# Patient Record
Sex: MALE | Race: WHITE | NOT HISPANIC OR LATINO | Employment: OTHER | URBAN - METROPOLITAN AREA
[De-identification: names, ages, dates, MRNs, and addresses within clinical notes are randomized per-mention and may not be internally consistent; named-entity substitution may affect disease eponyms.]

---

## 2020-11-09 ENCOUNTER — TRANSCRIBE ORDERS (OUTPATIENT)
Dept: ADMINISTRATIVE | Facility: HOSPITAL | Age: 70
End: 2020-11-09

## 2020-11-09 DIAGNOSIS — Z13.6 SCREENING FOR ISCHEMIC HEART DISEASE: Primary | ICD-10-CM

## 2020-12-15 ENCOUNTER — TELEPHONE (OUTPATIENT)
Dept: NEPHROLOGY | Facility: CLINIC | Age: 70
End: 2020-12-15

## 2021-01-11 ENCOUNTER — TRANSCRIBE ORDERS (OUTPATIENT)
Dept: ADMINISTRATIVE | Facility: HOSPITAL | Age: 71
End: 2021-01-11

## 2021-02-19 ENCOUNTER — HOSPITAL ENCOUNTER (INPATIENT)
Facility: HOSPITAL | Age: 71
LOS: 16 days | Discharge: NURSING, REHAB OR LONG-TERM CARE | DRG: 240 | End: 2021-03-07
Attending: EMERGENCY MEDICINE | Admitting: INTERNAL MEDICINE
Payer: COMMERCIAL

## 2021-02-19 ENCOUNTER — APPOINTMENT (EMERGENCY)
Dept: RADIOLOGY | Facility: HOSPITAL | Age: 71
DRG: 240 | End: 2021-02-19
Payer: COMMERCIAL

## 2021-02-19 ENCOUNTER — APPOINTMENT (INPATIENT)
Dept: ULTRASOUND IMAGING | Facility: HOSPITAL | Age: 71
DRG: 240 | End: 2021-02-19
Payer: COMMERCIAL

## 2021-02-19 DIAGNOSIS — R53.1 GENERALIZED WEAKNESS: ICD-10-CM

## 2021-02-19 DIAGNOSIS — E11.51 TYPE 2 DIABETES MELLITUS WITH DIABETIC PERIPHERAL ANGIOPATHY WITHOUT GANGRENE, WITH LONG-TERM CURRENT USE OF INSULIN (HCC): ICD-10-CM

## 2021-02-19 DIAGNOSIS — I73.9 PERIPHERAL ARTERIAL DISEASE (HCC): ICD-10-CM

## 2021-02-19 DIAGNOSIS — Z79.4 TYPE 2 DIABETES MELLITUS WITH DIABETIC PERIPHERAL ANGIOPATHY WITHOUT GANGRENE, WITH LONG-TERM CURRENT USE OF INSULIN (HCC): ICD-10-CM

## 2021-02-19 DIAGNOSIS — M86.9 OSTEOMYELITIS (HCC): ICD-10-CM

## 2021-02-19 DIAGNOSIS — N18.30 STAGE 3 CHRONIC KIDNEY DISEASE (HCC): ICD-10-CM

## 2021-02-19 DIAGNOSIS — I96 GANGRENE OF TOE OF RIGHT FOOT (HCC): Primary | ICD-10-CM

## 2021-02-19 DIAGNOSIS — K59.00 CONSTIPATION: ICD-10-CM

## 2021-02-19 DIAGNOSIS — K21.9 GERD (GASTROESOPHAGEAL REFLUX DISEASE): ICD-10-CM

## 2021-02-19 DIAGNOSIS — H04.123 DRY EYES: ICD-10-CM

## 2021-02-19 DIAGNOSIS — M86.9 OSTEOMYELITIS OF RIGHT FOOT, UNSPECIFIED TYPE (HCC): ICD-10-CM

## 2021-02-19 DIAGNOSIS — Z01.89 ENCOUNTER FOR COMPETENCY EVALUATION: ICD-10-CM

## 2021-02-19 PROBLEM — E03.9 HYPOTHYROIDISM: Status: ACTIVE | Noted: 2021-02-19

## 2021-02-19 PROBLEM — I10 HYPERTENSION: Status: ACTIVE | Noted: 2021-02-19

## 2021-02-19 PROBLEM — E78.5 HYPERLIPIDEMIA: Status: ACTIVE | Noted: 2021-02-19

## 2021-02-19 PROBLEM — IMO0002 TYPE 2 DM MILD NONPROLIFERATIVE RETINOPATHY, MACULAR EDEMA, UNCONTROL: Status: RESOLVED | Noted: 2021-02-19 | Resolved: 2021-02-19

## 2021-02-19 PROBLEM — IMO0002 TYPE 2 DM MILD NONPROLIFERATIVE RETINOPATHY, MACULAR EDEMA, UNCONTROL: Status: ACTIVE | Noted: 2021-02-19

## 2021-02-19 PROBLEM — R26.2 AMBULATORY DYSFUNCTION: Status: ACTIVE | Noted: 2021-02-19

## 2021-02-19 PROBLEM — F32.A DEPRESSION: Status: ACTIVE | Noted: 2021-02-19

## 2021-02-19 PROBLEM — E11.9 TYPE 2 DIABETES MELLITUS, WITH LONG-TERM CURRENT USE OF INSULIN (HCC): Status: ACTIVE | Noted: 2021-02-19

## 2021-02-19 LAB
ALBUMIN SERPL BCP-MCNC: 2.9 G/DL (ref 3.5–5)
ALP SERPL-CCNC: 119 U/L (ref 46–116)
ALT SERPL W P-5'-P-CCNC: 18 U/L (ref 12–78)
ANION GAP SERPL CALCULATED.3IONS-SCNC: 13 MMOL/L (ref 4–13)
APTT PPP: 33 SECONDS (ref 23–37)
AST SERPL W P-5'-P-CCNC: 10 U/L (ref 5–45)
BACTERIA UR QL AUTO: NORMAL /HPF
BASE EX.OXY STD BLDV CALC-SCNC: 28 % (ref 60–80)
BASE EXCESS BLDV CALC-SCNC: -4.9 MMOL/L
BASOPHILS # BLD AUTO: 0.11 THOUSANDS/ΜL (ref 0–0.1)
BASOPHILS NFR BLD AUTO: 1 % (ref 0–1)
BETA-HYDROXYBUTYRATE: 1.4 MMOL/L
BILIRUB SERPL-MCNC: 0.34 MG/DL (ref 0.2–1)
BILIRUB UR QL STRIP: NEGATIVE
BUN SERPL-MCNC: 30 MG/DL (ref 5–25)
CALCIUM ALBUM COR SERPL-MCNC: 10 MG/DL (ref 8.3–10.1)
CALCIUM SERPL-MCNC: 9.1 MG/DL (ref 8.3–10.1)
CHLORIDE SERPL-SCNC: 95 MMOL/L (ref 100–108)
CLARITY UR: CLEAR
CO2 SERPL-SCNC: 22 MMOL/L (ref 21–32)
COLOR UR: YELLOW
CREAT SERPL-MCNC: 1.93 MG/DL (ref 0.6–1.3)
EOSINOPHIL # BLD AUTO: 0.04 THOUSAND/ΜL (ref 0–0.61)
EOSINOPHIL NFR BLD AUTO: 0 % (ref 0–6)
ERYTHROCYTE [DISTWIDTH] IN BLOOD BY AUTOMATED COUNT: 12.2 % (ref 11.6–15.1)
GFR SERPL CREATININE-BSD FRML MDRD: 35 ML/MIN/1.73SQ M
GLUCOSE SERPL-MCNC: 408 MG/DL (ref 65–140)
GLUCOSE SERPL-MCNC: 415 MG/DL (ref 65–140)
GLUCOSE UR STRIP-MCNC: ABNORMAL MG/DL
HCO3 BLDV-SCNC: 21.2 MMOL/L (ref 24–30)
HCT VFR BLD AUTO: 35.7 % (ref 36.5–49.3)
HGB BLD-MCNC: 11.9 G/DL (ref 12–17)
HGB UR QL STRIP.AUTO: ABNORMAL
IMM GRANULOCYTES # BLD AUTO: 0.16 THOUSAND/UL (ref 0–0.2)
IMM GRANULOCYTES NFR BLD AUTO: 1 % (ref 0–2)
INR PPP: 1.12 (ref 0.84–1.19)
KETONES UR STRIP-MCNC: ABNORMAL MG/DL
LACTATE SERPL-SCNC: 1.4 MMOL/L (ref 0.5–2)
LEUKOCYTE ESTERASE UR QL STRIP: ABNORMAL
LYMPHOCYTES # BLD AUTO: 1.56 THOUSANDS/ΜL (ref 0.6–4.47)
LYMPHOCYTES NFR BLD AUTO: 6 % (ref 14–44)
MCH RBC QN AUTO: 29.3 PG (ref 26.8–34.3)
MCHC RBC AUTO-ENTMCNC: 33.3 G/DL (ref 31.4–37.4)
MCV RBC AUTO: 88 FL (ref 82–98)
MONOCYTES # BLD AUTO: 2.13 THOUSAND/ΜL (ref 0.17–1.22)
MONOCYTES NFR BLD AUTO: 9 % (ref 4–12)
NEUTROPHILS # BLD AUTO: 20.24 THOUSANDS/ΜL (ref 1.85–7.62)
NEUTS SEG NFR BLD AUTO: 83 % (ref 43–75)
NITRITE UR QL STRIP: NEGATIVE
NON-SQ EPI CELLS URNS QL MICRO: NORMAL /HPF
NRBC BLD AUTO-RTO: 0 /100 WBCS
O2 CT BLDV-SCNC: 4.8 ML/DL
PCO2 BLDV: 43.2 MM HG (ref 42–50)
PH BLDV: 7.31 [PH] (ref 7.3–7.4)
PH UR STRIP.AUTO: 5.5 [PH]
PLATELET # BLD AUTO: 365 THOUSANDS/UL (ref 149–390)
PMV BLD AUTO: 10.9 FL (ref 8.9–12.7)
PO2 BLDV: 18.1 MM HG (ref 35–45)
POTASSIUM SERPL-SCNC: 5.1 MMOL/L (ref 3.5–5.3)
PROCALCITONIN SERPL-MCNC: 0.1 NG/ML
PROT SERPL-MCNC: 7.8 G/DL (ref 6.4–8.2)
PROT UR STRIP-MCNC: ABNORMAL MG/DL
PROTHROMBIN TIME: 14.5 SECONDS (ref 11.6–14.5)
RBC # BLD AUTO: 4.06 MILLION/UL (ref 3.88–5.62)
RBC #/AREA URNS AUTO: NORMAL /HPF
SODIUM SERPL-SCNC: 130 MMOL/L (ref 136–145)
SP GR UR STRIP.AUTO: 1.02 (ref 1–1.03)
TROPONIN I SERPL-MCNC: <0.02 NG/ML
UROBILINOGEN UR QL STRIP.AUTO: 0.2 E.U./DL
WBC # BLD AUTO: 24.24 THOUSAND/UL (ref 4.31–10.16)
WBC #/AREA URNS AUTO: NORMAL /HPF

## 2021-02-19 PROCEDURE — 85610 PROTHROMBIN TIME: CPT | Performed by: PHYSICIAN ASSISTANT

## 2021-02-19 PROCEDURE — 93923 UPR/LXTR ART STDY 3+ LVLS: CPT

## 2021-02-19 PROCEDURE — 85025 COMPLETE CBC W/AUTO DIFF WBC: CPT | Performed by: PHYSICIAN ASSISTANT

## 2021-02-19 PROCEDURE — 99285 EMERGENCY DEPT VISIT HI MDM: CPT

## 2021-02-19 PROCEDURE — 82948 REAGENT STRIP/BLOOD GLUCOSE: CPT

## 2021-02-19 PROCEDURE — 82010 KETONE BODYS QUAN: CPT | Performed by: PHYSICIAN ASSISTANT

## 2021-02-19 PROCEDURE — 82805 BLOOD GASES W/O2 SATURATION: CPT | Performed by: PHYSICIAN ASSISTANT

## 2021-02-19 PROCEDURE — 84145 PROCALCITONIN (PCT): CPT | Performed by: PHYSICIAN ASSISTANT

## 2021-02-19 PROCEDURE — 93925 LOWER EXTREMITY STUDY: CPT

## 2021-02-19 PROCEDURE — 81001 URINALYSIS AUTO W/SCOPE: CPT | Performed by: PHYSICIAN ASSISTANT

## 2021-02-19 PROCEDURE — 71045 X-RAY EXAM CHEST 1 VIEW: CPT

## 2021-02-19 PROCEDURE — 99223 1ST HOSP IP/OBS HIGH 75: CPT | Performed by: INTERNAL MEDICINE

## 2021-02-19 PROCEDURE — 87040 BLOOD CULTURE FOR BACTERIA: CPT | Performed by: PHYSICIAN ASSISTANT

## 2021-02-19 PROCEDURE — 73660 X-RAY EXAM OF TOE(S): CPT

## 2021-02-19 PROCEDURE — 96365 THER/PROPH/DIAG IV INF INIT: CPT

## 2021-02-19 PROCEDURE — 85730 THROMBOPLASTIN TIME PARTIAL: CPT | Performed by: PHYSICIAN ASSISTANT

## 2021-02-19 PROCEDURE — 99285 EMERGENCY DEPT VISIT HI MDM: CPT | Performed by: PHYSICIAN ASSISTANT

## 2021-02-19 PROCEDURE — 80053 COMPREHEN METABOLIC PANEL: CPT | Performed by: PHYSICIAN ASSISTANT

## 2021-02-19 PROCEDURE — 83605 ASSAY OF LACTIC ACID: CPT | Performed by: PHYSICIAN ASSISTANT

## 2021-02-19 PROCEDURE — 93005 ELECTROCARDIOGRAM TRACING: CPT

## 2021-02-19 PROCEDURE — 36415 COLL VENOUS BLD VENIPUNCTURE: CPT | Performed by: PHYSICIAN ASSISTANT

## 2021-02-19 PROCEDURE — 84484 ASSAY OF TROPONIN QUANT: CPT | Performed by: PHYSICIAN ASSISTANT

## 2021-02-19 RX ORDER — ATORVASTATIN CALCIUM 40 MG/1
40 TABLET, FILM COATED ORAL DAILY
COMMUNITY

## 2021-02-19 RX ORDER — ATENOLOL 25 MG/1
25 TABLET ORAL DAILY
COMMUNITY

## 2021-02-19 RX ORDER — AMLODIPINE BESYLATE 10 MG/1
10 TABLET ORAL DAILY
Status: DISCONTINUED | OUTPATIENT
Start: 2021-02-20 | End: 2021-02-22

## 2021-02-19 RX ORDER — SODIUM CHLORIDE 9 MG/ML
100 INJECTION, SOLUTION INTRAVENOUS CONTINUOUS
Status: DISCONTINUED | OUTPATIENT
Start: 2021-02-19 | End: 2021-02-21

## 2021-02-19 RX ORDER — TRAZODONE HYDROCHLORIDE 50 MG/1
50 TABLET ORAL
Status: DISCONTINUED | OUTPATIENT
Start: 2021-02-19 | End: 2021-03-07 | Stop reason: HOSPADM

## 2021-02-19 RX ORDER — CEFAZOLIN SODIUM 2 G/50ML
2000 SOLUTION INTRAVENOUS EVERY 8 HOURS
Status: DISCONTINUED | OUTPATIENT
Start: 2021-02-19 | End: 2021-02-24

## 2021-02-19 RX ORDER — LEVOTHYROXINE SODIUM 175 UG/1
175 TABLET ORAL DAILY
COMMUNITY

## 2021-02-19 RX ORDER — OXYCODONE HYDROCHLORIDE 5 MG/1
5 TABLET ORAL EVERY 4 HOURS PRN
Status: DISCONTINUED | OUTPATIENT
Start: 2021-02-19 | End: 2021-03-07 | Stop reason: HOSPADM

## 2021-02-19 RX ORDER — OXYCODONE HYDROCHLORIDE 5 MG/1
2.5 TABLET ORAL EVERY 4 HOURS PRN
Status: DISCONTINUED | OUTPATIENT
Start: 2021-02-19 | End: 2021-03-07 | Stop reason: HOSPADM

## 2021-02-19 RX ORDER — ATENOLOL 25 MG/1
25 TABLET ORAL DAILY
Status: DISCONTINUED | OUTPATIENT
Start: 2021-02-20 | End: 2021-03-07 | Stop reason: HOSPADM

## 2021-02-19 RX ORDER — AMLODIPINE BESYLATE 10 MG/1
10 TABLET ORAL DAILY
COMMUNITY

## 2021-02-19 RX ORDER — LISINOPRIL 20 MG/1
20 TABLET ORAL DAILY
COMMUNITY

## 2021-02-19 RX ORDER — POLYETHYLENE GLYCOL 3350 17 G/17G
17 POWDER, FOR SOLUTION ORAL DAILY PRN
Status: DISCONTINUED | OUTPATIENT
Start: 2021-02-19 | End: 2021-03-07 | Stop reason: HOSPADM

## 2021-02-19 RX ORDER — INSULIN GLARGINE 100 [IU]/ML
30 INJECTION, SOLUTION SUBCUTANEOUS
Status: DISCONTINUED | OUTPATIENT
Start: 2021-02-19 | End: 2021-02-20

## 2021-02-19 RX ORDER — ACETAMINOPHEN 325 MG/1
975 TABLET ORAL EVERY 8 HOURS SCHEDULED
Status: DISCONTINUED | OUTPATIENT
Start: 2021-02-19 | End: 2021-03-07 | Stop reason: HOSPADM

## 2021-02-19 RX ORDER — CITALOPRAM 20 MG/1
20 TABLET ORAL DAILY
COMMUNITY

## 2021-02-19 RX ORDER — LEVOTHYROXINE SODIUM 175 UG/1
175 TABLET ORAL
Status: DISCONTINUED | OUTPATIENT
Start: 2021-02-20 | End: 2021-03-07 | Stop reason: HOSPADM

## 2021-02-19 RX ORDER — GABAPENTIN 300 MG/1
300 CAPSULE ORAL DAILY
COMMUNITY
End: 2021-03-07 | Stop reason: HOSPADM

## 2021-02-19 RX ORDER — ATORVASTATIN CALCIUM 40 MG/1
40 TABLET, FILM COATED ORAL
Status: DISCONTINUED | OUTPATIENT
Start: 2021-02-20 | End: 2021-03-07 | Stop reason: HOSPADM

## 2021-02-19 RX ORDER — HYDROXYZINE 50 MG/1
50 TABLET, FILM COATED ORAL DAILY
COMMUNITY

## 2021-02-19 RX ORDER — HYDROMORPHONE HCL/PF 1 MG/ML
0.2 SYRINGE (ML) INJECTION EVERY 4 HOURS PRN
Status: DISCONTINUED | OUTPATIENT
Start: 2021-02-19 | End: 2021-03-05

## 2021-02-19 RX ORDER — CITALOPRAM 20 MG/1
20 TABLET ORAL DAILY
Status: DISCONTINUED | OUTPATIENT
Start: 2021-02-20 | End: 2021-03-07 | Stop reason: HOSPADM

## 2021-02-19 RX ORDER — PREGABALIN 50 MG/1
50 CAPSULE ORAL DAILY
COMMUNITY

## 2021-02-19 RX ORDER — TRAZODONE HYDROCHLORIDE 50 MG/1
50 TABLET ORAL
COMMUNITY

## 2021-02-19 RX ADMIN — CEFAZOLIN SODIUM 2000 MG: 2 SOLUTION INTRAVENOUS at 20:38

## 2021-02-19 RX ADMIN — METRONIDAZOLE 500 MG: 500 INJECTION, SOLUTION INTRAVENOUS at 22:09

## 2021-02-19 RX ADMIN — SODIUM CHLORIDE 100 ML/HR: 0.9 INJECTION, SOLUTION INTRAVENOUS at 20:38

## 2021-02-19 RX ADMIN — INSULIN GLARGINE 30 UNITS: 100 INJECTION, SOLUTION SUBCUTANEOUS at 22:09

## 2021-02-19 RX ADMIN — OXYCODONE HYDROCHLORIDE 5 MG: 5 TABLET ORAL at 20:36

## 2021-02-19 RX ADMIN — VANCOMYCIN HYDROCHLORIDE 1750 MG: 1 INJECTION, POWDER, LYOPHILIZED, FOR SOLUTION INTRAVENOUS at 16:40

## 2021-02-19 RX ADMIN — TRAZODONE HYDROCHLORIDE 50 MG: 50 TABLET ORAL at 22:08

## 2021-02-19 RX ADMIN — ACETAMINOPHEN 975 MG: 325 TABLET, FILM COATED ORAL at 22:08

## 2021-02-19 RX ADMIN — CEFTRIAXONE 2000 MG: 2 INJECTION, POWDER, FOR SOLUTION INTRAMUSCULAR; INTRAVENOUS at 15:56

## 2021-02-19 NOTE — ED ATTENDING ATTESTATION
2/19/2021  Ck Villa DO, saw and evaluated the patient  I have discussed the patient with the resident/non-physician practitioner and agree with the resident's/non-physician practitioner's findings, Plan of Care, and MDM as documented in the resident's/non-physician practitioner's note, except where noted  All available labs and Radiology studies were reviewed  I was present for key portions of any procedure(s) performed by the resident/non-physician practitioner and I was immediately available to provide assistance  At this point I agree with the current assessment done in the Emergency Department  I have conducted an independent evaluation of this patient a history and physical is as follows:      Patient appears older than stated age  Right second toe is blackened and edematous with purulent drainage at the base of the phalanx  There is also an area of open skin  ED Course     3:22 PM  Patient still does not have vital signs documented after being here for an hour and 15 minutes  Asked the nurse again to please obtain vital signs  Critical Care Time  ECG 12 Lead Documentation Only    Date/Time: 2/19/2021 3:48 PM  Performed by: Jayla Dickerson DO  Authorized by: Ravi Villa DO     Indications / Diagnosis:  Generalized weakness  ECG reviewed by me, the ED Provider: yes    Patient location:  ED  Comments:      Normal sinus rhythm at 78 beats per minute  Normal axis, normal intervals, no ST T wave abnormalities suggestive of ischemia  QTC is normal   No old available for comparison  A/P  1) Right 2nd toe gangrene with osteomyelitis - start antibiotics  Admit to [facility]  Podiatry consulted

## 2021-02-19 NOTE — Clinical Note
Case was discussed with SUNITA and the patient's admission status was agreed to be Admission Status: inpatient status to the service of Dr Franko Maret

## 2021-02-19 NOTE — SEPSIS NOTE
Sepsis Note   Avtar Starks 78 y o  male MRN: [id number]  Unit/Bed#: ED 06 Encounter: [id number]      qSOFA     Row Name 02/19/21 1541                Altered mental status GCS < 15  --        Respiratory Rate > / =59  0        Systolic BP < / =304  0        Q Sofa Score  0            Initial Sepsis Screening     Row Name 02/19/21 1642                Is the patient's history suggestive of a new or worsening infection? (!) Yes (Proceed)  -SD        Suspected source of infection  soft tissue  -SD        Are two or more of the following signs & symptoms of infection both present and new to the patient? No  -SD        Indicate SIRS criteria  Leukocytosis (WBC > 18918 IJL)  -SD        If the answer is yes to both questions, suspicion of sepsis is present  --        If severe sepsis is present AND tissue hypoperfusion perists in the hour after fluid resuscitation or lactate > 4, the patient meets criteria for SEPTIC SHOCK  --        Are any of the following organ dysfunction criteria present within 6 hours of suspected infection and SIRS criteria that are NOT considered to be chronic conditions?   --        Organ dysfunction  --        Date of presentation of severe sepsis  --        Time of presentation of severe sepsis  --        Tissue hypoperfusion persists in the hour after crystalloid fluid administration, evidenced, by either:  --        Was hypotension present within one hour of the conclusion of crystalloid fluid administration?  --        Date of presentation of septic shock  --        Time of presentation of septic shock  --          User Key  (r) = Recorded By, (t) = Taken By, (c) = Cosigned By    [address] Name Provider Type    ANNA Lopez PA-C Physician Assistant

## 2021-02-20 ENCOUNTER — ANESTHESIA EVENT (INPATIENT)
Dept: PERIOP | Facility: HOSPITAL | Age: 71
DRG: 240 | End: 2021-02-20
Payer: COMMERCIAL

## 2021-02-20 PROBLEM — D64.9 ANEMIA: Status: ACTIVE | Noted: 2021-02-20

## 2021-02-20 PROBLEM — E87.1 HYPONATREMIA: Status: ACTIVE | Noted: 2021-02-20

## 2021-02-20 LAB
ALBUMIN SERPL BCP-MCNC: 2.4 G/DL (ref 3.5–5)
ALP SERPL-CCNC: 105 U/L (ref 46–116)
ALT SERPL W P-5'-P-CCNC: 13 U/L (ref 12–78)
ANION GAP SERPL CALCULATED.3IONS-SCNC: 13 MMOL/L (ref 4–13)
AST SERPL W P-5'-P-CCNC: 9 U/L (ref 5–45)
ATRIAL RATE: 81 BPM
BASOPHILS # BLD AUTO: 0.1 THOUSANDS/ΜL (ref 0–0.1)
BASOPHILS NFR BLD AUTO: 0 % (ref 0–1)
BILIRUB SERPL-MCNC: 0.28 MG/DL (ref 0.2–1)
BUN SERPL-MCNC: 31 MG/DL (ref 5–25)
CALCIUM ALBUM COR SERPL-MCNC: 9.8 MG/DL (ref 8.3–10.1)
CALCIUM SERPL-MCNC: 8.5 MG/DL (ref 8.3–10.1)
CHLORIDE SERPL-SCNC: 99 MMOL/L (ref 100–108)
CO2 SERPL-SCNC: 18 MMOL/L (ref 21–32)
CREAT SERPL-MCNC: 1.7 MG/DL (ref 0.6–1.3)
EOSINOPHIL # BLD AUTO: 0.05 THOUSAND/ΜL (ref 0–0.61)
EOSINOPHIL NFR BLD AUTO: 0 % (ref 0–6)
ERYTHROCYTE [DISTWIDTH] IN BLOOD BY AUTOMATED COUNT: 12.4 % (ref 11.6–15.1)
GFR SERPL CREATININE-BSD FRML MDRD: 41 ML/MIN/1.73SQ M
GLUCOSE SERPL-MCNC: 207 MG/DL (ref 65–140)
GLUCOSE SERPL-MCNC: 229 MG/DL (ref 65–140)
GLUCOSE SERPL-MCNC: 238 MG/DL (ref 65–140)
GLUCOSE SERPL-MCNC: 255 MG/DL (ref 65–140)
GLUCOSE SERPL-MCNC: 390 MG/DL (ref 65–140)
GLUCOSE SERPL-MCNC: 395 MG/DL (ref 65–140)
HCT VFR BLD AUTO: 33.7 % (ref 36.5–49.3)
HGB BLD-MCNC: 11.4 G/DL (ref 12–17)
IMM GRANULOCYTES # BLD AUTO: 0.16 THOUSAND/UL (ref 0–0.2)
IMM GRANULOCYTES NFR BLD AUTO: 1 % (ref 0–2)
LYMPHOCYTES # BLD AUTO: 1.58 THOUSANDS/ΜL (ref 0.6–4.47)
LYMPHOCYTES NFR BLD AUTO: 7 % (ref 14–44)
MCH RBC QN AUTO: 29.8 PG (ref 26.8–34.3)
MCHC RBC AUTO-ENTMCNC: 33.8 G/DL (ref 31.4–37.4)
MCV RBC AUTO: 88 FL (ref 82–98)
MONOCYTES # BLD AUTO: 1.76 THOUSAND/ΜL (ref 0.17–1.22)
MONOCYTES NFR BLD AUTO: 8 % (ref 4–12)
NEUTROPHILS # BLD AUTO: 19.93 THOUSANDS/ΜL (ref 1.85–7.62)
NEUTS SEG NFR BLD AUTO: 84 % (ref 43–75)
NRBC BLD AUTO-RTO: 0 /100 WBCS
P AXIS: 60 DEGREES
PLATELET # BLD AUTO: 330 THOUSANDS/UL (ref 149–390)
PMV BLD AUTO: 10.7 FL (ref 8.9–12.7)
POTASSIUM SERPL-SCNC: 4.5 MMOL/L (ref 3.5–5.3)
PR INTERVAL: 146 MS
PROCALCITONIN SERPL-MCNC: 0.13 NG/ML
PROT SERPL-MCNC: 6.8 G/DL (ref 6.4–8.2)
QRS AXIS: -2 DEGREES
QRSD INTERVAL: 92 MS
QT INTERVAL: 360 MS
QTC INTERVAL: 410 MS
RBC # BLD AUTO: 3.83 MILLION/UL (ref 3.88–5.62)
SODIUM SERPL-SCNC: 130 MMOL/L (ref 136–145)
T WAVE AXIS: 48 DEGREES
VENTRICULAR RATE: 78 BPM
WBC # BLD AUTO: 23.58 THOUSAND/UL (ref 4.31–10.16)

## 2021-02-20 PROCEDURE — 84145 PROCALCITONIN (PCT): CPT | Performed by: PHYSICIAN ASSISTANT

## 2021-02-20 PROCEDURE — 82948 REAGENT STRIP/BLOOD GLUCOSE: CPT

## 2021-02-20 PROCEDURE — 93010 ELECTROCARDIOGRAM REPORT: CPT | Performed by: INTERNAL MEDICINE

## 2021-02-20 PROCEDURE — 99223 1ST HOSP IP/OBS HIGH 75: CPT | Performed by: INTERNAL MEDICINE

## 2021-02-20 PROCEDURE — 99232 SBSQ HOSP IP/OBS MODERATE 35: CPT | Performed by: SURGERY

## 2021-02-20 PROCEDURE — 93925 LOWER EXTREMITY STUDY: CPT | Performed by: SURGERY

## 2021-02-20 PROCEDURE — 99222 1ST HOSP IP/OBS MODERATE 55: CPT | Performed by: PODIATRIST

## 2021-02-20 PROCEDURE — 85025 COMPLETE CBC W/AUTO DIFF WBC: CPT | Performed by: INTERNAL MEDICINE

## 2021-02-20 PROCEDURE — 80053 COMPREHEN METABOLIC PANEL: CPT | Performed by: INTERNAL MEDICINE

## 2021-02-20 PROCEDURE — 93922 UPR/L XTREMITY ART 2 LEVELS: CPT | Performed by: SURGERY

## 2021-02-20 PROCEDURE — 99232 SBSQ HOSP IP/OBS MODERATE 35: CPT | Performed by: INTERNAL MEDICINE

## 2021-02-20 RX ORDER — INSULIN GLARGINE 100 [IU]/ML
30 INJECTION, SOLUTION SUBCUTANEOUS
Status: DISCONTINUED | OUTPATIENT
Start: 2021-02-20 | End: 2021-02-22

## 2021-02-20 RX ORDER — INSULIN GLARGINE 100 [IU]/ML
42 INJECTION, SOLUTION SUBCUTANEOUS
Status: DISCONTINUED | OUTPATIENT
Start: 2021-02-20 | End: 2021-02-20

## 2021-02-20 RX ADMIN — ENOXAPARIN SODIUM 40 MG: 40 INJECTION SUBCUTANEOUS at 08:54

## 2021-02-20 RX ADMIN — METRONIDAZOLE 500 MG: 500 INJECTION, SOLUTION INTRAVENOUS at 12:37

## 2021-02-20 RX ADMIN — METRONIDAZOLE 500 MG: 500 INJECTION, SOLUTION INTRAVENOUS at 05:47

## 2021-02-20 RX ADMIN — INSULIN LISPRO 4 UNITS: 100 INJECTION, SOLUTION INTRAVENOUS; SUBCUTANEOUS at 01:01

## 2021-02-20 RX ADMIN — ATENOLOL 25 MG: 25 TABLET ORAL at 08:55

## 2021-02-20 RX ADMIN — ATORVASTATIN CALCIUM 40 MG: 40 TABLET, FILM COATED ORAL at 16:53

## 2021-02-20 RX ADMIN — INSULIN GLARGINE 30 UNITS: 100 INJECTION, SOLUTION SUBCUTANEOUS at 21:31

## 2021-02-20 RX ADMIN — SODIUM CHLORIDE 100 ML/HR: 0.9 INJECTION, SOLUTION INTRAVENOUS at 08:53

## 2021-02-20 RX ADMIN — INSULIN LISPRO 2 UNITS: 100 INJECTION, SOLUTION INTRAVENOUS; SUBCUTANEOUS at 21:31

## 2021-02-20 RX ADMIN — TIOTROPIUM BROMIDE 18 MCG: 18 CAPSULE ORAL; RESPIRATORY (INHALATION) at 08:55

## 2021-02-20 RX ADMIN — CITALOPRAM HYDROBROMIDE 20 MG: 20 TABLET ORAL at 08:54

## 2021-02-20 RX ADMIN — CEFAZOLIN SODIUM 2000 MG: 2 SOLUTION INTRAVENOUS at 05:02

## 2021-02-20 RX ADMIN — ACETAMINOPHEN 975 MG: 325 TABLET, FILM COATED ORAL at 05:47

## 2021-02-20 RX ADMIN — CEFAZOLIN SODIUM 2000 MG: 2 SOLUTION INTRAVENOUS at 11:41

## 2021-02-20 RX ADMIN — METRONIDAZOLE 500 MG: 500 INJECTION, SOLUTION INTRAVENOUS at 20:49

## 2021-02-20 RX ADMIN — LEVOTHYROXINE SODIUM 175 MCG: 175 TABLET ORAL at 05:47

## 2021-02-20 RX ADMIN — INSULIN LISPRO 3 UNITS: 100 INJECTION, SOLUTION INTRAVENOUS; SUBCUTANEOUS at 11:41

## 2021-02-20 RX ADMIN — CEFAZOLIN SODIUM 2000 MG: 2 SOLUTION INTRAVENOUS at 19:01

## 2021-02-20 RX ADMIN — INSULIN LISPRO 5 UNITS: 100 INJECTION, SOLUTION INTRAVENOUS; SUBCUTANEOUS at 07:22

## 2021-02-20 RX ADMIN — INSULIN LISPRO 10 UNITS: 100 INJECTION, SOLUTION INTRAVENOUS; SUBCUTANEOUS at 07:24

## 2021-02-20 RX ADMIN — AMLODIPINE BESYLATE 10 MG: 10 TABLET ORAL at 08:54

## 2021-02-20 RX ADMIN — SODIUM CHLORIDE 100 ML/HR: 0.9 INJECTION, SOLUTION INTRAVENOUS at 20:49

## 2021-02-20 RX ADMIN — INSULIN LISPRO 2 UNITS: 100 INJECTION, SOLUTION INTRAVENOUS; SUBCUTANEOUS at 16:53

## 2021-02-20 RX ADMIN — OXYCODONE HYDROCHLORIDE 5 MG: 5 TABLET ORAL at 07:24

## 2021-02-20 RX ADMIN — OXYCODONE HYDROCHLORIDE 5 MG: 5 TABLET ORAL at 12:00

## 2021-02-20 RX ADMIN — ACETAMINOPHEN 975 MG: 325 TABLET, FILM COATED ORAL at 14:34

## 2021-02-20 RX ADMIN — ACETAMINOPHEN 975 MG: 325 TABLET, FILM COATED ORAL at 21:31

## 2021-02-20 RX ADMIN — TRAZODONE HYDROCHLORIDE 50 MG: 50 TABLET ORAL at 21:31

## 2021-02-20 NOTE — ASSESSMENT & PLAN NOTE
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

## 2021-02-20 NOTE — ASSESSMENT & PLAN NOTE
· Necrosis of the right 2nd digit (distal phalanx), proximal phalanx erythema toes, warmth, tender  Inflammatory signs present of the plantar surface as well  · X-ray of the right foot showed no evidence of osteomyelitis, 2nd PIP joint dislocation  · Patient does not meet sepsis/SIRS criteria  Afebrile, stable vital signs  · WBC 24 2  · Lactic acid within normal limits    Plan:  · Ancef 2 g IV, Flagyl 500 mg IV  · Geriatric pain management  · Wound care  · Podiatry and ID consults  · Appreciate Vascular surgery consult   · Lower extremity arterial Doppler:  Follow-up results    · Monitor vital signs  · Monitor WBC

## 2021-02-20 NOTE — ASSESSMENT & PLAN NOTE
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

## 2021-02-20 NOTE — UTILIZATION REVIEW
Initial Clinical Review    Admission: Date/Time/Statement:   Admission Orders (From admission, onward)     Ordered        02/19/21 1736  Inpatient Admission  Once                   Orders Placed This Encounter   Procedures    Inpatient Admission     Standing Status:   Standing     Number of Occurrences:   1     Order Specific Question:   Level of Care     Answer:   Med Surg [16]     Order Specific Question:   Estimated length of stay     Answer:   More than 2 Midnights     Order Specific Question:   Certification     Answer:   I certify that inpatient services are medically necessary for this patient for a duration of greater than two midnights  See H&P and MD Progress Notes for additional information about the patient's course of treatment  ED Arrival Information     Expected Arrival Acuity Means of Arrival Escorted By Service Admission Type    - 2/19/2021 14:09 Urgent Ambulance [facility] EMS Hospitalist Urgent    Arrival Complaint    general weakness        Chief Complaint   Patient presents with    Weakness - Generalized     PT comes to ED with c/o increasing weakness "for many weeks, today I just cant get around anymore" Per EMS "brother told us that his big toe on right foot is black" (PT is diabetic BS for EMS was 406)     Assessment/Plan:   79 yo male,  To ER via EMS From home,   Admitted IP status, medsurg level of care for treatment of  Rt 2nd toe  Osteomyelitis  In setting of  hyperglycemia, leukocytosis, elevated acetone and elevated crt  H/o DMT2 & CKD 3  (unknown baseline)   Reports injured 2nd toe Rt foot few wks prior  (fall)    Progressively turning black since  For last 3 days has felt ill - did not take his insulin or other meds, today unable to get out of bed d/t weakness  Pseudohyponatremia in the setting of hyperglycemia     EXAM:  Dry mucous membranes, Rt 2nd toe  W/obvious deformity, gangrenous, foul smelling  with surrounding erythema on plantar & dorsal surface of the foot   Dorsalis pedis on right not palpable  Will initiate  IVABs,  Provide pain management & wound care,  Consult podiatry/ID/vascular  Manage BS w/basal and SSI per qid accucks  Trend VS, CBC,  Renal indices - HOLD lisinopril, avoid hypotension  2/20  Vascular surgery consult:  Obtain b/l  Lower extremity arterial Doppler study; Toe/wound care per podiatry   2/20   INTERNAL MED:  crt improved w/IVF, cont  Increase basal insulin  PT/OT after possible toe amputation     2/20  Podiatry:  Acute osteomyelitis right 2nd toe with gangrene and exposed bone,  Cellulitis right foot, suspicious for abscess, PAD      limb threatening right foot infection -  will need open 2nd ray amputation / I&D to control the source of the infection before possible vascular intervention  NPO after midnight    Plan OR tomorrow      ED Triage Vitals   Temperature Pulse Respirations Blood Pressure SpO2   02/19/21 1541 02/19/21 1541 02/19/21 1541 02/19/21 1541 02/19/21 1541   98 °F (36 7 °C) 78 14 147/65 98 %      Temp Source Heart Rate Source Patient Position - Orthostatic VS BP Location FiO2 (%)   02/19/21 1926 -- 02/19/21 1926 02/19/21 1541 --   Oral  Lying Right arm       Pain Score       02/19/21 1926       Worst Possible Pain          Wt Readings from Last 1 Encounters:   02/19/21 95 3 kg (210 lb)     Additional Vital Signs:     02/19/21 1926  97 7 °F (36 5 °C)  83  14  144/82  94 %  None (Room air)  Lying     02/20/21 0700  97 6 °F (36 4 °C)  82  18  178/73Abnormal   91 %  None (Room air)  Lying   02/20/21 0300  98 1 °F (36 7 °C)  77  16  171/69Abnormal   95 %  None (Room air)         Pertinent Labs/Diagnostic Test Results:   2/19  ekg - nsr   2/19  CXR -  nad  2/19  Xray  2nd toe of the right foot showed no evidence of osteomyelitis, 2nd PIP joint dislocation      Results from last 7 days   Lab Units 02/20/21  0438 02/19/21  1547   WBC Thousand/uL 23 58* 24 24*   HEMOGLOBIN g/dL 11 4* 11 9*   HEMATOCRIT % 33 7* 35 7*   PLATELETS Thousands/uL 330 365   NEUTROS ABS Thousands/µL 19 93* 20 24*         Results from last 7 days   Lab Units 02/20/21  0438 02/19/21  1547   SODIUM mmol/L 130* 130*   POTASSIUM mmol/L 4 5 5 1   CHLORIDE mmol/L 99* 95*   CO2 mmol/L 18* 22   ANION GAP mmol/L 13 13   BUN mg/dL 31* 30*   CREATININE mg/dL 1 70* 1 93*   EGFR ml/min/1 73sq m 41 35   CALCIUM mg/dL 8 5 9 1     Results from last 7 days   Lab Units 02/20/21  0438 02/19/21  1547   AST U/L 9 10   ALT U/L 13 18   ALK PHOS U/L 105 119*   TOTAL PROTEIN g/dL 6 8 7 8   ALBUMIN g/dL 2 4* 2 9*   TOTAL BILIRUBIN mg/dL 0 28 0 34     Results from last 7 days   Lab Units 02/20/21  1126 02/20/21  0717 02/19/21  2110   POC GLUCOSE mg/dl 229* 395* 408*     Results from last 7 days   Lab Units 02/20/21  0438 02/19/21  1547   GLUCOSE RANDOM mg/dL 390* 415*     BETA-HYDROXYBUTYRATE   Date Value Ref Range Status   02/19/2021 1 4 (H) <0 6 mmol/L Final          Results from last 7 days   Lab Units 02/19/21  1547   PH DHAVAL  7 309   PCO2 DHAVAL mm Hg 43 2   PO2 DHAVAL mm Hg 18 1*   HCO3 DHAVAL mmol/L 21 2*   BASE EXC DHAVAL mmol/L -4 9   O2 CONTENT DHAVAL ml/dL 4 8   O2 HGB, VENOUS % 28 0*     Results from last 7 days   Lab Units 02/19/21  1547   TROPONIN I ng/mL <0 02         Results from last 7 days   Lab Units 02/19/21  1547   PROTIME seconds 14 5   INR  1 12   PTT seconds 33         Results from last 7 days   Lab Units 02/19/21  1547   PROCALCITONIN ng/ml 0 10     Results from last 7 days   Lab Units 02/19/21  1547   LACTIC ACID mmol/L 1 4     Results from last 7 days   Lab Units 02/19/21  1911   CLARITY UA  Clear   COLOR UA  Yellow   SPEC GRAV UA  1 025   PH UA  5 5   GLUCOSE UA mg/dl >=1000 (1%)*   KETONES UA mg/dl Trace*   BLOOD UA  Small*   PROTEIN UA mg/dl 30 (1+)*   NITRITE UA  Negative   BILIRUBIN UA  Negative   UROBILINOGEN UA E U /dl 0 2   LEUKOCYTES UA  Elevated glucose may cause decreased leukocyte values   See urine microscopic for [facility] result/*   WBC UA /hpf 0-1   RBC UA /hpf 1-2 BACTERIA UA /hpf Occasional   EPITHELIAL CELLS WET PREP /hpf Occasional     Results from last 7 days   Lab Units 02/19/21  1555 02/19/21  1540   BLOOD CULTURE  Received in Microbiology Lab  Culture in Progress  Received in Microbiology Lab  Culture in Progress  ED Treatment:   Medication Administration from 02/19/2021 1409 to 02/19/2021 1919       Date/Time Order Dose Route Action     02/19/2021 1556 cefTRIAXone (ROCEPHIN) 2,000 mg in dextrose 5 % 50 mL IVPB 2,000 mg Intravenous New Bag     02/19/2021 1640 vancomycin (VANCOCIN) 1,750 mg in sodium chloride 0 9 % 500 mL IVPB 1,750 mg Intravenous New Bag        Admitting Diagnosis: Osteomyelitis (HCC) [M86 9]  General weakness [R53 1]  Generalized weakness [R53 1]  Gangrene of toe of right foot ([facility] [address] ) [place]  Age/Sex: 78 y o  male     Admission Orders:  Scds;  Send stool for cdiff r/o;  Vs q 4 hr;  I/O q shift; Wound care daily  ;  accucks qid w/SSI:  Lo carb diet;     On Sunday - npo after 2359;    IP CONSULT TO PODIATRY  IP CONSULT TO VASCULAR SURGERY  IP CONSULT TO INFECTIOUS DISEASES    Scheduled Medications:  acetaminophen, 975 mg, Oral, Q8H [facility]  amLODIPine, 10 mg, Oral, Daily  atenolol, 25 mg, Oral, Daily  atorvastatin, 40 mg, Oral, Daily With Dinner  cefazolin, 2,000 mg, Intravenous, Q8H  citalopram, 20 mg, Oral, Daily  enoxaparin, 40 mg, Subcutaneous, Daily  insulin glargine, 42 Units, Subcutaneous, HS  insulin lispro, 1-5 Units, Subcutaneous, HS  insulin lispro, 1-6 Units, Subcutaneous, TID AC  insulin lispro, 14 Units, Subcutaneous, TID With Meals  levothyroxine, 175 mcg, Oral, Early Morning  metroNIDAZOLE, 500 mg, Intravenous, Q8H  tiotropium, 18 mcg, Inhalation, Daily  traZODone, 50 mg, Oral, HS      Continuous IV Infusions:  sodium chloride, 100 mL/hr, Intravenous, Continuous      PRN Meds:  HYDROmorphone, 0 2 mg, Intravenous, Q4H PRN  oxyCODONE, 2 5 mg, Oral, Q4H PRN  oxyCODONE, 5 mg, Oral, Q4H PRN  polyethylene glycol, 17 g, Oral, Daily PRN      Network Utilization Review Department  ATTENTION: Please call with any questions or concerns to [phone] and carefully listen to the prompts so that you are directed to the right person  All voicemails are confidential   Ok [place] all requests for admission clinical reviews, approved or denied determinations and any other requests to dedicated fax number below belonging to the campus where the patient is receiving treatment   List of dedicated fax numbers for the Facilities:  [address] DENIALS (Administrative/Medical Necessity) [phone]   [address] (Maternity/NICU/Pediatrics) [phone]   [address] [place] [address] Dr Ashwini Mayen 0584 (  Rosana Muir "Dorinda" 103) [address] [place] [address] Libby Alejandro Vigil [address] [place] [address] [phone]

## 2021-02-20 NOTE — ASSESSMENT & PLAN NOTE
Lab Results   Component Value Date    EGFR 35 02/19/2021    CREATININE 1 93 (H) 02/19/2021   · Per notes, patient has chronic kidney disease stage III  · Unclear baseline, labs not available  · Will monitor creatinine after IV fluids  As well will hold lisinopril for now  · Monitor BMP a m

## 2021-02-20 NOTE — PROGRESS NOTES
Progress Note - Doni Home 1950, 78 y o  male MRN: [id number]    Unit/Bed#: S -01 Encounter: [id number]    Primary Care Provider: Ulises Johnson   Date and time admitted to hospital: 2/19/2021  2:09 PM        * Osteomyelitis [facility])  Assessment & Plan  · Necrosis of the right 2nd digit (distal phalanx), proximal phalanx erythema toes, warmth, tender  Inflammatory signs present of the plantar surface as well  · X-ray of the right foot showed no evidence of osteomyelitis, 2nd PIP joint dislocation  · Patient does not meet sepsis/SIRS criteria  Afebrile, stable vital signs  · WBC 24 2  · Lactic acid within normal limits    Plan:  · Ancef 2 g IV, Flagyl 500 mg IV  · Geriatric pain management  · Wound care  · Podiatry and ID consults  · Appreciate Vascular surgery consult   · Lower extremity arterial Doppler:  Follow-up results  · Monitor vital signs  · Monitor WBC      Type 2 diabetes mellitus, with long-term current use of insulin ([facility] )  Assessment & Plan  No results found for: HGBA1C    Recent Labs     02/19/21  2110 02/20/21  0717   POCGLU 408* 395*       Blood Sugar Average: Last 72 hrs:  (P) 401 5   · Patient admitted with blood sugars of 415 mg, beta hydroxy butyrate increased, normal pH, normal bicarb and anion gap on BMP  · History of type 2 diabetes in the past 25 years, follow-up with endocrinologist as outpatient  On 30 NPH a m , 60 units NPH at night, Novolin R 30 units a m  As home medication  · Last hemoglobin A1c 8 0  · Patient admitted poor oral intake, and decreased appetite lately  Not took insulin as was ordered  Plan:  · On basal bolus insulin  Will increase the doses today  · Sliding scale insulin  · A1c level, if not done the last 90 days  · Accu-Chek  · Hypoglycemia protocol  · Diabetic diet  · Adjust treatment accordingly  Anemia  Assessment & Plan  · Mild  · No active bleeding  · Likely hemodilution from IV fluids  · Monitor    · Further workup and management if this worsens significantly  Hyponatremia  Assessment & Plan  · Pseudohyponatremia in the setting of hyperglycemia   · Monitor  · Manage patient's hyperglycemia  Ambulatory dysfunction  Assessment & Plan  · Per patient, 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23, 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  Lately patient noticed left hip pain, as well mentioned knee pain  · About 2 months ago patient had a fall  · Fall precaution  · Bed alarm  · PT/OT after possible toe amputation     Stage 3 chronic kidney disease  Assessment & Plan  Lab Results   Component Value Date    EGFR 41 02/20/2021    EGFR 35 02/19/2021    CREATININE 1 70 (H) 02/20/2021    CREATININE 1 93 (H) 02/19/2021   · Per notes, patient has chronic kidney disease stage III  · Unclear baseline, labs not available  · Serum creatinine improving with IV fluids  Thus will continue IV fluids for now  · Continue to hold off on the lisinopril for now  · Monitor BMP a m  · Avoid nephrotoxins  · Avoid hypotension  Hypothyroidism  Assessment & Plan  · Last TSH was within normal limits per chart review  · Continue levothyroxine 175 mg    Hyperlipidemia  Assessment & Plan  · Continue atorvastatin 40 mg    Depression  Assessment & Plan  · Continue citalopram 20 mg    Hypertension  Assessment & Plan  · Blood pressure acceptable  · On lisinopril 20 mg as home medication, atenolol 25 mg, amlodipine 10 mg   · Will hold lisinopril for now because of kidney function  · Monitor vital signs      VTE Pharmacologic Prophylaxis:   Pharmacologic: Enoxaparin (Lovenox)  Mechanical VTE Prophylaxis in Place: Yes    Patient Centered Rounds: I have performed bedside rounds with nursing staff today  Discussions with Specialists or Other Care Team Provider:     Education and Discussions with Family / Patient:  I discussed our findings, management and plans to the patient and patient's brother  Answered questions and concerns    They are okay with the management and plans     Time Spent for Care: 30 minutes  More than 50% of total time spent on counseling and coordination of care as described above  Current Length of Stay: 1 day(s)    Current Patient Status: Inpatient   Certification Statement: The patient will continue to require additional inpatient hospital stay due to Above findings and plans  Discharge Plan:  None yet  Code Status: Level 1 - Full Code      Subjective:   Patient is doing fine  Patient has only complaint is occasional pains on his 2nd digit of the right foot  Otherwise no other pains  No other complaints  No shortness of breath  No nausea, vomiting or diarrhea  Objective:     Vitals:   Temp (24hrs), Av 9 °F (36 6 °C), Min:97 6 °F (36 4 °C), Max:98 1 °F (36 7 °C)    Temp:  [97 6 °F (36 4 °C)-98 1 °F (36 7 °C)] 98 1 °F (36 7 °C)  HR:  [77-86] 77  Resp:  [14-18] 18  BP: (144-178)/(63-82) 147/63  SpO2:  [91 %-98 %] 91 %  Body mass index is 30 13 kg/m²  Input and Output Summary (last 24 hours): Intake/Output Summary (Last 24 hours) at 2021 1208  Last data filed at 2021 0853  Gross per 24 hour   Intake 1275 ml   Output 300 ml   Net 975 ml       Physical Exam:     Physical Exam  Vitals signs and nursing note reviewed  Constitutional:       General: He is not in acute distress  Appearance: He is not toxic-appearing or diaphoretic  Cardiovascular:      Rate and Rhythm: Normal rate and regular rhythm  Heart sounds: Normal heart sounds  Pulmonary:      Effort: Pulmonary effort is normal  No respiratory distress  Breath sounds: Normal breath sounds  Abdominal:      General: Bowel sounds are normal  There is no distension  Palpations: Abdomen is soft  Tenderness: There is no abdominal tenderness  Musculoskeletal:         General: Tenderness present  Right lower leg: No edema  Left lower leg: No edema  Skin:     General: Skin is warm  Coloration: Skin is not pale        Findings: Lesion present  No erythema or rash  Comments: Necrosis/gangrene 2nd toe right foot with tenderness  Neurological:      General: No focal deficit present  Mental Status: He is alert  Psychiatric:         Mood and Affect: Mood normal          Behavior: Behavior normal          Thought Content: Thought content normal               Additional Data:     Labs:    Results from last 7 days   Lab Units 02/20/21  0438   WBC Thousand/uL 23 58*   HEMOGLOBIN g/dL 11 4*   HEMATOCRIT % 33 7*   PLATELETS Thousands/uL 330   NEUTROS PCT % 84*   LYMPHS PCT % 7*   MONOS PCT % 8   EOS PCT % 0     Results from last 7 days   Lab Units 02/20/21  0438   POTASSIUM mmol/L 4 5   CHLORIDE mmol/L 99*   CO2 mmol/L 18*   BUN mg/dL 31*   CREATININE mg/dL 1 70*   CALCIUM mg/dL 8 5   ALK PHOS U/L 105   ALT U/L 13   AST U/L 9     Results from last 7 days   Lab Units 02/19/21  1547   INR  1 12       * I Have Reviewed All Lab Data Listed Above  * Additional Pertinent Lab Tests Reviewed: Sandy [address] Admission Reviewed    Imaging:    Imaging Reports Reviewed Today Include:  Diagnostic imaging studies that were done on this admission  Imaging Personally Reviewed by Myself Includes:  None  Recent Cultures (last 7 days):     Results from last 7 days   Lab Units 02/19/21  1555 02/19/21  1540   BLOOD CULTURE  Received in Microbiology Lab  Culture in Progress  Received in Microbiology Lab  Culture in Progress         Last 24 Hours Medication List:   Current Facility-Administered Medications   Medication Dose Route Frequency Provider Last Rate    acetaminophen  975 mg Oral Q8H [address] Dean Olivo MD      amLODIPine  10 mg Oral Daily Dean Olivo MD      atenolol  25 mg Oral Daily Dean Olivo MD      atorvastatin  40 mg Oral Daily With Lavonna Dancer, MD      cefazolin  2,000 mg Intravenous Q8H Dean Olivo MD 2,000 mg (02/20/21 1141)    citalopram  20 mg Oral Daily Delbert Maben MD Aden      enoxaparin  40 mg Subcutaneous Daily Miguel Esparza MD      HYDROmorphone  0 2 mg Intravenous Q4H PRN Miguel Esparza MD      insulin glargine  42 Units Subcutaneous HS Bibi Sinclair MD      insulin lispro  1-5 Units Subcutaneous HS Miguel Esparza MD      insulin lispro  1-6 Units Subcutaneous TID AC Miguel Esparza MD      insulin lispro  14 Units Subcutaneous TID With Meals Bibi Sinclair MD      levothyroxine  175 mcg Oral Early Morning Miguel Esparza MD      metroNIDAZOLE  500 mg Intravenous Q8H Miguel Esparza  mg (02/20/21 6053)    oxyCODONE  2 5 mg Oral Q4H PRN Miguel Esparza MD      oxyCODONE  5 mg Oral Q4H PRN Miguel Esparza MD      polyethylene glycol  17 g Oral Daily PRN Miguel Esparza MD      sodium chloride  100 mL/hr Intravenous Continuous Miguel Esparza  mL/hr (02/20/21 5185)    tiotropium  18 mcg Inhalation Daily Miguel Esparza MD      traZODone  50 mg Oral HS Miguel Esparza MD          Today, Patient Was Seen By: Kiara Corado MD    ** Please Note: Dragon 360 Dictation voice to text software may have been used in the creation of this document   **

## 2021-02-20 NOTE — ED PROVIDER NOTES
History  Chief Complaint   Patient presents with    Weakness - Generalized     PT comes to ED with c/o increasing weakness "for many weeks, today I just cant get around anymore" Per EMS "brother told us that his big toe on right foot is black" (PT is diabetic BS for EMS was 14)     This is a 14-year-old male with past medical history significant for type 2 diabetes mellitus presenting to the emergency department for weakness for weeks and a black big toe  Patient notes this weakness has been occurring for the past few weeks and occurred after he had strenuous activity  The patient notes it toe pain approximately 3 weeks ago  It was located on his right 2nd toe and has been worsening since  Patient notes discoloration of the left 2nd toe  Per EMS, blood sugar was 406 prior to evaluation  The patient notes chronic dyspnea  The patient denies fever, chills, chest pain, cough, palpitations, nausea, vomiting, diarrhea, constipation, dysuria, and lower extremity swelling  The patient denies peripheral neuropathy  The patient is an insulin dependent diabetic and supposedly has not been so compliant with his insulin over the course of the years  The patient denies other complaints at this time  History provided by:  Patient   used: No    Other  Location:  Right Second Toe  Quality:  Blackness; Infection  Severity:  Severe  Onset quality:  Gradual  Duration:  3 weeks  Timing:  Constant  Progression:  Worsening  Chronicity:  New  Worsened by: Movement; Walking  Associated symptoms: fatigue and shortness of breath    Associated symptoms: no abdominal pain, no chest pain, no cough, no diarrhea, no fever, no headaches, no loss of consciousness, no myalgias, no nausea, no sore throat, no vomiting and no wheezing    Risk factors:  T2DM      Prior to Admission Medications   Prescriptions Last Dose Informant Patient Reported? Taking?    amLODIPine (NORVASC) 10 mg tablet 2/19/2021 at Unknown time  Yes Yes   Sig: Take 10 mg by mouth daily   atenolol (TENORMIN) 25 mg tablet 2/19/2021 at Unknown time  Yes Yes   Sig: Take 25 mg by mouth daily   atorvastatin (LIPITOR) 40 mg tablet 2/18/2021 at Unknown time  Yes Yes   Sig: Take 40 mg by mouth daily   citalopram (CeleXA) 20 mg tablet 2/19/2021 at Unknown time  Yes Yes   Sig: Take 20 mg by mouth daily   gabapentin (NEURONTIN) 300 mg capsule Not Taking at Unknown time  Yes No   Sig: Take 300 mg by mouth daily   hydrOXYzine HCL (ATARAX) 50 mg tablet Not Taking at Unknown time  Yes No   Sig: Take 50 mg by mouth daily   levothyroxine 175 mcg tablet 2/19/2021 at Unknown time  Yes Yes   Sig: Take 175 mcg by mouth daily   lisinopril (ZESTRIL) 20 mg tablet 2/19/2021 at Unknown time Family Member Yes Yes   Sig: Take 20 mg by mouth daily   pregabalin (LYRICA) 50 mg capsule Not Taking at Unknown time  Yes No   Sig: Take 50 mg by mouth daily   tiotropium (SPIRIVA) 18 mcg inhalation capsule Not Taking at Unknown time  Yes No   Sig: Place 18 mcg into inhaler and inhale daily   traZODone (DESYREL) 50 mg tablet 2/18/2021 at Unknown time  Yes Yes   Sig: Take 50 mg by mouth daily at bedtime      Facility-Administered Medications: None       No past medical history on file  No past surgical history on file  No family history on file  I have reviewed and agree with the history as documented  No existing history information found  No existing history information found  Social History     Tobacco Use    Smoking status: Not on file   Substance Use Topics    Alcohol use: Not on file    Drug use: Not on file       Review of Systems   Constitutional: Positive for fatigue  Negative for chills, diaphoresis and fever  HENT: Negative for sore throat  Eyes: Negative for visual disturbance  Respiratory: Positive for shortness of breath  Negative for cough, chest tightness and wheezing  Cardiovascular: Negative for chest pain, palpitations and leg swelling  Gastrointestinal: Negative for abdominal pain, constipation, diarrhea, nausea and vomiting  Genitourinary: Negative for dysuria  Musculoskeletal: Positive for arthralgias  Negative for myalgias  Skin: Positive for color change and wound  Negative for pallor  Neurological: Negative for dizziness, loss of consciousness, syncope, weakness, light-headedness, numbness and headaches  Psychiatric/Behavioral: Negative for confusion  All other systems reviewed and are negative  Physical Exam  Physical Exam  Vitals signs and nursing note reviewed  Constitutional:       General: He is not in acute distress  Appearance: Normal appearance  He is obese  He is not ill-appearing, toxic-appearing or diaphoretic  HENT:      Head: Normocephalic and atraumatic  Nose: Nose normal       Mouth/Throat:      Mouth: Mucous membranes are moist    Eyes:      General: No scleral icterus  Right eye: No discharge  Left eye: No discharge  Extraocular Movements: Extraocular movements intact  Conjunctiva/sclera: Conjunctivae normal    Cardiovascular:      Rate and Rhythm: Normal rate and regular rhythm  Pulses: Normal pulses  Heart sounds: Normal heart sounds  No murmur  No friction rub  No gallop  Comments: 2+ DP pulses bilaterally  Pulmonary:      Effort: Pulmonary effort is normal  No respiratory distress  Breath sounds: Normal breath sounds  No stridor  No wheezing, rhonchi or rales  Abdominal:      General: Abdomen is flat  There is no distension  Palpations: Abdomen is soft  Tenderness: There is no abdominal tenderness  There is no right CVA tenderness, left CVA tenderness, guarding or rebound  Comments: No TTP in any of the four abdominal quadrants  Negative rebound  Negative Rovsing  Negative McBurney Point Tenderness  Negative Tidwell  Non-peritoneal   Musculoskeletal: Normal range of motion  Right lower leg: No edema        Left lower leg: No edema    Skin:     General: Skin is warm and dry  Capillary Refill: Capillary refill takes less than 2 seconds  Coloration: Skin is not jaundiced or pale  Findings: No lesion  Comments: Right second toe is black with a significant amount of eschar; there is sloughing and purulence of toe proximally at base of toe near metatarsal  There is not ROM of the right second toe; no active signs of bleeding  Right second toe is foul-smelling with obvious deformity   Neurological:      General: No focal deficit present  Mental Status: He is alert and oriented to person, place, and time  Mental status is at baseline     Psychiatric:         Mood and Affect: Mood normal          Behavior: Behavior normal          Vital Signs  ED Triage Vitals   Temperature Pulse Respirations Blood Pressure SpO2   02/19/21 1541 02/19/21 1541 02/19/21 1541 02/19/21 1541 02/19/21 1541   98 °F (36 7 °C) 78 14 147/65 98 %      Temp Source Heart Rate Source Patient Position - Orthostatic VS BP Location FiO2 (%)   02/19/21 1926 -- 02/19/21 1926 02/19/21 1541 --   Oral  Lying Right arm       Pain Score       02/19/21 1926       Worst Possible Pain           Vitals:    02/19/21 1541 02/19/21 1926   BP: 147/65 144/82   Pulse: 78 83   Patient Position - Orthostatic VS:  Lying         Visual Acuity      ED Medications  Medications   sodium chloride 0 9 % infusion (has no administration in time range)   enoxaparin (LOVENOX) subcutaneous injection 40 mg (has no administration in time range)   insulin lispro (HumaLOG) 100 units/mL subcutaneous injection 10 Units (has no administration in time range)   insulin glargine (LANTUS) subcutaneous injection 30 Units 0 3 mL (has no administration in time range)   insulin lispro (HumaLOG) 100 units/mL subcutaneous injection 1-6 Units (has no administration in time range)   insulin lispro (HumaLOG) 100 units/mL subcutaneous injection 1-5 Units (has no administration in time range)   acetaminophen (TYLENOL) tablet 975 mg (has no administration in time range)   oxyCODONE (ROXICODONE) IR tablet 2 5 mg (has no administration in time range)   oxyCODONE (ROXICODONE) IR tablet 5 mg (has no administration in time range)   HYDROmorphone (DILAUDID) injection 0 2 mg (has no administration in time range)   polyethylene glycol (MIRALAX) packet 17 g (has no administration in time range)   amLODIPine (NORVASC) tablet 10 mg (has no administration in time range)   atenolol (TENORMIN) tablet 25 mg (has no administration in time range)   citalopram (CeleXA) tablet 20 mg (has no administration in time range)   levothyroxine tablet 175 mcg (has no administration in time range)   traZODone (DESYREL) tablet 50 mg (has no administration in time range)   atorvastatin (LIPITOR) tablet 40 mg (has no administration in time range)   tiotropium (SPIRIVA) capsule for inhaler 18 mcg (has no administration in time range)   ceFAZolin (ANCEF) IVPB (premix in dextrose) 2,000 mg 50 mL (has no administration in time range)   metroNIDAZOLE (FLAGYL) IVPB (premix) 500 mg 100 mL (has no administration in time range)   cefTRIAXone (ROCEPHIN) 2,000 mg in dextrose 5 % 50 mL IVPB (0 mg Intravenous Stopped 2/19/21 1640)   vancomycin (VANCOCIN) 1,750 mg in sodium chloride 0 9 % 500 mL IVPB (1,750 mg Intravenous New Bag 2/19/21 1640)       Diagnostic Studies  Results Reviewed     Procedure Component Value Units Date/Time    UA w Reflex to Microscopic w Reflex to Culture [[id number]]  (Abnormal) Collected: 02/19/21 1911    Lab Status: Final result Specimen: Urine, Clean Catch Updated: 02/19/21 1933     Color, UA Yellow     Clarity, UA Clear     Specific Gravity, UA 1 025     pH, UA 5 5     Leukocytes, UA Elevated glucose may cause decreased leukocyte values   See urine microscopic for [facility] result/     Nitrite, UA Negative     Protein, UA 30 (1+) mg/dl      Glucose, UA >=1000 (1%) mg/dl      Ketones, UA Trace mg/dl      Urobilinogen, UA 0 2 E U /dl Bilirubin, UA Negative     Blood, UA Small    Urine Microscopic [[id number]] Collected: 02/19/21 1911    Lab Status:  In process Specimen: Urine, Clean Catch Updated: 02/19/21 1933    Beta Hydroxybutyrate [[id number]]  (Abnormal) Collected: 02/19/21 1724    Lab Status: Final result Specimen: Blood from Arm, Right Updated: 02/19/21 1743     BETA-HYDROXYBUTYRATE 1 4 mmol/L     Protime-INR [[id number]]  (Normal) Collected: 02/19/21 1547    Lab Status: Final result Specimen: Blood from Arm, Right Updated: 02/19/21 1709     Protime 14 5 seconds      INR 1 12    APTT [[id number]]  (Normal) Collected: 02/19/21 1547    Lab Status: Final result Specimen: Blood from Arm, Right Updated: 02/19/21 1709     PTT 33 seconds     Comprehensive metabolic panel [[id number]]  (Abnormal) Collected: 02/19/21 1547    Lab Status: Final result Specimen: Blood from Arm, Right Updated: 02/19/21 1623     Sodium 130 mmol/L      Potassium 5 1 mmol/L      Chloride 95 mmol/L      CO2 22 mmol/L      ANION GAP 13 mmol/L      BUN 30 mg/dL      Creatinine 1 93 mg/dL      Glucose 415 mg/dL      Calcium 9 1 mg/dL      Corrected Calcium 10 0 mg/dL      AST 10 U/L      ALT 18 U/L      Alkaline Phosphatase 119 U/L      Total Protein 7 8 g/dL      Albumin 2 9 g/dL      Total Bilirubin 0 34 mg/dL      eGFR 35 ml/min/1 73sq m     Narrative:      [place] guidelines for Chronic Kidney Disease (CKD):     Stage 1 with normal or high GFR (GFR > 90 mL/min/1 73 square meters)    Stage 2 Mild CKD (GFR = 60-89 mL/min/1 73 square meters)    Stage 3A Moderate CKD (GFR = 45-59 mL/min/1 73 square meters)    Stage 3B Moderate CKD (GFR = 30-44 mL/min/1 73 square meters)    Stage 4 Severe CKD (GFR = 15-29 mL/min/1 73 square meters)    Stage 5 End Stage CKD (GFR <15 mL/min/1 73 square meters)  Note: GFR calculation is accurate only with a steady state creatinine    Lactic acid [[id number]]  (Normal) Collected: 02/19/21 1547    Lab Status: Final result Specimen: Blood from Arm, Right Updated: 02/19/21 1615     LACTIC ACID 1 4 mmol/L     Narrative:      Result may be elevated if tourniquet was used during collection  Troponin I [[id number]]  (Normal) Collected: 02/19/21 1547    Lab Status: Final result Specimen: Blood from Arm, Right Updated: 02/19/21 1615     Troponin I <0 02 ng/mL     Blood culture #1 [[id number]] Collected: 02/19/21 1555    Lab Status: In process Specimen: Blood from Arm, Right Updated: 02/19/21 1608    CBC and differential [[id number]]  (Abnormal) Collected: 02/19/21 1547    Lab Status: Final result Specimen: Blood from Arm, Right Updated: 02/19/21 1556     WBC 24 24 Thousand/uL      RBC 4 06 Million/uL      Hemoglobin 11 9 g/dL      Hematocrit 35 7 %      MCV 88 fL      MCH 29 3 pg      MCHC 33 3 g/dL      RDW 12 2 %      MPV 10 9 fL      Platelets 696 Thousands/uL      nRBC 0 /100 WBCs      Neutrophils Relative 83 %      Immat GRANS % 1 %      Lymphocytes Relative 6 %      Monocytes Relative 9 %      Eosinophils Relative 0 %      Basophils Relative 1 %      Neutrophils Absolute 20 24 Thousands/µL      Immature Grans Absolute 0 16 Thousand/uL      Lymphocytes Absolute 1 56 Thousands/µL      Monocytes Absolute 2 13 Thousand/µL      Eosinophils Absolute 0 04 Thousand/µL      Basophils Absolute 0 11 Thousands/µL     Blood gas, Venous [[id number]]  (Abnormal) Collected: 02/19/21 1547    Lab Status: Final result Specimen: Blood from Arm, Right Updated: 02/19/21 1556     pH, Aki 7 309     pCO2, Aki 43 2 mm Hg      pO2, Aki 18 1 mm Hg      HCO3, Aki 21 2 mmol/L      Base Excess, Aki -4 9 mmol/L      O2 Content, Aki 4 8 ml/dL      O2 HGB, VENOUS 28 0 %     Blood culture #2 [[id number]] Collected: 02/19/21 1540    Lab Status: In process Specimen: Blood from Arm, Right Updated: 02/19/21 1553    Procalcitonin with AM Reflex [[id number]] Collected: 02/19/21 1547    Lab Status:  In process Specimen: Blood from Arm, Right Updated: 02/19/21 1553 XR chest portable   ED Interpretation by Ugo Cheatham PA-C (02/19 1511)   No acute cardiopulmonary disease  Final Result by Rochelle Matamoros MD (02/19 1509)      No acute cardiopulmonary disease  Workstation performed: [id number]         XR toe second min 2 views RIGHT   Final Result by Rochelle Matamoros MD (02/19 1516)   No evidence of osteomyelitis      2nd PIP joint dislocation      Multilevel degenerative changes      Workstation performed: [id number]         VAS lower limb arterial duplex, complete bilateral    (Results Pending)              Procedures  ECG 12 Lead Documentation Only    Date/Time: 2/19/2021 7:56 PM  Performed by: Ugo Cheatham PA-C  Authorized by: Ugo Cheatham PA-C     Indications / Diagnosis:  Sepsis Protocol  ECG reviewed by me, the ED Provider: yes    Patient location:  ED  Previous ECG:     Previous ECG:  Unavailable  Interpretation:     Interpretation: normal    Rate:     ECG rate:  78    ECG rate assessment: normal    Rhythm:     Rhythm: sinus rhythm    Ectopy:     Ectopy: none    QRS:     QRS axis:  Normal  Conduction:     Conduction: normal    ST segments:     ST segments:  Normal  T waves:     T waves: normal    Comments:      No acute signs of ischemia  Overall, no explanation on this EKG for the patient's symptoms             ED Course  ED Course as of Feb 19 1939 Fri Feb 19, 2021   1627 WBC(!): 24 24   1627 Creatinine(!): 1 93   1802 BETA-HYDROXYBUTYRATE(!): 1 4   1802 pH, Aki: 7 309                         Initial Sepsis Screening     Row Name 02/19/21 1642                Is the patient's history suggestive of a new or worsening infection? (!) Yes (Proceed)  -SD        Suspected source of infection  soft tissue  -SD        Are two or more of the following signs & symptoms of infection both present and new to the patient?   No  -SD        Indicate SIRS criteria  Leukocytosis (WBC > 59285 IJL)  -SD        If the answer is yes to both questions, suspicion of sepsis is present  --        If severe sepsis is present AND tissue hypoperfusion perists in the hour after fluid resuscitation or lactate > 4, the patient meets criteria for SEPTIC SHOCK  --        Are any of the following organ dysfunction criteria present within 6 hours of suspected infection and SIRS criteria that are NOT considered to be chronic conditions? --        Organ dysfunction  --        Date of presentation of severe sepsis  --        Time of presentation of severe sepsis  --        Tissue hypoperfusion persists in the hour after crystalloid fluid administration, evidenced, by either:  --        Was hypotension present within one hour of the conclusion of crystalloid fluid administration?  --        Date of presentation of septic shock  --        Time of presentation of septic shock  --          User Key  (r) = Recorded By, (t) = Taken By, (c) = Cosigned By    [address] Name Provider Type    ANNA Degroot PA-C Physician Assistant                        MDM  Number of Diagnoses or Management Options  Gangrene of toe of right foot [facility]): new and requires workup  Generalized weakness: new and requires workup  Diagnosis management comments: With the 45-year-old male with past medical history significant for type 2 diabetes mellitus presenting to the emergency department for weeks worth of weakness in addition to a "black toe" for approximately 3 weeks  Per EMS, patient's blood glucose was 406 on arrival   EKG within normal limits  Chest x-ray within normal limits  Lab workup significant for leukocytosis at 24 24, glucose 415, creatinine 1 93, lactic acid 1 4, acetone 1 4, and glucosuria  I had a discussion with podiatry who was recommending a lower arterial study (which I did not order but did notify Dr Shakira Huizar about)  Podiatry recommending seeing the patient tomorrow    X-ray of right 2nd toe did not show any osteomyelitis but did note dislocation of the left 2nd digit  Given the extent of the patient's damage to his left 2nd toe and overall poor glycemic control with leukocytosis, glucosuria, elevated creatinine, elevated acetone, decision was made to admit the patient for inpatient therapy  The patient was accepted for inpatient status to the service of Dr Senait Pleitez  The patient is stable for admission at this time  All questions answered to the patient's satisfaction  The patient verifies understanding and agrees to this plan at this time         Amount and/or Complexity of Data Reviewed  Clinical lab tests: ordered and reviewed  Tests in the radiology section of CPT®: ordered and reviewed  Discuss the patient with other providers: yes  Independent visualization of images, tracings, or specimens: yes        Disposition  Final diagnoses:   Gangrene of toe of right foot ([facility] [address] )   Generalized weakness     Time reflects when diagnosis was documented in both MDM as applicable and the Disposition within this note     Time User Action Codes Description Comment    2/19/2021  5:34 PM Andrew Grimaldo Add [L03 90] Cellulitis     2/19/2021  5:35 PM Kat Deems Add [R53 1] Weakness     2/19/2021  5:35 PM Kishan Giles Add Kala Fabian Gangrene of toe of right foot ([facility] [address] )     2/19/2021  5:35 PM Kat Deems Add [R53 1] Generalized weakness     2/19/2021  5:35 PM Krysta Solum [R53 1] Weakness     2/19/2021  5:35 PM Kishan Giles Modify [I96] Gangrene of toe of right foot ([facility] [address] )     2/19/2021  5:35 PM Kat Deems Remove [L03 90] Cellulitis     2/19/2021  6:42 PM Lenda [place] Add [M86 9] Osteomyelitis ([facility] [address] )     2/19/2021  7:25 PM Lenda [place] Add [I73 9] Peripheral arterial disease [facility])       ED Disposition     ED Disposition Condition Date/Time Comment    Admit Stable Fri Feb 19, 2021  5:36 PM Case was discussed with SUNITA and the patient's admission status was agreed to be Admission Status: inpatient status to the service of Dr Luc Maurer  Follow-up Information    None         Current Discharge Medication List      CONTINUE these medications which have NOT CHANGED    Details   amLODIPine (NORVASC) 10 mg tablet Take 10 mg by mouth daily      atenolol (TENORMIN) 25 mg tablet Take 25 mg by mouth daily      atorvastatin (LIPITOR) 40 mg tablet Take 40 mg by mouth daily      citalopram (CeleXA) 20 mg tablet Take 20 mg by mouth daily      levothyroxine 175 mcg tablet Take 175 mcg by mouth daily      lisinopril (ZESTRIL) 20 mg tablet Take 20 mg by mouth daily      traZODone (DESYREL) 50 mg tablet Take 50 mg by mouth daily at bedtime      gabapentin (NEURONTIN) 300 mg capsule Take 300 mg by mouth daily      hydrOXYzine HCL (ATARAX) 50 mg tablet Take 50 mg by mouth daily      pregabalin (LYRICA) 50 mg capsule Take 50 mg by mouth daily      tiotropium (SPIRIVA) 18 mcg inhalation capsule Place 18 mcg into inhaler and inhale daily           No discharge procedures on file      PDMP Review     None          ED Provider  Electronically Signed by           Carly Henriquez PA-C  02/19/21 2002

## 2021-02-20 NOTE — ASSESSMENT & PLAN NOTE
Lab Results   Component Value Date    EGFR 41 02/20/2021    EGFR 35 02/19/2021    CREATININE 1 70 (H) 02/20/2021    CREATININE 1 93 (H) 02/19/2021   · Per notes, patient has chronic kidney disease stage III  · Unclear baseline, labs not available  · Serum creatinine improving with IV fluids  Thus will continue IV fluids for now  · Continue to hold off on the lisinopril for now  · Monitor BMP a m  · Avoid nephrotoxins  · Avoid hypotension

## 2021-02-20 NOTE — ASSESSMENT & PLAN NOTE
· Mild  · No active bleeding  · Likely hemodilution from IV fluids  · Monitor  · Further workup and management if this worsens significantly

## 2021-02-20 NOTE — ASSESSMENT & PLAN NOTE
· Necrosis of the right 2nd digit (distal phalanx), proximal phalanx erythema toes, warmth, tender  Inflammatory signs present of the plantar surface as well  · X-ray of the right foot showed no evidence of osteomyelitis, 2nd PIP joint dislocation  · Patient does not meet sepsis/SIRS criteria  Afebrile, stable vital signs    · WBC 24 2  · Lactic acid within normal limits    Plan:  · Ancef 2 g IV, Flagyl 500 mg IV  · Geriatric pain management  · Wound care  · Podiatry consult  · Vascular consult   · Lower extremity arterial Doppler  · Monitor vital signs  · Monitor WBC

## 2021-02-20 NOTE — PROGRESS NOTES
Progress Note - Anabell Quiroga 1950, 78 y o  male MRN: [id number]    Unit/Bed#: S -01 Encounter: [id number]    Primary Care Provider: Ulises Elmore   Date and time admitted to hospital: 2/19/2021  2:09 PM        * Osteomyelitis [facility])  Assessment & Plan  · Necrosis of the right 2nd digit (distal phalanx), proximal phalanx erythema toes, warmth, tender  Inflammatory signs present of the plantar surface as well  · X-ray of the right foot showed no evidence of osteomyelitis, 2nd PIP joint dislocation  · Patient does not meet sepsis/SIRS criteria  Afebrile, stable vital signs  · WBC 24 2  · Lactic acid within normal limits    Plan:  · Ancef 2 g IV, Flagyl 500 mg IV  · Geriatric pain management  · Wound care  · Podiatry consult  · Vascular consult   · Lower extremity arterial Doppler  · Monitor vital signs  · Monitor WBC      Type 2 diabetes mellitus, with long-term current use of insulin (HCC)  Assessment & Plan  No results found for: HGBA1C    No results for input(s): POCGLU in the last 72 hours  Blood Sugar Average: Last 72 hrs:     · Patient admitted with blood sugars of 415 mg, beta hydroxy butyrate increased, normal pH, normal bicarb and anion gap on BMP  · History of type 2 diabetes in the past 25 years, follow-up with endocrinologist as outpatient  On 30 NPH a m , 60 units NPH at night, Novolin R 30 units a m  As home medication  · Last hemoglobin A1c 8 0  · Patient admitted poor oral intake, and decreased appetite lately  Not took insulin as was ordered  Plan:  · Lantus 30 units HS, Levemir insulin 10 units with meals  · Sliding scale insulin  · Accu-Chek  · Hypoglycemia protocol  · Diabetic diet      Ambulatory dysfunction  Assessment & Plan  · Per patient, 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23, 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  Lately patient noticed left hip pain, as well mentioned knee pain    · About 2 months ago patient had a fall  · Fall precaution  · Bed alarm  · PT/OT after toe amputation     Stage 3 chronic kidney disease  Assessment & Plan  Lab Results   Component Value Date    EGFR 35 02/19/2021    CREATININE 1 93 (H) 02/19/2021   · Per notes, patient has chronic kidney disease stage III  · Unclear baseline, labs not available  · Will monitor creatinine after IV fluids  As well will hold lisinopril for now  · Monitor BMP a m  Hypothyroidism  Assessment & Plan  · Last TSH was within normal limits per chart review  · Continue levothyroxine 175 mg    Hyperlipidemia  Assessment & Plan  · Continue atorvastatin 40 mg    Depression  Assessment & Plan  · Continue citalopram 20 mg    Hypertension  Assessment & Plan  · Blood pressure acceptable  · On lisinopril 20 mg as home medication, atenolol 25 mg, amlodipine 10 mg   · Will hold lisinopril for now because of kidney function  · Monitor vital signs      VTE Prophylaxis: Enoxaparin (Lovenox)  / sequential compression device   Code Status:  Level 1  POLST: POLST form is not discussed and not completed at this time  Anticipated Length of Stay:  Patient will be admitted on an Inpatient basis with an anticipated length of stay of  greater than 2 midnights  Justification for Hospital Stay:  Osteomyelitis of the right toe, IV antibiotics    Chief Complaint:   Right foot pain    History of Present Illness:    Felicita Domingo is a 78 y o  male with past medical history of hypertension, hyperlipidemia, type 2 diabetes with long use of insulin, chronic kidney disease, smoker(currently smokes about 3 to cigarettes daily) who presents with right foot pain  About 3 weeks ago patient fractured his right 2nd toe, had a bandage on since, but noticed worsening pain, redness for which he came today to emergency department  Physical exam revealed right distal phalanx necrosis of the 2nd toe, erythema, tenderness, warmth around the 2nd toe as well the plantar surface of the right foot  Left foot is very tender to light touch    X-ray of the right foot showed no evidence of osteomyelitis, with 2nd PIP joint dislocation  Patient was admitted for further management  Patient has leukocytosis, is not febrile and has stable vital signs  He was started on vancomycin and ceftriaxone in the emergency department  His blood sugar upon admission was 415, patient reported noncompliance with insulin lately because of poor appetite and poor oral intake  He mentioned that he his cat (21years old) recently  and he could not eat well since  He mentioned that when he is not eating he is not taking insulin as well  Patient denied nausea, vomiting, abdominal pain, confusion  Review of Systems:    Review of Systems   Constitutional: Positive for appetite change and chills  Negative for diaphoresis and fever  HENT: Negative for congestion  Respiratory: Negative for cough, chest tightness, shortness of breath and wheezing  Cardiovascular: Negative for chest pain, palpitations and leg swelling  Gastrointestinal: Negative for abdominal pain, blood in stool, constipation, diarrhea, nausea and vomiting  Endocrine: Negative for polydipsia, polyphagia and polyuria  Genitourinary: Negative for flank pain, frequency and hematuria  Musculoskeletal: Positive for arthralgias and gait problem  Skin: Positive for color change and wound  Neurological: Negative for dizziness, light-headedness and headaches  Psychiatric/Behavioral: Negative for confusion  All other systems reviewed and are negative  Past Medical and Surgical History:     No past medical history on file  No past surgical history on file  Meds/Allergies:    Prior to Admission medications    Medication Sig Start Date End Date Taking?  Authorizing Provider   amLODIPine (NORVASC) 10 mg tablet Take 10 mg by mouth daily   Yes Historical Provider, MD   atenolol (TENORMIN) 25 mg tablet Take 25 mg by mouth daily   Yes Historical Provider, MD   atorvastatin (LIPITOR) 40 mg tablet Take 40 mg by mouth daily   Yes Historical Provider, MD   citalopram (CeleXA) 20 mg tablet Take 20 mg by mouth daily   Yes Historical Provider, MD   levothyroxine 175 mcg tablet Take 175 mcg by mouth daily   Yes Historical Provider, MD   lisinopril (ZESTRIL) 20 mg tablet Take 20 mg by mouth daily   Yes Historical Provider, MD   traZODone (DESYREL) 50 mg tablet Take 50 mg by mouth daily at bedtime   Yes Historical Provider, MD   gabapentin (NEURONTIN) 300 mg capsule Take 300 mg by mouth daily    Historical Provider, MD   hydrOXYzine HCL (ATARAX) 50 mg tablet Take 50 mg by mouth daily    Historical Provider, MD   pregabalin (LYRICA) 50 mg capsule Take 50 mg by mouth daily    Historical Provider, MD   tiotropium (SPIRIVA) 18 mcg inhalation capsule Place 18 mcg into inhaler and inhale daily    Historical Provider, MD     I have reviewed home medications using allscripts  Allergies: Not on File    Social History:     Marital Status:    Occupation:  Retired, worked as a   Patient Pre-hospital Living Situation:  Lives with brother  Patient Pre-hospital Level of Mobility:  Independent  Patient Pre-hospital Diet Restrictions:  No restrictions  Substance Use History:   Social History     Substance and Sexual Activity   Alcohol Use Not on file     Social History     Tobacco Use   Smoking Status Not on file     Social History     Substance and Sexual Activity   Drug Use Not on file       Family History:    No family history on file  Physical Exam:     Vitals:   Blood Pressure: 144/82 (02/19/21 1926)  Pulse: 83 (02/19/21 1926)  Temperature: 97 7 °F (36 5 °C) (02/19/21 1926)  Temp Source: Oral (02/19/21 1926)  Respirations: 14 (02/19/21 1926)  Height: 5' 10" (177 8 cm) (02/19/21 1619)  Weight - Scale: 95 3 kg (210 lb) (02/19/21 1619)  SpO2: 94 % (02/19/21 1926)    Physical Exam  Vitals signs reviewed  Constitutional:       General: He is not in acute distress       Appearance: He is not toxic-appearing or diaphoretic  HENT:      Head: Normocephalic  Eyes:      General: No scleral icterus  Right eye: No discharge  Left eye: No discharge  Extraocular Movements: Extraocular movements intact  Conjunctiva/sclera: Conjunctivae normal       Pupils: Pupils are equal, round, and reactive to light  Neck:      Musculoskeletal: Normal range of motion  Cardiovascular:      Rate and Rhythm: Normal rate and regular rhythm  Heart sounds: Normal heart sounds  No murmur  Pulmonary:      Effort: No respiratory distress  Breath sounds: Normal breath sounds  No wheezing, rhonchi or rales  Abdominal:      General: There is no distension  Palpations: Abdomen is soft  Tenderness: There is no abdominal tenderness  There is no right CVA tenderness, left CVA tenderness, guarding or rebound  Musculoskeletal:      Right lower leg: No edema  Left lower leg: No edema  Skin:     General: Skin is warm  Findings: Erythema and lesion present  Comments: Necrosis he 2nd right toe  Warmth, erythema, tenderness left foot   Neurological:      Mental Status: He is alert and oriented to person, place, and time  Psychiatric:         Mood and Affect: Mood normal          Behavior: Behavior normal            Additional Data:     Lab Results: I have personally reviewed pertinent reports  Results from last 7 days   Lab Units 02/19/21  1547   WBC Thousand/uL 24 24*   HEMOGLOBIN g/dL 11 9*   HEMATOCRIT % 35 7*   PLATELETS Thousands/uL 365   NEUTROS PCT % 83*   LYMPHS PCT % 6*   MONOS PCT % 9   EOS PCT % 0     Results from last 7 days   Lab Units 02/19/21  1547   POTASSIUM mmol/L 5 1   CHLORIDE mmol/L 95*   CO2 mmol/L 22   BUN mg/dL 30*   CREATININE mg/dL 1 93*   CALCIUM mg/dL 9 1   ALK PHOS U/L 119*   ALT U/L 18   AST U/L 10     Results from last 7 days   Lab Units 02/19/21  1547   INR  1 12       Imaging: I have personally reviewed pertinent reports        Xr Chest Portable    Result Date: 2/19/2021  Narrative: CHEST INDICATION:   Sepsis Protocol  COMPARISON:  None EXAM PERFORMED/VIEWS:  XR CHEST PORTABLE Single view FINDINGS: Cardiomediastinal silhouette appears unremarkable  The lungs are clear  No pneumothorax or pleural effusion  Osseous structures appear within normal limits for patient age  Impression: No acute cardiopulmonary disease  Workstation performed: [id number]     Althea March Second Min 2 Views Right    Result Date: 2/19/2021  Narrative: RIGHT TOES INDICATION:   R/O Osteomyelitis  COMPARISON:  None VIEWS:  XR TOE RIGHT SECOND MIN 2 VIEWS Images: 3 FINDINGS: Dislocation at the 2nd PIP joint, likely dorsally  There is no acute fracture Advanced degenerative changes throughout the interphalangeal joints of the foot No lytic or blastic osseous lesion  Soft tissues are unremarkable  Impression: No evidence of osteomyelitis 2nd PIP joint dislocation Multilevel degenerative changes Workstation performed: [id number]       EKG, Pathology, and Other Studies Reviewed on Admission:   · EKG:  Sinus rhythm, T-wave inverted in V5 -V6    Epic / Care Everywhere Records Reviewed: Yes     ** Please Note: This note has been constructed using a voice recognition system   **

## 2021-02-20 NOTE — ASSESSMENT & PLAN NOTE
· Blood pressure acceptable  · On lisinopril 20 mg as home medication, atenolol 25 mg, amlodipine 10 mg   · Will hold lisinopril for now because of kidney function    · Monitor vital signs

## 2021-02-20 NOTE — CONSULTS
Consultation - Vascular Surgery   Tanvi Ford 78 y o  male MRN: [id number]  Unit/Bed#: S -01 Encounter: [id number]      Assessment/Plan      Assessment:  69yo M with DM, CKD, tobacco use, who p/w R 2nd toe tissue loss following injury 3 weeks ago    Plan:  · F/u LEADs -- further recommendations pending results  · [address] per podiatry  · Rest of care per primary    History of Present Illness   Physician Requesting Consult: Guido Parra MD  Reason for Consult / Principal Problem: PAD with R 2nd toe tissue loss    HPI: Tanvi Ford is a 78y o  year old male with PMHx of IDDM, CKD3, HTN, HLD, active smoker, who presents with R 2nd toe tissue loss  He states he sustained a fracture in that toe about 3 weeks ago and it has progressively worsened since then  He currently smokes 3-4 cigarettes per day, but states he smoked 3 PPD for many years  He has never seen a vascular surgeon before and is not on any blood thinning medications regularly  Inpatient consult to Vascular Surgery     Performed by  Jaime Teague MD     Authorized by Luisito Barreto MD              Review of Systems   Skin: Positive for wound  All other systems reviewed and are negative  Historical Information   No past medical history on file  No past surgical history on file  Social History   Social History     Substance and Sexual Activity   Alcohol Use Not on file     Social History     Substance and Sexual Activity   Drug Use Not on file     No existing history information found  No existing history information found    Social History     Tobacco Use   Smoking Status Not on file     Family History: No family history on file }    Meds/Allergies   all current active meds have been reviewed, current meds:   Current Facility-Administered Medications   Medication Dose Route Frequency    acetaminophen (TYLENOL) tablet 975 mg  975 mg Oral Q8H [facility]    [START ON 2/20/2021] amLODIPine (NORVASC) tablet 10 mg  10 mg Oral Daily  [START ON 2/20/2021] atenolol (TENORMIN) tablet 25 mg  25 mg Oral Daily    [START ON 2/20/2021] atorvastatin (LIPITOR) tablet 40 mg  40 mg Oral Daily With Dinner    ceFAZolin (ANCEF) IVPB (premix in dextrose) 2,000 mg 50 mL  2,000 mg Intravenous Q8H    [START ON 2/20/2021] citalopram (CeleXA) tablet 20 mg  20 mg Oral Daily    [START ON 2/20/2021] enoxaparin (LOVENOX) subcutaneous injection 40 mg  40 mg Subcutaneous Daily    HYDROmorphone (DILAUDID) injection 0 2 mg  0 2 mg Intravenous Q4H PRN    insulin glargine (LANTUS) subcutaneous injection 30 Units 0 3 mL  30 Units Subcutaneous HS    insulin lispro (HumaLOG) 100 units/mL subcutaneous injection 1-5 Units  1-5 Units Subcutaneous HS    [START ON 2/20/2021] insulin lispro (HumaLOG) 100 units/mL subcutaneous injection 1-6 Units  1-6 Units Subcutaneous TID AC    [START ON 2/20/2021] insulin lispro (HumaLOG) 100 units/mL subcutaneous injection 10 Units  10 Units Subcutaneous TID With Meals    [START ON 2/20/2021] levothyroxine tablet 175 mcg  175 mcg Oral Early Morning    metroNIDAZOLE (FLAGYL) IVPB (premix) 500 mg 100 mL  500 mg Intravenous Q8H    oxyCODONE (ROXICODONE) IR tablet 2 5 mg  2 5 mg Oral Q4H PRN    oxyCODONE (ROXICODONE) IR tablet 5 mg  5 mg Oral Q4H PRN    polyethylene glycol (MIRALAX) packet 17 g  17 g Oral Daily PRN    sodium chloride 0 9 % infusion  100 mL/hr Intravenous Continuous    [START ON 2/20/2021] tiotropium (SPIRIVA) capsule for inhaler 18 mcg  18 mcg Inhalation Daily    traZODone (DESYREL) tablet 50 mg  50 mg Oral HS    and PTA meds:   Prior to Admission Medications   Prescriptions Last Dose Informant Patient Reported? Taking?    amLODIPine (NORVASC) 10 mg tablet 2/19/2021 at Unknown time  Yes Yes   Sig: Take 10 mg by mouth daily   atenolol (TENORMIN) 25 mg tablet 2/19/2021 at Unknown time  Yes Yes   Sig: Take 25 mg by mouth daily   atorvastatin (LIPITOR) 40 mg tablet 2/18/2021 at Unknown time  Yes Yes   Sig: Take 40 mg by mouth daily   citalopram (CeleXA) 20 mg tablet 2/19/2021 at Unknown time  Yes Yes   Sig: Take 20 mg by mouth daily   gabapentin (NEURONTIN) 300 mg capsule Not Taking at Unknown time  Yes No   Sig: Take 300 mg by mouth daily   hydrOXYzine HCL (ATARAX) 50 mg tablet Not Taking at Unknown time  Yes No   Sig: Take 50 mg by mouth daily   levothyroxine 175 mcg tablet 2/19/2021 at Unknown time  Yes Yes   Sig: Take 175 mcg by mouth daily   lisinopril (ZESTRIL) 20 mg tablet 2/19/2021 at Unknown time Family Member Yes Yes   Sig: Take 20 mg by mouth daily   pregabalin (LYRICA) 50 mg capsule Not Taking at Unknown time  Yes No   Sig: Take 50 mg by mouth daily   tiotropium (SPIRIVA) 18 mcg inhalation capsule Not Taking at Unknown time  Yes No   Sig: Place 18 mcg into inhaler and inhale daily   traZODone (DESYREL) 50 mg tablet 2/18/2021 at Unknown time  Yes Yes   Sig: Take 50 mg by mouth daily at bedtime      Facility-Administered Medications: None     No Known Allergies    Objective   Vitals: Blood pressure 158/70, pulse 86, temperature 98 1 °F (36 7 °C), temperature source Oral, resp  rate 14, height 5' 10" (1 778 m), weight 95 3 kg (210 lb), SpO2 96 %  ,Body mass index is 30 13 kg/m²  Intake/Output Summary (Last 24 hours) at 2/19/2021 2210  Last data filed at 2/19/2021 1640  Gross per 24 hour   Intake 50 ml   Output --   Net 50 ml     Invasive Devices     Peripheral Intravenous Line            Peripheral IV 02/19/21 Left Wrist less than 1 day    Peripheral IV 02/19/21 Right;Ventral (anterior) Forearm less than 1 day    Peripheral IV 02/19/21 Right;Ventral (anterior); Medial Arm less than 1 day                Physical Exam   Gen:  NAD  HENT: MMM  CV:  RRR   weakly palpable DP bilaterally, non-palpable PT bilaterally  Lungs: nl effort on RA  Abd:  soft, NT/ND  Ext:  R 2nd toe dry gangrene with mild erythema proximally   B/L feet warm with M/S intact   Skin:  see above  Neuro: A&Ox3     Lab Results:   I have personally reviewed pertinent reports  , CBC with diff:   Lab Results   Component Value Date    WBC 24 24 (H) 02/19/2021    HGB 11 9 (L) 02/19/2021    HCT 35 7 (L) 02/19/2021    MCV 88 02/19/2021     02/19/2021    MCH 29 3 02/19/2021    MCHC 33 3 02/19/2021    RDW 12 2 02/19/2021    MPV 10 9 02/19/2021    NRBC 0 02/19/2021   , BMP/CMP:   Lab Results   Component Value Date    SODIUM 130 (L) 02/19/2021    K 5 1 02/19/2021    CL 95 (L) 02/19/2021    CO2 22 02/19/2021    BUN 30 (H) 02/19/2021    CREATININE 1 93 (H) 02/19/2021    CALCIUM 9 1 02/19/2021    AST 10 02/19/2021    ALT 18 02/19/2021    ALKPHOS 119 (H) 02/19/2021    EGFR 35 02/19/2021   , Coags:   Lab Results   Component Value Date    PTT 33 02/19/2021    INR 1 12 02/19/2021     Imaging Studies: I have personally reviewed pertinent reports  EKG, Pathology, and Other Studies: I have personally reviewed pertinent reports      VTE Prophylaxis: Enoxaparin (Lovenox)     Code Status: Level 1 - Full Code  Advance Directive and Living Will:      Power of :    POLST:

## 2021-02-20 NOTE — ASSESSMENT & PLAN NOTE
Lab Results   Component Value Date    EGFR 41 02/20/2021    EGFR 35 02/19/2021    CREATININE 1 70 (H) 02/20/2021    CREATININE 1 93 (H) 02/19/2021   · Per notes, patient has chronic kidney disease stage III  · Unclear baseline, labs not available  · Will monitor creatinine after IV fluids  As well will hold lisinopril for now  · Monitor BMP a m

## 2021-02-20 NOTE — H&P
H&P- Doni Home 1950, 78 y o  male MRN: [id number]    Unit/Bed#: S -01 Encounter: [id number]    Primary Care Provider: Ulises Johnson   Date and time admitted to hospital: 2/19/2021  2:09 PM        * Osteomyelitis [facility])  Assessment & Plan  · Necrosis of the right 2nd digit (distal phalanx), proximal phalanx erythema toes, warmth, tender  Inflammatory signs present of the plantar surface as well  · X-ray of the right foot showed no evidence of osteomyelitis, 2nd PIP joint dislocation  · Patient does not meet sepsis/SIRS criteria  Afebrile, stable vital signs  · WBC 24 2  · Lactic acid within normal limits    Plan:  · Ancef 2 g IV, Flagyl 500 mg IV  · Geriatric pain management  · Wound care  · Podiatry consult  · Vascular consult   · Lower extremity arterial Doppler  · Monitor vital signs  · Monitor WBC      Type 2 diabetes mellitus, with long-term current use of insulin ([facility] )  Assessment & Plan  No results found for: HGBA1C    Recent Labs     02/19/21  2110 02/20/21  0717   POCGLU 408* 395*       Blood Sugar Average: Last 72 hrs:  (P) 401 5   · Patient admitted with blood sugars of 415 mg, beta hydroxy butyrate increased, normal pH, normal bicarb and anion gap on BMP  · History of type 2 diabetes in the past 25 years, follow-up with endocrinologist as outpatient  On 30 NPH a m , 60 units NPH at night, Novolin R 30 units a m  As home medication  · Last hemoglobin A1c 8 0  · Patient admitted poor oral intake, and decreased appetite lately  Not took insulin as was ordered  Plan:  · Lantus 30 units HS, Levemir insulin 10 units with meals  · Sliding scale insulin  · Accu-Chek  · Hypoglycemia protocol  · Diabetic diet      Hyponatremia  Assessment & Plan  · Pseudohyponatremia in the setting of hyperglycemia   · Corrected sodium 138  Ambulatory dysfunction  Assessment & Plan  · Per patient, 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23, 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   Lately patient noticed left hip pain, as well mentioned knee pain  · About 2 months ago patient had a fall  · Fall precaution  · Bed alarm  · PT/OT after toe amputation     Stage 3 chronic kidney disease  Assessment & Plan  Lab Results   Component Value Date    EGFR 41 02/20/2021    EGFR 35 02/19/2021    CREATININE 1 70 (H) 02/20/2021    CREATININE 1 93 (H) 02/19/2021   · Per notes, patient has chronic kidney disease stage III  · Unclear baseline, labs not available  · Will monitor creatinine after IV fluids  As well will hold lisinopril for now  · Monitor BMP a m  Hypothyroidism  Assessment & Plan  · Last TSH was within normal limits per chart review  · Continue levothyroxine 175 mg    Hyperlipidemia  Assessment & Plan  · Continue atorvastatin 40 mg    Depression  Assessment & Plan  · Continue citalopram 20 mg    Hypertension  Assessment & Plan  · Blood pressure acceptable  · On lisinopril 20 mg as home medication, atenolol 25 mg, amlodipine 10 mg   · Will hold lisinopril for now because of kidney function  · Monitor vital signs      VTE Prophylaxis: Enoxaparin (Lovenox)  / sequential compression device   Code Status:  Level 1  POLST: POLST form is not discussed and not completed at this time      Anticipated Length of Stay:  Patient will be admitted on an Inpatient basis with an anticipated length of stay of  greater than 2 midnights  Justification for Hospital Stay:  Osteomyelitis of the right toe, IV antibiotics     Chief Complaint:   Right foot pain     History of Present Illness:     Cory Ortega is a 78 y o  male with past medical history of hypertension, hyperlipidemia, type 2 diabetes with long use of insulin, chronic kidney disease, smoker(currently smokes about 3 to cigarettes daily) who presents with right foot pain  About 3 weeks ago patient fractured his right 2nd toe, had a bandage on since, but noticed worsening pain, redness for which he came today to emergency department    Physical exam revealed right distal phalanx necrosis of the 2nd toe, erythema, tenderness, warmth around the 2nd toe as well the plantar surface of the right foot  Left foot is very tender to light touch  X-ray of the right foot showed no evidence of osteomyelitis, with 2nd PIP joint dislocation  Patient was admitted for further management  Patient has leukocytosis, is not febrile and has stable vital signs  He was started on vancomycin and ceftriaxone in the emergency department  His blood sugar upon admission was 415, patient reported noncompliance with insulin lately because of poor appetite and poor oral intake  He mentioned that he his cat (21years old) recently  and he could not eat well since  He mentioned that when he is not eating he is not taking insulin as well  Patient denied nausea, vomiting, abdominal pain, confusion        Review of Systems:     Review of Systems   Constitutional: Positive for appetite change and chills  Negative for diaphoresis and fever  HENT: Negative for congestion  Respiratory: Negative for cough, chest tightness, shortness of breath and wheezing  Cardiovascular: Negative for chest pain, palpitations and leg swelling  Gastrointestinal: Negative for abdominal pain, blood in stool, constipation, diarrhea, nausea and vomiting  Endocrine: Negative for polydipsia, polyphagia and polyuria  Genitourinary: Negative for flank pain, frequency and hematuria  Musculoskeletal: Positive for arthralgias and gait problem  Skin: Positive for color change and wound  Neurological: Negative for dizziness, light-headedness and headaches  Psychiatric/Behavioral: Negative for confusion  All other systems reviewed and are negative         Past Medical and Surgical History:      Medical History   No past medical history on file         Surgical History   No past surgical history on file         Meds/Allergies:             Prior to Admission medications    Medication Sig Start Date End Date Taking? Authorizing Provider   amLODIPine (NORVASC) 10 mg tablet Take 10 mg by mouth daily     Yes Historical Provider, MD   atenolol (TENORMIN) 25 mg tablet Take 25 mg by mouth daily     Yes Historical Provider, MD   atorvastatin (LIPITOR) 40 mg tablet Take 40 mg by mouth daily     Yes Historical Provider, MD   citalopram (CeleXA) 20 mg tablet Take 20 mg by mouth daily     Yes Historical Provider, MD   levothyroxine 175 mcg tablet Take 175 mcg by mouth daily     Yes Historical Provider, MD   lisinopril (ZESTRIL) 20 mg tablet Take 20 mg by mouth daily     Yes Historical Provider, MD   traZODone (DESYREL) 50 mg tablet Take 50 mg by mouth daily at bedtime     Yes Historical Provider, MD   gabapentin (NEURONTIN) 300 mg capsule Take 300 mg by mouth daily       Historical Provider, MD   hydrOXYzine HCL (ATARAX) 50 mg tablet Take 50 mg by mouth daily       Historical Provider, MD   pregabalin (LYRICA) 50 mg capsule Take 50 mg by mouth daily       Historical Provider, MD   tiotropium (SPIRIVA) 18 mcg inhalation capsule Place 18 mcg into inhaler and inhale daily       Historical Provider, MD      I have reviewed home medications using allscripts      Allergies: Not on File     Social History:     Marital Status:     Occupation:  Retired, worked as a   Patient Pre-hospital Living Situation:  Lives with brother  Patient Pre-hospital Level of Mobility:  Independent  Patient Pre-hospital Diet Restrictions:  No restrictions  Substance Use History:   Social History          Substance and Sexual Activity   Alcohol Use Not on file      Social History          Tobacco Use   Smoking Status Not on file      Social History          Substance and Sexual Activity   Drug Use Not on file         Family History:     No family history on file      Physical Exam:      Vitals:   Blood Pressure: 144/82 (02/19/21 1926)  Pulse: 83 (02/19/21 1926)  Temperature: 97 7 °F (36 5 °C) (02/19/21 1926)  Temp Source: Oral (02/19/21 1926)  Respirations: 14 (02/19/21 1926)  Height: 5' 10" (177 8 cm) (02/19/21 1619)  Weight - Scale: 95 3 kg (210 lb) (02/19/21 1619)  SpO2: 94 % (02/19/21 1926)     Physical Exam  Vitals signs reviewed  Constitutional:       General: He is not in acute distress  Appearance: He is not toxic-appearing or diaphoretic  HENT:      Head: Normocephalic  Eyes:      General: No scleral icterus  Right eye: No discharge  Left eye: No discharge  Extraocular Movements: Extraocular movements intact  Conjunctiva/sclera: Conjunctivae normal       Pupils: Pupils are equal, round, and reactive to light  Neck:      Musculoskeletal: Normal range of motion  Cardiovascular:      Rate and Rhythm: Normal rate and regular rhythm  Heart sounds: Normal heart sounds  No murmur  Pulmonary:      Effort: No respiratory distress  Breath sounds: Normal breath sounds  No wheezing, rhonchi or rales  Abdominal:      General: There is no distension  Palpations: Abdomen is soft  Tenderness: There is no abdominal tenderness  There is no right CVA tenderness, left CVA tenderness, guarding or rebound  Musculoskeletal:      Right lower leg: No edema  Left lower leg: No edema  Skin:     General: Skin is warm  Findings: Erythema and lesion present  Comments: Necrosis he 2nd right toe  Warmth, erythema, tenderness left foot   Neurological:      Mental Status: He is alert and oriented to person, place, and time     Psychiatric:         Mood and Affect: Mood normal          Behavior: Behavior normal                Additional Data:      Lab Results: I have personally reviewed pertinent reports             Results from last 7 days   Lab Units 02/19/21  1547   WBC Thousand/uL 24 24*   HEMOGLOBIN g/dL 11 9*   HEMATOCRIT % 35 7*   PLATELETS Thousands/uL 365   NEUTROS PCT % 83*   LYMPHS PCT % 6*   MONOS PCT % 9   EOS PCT % 0           Results from last 7 days   Lab Units 02/19/21  1547 POTASSIUM mmol/L 5 1   CHLORIDE mmol/L 95*   CO2 mmol/L 22   BUN mg/dL 30*   CREATININE mg/dL 1 93*   CALCIUM mg/dL 9 1   ALK PHOS U/L 119*   ALT U/L 18   AST U/L 10           Results from last 7 days   Lab Units 02/19/21  1547   INR   1 12         Imaging: I have personally reviewed pertinent reports        Xr Chest Portable     Result Date: 2/19/2021  Narrative: CHEST INDICATION:   Sepsis Protocol  COMPARISON:  None EXAM PERFORMED/VIEWS:  XR CHEST PORTABLE Single view FINDINGS: Cardiomediastinal silhouette appears unremarkable  The lungs are clear  No pneumothorax or pleural effusion  Osseous structures appear within normal limits for patient age       Impression: No acute cardiopulmonary disease  Workstation performed: [id number]      Xr Toe Second Min 2 Views Right     Result Date: 2/19/2021  Narrative: RIGHT TOES INDICATION:   R/O Osteomyelitis  COMPARISON:  None VIEWS:  XR TOE RIGHT SECOND MIN 2 VIEWS Images: 3 FINDINGS: Dislocation at the 2nd PIP joint, likely dorsally  There is no acute fracture Advanced degenerative changes throughout the interphalangeal joints of the foot No lytic or blastic osseous lesion  Soft tissues are unremarkable       Impression: No evidence of osteomyelitis 2nd PIP joint dislocation Multilevel degenerative changes Workstation performed: [id number]         EKG, Pathology, and Other Studies Reviewed on Admission:   · EKG:  Sinus rhythm, T-wave inverted in V5 -V6     Epic / Care Everywhere Records Reviewed: Yes      ** Please Note: This note has been constructed using a voice recognition system   **

## 2021-02-20 NOTE — ASSESSMENT & PLAN NOTE
No results found for: HGBA1C    No results for input(s): POCGLU in the last 72 hours  Blood Sugar Average: Last 72 hrs:     · Patient admitted with blood sugars of 415 mg, beta hydroxy butyrate increased, normal pH, normal bicarb and anion gap on BMP  · History of type 2 diabetes in the past 25 years, follow-up with endocrinologist as outpatient  On 30 NPH a m , 60 units NPH at night, Novolin R 30 units a m  As home medication  · Last hemoglobin A1c 8 0  · Patient admitted poor oral intake, and decreased appetite lately  Not took insulin as was ordered      Plan:  · Lantus 30 units HS, Levemir insulin 10 units with meals  · Sliding scale insulin  · Accu-Chek  · Hypoglycemia protocol  · Diabetic diet

## 2021-02-20 NOTE — ASSESSMENT & PLAN NOTE
No results found for: HGBA1C    Recent Labs     02/19/21  2110 02/20/21  0717   POCGLU 408* 395*       Blood Sugar Average: Last 72 hrs:  (P) 401 5   · Patient admitted with blood sugars of 415 mg, beta hydroxy butyrate increased, normal pH, normal bicarb and anion gap on BMP  · History of type 2 diabetes in the past 25 years, follow-up with endocrinologist as outpatient  On 30 NPH a m , 60 units NPH at night, Novolin R 30 units a m  As home medication  · Last hemoglobin A1c 8 0  · Patient admitted poor oral intake, and decreased appetite lately  Not took insulin as was ordered  Plan:  · On basal bolus insulin  Will increase the doses today  · Sliding scale insulin  · A1c level, if not done the last 90 days  · Accu-Chek  · Hypoglycemia protocol  · Diabetic diet  · Adjust treatment accordingly

## 2021-02-20 NOTE — CONSULTS
Consult - Podiatry   Avtar Starks 78 y o  male MRN: [id number]  Unit/Bed#: S -01 Encounter: [id number]        ASSESSMENT:  1  Acute osteomyelitis right 2nd toe with gangrene and exposed bone  2  Cellulitis right foot, suspicious for abscess  3  PAD  4  Uncontrolled diabetes with hyperglycemia  PLAN:  1  Pt examined at the bedside and discussed his condition and treatment plan  2  He has limb threatening right foot infection with osteomyelitis and will need open 2nd ray amputation / I&D to control the source of the infection before possible vascular intervention  3  Patient agreeable to the plan  NPO after midnight  Plan OR tomorrow  4  Continue IV antibiotics and waits ID input  5  Discussed with SLIM about surgical plan  6  Pending result of arterial study  7  I also discussed the above with his brother  Imaging: I have personally reviewed pertinent films in PACS  EKG, Pathology, labs, and Other Studies: I have personally reviewed pertinent reports  History of Present Illness     Avtar Starks is a 78 y o  male who consulted for right foot infection  He had problem on right 2nd toe with discoloration for about 3 5 weeks to 1 month  He did not seek any medical attention with his foot problem  Increased pain on right foot in the past week  He also had increased discoloration on right 2nd toe  Finally his brother convinced him to go to ED for evaluation  His brother tried to talk to him to be evaluated multiple times, but he had been refusing  He has not seen his PCP for a long time  His BS is not in good control  No known PAD, but he is a heavy smoker with long standing diabetes  No constitutional signs of infection  Consults    Review of Systems   Constitutional: Negative  HENT: Difficulty hearing   Eyes: Negative  Respiratory: Negative  Cardiovascular: Negative  Gastrointestinal: Negative      Musculoskeletal: See HPI  Skin:n See HPI  Neurological: Negative  Psych: negative  Historical Information   Past medical history  1  Type II Diabetes  2  HTN  3  CRD  4  Hypothyroidism    No past surgical history on file  Social History   Social History     Substance and Sexual Activity   Alcohol Use Not on file     Social History     Substance and Sexual Activity   Drug Use Not on file     Social History     Heavy smoker - smokes 2-3 PPD  Lives with his brother at home           Family History: Diabetes     Meds/Allergies   Medications Prior to Admission   Medication    amLODIPine (NORVASC) 10 mg tablet    atenolol (TENORMIN) 25 mg tablet    atorvastatin (LIPITOR) 40 mg tablet    citalopram (CeleXA) 20 mg tablet    levothyroxine 175 mcg tablet    lisinopril (ZESTRIL) 20 mg tablet    traZODone (DESYREL) 50 mg tablet    gabapentin (NEURONTIN) 300 mg capsule    hydrOXYzine HCL (ATARAX) 50 mg tablet    pregabalin (LYRICA) 50 mg capsule    tiotropium (SPIRIVA) 18 mcg inhalation capsule     No Known Allergies    Objective   First Vitals:   Blood Pressure: 147/65 (02/19/21 1541)  Pulse: 78 (02/19/21 1541)  Temperature: 98 °F (36 7 °C) (02/19/21 1541)  Temp Source: Oral (02/19/21 1926)  Respirations: 14 (02/19/21 1541)  Height: 5' 10" (177 8 cm) (02/19/21 1619)  Weight - Scale: 95 3 kg (210 lb) (02/19/21 1619)  SpO2: 98 % (02/19/21 1541)    Current Vitals:   Blood Pressure: 147/63 (02/20/21 1100)  Pulse: 77 (02/20/21 1100)  Temperature: 98 1 °F (36 7 °C) (02/20/21 1100)  Temp Source: Oral (02/20/21 1100)  Respirations: 18 (02/20/21 1100)  Height: 5' 10" (177 8 cm) (02/19/21 1619)  Weight - Scale: 95 3 kg (210 lb) (02/19/21 1619)  SpO2: 91 % (02/20/21 1100)        /63 (BP Location: Left arm)   Pulse 77   Temp 98 1 °F (36 7 °C) (Oral)   Resp 18   Ht 5' 10" (1 778 m)   Wt 95 3 kg (210 lb)   SpO2 91%   BMI 30 13 kg/m²      PHYSICAL EXAMINATIONS:    General appearance: Alert, cooperative, NAD  HEENT: Normocephalic, atraumatic, without obvious abnormality  HEART: Normal rate and rhythm  Lungs: Non-labored breathing  No acute respiratory distress  Abdomen: No distension  Psychiatric: No confusion  LE Vascular: Right DP - non palpable  Left DP - non palpable  Right PT - non palpable  Left PT - non palpable  Pedal hair - absent  Gangrene right 2nd toe  LE Musculoskeletal: No calf pain  MMT intact  ROM intact  Dislocation right 2nd toe PIPJ with bone exposure  Dermatological: Exposed bone right 2nd toe with gangrene  Mild redness and edema around right 2nd ray  Tenderness presents submet 2 and distal arch  No chen pus  Neurological: Gross sensation intact  CN intact  AAO X 3  No focal neurologic deficit          Lab Results:     Admission on 02/19/2021   Component Date Value    WBC 02/19/2021 24 24*    RBC 02/19/2021 4 06     Hemoglobin 02/19/2021 11 9*    Hematocrit 02/19/2021 35 7*    MCV 02/19/2021 88     MCH 02/19/2021 29 3     MCHC 02/19/2021 33 3     RDW 02/19/2021 12 2     MPV 02/19/2021 10 9     Platelets 84/51/7418 365     nRBC 02/19/2021 0     Neutrophils Relative 02/19/2021 83*    Immat GRANS % 02/19/2021 1     Lymphocytes Relative 02/19/2021 6*    Monocytes Relative 02/19/2021 9     Eosinophils Relative 02/19/2021 0     Basophils Relative 02/19/2021 1     Neutrophils Absolute 02/19/2021 20 24*    Immature Grans Absolute 02/19/2021 0 16     Lymphocytes Absolute 02/19/2021 1 56     Monocytes Absolute 02/19/2021 2 13*    Eosinophils Absolute 02/19/2021 0 04     Basophils Absolute 02/19/2021 0 11*    Sodium 02/19/2021 130*    Potassium 02/19/2021 5 1     Chloride 02/19/2021 95*    CO2 02/19/2021 22     ANION GAP 02/19/2021 13     BUN 02/19/2021 30*    Creatinine 02/19/2021 1 93*    Glucose 02/19/2021 415*    Calcium 02/19/2021 9 1     Corrected Calcium 02/19/2021 10 0     AST 02/19/2021 10     ALT 02/19/2021 18     Alkaline Phosphatase 02/19/2021 119*    Total Protein 02/19/2021 7 8     Albumin 02/19/2021 2 9*    Total Bilirubin 02/19/2021 0 34     eGFR 02/19/2021 35     LACTIC ACID 02/19/2021 1 4     Procalcitonin 02/19/2021 0 10     Protime 02/19/2021 14 5     INR 02/19/2021 1 12     PTT 02/19/2021 33     Blood Culture 02/19/2021 Received in Microbiology Lab  Culture in Progress   Blood Culture 02/19/2021 Received in Microbiology Lab  Culture in Progress   Color, UA 02/19/2021 Yellow     Clarity, UA 02/19/2021 Clear     Specific Gravity, UA 02/19/2021 1 025     pH, UA 02/19/2021 5 5     Leukocytes, UA 02/19/2021 Elevated glucose may cause decreased leukocyte values   See urine microscopic for [facility] result/*    Nitrite, UA 02/19/2021 Negative     Protein, UA 02/19/2021 30 (1+)*    Glucose, UA 02/19/2021 >=1000 (1%)*    Ketones, UA 02/19/2021 Trace*    Urobilinogen, UA 02/19/2021 0 2     Bilirubin, UA 02/19/2021 Negative     Blood, UA 02/19/2021 Small*    pH, Aki 02/19/2021 7 309     pCO2, Aki 02/19/2021 43 2     pO2, Aki 02/19/2021 18 1*    HCO3, Aki 02/19/2021 21 2*    Base Excess, Aki 02/19/2021 -4 9     O2 Content, Aki 02/19/2021 4 8     O2 HGB, VENOUS 02/19/2021 28 0*    Troponin I 02/19/2021 <0 02     BETA-HYDROXYBUTYRATE 02/19/2021 1 4*    RBC, UA 02/19/2021 1-2     WBC, UA 02/19/2021 0-1     Epithelial Cells 02/19/2021 Occasional     Bacteria, UA 02/19/2021 Occasional     POC Glucose 02/19/2021 408*    Sodium 02/20/2021 130*    Potassium 02/20/2021 4 5     Chloride 02/20/2021 99*    CO2 02/20/2021 18*    ANION GAP 02/20/2021 13     BUN 02/20/2021 31*    Creatinine 02/20/2021 1 70*    Glucose 02/20/2021 390*    Calcium 02/20/2021 8 5     Corrected Calcium 02/20/2021 9 8     AST 02/20/2021 9     ALT 02/20/2021 13     Alkaline Phosphatase 02/20/2021 105     Total Protein 02/20/2021 6 8     Albumin 02/20/2021 2 4*    Total Bilirubin 02/20/2021 0 28     eGFR 02/20/2021 41     WBC 02/20/2021 23 58*    RBC 02/20/2021 3 83*    Hemoglobin 02/20/2021 11 4*    Hematocrit 02/20/2021 33 7*    MCV 02/20/2021 88     MCH 02/20/2021 29 8     MCHC 02/20/2021 33 8     RDW 02/20/2021 12 4     MPV 02/20/2021 10 7     Platelets 77/48/0069 330     nRBC 02/20/2021 0     Neutrophils Relative 02/20/2021 84*    Immat GRANS % 02/20/2021 1     Lymphocytes Relative 02/20/2021 7*    Monocytes Relative 02/20/2021 8     Eosinophils Relative 02/20/2021 0     Basophils Relative 02/20/2021 0     Neutrophils Absolute 02/20/2021 19 93*    Immature Grans Absolute 02/20/2021 0 16     Lymphocytes Absolute 02/20/2021 1 58     Monocytes Absolute 02/20/2021 1 76*    Eosinophils Absolute 02/20/2021 0 05     Basophils Absolute 02/20/2021 0 10     POC Glucose 02/20/2021 395*    POC Glucose 02/20/2021 229*             Results from last 7 days   Lab Units 02/19/21  1555 02/19/21  1540   BLOOD CULTURE  Received in Microbiology Lab  Culture in Progress  Received in Microbiology Lab  Culture in Progress

## 2021-02-20 NOTE — ASSESSMENT & PLAN NOTE
No results found for: HGBA1C    Recent Labs     02/19/21  2110 02/20/21  0717   POCGLU 408* 395*       Blood Sugar Average: Last 72 hrs:  (P) 401 5   · Patient admitted with blood sugars of 415 mg, beta hydroxy butyrate increased, normal pH, normal bicarb and anion gap on BMP  · History of type 2 diabetes in the past 25 years, follow-up with endocrinologist as outpatient  On 30 NPH a m , 60 units NPH at night, Novolin R 30 units a m  As home medication  · Last hemoglobin A1c 8 0  · Patient admitted poor oral intake, and decreased appetite lately  Not took insulin as was ordered      Plan:  · Lantus 30 units HS, Levemir insulin 10 units with meals  · Sliding scale insulin  · Accu-Chek  · Hypoglycemia protocol  · Diabetic diet

## 2021-02-20 NOTE — PLAN OF CARE
Problem: Potential for Falls  Goal: Patient will remain free of falls  Description: INTERVENTIONS:  - Assess patient frequently for physical needs  -  Identify cognitive and physical deficits and behaviors that affect risk of falls    -  Loving fall precautions as indicated by assessment   - Educate patient/family on patient safety including physical limitations  - Instruct patient to call for assistance with activity based on assessment  - Modify environment to reduce risk of injury  - Consider OT/PT consult to assist with strengthening/mobility  Outcome: Progressing

## 2021-02-20 NOTE — QUICK NOTE
Since patient is going to be NPO post midnight for surgery tomorrow, I placed patient back on 30 units of Lantus tonight, instead of the 42 units that I ordered earlier today  Patient also not having any diarrhea anymore, thus C diff PCR order was discontinued

## 2021-02-21 ENCOUNTER — ANESTHESIA (INPATIENT)
Dept: PERIOP | Facility: HOSPITAL | Age: 71
DRG: 240 | End: 2021-02-21
Payer: COMMERCIAL

## 2021-02-21 ENCOUNTER — APPOINTMENT (INPATIENT)
Dept: RADIOLOGY | Facility: HOSPITAL | Age: 71
DRG: 240 | End: 2021-02-21
Payer: COMMERCIAL

## 2021-02-21 VITALS — HEART RATE: 66 BPM

## 2021-02-21 PROBLEM — R10.13 DYSPEPSIA: Status: ACTIVE | Noted: 2021-02-21

## 2021-02-21 PROBLEM — I73.9 PERIPHERAL ARTERIAL DISEASE (HCC): Chronic | Status: ACTIVE | Noted: 2021-02-21

## 2021-02-21 LAB
ANION GAP SERPL CALCULATED.3IONS-SCNC: 12 MMOL/L (ref 4–13)
BASOPHILS # BLD AUTO: 0.11 THOUSANDS/ΜL (ref 0–0.1)
BASOPHILS NFR BLD AUTO: 1 % (ref 0–1)
BUN SERPL-MCNC: 21 MG/DL (ref 5–25)
CALCIUM SERPL-MCNC: 8.1 MG/DL (ref 8.3–10.1)
CHLORIDE SERPL-SCNC: 104 MMOL/L (ref 100–108)
CO2 SERPL-SCNC: 19 MMOL/L (ref 21–32)
CREAT SERPL-MCNC: 1.32 MG/DL (ref 0.6–1.3)
EOSINOPHIL # BLD AUTO: 0.18 THOUSAND/ΜL (ref 0–0.61)
EOSINOPHIL NFR BLD AUTO: 1 % (ref 0–6)
ERYTHROCYTE [DISTWIDTH] IN BLOOD BY AUTOMATED COUNT: 12.3 % (ref 11.6–15.1)
EST. AVERAGE GLUCOSE BLD GHB EST-MCNC: 220 MG/DL
GFR SERPL CREATININE-BSD FRML MDRD: 55 ML/MIN/1.73SQ M
GLUCOSE SERPL-MCNC: 135 MG/DL (ref 65–140)
GLUCOSE SERPL-MCNC: 153 MG/DL (ref 65–140)
GLUCOSE SERPL-MCNC: 162 MG/DL (ref 65–140)
GLUCOSE SERPL-MCNC: 166 MG/DL (ref 65–140)
GLUCOSE SERPL-MCNC: 241 MG/DL (ref 65–140)
HBA1C MFR BLD: 9.3 %
HCT VFR BLD AUTO: 34.4 % (ref 36.5–49.3)
HGB BLD-MCNC: 11.4 G/DL (ref 12–17)
IMM GRANULOCYTES # BLD AUTO: 0.14 THOUSAND/UL (ref 0–0.2)
IMM GRANULOCYTES NFR BLD AUTO: 1 % (ref 0–2)
LYMPHOCYTES # BLD AUTO: 1.2 THOUSANDS/ΜL (ref 0.6–4.47)
LYMPHOCYTES NFR BLD AUTO: 6 % (ref 14–44)
MCH RBC QN AUTO: 28.9 PG (ref 26.8–34.3)
MCHC RBC AUTO-ENTMCNC: 33.1 G/DL (ref 31.4–37.4)
MCV RBC AUTO: 87 FL (ref 82–98)
MONOCYTES # BLD AUTO: 1.61 THOUSAND/ΜL (ref 0.17–1.22)
MONOCYTES NFR BLD AUTO: 8 % (ref 4–12)
NEUTROPHILS # BLD AUTO: 16.78 THOUSANDS/ΜL (ref 1.85–7.62)
NEUTS SEG NFR BLD AUTO: 83 % (ref 43–75)
NRBC BLD AUTO-RTO: 0 /100 WBCS
PLATELET # BLD AUTO: 322 THOUSANDS/UL (ref 149–390)
PMV BLD AUTO: 11.1 FL (ref 8.9–12.7)
POTASSIUM SERPL-SCNC: 3.8 MMOL/L (ref 3.5–5.3)
RBC # BLD AUTO: 3.94 MILLION/UL (ref 3.88–5.62)
SODIUM SERPL-SCNC: 135 MMOL/L (ref 136–145)
WBC # BLD AUTO: 20.02 THOUSAND/UL (ref 4.31–10.16)

## 2021-02-21 PROCEDURE — 99232 SBSQ HOSP IP/OBS MODERATE 35: CPT | Performed by: SURGERY

## 2021-02-21 PROCEDURE — 88311 DECALCIFY TISSUE: CPT | Performed by: PATHOLOGY

## 2021-02-21 PROCEDURE — 87075 CULTR BACTERIA EXCEPT BLOOD: CPT | Performed by: PODIATRIST

## 2021-02-21 PROCEDURE — 87077 CULTURE AEROBIC IDENTIFY: CPT | Performed by: PODIATRIST

## 2021-02-21 PROCEDURE — 87205 SMEAR GRAM STAIN: CPT | Performed by: PODIATRIST

## 2021-02-21 PROCEDURE — 85025 COMPLETE CBC W/AUTO DIFF WBC: CPT | Performed by: INTERNAL MEDICINE

## 2021-02-21 PROCEDURE — 88305 TISSUE EXAM BY PATHOLOGIST: CPT | Performed by: PATHOLOGY

## 2021-02-21 PROCEDURE — 82948 REAGENT STRIP/BLOOD GLUCOSE: CPT

## 2021-02-21 PROCEDURE — 87070 CULTURE OTHR SPECIMN AEROBIC: CPT | Performed by: PODIATRIST

## 2021-02-21 PROCEDURE — 99232 SBSQ HOSP IP/OBS MODERATE 35: CPT | Performed by: INTERNAL MEDICINE

## 2021-02-21 PROCEDURE — 87186 SC STD MICRODIL/AGAR DIL: CPT | Performed by: PODIATRIST

## 2021-02-21 PROCEDURE — 83036 HEMOGLOBIN GLYCOSYLATED A1C: CPT | Performed by: INTERNAL MEDICINE

## 2021-02-21 PROCEDURE — 28810 AMPUTATION TOE & METATARSAL: CPT | Performed by: PODIATRIST

## 2021-02-21 PROCEDURE — 99223 1ST HOSP IP/OBS HIGH 75: CPT | Performed by: INTERNAL MEDICINE

## 2021-02-21 PROCEDURE — 73630 X-RAY EXAM OF FOOT: CPT

## 2021-02-21 PROCEDURE — 0Y6R0Z0 DETACHMENT AT RIGHT 2ND TOE, COMPLETE, OPEN APPROACH: ICD-10-PCS | Performed by: PODIATRIST

## 2021-02-21 PROCEDURE — 80048 BASIC METABOLIC PNL TOTAL CA: CPT | Performed by: INTERNAL MEDICINE

## 2021-02-21 PROCEDURE — NC001 PR NO CHARGE: Performed by: PODIATRIST

## 2021-02-21 PROCEDURE — 28002 TREATMENT OF FOOT INFECTION: CPT | Performed by: PODIATRIST

## 2021-02-21 RX ORDER — BUPIVACAINE HYDROCHLORIDE 5 MG/ML
INJECTION, SOLUTION PERINEURAL AS NEEDED
Status: DISCONTINUED | OUTPATIENT
Start: 2021-02-21 | End: 2021-02-21 | Stop reason: HOSPADM

## 2021-02-21 RX ORDER — FAMOTIDINE 20 MG/1
20 TABLET, FILM COATED ORAL 2 TIMES DAILY
Status: DISCONTINUED | OUTPATIENT
Start: 2021-02-21 | End: 2021-03-07 | Stop reason: HOSPADM

## 2021-02-21 RX ORDER — BUPIVACAINE HYDROCHLORIDE 2.5 MG/ML
INJECTION, SOLUTION EPIDURAL; INFILTRATION; INTRACAUDAL AS NEEDED
Status: DISCONTINUED | OUTPATIENT
Start: 2021-02-21 | End: 2021-02-21 | Stop reason: HOSPADM

## 2021-02-21 RX ORDER — ONDANSETRON 2 MG/ML
4 INJECTION INTRAMUSCULAR; INTRAVENOUS ONCE AS NEEDED
Status: DISCONTINUED | OUTPATIENT
Start: 2021-02-21 | End: 2021-02-21 | Stop reason: HOSPADM

## 2021-02-21 RX ORDER — SODIUM CHLORIDE, SODIUM LACTATE, POTASSIUM CHLORIDE, CALCIUM CHLORIDE 600; 310; 30; 20 MG/100ML; MG/100ML; MG/100ML; MG/100ML
125 INJECTION, SOLUTION INTRAVENOUS CONTINUOUS
Status: CANCELLED | OUTPATIENT
Start: 2021-02-21

## 2021-02-21 RX ORDER — HYDROMORPHONE HCL/PF 1 MG/ML
0.2 SYRINGE (ML) INJECTION
Status: DISCONTINUED | OUTPATIENT
Start: 2021-02-21 | End: 2021-02-21 | Stop reason: HOSPADM

## 2021-02-21 RX ORDER — FENTANYL CITRATE 50 UG/ML
INJECTION, SOLUTION INTRAMUSCULAR; INTRAVENOUS AS NEEDED
Status: DISCONTINUED | OUTPATIENT
Start: 2021-02-21 | End: 2021-02-21

## 2021-02-21 RX ORDER — FENTANYL CITRATE/PF 50 MCG/ML
50 SYRINGE (ML) INJECTION
Status: DISCONTINUED | OUTPATIENT
Start: 2021-02-21 | End: 2021-02-21 | Stop reason: HOSPADM

## 2021-02-21 RX ORDER — CALCIUM CARBONATE 200(500)MG
500 TABLET,CHEWABLE ORAL 2 TIMES DAILY PRN
Status: DISCONTINUED | OUTPATIENT
Start: 2021-02-21 | End: 2021-03-07 | Stop reason: HOSPADM

## 2021-02-21 RX ORDER — DIPHENHYDRAMINE HYDROCHLORIDE 50 MG/ML
12.5 INJECTION INTRAMUSCULAR; INTRAVENOUS ONCE AS NEEDED
Status: DISCONTINUED | OUTPATIENT
Start: 2021-02-21 | End: 2021-02-21 | Stop reason: HOSPADM

## 2021-02-21 RX ORDER — CEFAZOLIN SODIUM 1 G/3ML
INJECTION, POWDER, FOR SOLUTION INTRAMUSCULAR; INTRAVENOUS AS NEEDED
Status: DISCONTINUED | OUTPATIENT
Start: 2021-02-21 | End: 2021-02-21

## 2021-02-21 RX ORDER — HYDROMORPHONE HCL/PF 1 MG/ML
0.5 SYRINGE (ML) INJECTION
Status: DISCONTINUED | OUTPATIENT
Start: 2021-02-21 | End: 2021-02-21 | Stop reason: HOSPADM

## 2021-02-21 RX ORDER — METOCLOPRAMIDE HYDROCHLORIDE 5 MG/ML
10 INJECTION INTRAMUSCULAR; INTRAVENOUS ONCE AS NEEDED
Status: DISCONTINUED | OUTPATIENT
Start: 2021-02-21 | End: 2021-02-21 | Stop reason: HOSPADM

## 2021-02-21 RX ORDER — SODIUM CHLORIDE, SODIUM LACTATE, POTASSIUM CHLORIDE, CALCIUM CHLORIDE 600; 310; 30; 20 MG/100ML; MG/100ML; MG/100ML; MG/100ML
INJECTION, SOLUTION INTRAVENOUS CONTINUOUS PRN
Status: DISCONTINUED | OUTPATIENT
Start: 2021-02-21 | End: 2021-02-21

## 2021-02-21 RX ORDER — PROPOFOL 10 MG/ML
INJECTION, EMULSION INTRAVENOUS CONTINUOUS PRN
Status: DISCONTINUED | OUTPATIENT
Start: 2021-02-21 | End: 2021-02-21

## 2021-02-21 RX ORDER — ONDANSETRON 2 MG/ML
INJECTION INTRAMUSCULAR; INTRAVENOUS AS NEEDED
Status: DISCONTINUED | OUTPATIENT
Start: 2021-02-21 | End: 2021-02-21

## 2021-02-21 RX ORDER — LIDOCAINE HYDROCHLORIDE 10 MG/ML
0.5 INJECTION, SOLUTION EPIDURAL; INFILTRATION; INTRACAUDAL; PERINEURAL ONCE AS NEEDED
Status: CANCELLED | OUTPATIENT
Start: 2021-02-21

## 2021-02-21 RX ORDER — PROPOFOL 10 MG/ML
INJECTION, EMULSION INTRAVENOUS AS NEEDED
Status: DISCONTINUED | OUTPATIENT
Start: 2021-02-21 | End: 2021-02-21

## 2021-02-21 RX ORDER — LIDOCAINE HYDROCHLORIDE 10 MG/ML
INJECTION, SOLUTION EPIDURAL; INFILTRATION; INTRACAUDAL; PERINEURAL AS NEEDED
Status: DISCONTINUED | OUTPATIENT
Start: 2021-02-21 | End: 2021-02-21 | Stop reason: HOSPADM

## 2021-02-21 RX ADMIN — FENTANYL CITRATE 25 MCG: 50 INJECTION, SOLUTION INTRAMUSCULAR; INTRAVENOUS at 08:09

## 2021-02-21 RX ADMIN — AMLODIPINE BESYLATE 10 MG: 10 TABLET ORAL at 10:45

## 2021-02-21 RX ADMIN — INSULIN GLARGINE 30 UNITS: 100 INJECTION, SOLUTION SUBCUTANEOUS at 21:54

## 2021-02-21 RX ADMIN — CEFAZOLIN SODIUM 2000 MG: 2 SOLUTION INTRAVENOUS at 20:59

## 2021-02-21 RX ADMIN — ACETAMINOPHEN 975 MG: 325 TABLET, FILM COATED ORAL at 21:51

## 2021-02-21 RX ADMIN — OXYCODONE HYDROCHLORIDE 5 MG: 5 TABLET ORAL at 18:03

## 2021-02-21 RX ADMIN — PROPOFOL 50 MG: 10 INJECTION, EMULSION INTRAVENOUS at 07:58

## 2021-02-21 RX ADMIN — PHENYLEPHRINE HYDROCHLORIDE 100 MCG: 10 INJECTION INTRAVENOUS at 08:25

## 2021-02-21 RX ADMIN — CEFAZOLIN SODIUM 2000 MG: 2 SOLUTION INTRAVENOUS at 03:22

## 2021-02-21 RX ADMIN — PROPOFOL 100 MCG/KG/MIN: 10 INJECTION, EMULSION INTRAVENOUS at 07:58

## 2021-02-21 RX ADMIN — FENTANYL CITRATE 25 MCG: 50 INJECTION, SOLUTION INTRAMUSCULAR; INTRAVENOUS at 07:58

## 2021-02-21 RX ADMIN — ACETAMINOPHEN 975 MG: 325 TABLET, FILM COATED ORAL at 05:11

## 2021-02-21 RX ADMIN — METRONIDAZOLE 500 MG: 500 INJECTION, SOLUTION INTRAVENOUS at 21:52

## 2021-02-21 RX ADMIN — LEVOTHYROXINE SODIUM 175 MCG: 175 TABLET ORAL at 05:11

## 2021-02-21 RX ADMIN — ENOXAPARIN SODIUM 40 MG: 40 INJECTION SUBCUTANEOUS at 10:56

## 2021-02-21 RX ADMIN — INSULIN LISPRO 3 UNITS: 100 INJECTION, SOLUTION INTRAVENOUS; SUBCUTANEOUS at 17:49

## 2021-02-21 RX ADMIN — FENTANYL CITRATE 25 MCG: 50 INJECTION, SOLUTION INTRAMUSCULAR; INTRAVENOUS at 08:15

## 2021-02-21 RX ADMIN — ATENOLOL 25 MG: 25 TABLET ORAL at 10:46

## 2021-02-21 RX ADMIN — HYDROMORPHONE HYDROCHLORIDE 0.2 MG: 1 INJECTION, SOLUTION INTRAMUSCULAR; INTRAVENOUS; SUBCUTANEOUS at 21:53

## 2021-02-21 RX ADMIN — CEFAZOLIN SODIUM 2000 MG: 2 SOLUTION INTRAVENOUS at 11:36

## 2021-02-21 RX ADMIN — METRONIDAZOLE 500 MG: 500 INJECTION, SOLUTION INTRAVENOUS at 10:51

## 2021-02-21 RX ADMIN — FAMOTIDINE 20 MG: 20 TABLET ORAL at 17:49

## 2021-02-21 RX ADMIN — TRAZODONE HYDROCHLORIDE 50 MG: 50 TABLET ORAL at 21:52

## 2021-02-21 RX ADMIN — PROPOFOL 20 MG: 10 INJECTION, EMULSION INTRAVENOUS at 08:15

## 2021-02-21 RX ADMIN — ONDANSETRON 4 MG: 2 INJECTION INTRAMUSCULAR; INTRAVENOUS at 08:02

## 2021-02-21 RX ADMIN — SODIUM CHLORIDE 100 ML/HR: 0.9 INJECTION, SOLUTION INTRAVENOUS at 13:14

## 2021-02-21 RX ADMIN — OXYCODONE HYDROCHLORIDE 5 MG: 5 TABLET ORAL at 10:35

## 2021-02-21 RX ADMIN — OXYCODONE HYDROCHLORIDE 5 MG: 5 TABLET ORAL at 23:21

## 2021-02-21 RX ADMIN — ATORVASTATIN CALCIUM 40 MG: 40 TABLET, FILM COATED ORAL at 16:07

## 2021-02-21 RX ADMIN — INSULIN LISPRO 1 UNITS: 100 INJECTION, SOLUTION INTRAVENOUS; SUBCUTANEOUS at 13:15

## 2021-02-21 RX ADMIN — PHENYLEPHRINE HYDROCHLORIDE 100 MCG: 10 INJECTION INTRAVENOUS at 08:46

## 2021-02-21 RX ADMIN — FAMOTIDINE 20 MG: 20 TABLET ORAL at 11:54

## 2021-02-21 RX ADMIN — SODIUM CHLORIDE, SODIUM LACTATE, POTASSIUM CHLORIDE, AND CALCIUM CHLORIDE: .6; .31; .03; .02 INJECTION, SOLUTION INTRAVENOUS at 07:53

## 2021-02-21 RX ADMIN — CITALOPRAM HYDROBROMIDE 20 MG: 20 TABLET ORAL at 10:46

## 2021-02-21 RX ADMIN — CEFAZOLIN SODIUM 2000 MG: 1 INJECTION, POWDER, FOR SOLUTION INTRAMUSCULAR; INTRAVENOUS at 08:02

## 2021-02-21 RX ADMIN — FENTANYL CITRATE 25 MCG: 50 INJECTION, SOLUTION INTRAMUSCULAR; INTRAVENOUS at 08:03

## 2021-02-21 RX ADMIN — METRONIDAZOLE 500 MG: 500 INJECTION, SOLUTION INTRAVENOUS at 04:03

## 2021-02-21 RX ADMIN — PROPOFOL 50 MG: 10 INJECTION, EMULSION INTRAVENOUS at 08:02

## 2021-02-21 NOTE — ANESTHESIA PREPROCEDURE EVALUATION
Procedure:  INCISION AND DRAINAGE (I&D) EXTREMITY RIGHT FOOT (Right Foot)    Relevant Problems   ANESTHESIA (within normal limits)      CARDIO   (+) Hyperlipidemia   (+) Hypertension      ENDO   (+) Hypothyroidism   (+) Type 2 diabetes mellitus, with long-term current use of insulin (HCC)      GI/HEPATIC (within normal limits)      /RENAL   (+) Stage 3 chronic kidney disease      HEMATOLOGY   (+) Anemia      MUSCULOSKELETAL (within normal limits)      NEURO/PSYCH   (+) Depression      PULMONARY (within normal limits)      Other   (+) Osteomyelitis, right 2nd toe with cellulitis        Physical Exam    Airway    Mallampati score: II  TM Distance: >3 FB  Neck ROM: full     Dental   No notable dental hx     Cardiovascular  Rhythm: regular, Rate: normal, Cardiovascular exam normal    Pulmonary  Pulmonary exam normal Breath sounds clear to auscultation,     Other Findings        Anesthesia Plan  ASA Score- 3     Anesthesia Type- IV sedation with anesthesia with ASA Monitors  Additional Monitors:   Airway Plan:           Plan Factors-    Chart reviewed  Existing labs reviewed  Patient summary reviewed  Patient is a current smoker  Induction- intravenous  Postoperative Plan- Plan for postoperative opioid use  Informed Consent- Anesthetic plan and risks discussed with patient  I personally reviewed this patient with the CRNA  Discussed and agreed on the Anesthesia Plan with the CRNA  Briana Hayes Recent labs personally reviewed:  Lab Results   Component Value Date    WBC 20 02 (H) 02/21/2021    HGB 11 4 (L) 02/21/2021     02/21/2021     Lab Results   Component Value Date    K 3 8 02/21/2021    BUN 21 02/21/2021    CREATININE 1 32 (H) 02/21/2021     Lab Results   Component Value Date    PTT 33 02/19/2021      Lab Results   Component Value Date    INR 1 12 02/19/2021     I, Patel Hewitt MD, have personally seen and evaluated the patient prior to anesthetic care    I have reviewed the pre-anesthetic record, and other medical records if appropriate to the anesthetic care  If a CRNA is involved in the case, I have reviewed the CRNA assessment, if present, and agree  Risks/benefits and alternatives discussed with patient including possible PONV, sore throat, and possibility of rare anesthetic and surgical emergencies

## 2021-02-21 NOTE — ASSESSMENT & PLAN NOTE
· LEADS:  Revealed severe bilateral lower extremity peripheral arterial disease  Low DULCE  · Vascular surgery on board  Planning to do arteriogram on this admission  Recommended Nephrology management to optimize kidney function 1st   · Continue vascular medications

## 2021-02-21 NOTE — CONSULTS
Consultation - Nephrology   Nathalie Lantiguacaryn 78 y o  male MRN: [id number]  Unit/Bed#: S -01 Encounter: [id number]    ASSESSMENT and PLAN:  Acute kidney injury, POA  -Baseline creatinine:  1 6 mg/dl in November 2020 based on records from media section from PCP's office  -Admission creatinine:  1 93 mg/dl  - Work up:   · UA with microscopy:  UA with 1-2 RBC per high-power field with 1+ protein and glucose  · Imaging:  No renal imaging  -Etiology:  Acute kidney injury likely due to use of NSAIDs and foot infection in the setting of autoregulatory failure from use of ACE inhibitor     Patient was taking aspirin 325 mg 2 to 3 times a day to help with pain  [facility] Course:  Renal function improving since admission with IV fluid and holding ACE inhibitor and NSAIDs  -Plan:   · Renal function already improving and creatinine down to 1 3  Will stop IV fluids for now  · Discussed with patient about risk of contrast induced nephropathy and possibly of dialysis and he verbalized understanding and willing to proceed with arteriogram   · To minimize the risk of contrast induced nephropathy, would recommend hydration with IV normal saline 75 mL/hour 1 hour before and is up to 6 hours post IV contrast administration  Continue to monitor renal function closely  · If renal function remains stable at creatinine 1 3-1 6, okay to undergo lower extremity arteriogram from Nephrology side with acceptable risk worsening renal function  · Avoid nephrotoxins and dose all medications per EGFR  · Avoid hypotension                   · Continue to hold ACE inhibitor                             Chronic Kidney Disease Stage 3  -Outpatient Nephrologist:  None  -Baseline Creatinine:  1 6 mg/dL based on labs from November 2020  -Etiology:  Chronic kidney disease likely due to diabetic nephropathy, hypertensive nephrosclerosis and age-related nephron loss  -Avoid Nephrotoxins and Dose all medications per eGFR  -Will need outpatient follow up after discharge  -quantify proteinuria as outpatient    BP/hypertension  -Home Medication:  Lisinopril  -Currently BP stable  -Plan:  Continue to hold ACE inhibitor  Continue amlodipine 10 mg daily and atenolol 25 mg daily    Osteomyelitis, right 2nd digit right foot status post I&D diet foot and open 2nd ray amputation on 02/21 by Podiatry  Plan for lower extremity arteriogram by vascular surgery  Please see above regarding clearance  Continue antibiotic per ID    Hyponatremia:  Likely from volume depletion  Admission sodium 130 improving to 135, continue to monitor    Diabetes mellitus type 2:  Management per primary team     Elevated PSA:  Patient has PSA total of 37 3 on blood work in November 2020  Would repeat PSA and if elevated recommend Urology consult  He currently denies any urinary symptoms but would monitor for urine retention if any worsening renal function    Discussed with Dr Tawanna Frankel  Discussed with vascular surgery        HISTORY OF PRESENT ILLNESS:  Requesting Physician: Josee Pastor MD  Reason for Consult:  Chronic kidney disease/elevated creatinine, prevention of contrast nephropathy    Prosper Villanueva is a 78 y o  male with past history of hypertension, hyperlipidemia, diabetes mellitus type 2 chronic kidney disease, chronic smoking who was admitted to [facility] after presenting with right foot pain redness  Was seen by Podiatry and underwent I&D right foot and open 2nd ray amputation for osteomyelitis on 02/21/2021  Patient was seen by vascular surgery and plan for angiogram for possible revascularization  Being followed by infectious disease and on antibiotic per ID recommendations  Creatinine on elevation was 1 9 on 02/19, improving to 1 32 on 02/21  Nephrology consulted for prevention of contrast induced nephropathy    PAST MEDICAL HISTORY:  History reviewed  No pertinent past medical history     Hypertension  Hypothyroidism  Diabetes mellitus type 2  Chronic kidney disease stage 3  Hyperlipidemia      PAST SURGICAL HISTORY:  History reviewed  No pertinent surgical history  Reviewed    SOCIAL HISTORY:  Social History     Substance and Sexual Activity   Alcohol Use None     Social History     Substance and Sexual Activity   Drug Use Not on file     Social History     Tobacco Use   Smoking Status Not on file       FAMILY HISTORY:  History reviewed  No pertinent family history      ALLERGIES:  No Known Allergies    MEDICATIONS:    Current Facility-Administered Medications:     acetaminophen (TYLENOL) tablet 975 mg, 975 mg, Oral, Q8H [facility], Stephania Puls, DPM, 975 mg at 02/21/21 0511    amLODIPine (NORVASC) tablet 10 mg, 10 mg, Oral, Daily, Stephania Puls, DPM, 10 mg at 02/21/21 1045    atenolol (TENORMIN) tablet 25 mg, 25 mg, Oral, Daily, Stephania Puls, DPM, 25 mg at 02/21/21 1046    atorvastatin (LIPITOR) tablet 40 mg, 40 mg, Oral, Daily With Corinne Davila, DPM, 40 mg at 02/20/21 1653    calcium carbonate (TUMS) chewable tablet 500 mg, 500 mg, Oral, BID PRN, Bibi Sinclair MD    ceFAZolin (ANCEF) IVPB (premix in dextrose) 2,000 mg 50 mL, 2,000 mg, Intravenous, Q8H, Stephania Puls, DPM, Last Rate: 100 mL/hr at 02/21/21 1136, 2,000 mg at 02/21/21 1136    citalopram (CeleXA) tablet 20 mg, 20 mg, Oral, Daily, Stephania Puls, DPM, 20 mg at 02/21/21 1046    enoxaparin (LOVENOX) subcutaneous injection 40 mg, 40 mg, Subcutaneous, Daily, Stephania Puls, DPM, 40 mg at 02/21/21 1056    famotidine (PEPCID) tablet 20 mg, 20 mg, Oral, BID, Bibi Sinclair MD, 20 mg at 02/21/21 1154    HYDROmorphone (DILAUDID) injection 0 2 mg, 0 2 mg, Intravenous, Q4H PRN, Stephania Puls, DPM    insulin glargine (LANTUS) subcutaneous injection 30 Units 0 3 mL, 30 Units, Subcutaneous, HS, Stephania Puls, DPM, 30 Units at 02/20/21 2131    insulin lispro (HumaLOG) 100 units/mL subcutaneous injection 1-5 Units, 1-5 Units, Subcutaneous, HS, Stephania Puls, DPM, 2 Units at 02/20/21 2131    insulin lispro (HumaLOG) 100 units/mL subcutaneous injection 1-6 Units, 1-6 Units, Subcutaneous, TID AC, 1 Units at 02/21/21 1315 **AND** Fingerstick Glucose (POCT), , , TID AC, Familiaon Dannie, DPM    insulin lispro (HumaLOG) 100 units/mL subcutaneous injection 14 Units, 14 Units, Subcutaneous, TID With Meals, Familiaon Dannie, DPM, 14 Units at 02/20/21 1653    levothyroxine tablet 175 mcg, 175 mcg, Oral, Early Morning, Familiaon Dannie, DPM, 175 mcg at 02/21/21 0511    metroNIDAZOLE (FLAGYL) IVPB (premix) 500 mg 100 mL, 500 mg, Intravenous, Q8H, Familiaon Dannie, DPM, Last Rate: 200 mL/hr at 02/21/21 1051, 500 mg at 02/21/21 1051    oxyCODONE (ROXICODONE) IR tablet 2 5 mg, 2 5 mg, Oral, Q4H PRN, Familiaon Dannie, DPM    oxyCODONE (ROXICODONE) IR tablet 5 mg, 5 mg, Oral, Q4H PRN, Familiaon Dannie, DPM, 5 mg at 02/21/21 1035    polyethylene glycol (MIRALAX) packet 17 g, 17 g, Oral, Daily PRN, Familiaon Dannie, DPM    sodium chloride 0 9 % infusion, 100 mL/hr, Intravenous, Continuous, Ace Dannie, DPM, Last Rate: 100 mL/hr at 02/21/21 1314, 100 mL/hr at 02/21/21 1314    tiotropium (SPIRIVA) capsule for inhaler 18 mcg, 18 mcg, Inhalation, Daily, Familiaon Dannie, DPM, 18 mcg at 02/20/21 0855    traZODone (DESYREL) tablet 50 mg, 50 mg, Oral, HS, Familiaon Dannie, DPM, 50 mg at 02/20/21 2131    REVIEW OF SYSTEMS:   Review of Systems   Constitutional: Negative for activity change, appetite change, chills, diaphoresis, fatigue and fever  HENT: Negative for congestion, facial swelling and nosebleeds  Eyes: Negative for pain and visual disturbance  Respiratory: Negative for cough, chest tightness and shortness of breath  Cardiovascular: Negative for chest pain and palpitations  Gastrointestinal: Negative for abdominal distention, abdominal pain, diarrhea, nausea and vomiting  Genitourinary: Negative for difficulty urinating, dysuria, flank pain, frequency and hematuria  Musculoskeletal: Negative for arthralgias, back pain and joint swelling  Skin: Negative for rash  Neurological: Negative for dizziness, seizures, syncope, weakness and headaches  Psychiatric/Behavioral: Negative for agitation and confusion  The patient is not nervous/anxious  All the systems were reviewed and were negative except as documented on the HPI  PHYSICAL EXAM:  Current Weight: Weight - Scale: 95 3 kg (210 lb)  First Weight: Weight - Scale: 95 3 kg (210 lb)  Vitals:    02/21/21 1009 02/21/21 1030 02/21/21 1100 02/21/21 1400   BP: 152/65 148/78 152/88 154/67   BP Location: Left arm Left arm Left arm Left arm   Pulse: 74 73 80 77   Resp: 18 18 16 16   Temp: 98 °F (36 7 °C) 98 3 °F (36 8 °C) 98 4 °F (36 9 °C) 98 7 °F (37 1 °C)   TempSrc: Oral Oral Oral Oral   SpO2: 96% 93% 93% 92%   Weight:       Height:           Intake/Output Summary (Last 24 hours) at 2/21/2021 1402  Last data filed at 2/21/2021 1314  Gross per 24 hour   Intake 2900 ml   Output 1325 ml   Net 1575 ml     Physical Exam  Constitutional:       General: He is not in acute distress  Appearance: He is well-developed  He is not diaphoretic  HENT:      Head: Normocephalic and atraumatic  Mouth/Throat:      Mouth: Mucous membranes are moist    Eyes:      General: No scleral icterus  Conjunctiva/sclera: Conjunctivae normal       Pupils: Pupils are equal, round, and reactive to light  Neck:      Musculoskeletal: Neck supple  Thyroid: No thyromegaly  Cardiovascular:      Rate and Rhythm: Normal rate and regular rhythm  Heart sounds: Normal heart sounds  No murmur  No friction rub  Pulmonary:      Effort: Pulmonary effort is normal  No respiratory distress  Breath sounds: Normal breath sounds  No wheezing or rales  Abdominal:      General: Bowel sounds are normal  There is no distension  Palpations: Abdomen is soft  Tenderness: There is no abdominal tenderness  Musculoskeletal:         General: No deformity  Right lower leg: No edema  Left lower leg: No edema        Comments: Dressing over right foot   Lymphadenopathy:      Cervical: No cervical adenopathy  Skin:     Coloration: Skin is not pale  Nails: There is no clubbing  Neurological:      Mental Status: He is alert and oriented to person, place, and time  He is not disoriented  Psychiatric:         Mood and Affect: Mood normal  Mood is not anxious  Affect is not inappropriate  Behavior: Behavior normal          Thought Content: Thought content normal            Invasive Devices:        Lab Results:   Results from last 7 days   Lab Units 02/21/21  0456 02/20/21  0438 02/19/21  1547   WBC Thousand/uL 20 02* 23 58* 24 24*   HEMOGLOBIN g/dL 11 4* 11 4* 11 9*   HEMATOCRIT % 34 4* 33 7* 35 7*   PLATELETS Thousands/uL 322 330 365   POTASSIUM mmol/L 3 8 4 5 5 1   CHLORIDE mmol/L 104 99* 95*   CO2 mmol/L 19* 18* 22   BUN mg/dL 21 31* 30*   CREATININE mg/dL 1 32* 1 70* 1 93*   CALCIUM mg/dL 8 1* 8 5 9 1   ALK PHOS U/L  --  105 119*   ALT U/L  --  13 18   AST U/L  --  9 10       Other Studies:  X-ray foot, 2nd PIP joint dislocation      Portions of the record may have been created with voice recognition software  Occasional wrong word or "sound a like" substitutions may have occurred due to the inherent limitations of voice recognition software  Read the chart carefully and recognize, using context, where substitutions have occurred  If you have any questions, please contact the dictating provider

## 2021-02-21 NOTE — PROGRESS NOTES
Progress Note - Vascular Surgery   Sandrine Chandra 78 y o  male MRN: [id number]  Unit/Bed#: OR POOL Encounter: [id number]      Subjective:  NAEO, complaining of some pain in right foot, no new complaints    Vitals:  /88 (BP Location: Left arm)   Pulse 75   Temp 98 °F (36 7 °C) (Oral)   Resp 16   Ht 5' 10" (1 778 m)   Wt 95 3 kg (210 lb)   SpO2 92%   BMI 30 13 kg/m²     I/Os:  I/O last 3 completed shifts: In: 1854 [I V :2515; IV Piggyback:210]  Out: 1420 [Urine:1350]  No intake/output data recorded  Lab Results and Cultures:   CBC with diff:   Lab Results   Component Value Date    WBC 20 02 (H) 02/21/2021    HGB 11 4 (L) 02/21/2021    HCT 34 4 (L) 02/21/2021    MCV 87 02/21/2021     02/21/2021    MCH 28 9 02/21/2021    MCHC 33 1 02/21/2021    RDW 12 3 02/21/2021    MPV 11 1 02/21/2021    NRBC 0 02/21/2021   ,   BMP/CMP:  Lab Results   Component Value Date    SODIUM 135 (L) 02/21/2021    K 3 8 02/21/2021     02/21/2021    CO2 19 (L) 02/21/2021    BUN 21 02/21/2021    CREATININE 1 32 (H) 02/21/2021    CALCIUM 8 1 (L) 02/21/2021    AST 9 02/20/2021    ALT 13 02/20/2021    ALKPHOS 105 02/20/2021    EGFR 55 02/21/2021   ,   Lipid Panel: No results found for: CHOL,   Coags:   Lab Results   Component Value Date    PTT 33 02/19/2021    INR 1 12 02/19/2021   ,     Blood Culture:   Lab Results   Component Value Date    BLOODCX No Growth at 24 hrs  02/19/2021   ,   Urinalysis:   Lab Results   Component Value Date    COLORU Yellow 02/19/2021    CLARITYU Clear 02/19/2021    SPECGRAV 1 025 02/19/2021    PHUR 5 5 02/19/2021    LEUKOCYTESUR (A) 02/19/2021     Elevated glucose may cause decreased leukocyte values   See urine microscopic for [facility] result/    NITRITE Negative 02/19/2021    GLUCOSEU >=1000 (1%) (A) 02/19/2021    KETONESU Trace (A) 02/19/2021    BILIRUBINUR Negative 02/19/2021    BLOODU Small (A) 02/19/2021   ,   Urine Culture: No results found for: URINECX,   Wound Culure: No results found for: WOUNDCULT    Medications:  Current Facility-Administered Medications   Medication Dose Route Frequency    [MAR Hold] acetaminophen (TYLENOL) tablet 975 mg  975 mg Oral Q8H [facility]    [MAR Hold] amLODIPine (NORVASC) tablet 10 mg  10 mg Oral Daily    [MAR Hold] atenolol (TENORMIN) tablet 25 mg  25 mg Oral Daily    atorvastatin (LIPITOR) tablet 40 mg  40 mg Oral Daily With Dinner    ceFAZolin (ANCEF) IVPB (premix in dextrose) 2,000 mg 50 mL  2,000 mg Intravenous Q8H    [MAR Hold] citalopram (CeleXA) tablet 20 mg  20 mg Oral Daily    [MAR Hold] enoxaparin (LOVENOX) subcutaneous injection 40 mg  40 mg Subcutaneous Daily    [MAR Hold] HYDROmorphone (DILAUDID) injection 0 2 mg  0 2 mg Intravenous Q4H PRN    insulin glargine (LANTUS) subcutaneous injection 30 Units 0 3 mL  30 Units Subcutaneous HS    [MAR Hold] insulin lispro (HumaLOG) 100 units/mL subcutaneous injection 1-5 Units  1-5 Units Subcutaneous HS    [MAR Hold] insulin lispro (HumaLOG) 100 units/mL subcutaneous injection 1-6 Units  1-6 Units Subcutaneous TID AC    insulin lispro (HumaLOG) 100 units/mL subcutaneous injection 14 Units  14 Units Subcutaneous TID With Meals    [MAR Hold] levothyroxine tablet 175 mcg  175 mcg Oral Early Morning    metroNIDAZOLE (FLAGYL) IVPB (premix) 500 mg 100 mL  500 mg Intravenous Q8H    [MAR Hold] oxyCODONE (ROXICODONE) IR tablet 2 5 mg  2 5 mg Oral Q4H PRN    [MAR Hold] oxyCODONE (ROXICODONE) IR tablet 5 mg  5 mg Oral Q4H PRN    [MAR Hold] polyethylene glycol (MIRALAX) packet 17 g  17 g Oral Daily PRN    sodium chloride 0 9 % infusion  100 mL/hr Intravenous Continuous    tiotropium (SPIRIVA) capsule for inhaler 18 mcg  18 mcg Inhalation Daily    [MAR Hold] traZODone (DESYREL) tablet 50 mg  50 mg Oral HS       Imagin/19: LEADs: R - 0 53/66/19 , L - 0 57/121/58    Physical Exam:  General: AAO x 3, NAD  HEENT: Normocephalic, atraumatic, conjunctiva normal, EOMI, PERRLA  Neck: No JVD, No LAD  Cardio: RRR  Resp: NWB on RA  Abd: Soft, nontender, nondistended, No guarding, no rebound  Neuro: Sensation and motor intact x 4 limbs  Extremities: WWP, Gangrene of right 2nd toe with some spreading erythema of right foot  Skin: Warm and dry    Pulse exam:  R: Non-palpable DP/PT  L: Weakly palpable DP, Non palpable PT    Assessment:  69yo M with DM, CKD, tobacco use, who p/w R 2nd toe tissue loss following injury 3 weeks ago    Plan:  Plan for 2nd toe amputation with podiatry today for source control  LEADs below healing range, would likely benefit from revascularization, plan for angiogram following source control procedure  Given CKD, would recommend nephrology optimization prior to angiogram  Rest of care per primary    Ketty Yeh MD  2/21/2021

## 2021-02-21 NOTE — ASSESSMENT & PLAN NOTE
· Occurred after patient's surgical procedure today  · Complains of stomach discomfort and some nausea  · Prescribed with Tums p r n  And Pepcid  · Antiemetic medication  Kalin Pastrana

## 2021-02-21 NOTE — ASSESSMENT & PLAN NOTE
· Necrosis of the right 2nd digit (distal phalanx), proximal phalanx erythema toes, warmth, tender  Inflammatory signs present of the plantar surface as well  · X-ray of the right foot showed no evidence of osteomyelitis, 2nd PIP joint dislocation  · Patient does not meet sepsis/SIRS criteria  Afebrile, stable vital signs  · WBC 24 2  · Lactic acid within normal limits    Plan:  · Status post incision and drainage right foot and open 2nd ray amputation, 2/21  · Ancef 2 g IV, Flagyl 500 mg IV  · Geriatric pain management  · Wound care  · Podiatry and ID on board  · Appreciate Vascular surgery consult:  · Lower extremity arterial Doppler:  Revealed severe diffuse peripheral arterial disease on both lower extremities  With DULCE in the  50s  Please see plans under peripheral arterial disease  · Monitor vital signs  · Monitor WBC  · PT/OT

## 2021-02-21 NOTE — ASSESSMENT & PLAN NOTE
· Blood pressure acceptable    · On lisinopril 20 mg as home medication, atenolol 25 mg, amlodipine 10 mg   · Will hold lisinopril for now because of kidney function and plans for arteriogram   · Monitor vital signs

## 2021-02-21 NOTE — ASSESSMENT & PLAN NOTE
Lab Results   Component Value Date    EGFR 55 02/21/2021    EGFR 41 02/20/2021    EGFR 35 02/19/2021    CREATININE 1 32 (H) 02/21/2021    CREATININE 1 70 (H) 02/20/2021    CREATININE 1 93 (H) 02/19/2021   · Per notes, patient has chronic kidney disease stage III  · Unclear baseline, labs not available  · Serum creatinine improving with IV fluids  · As per nephrologist, may discontinue IV fluids  · Continue to hold off on the lisinopril for now  · Monitor BMP a m  · Avoid nephrotoxins  · Avoid hypotension    · Per recommendation by the vascular surgery service, we consulted Nephrology, to optimize patient's kidney function, prior to arteriogram

## 2021-02-21 NOTE — ASSESSMENT & PLAN NOTE
· Resolved  · Pseudohyponatremia in the setting of hyperglycemia   · Monitor  · Manage patient's hyperglycemia

## 2021-02-21 NOTE — PLAN OF CARE
Problem: Potential for Falls  Goal: Patient will remain free of falls  Description: INTERVENTIONS:  - Assess patient frequently for physical needs  -  Identify cognitive and physical deficits and behaviors that affect risk of falls    -  [place] fall precautions as indicated by assessment   - Educate patient/family on patient safety including physical limitations  - Instruct patient to call for assistance with activity based on assessment  - Modify environment to reduce risk of injury  - Consider OT/PT consult to assist with strengthening/mobility  Outcome: Progressing     Problem: PAIN - ADULT  Goal: Verbalizes/displays adequate comfort level or baseline comfort level  Description: Interventions:  - Encourage patient to monitor pain and request assistance  - Assess pain using appropriate pain scale  - Administer analgesics based on type and severity of pain and evaluate response  - Implement non-pharmacological measures as appropriate and evaluate response  - Consider cultural and social influences on pain and pain management  - Notify physician/advanced practitioner if interventions unsuccessful or patient reports new pain  Outcome: Progressing     Problem: INFECTION - ADULT  Goal: Absence or prevention of progression during hospitalization  Description: INTERVENTIONS:  - Assess and monitor for signs and symptoms of infection  - Monitor lab/diagnostic results  - Monitor all insertion sites, i e  indwelling lines, tubes, and drains  - Monitor endotracheal if appropriate and nasal secretions for changes in amount and color  - [place] appropriate cooling/warming therapies per order  - Administer medications as ordered  - Instruct and encourage patient and family to use good hand hygiene technique  - Identify and instruct in appropriate isolation precautions for identified infection/condition  Outcome: Progressing     Problem: SAFETY ADULT  Goal: Patient will remain free of falls  Description: INTERVENTIONS:  - Assess patient frequently for physical needs  -  Identify cognitive and physical deficits and behaviors that affect risk of falls  -  [place] fall precautions as indicated by assessment   - Educate patient/family on patient safety including physical limitations  - Instruct patient to call for assistance with activity based on assessment  - Modify environment to reduce risk of injury  - Consider OT/PT consult to assist with strengthening/mobility  Outcome: Progressing     Problem: DISCHARGE PLANNING  Goal: Discharge to home or other facility with appropriate resources  Description: INTERVENTIONS:  - Identify barriers to discharge w/patient and caregiver  - Arrange for needed discharge resources and transportation as appropriate  - Identify discharge learning needs (meds, wound care, etc )  - Arrange for interpretive services to assist at discharge as needed  - Refer to Case Management Department for coordinating discharge planning if the patient needs post-hospital services based on physician/advanced practitioner order or complex needs related to functional status, cognitive ability, or social support system  Outcome: Progressing     Problem: Knowledge Deficit  Goal: Patient/family/caregiver demonstrates understanding of disease process, treatment plan, medications, and discharge instructions  Description: Complete learning assessment and assess knowledge base    Interventions:  - Provide teaching at level of understanding  - Provide teaching via preferred learning methods  Outcome: Progressing

## 2021-02-21 NOTE — QUICK NOTE
Attempted to see patient this morning however he has already been taken to the OR  White count is currently down trending  Blood cultures remain without growth  Patient's other vitals are stable  Creatinine appears to be down trending  Briefly reviewed operative report and it appears that patient underwent amputation but was also found to have a tracking abscess into the midfoot  Cultures have been collected  At this time given current stability will continue the patient on Ancef with Flagyl  Will follow up pending culture data and adjust antibiotics  Will hopefully transition to oral antibiotic if patient makes continued progress and has adequate source control  Additional care as per primary  ID consult service will formally re-evaluate this patient again tomorrow

## 2021-02-21 NOTE — ASSESSMENT & PLAN NOTE
No results found for: HGBA1C    Recent Labs     02/20/21  1828 02/20/21  2127 02/21/21  0918 02/21/21  1129   POCGLU 238* 255* 153* 162*       Blood Sugar Average: Last 72 hrs:  (P) 255 875   · Patient admitted with blood sugars of 415 mg, beta hydroxy butyrate increased, normal pH, normal bicarb and anion gap on BMP  · History of type 2 diabetes in the past 25 years, follow-up with endocrinologist as outpatient  On 30 NPH a m , 60 units NPH at night, Novolin R 30 units a m  As home medication  · Last hemoglobin A1c 8 0  · Patient admitted poor oral intake, and decreased appetite lately  Not took insulin as was ordered  Plan:  · On basal bolus insulin  · Sliding scale insulin  · Presently, blood sugars at acceptable levels  · A1c level, if not done the last 90 days  · Accu-Chek  · Hypoglycemia protocol  · Diabetic diet  · Adjust treatment accordingly

## 2021-02-21 NOTE — H&P (VIEW-ONLY)
Consultation - Nephrology   Cloteal Situ 78 y o  male MRN: [id number]  Unit/Bed#: S -01 Encounter: [id number]    ASSESSMENT and PLAN:  Acute kidney injury, POA  -Baseline creatinine:  1 6 mg/dl in November 2020 based on records from media section from PCP's office  -Admission creatinine:  1 93 mg/dl  - Work up:   · UA with microscopy:  UA with 1-2 RBC per high-power field with 1+ protein and glucose  · Imaging:  No renal imaging  -Etiology:  Acute kidney injury likely due to use of NSAIDs and foot infection in the setting of autoregulatory failure from use of ACE inhibitor     Patient was taking aspirin 325 mg 2 to 3 times a day to help with pain  [facility] Course:  Renal function improving since admission with IV fluid and holding ACE inhibitor and NSAIDs  -Plan:   · Renal function already improving and creatinine down to 1 3  Will stop IV fluids for now  · Discussed with patient about risk of contrast induced nephropathy and possibly of dialysis and he verbalized understanding and willing to proceed with arteriogram   · To minimize the risk of contrast induced nephropathy, would recommend hydration with IV normal saline 75 mL/hour 1 hour before and is up to 6 hours post IV contrast administration  Continue to monitor renal function closely  · If renal function remains stable at creatinine 1 3-1 6, okay to undergo lower extremity arteriogram from Nephrology side with acceptable risk worsening renal function  · Avoid nephrotoxins and dose all medications per EGFR  · Avoid hypotension                   · Continue to hold ACE inhibitor                             Chronic Kidney Disease Stage 3  -Outpatient Nephrologist:  None  -Baseline Creatinine:  1 6 mg/dL based on labs from November 2020  -Etiology:  Chronic kidney disease likely due to diabetic nephropathy, hypertensive nephrosclerosis and age-related nephron loss  -Avoid Nephrotoxins and Dose all medications per eGFR  -Will need outpatient follow up after discharge  -quantify proteinuria as outpatient    BP/hypertension  -Home Medication:  Lisinopril  -Currently BP stable  -Plan:  Continue to hold ACE inhibitor  Continue amlodipine 10 mg daily and atenolol 25 mg daily    Osteomyelitis, right 2nd digit right foot status post I&D diet foot and open 2nd ray amputation on 02/21 by Podiatry  Plan for lower extremity arteriogram by vascular surgery  Please see above regarding clearance  Continue antibiotic per ID    Hyponatremia:  Likely from volume depletion  Admission sodium 130 improving to 135, continue to monitor    Diabetes mellitus type 2:  Management per primary team     Elevated PSA:  Patient has PSA total of 37 3 on blood work in November 2020  Would repeat PSA and if elevated recommend Urology consult  He currently denies any urinary symptoms but would monitor for urine retention if any worsening renal function    Discussed with Dr Elvie Cao  Discussed with vascular surgery        HISTORY OF PRESENT ILLNESS:  Requesting Physician: Cinthya Marmolejo MD  Reason for Consult:  Chronic kidney disease/elevated creatinine, prevention of contrast nephropathy    Marshal Aragon is a 78 y o  male with past history of hypertension, hyperlipidemia, diabetes mellitus type 2 chronic kidney disease, chronic smoking who was admitted to [facility] after presenting with right foot pain redness  Was seen by Podiatry and underwent I&D right foot and open 2nd ray amputation for osteomyelitis on 02/21/2021  Patient was seen by vascular surgery and plan for angiogram for possible revascularization  Being followed by infectious disease and on antibiotic per ID recommendations  Creatinine on elevation was 1 9 on 02/19, improving to 1 32 on 02/21  Nephrology consulted for prevention of contrast induced nephropathy    PAST MEDICAL HISTORY:  History reviewed  No pertinent past medical history     Hypertension  Hypothyroidism  Diabetes mellitus type 2  Chronic kidney disease stage 3  Hyperlipidemia      PAST SURGICAL HISTORY:  History reviewed  No pertinent surgical history  Reviewed    SOCIAL HISTORY:  Social History     Substance and Sexual Activity   Alcohol Use None     Social History     Substance and Sexual Activity   Drug Use Not on file     Social History     Tobacco Use   Smoking Status Not on file       FAMILY HISTORY:  History reviewed  No pertinent family history      ALLERGIES:  No Known Allergies    MEDICATIONS:    Current Facility-Administered Medications:     acetaminophen (TYLENOL) tablet 975 mg, 975 mg, Oral, Q8H [address], Marcos Nashom, DPM, 975 mg at 02/21/21 0511    amLODIPine (NORVASC) tablet 10 mg, 10 mg, Oral, Daily, Wolnaveen Broom, DPM, 10 mg at 02/21/21 1045    atenolol (TENORMIN) tablet 25 mg, 25 mg, Oral, Daily, Marcos Van, DPM, 25 mg at 02/21/21 1046    atorvastatin (LIPITOR) tablet 40 mg, 40 mg, Oral, Daily With Katerina Sandy, DPM, 40 mg at 02/20/21 1653    calcium carbonate (TUMS) chewable tablet 500 mg, 500 mg, Oral, BID PRN, Bibi Sinclair MD    ceFAZolin (ANCEF) IVPB (premix in dextrose) 2,000 mg 50 mL, 2,000 mg, Intravenous, Q8H, Marcos Van DPM, Last Rate: 100 mL/hr at 02/21/21 1136, 2,000 mg at 02/21/21 1136    citalopram (CeleXA) tablet 20 mg, 20 mg, Oral, Daily, Marcos Van DPM, 20 mg at 02/21/21 1046    enoxaparin (LOVENOX) subcutaneous injection 40 mg, 40 mg, Subcutaneous, Daily, Marcos Van DPM, 40 mg at 02/21/21 1056    famotidine (PEPCID) tablet 20 mg, 20 mg, Oral, BID, Bibi Sinclair MD, 20 mg at 02/21/21 1154    HYDROmorphone (DILAUDID) injection 0 2 mg, 0 2 mg, Intravenous, Q4H PRN, Marcos Van DPM    insulin glargine (LANTUS) subcutaneous injection 30 Units 0 3 mL, 30 Units, Subcutaneous, HS, Marcos Van DPM, 30 Units at 02/20/21 2131    insulin lispro (HumaLOG) 100 units/mL subcutaneous injection 1-5 Units, 1-5 Units, Subcutaneous, HS, Marcos Van DPM, 2 Units at 02/20/21 2131    insulin lispro (HumaLOG) 100 units/mL subcutaneous injection 1-6 Units, 1-6 Units, Subcutaneous, TID AC, 1 Units at 02/21/21 1315 **AND** Fingerstick Glucose (POCT), , , TID AC, Ermalinda Martyr, DPM    insulin lispro (HumaLOG) 100 units/mL subcutaneous injection 14 Units, 14 Units, Subcutaneous, TID With Meals, Ermalinda Martyr, DPM, 14 Units at 02/20/21 1653    levothyroxine tablet 175 mcg, 175 mcg, Oral, Early Morning, Ermalinda Martyr, DPM, 175 mcg at 02/21/21 0511    metroNIDAZOLE (FLAGYL) IVPB (premix) 500 mg 100 mL, 500 mg, Intravenous, Q8H, Ermalinda Martyr, DPM, Last Rate: 200 mL/hr at 02/21/21 1051, 500 mg at 02/21/21 1051    oxyCODONE (ROXICODONE) IR tablet 2 5 mg, 2 5 mg, Oral, Q4H PRN, Ermalinda Martyr, DPM    oxyCODONE (ROXICODONE) IR tablet 5 mg, 5 mg, Oral, Q4H PRN, Ermalinda Martyr, DPM, 5 mg at 02/21/21 1035    polyethylene glycol (MIRALAX) packet 17 g, 17 g, Oral, Daily PRN, Ermalinda Martyr, DPM    sodium chloride 0 9 % infusion, 100 mL/hr, Intravenous, Continuous, Ermalinda Martyr, DPM, Last Rate: 100 mL/hr at 02/21/21 1314, 100 mL/hr at 02/21/21 1314    tiotropium (SPIRIVA) capsule for inhaler 18 mcg, 18 mcg, Inhalation, Daily, Ermalinda Martyr, DPM, 18 mcg at 02/20/21 0855    traZODone (DESYREL) tablet 50 mg, 50 mg, Oral, HS, Ermalinda Martyr, DPM, 50 mg at 02/20/21 2131    REVIEW OF SYSTEMS:   Review of Systems   Constitutional: Negative for activity change, appetite change, chills, diaphoresis, fatigue and fever  HENT: Negative for congestion, facial swelling and nosebleeds  Eyes: Negative for pain and visual disturbance  Respiratory: Negative for cough, chest tightness and shortness of breath  Cardiovascular: Negative for chest pain and palpitations  Gastrointestinal: Negative for abdominal distention, abdominal pain, diarrhea, nausea and vomiting  Genitourinary: Negative for difficulty urinating, dysuria, flank pain, frequency and hematuria  Musculoskeletal: Negative for arthralgias, back pain and joint swelling  Skin: Negative for rash  Neurological: Negative for dizziness, seizures, syncope, weakness and headaches  Psychiatric/Behavioral: Negative for agitation and confusion  The patient is not nervous/anxious  All the systems were reviewed and were negative except as documented on the HPI  PHYSICAL EXAM:  Current Weight: Weight - Scale: 95 3 kg (210 lb)  First Weight: Weight - Scale: 95 3 kg (210 lb)  Vitals:    02/21/21 1009 02/21/21 1030 02/21/21 1100 02/21/21 1400   BP: 152/65 148/78 152/88 154/67   BP Location: Left arm Left arm Left arm Left arm   Pulse: 74 73 80 77   Resp: 18 18 16 16   Temp: 98 °F (36 7 °C) 98 3 °F (36 8 °C) 98 4 °F (36 9 °C) 98 7 °F (37 1 °C)   TempSrc: Oral Oral Oral Oral   SpO2: 96% 93% 93% 92%   Weight:       Height:           Intake/Output Summary (Last 24 hours) at 2/21/2021 1402  Last data filed at 2/21/2021 1314  Gross per 24 hour   Intake 2900 ml   Output 1325 ml   Net 1575 ml     Physical Exam  Constitutional:       General: He is not in acute distress  Appearance: He is well-developed  He is not diaphoretic  HENT:      Head: Normocephalic and atraumatic  Mouth/Throat:      Mouth: Mucous membranes are moist    Eyes:      General: No scleral icterus  Conjunctiva/sclera: Conjunctivae normal       Pupils: Pupils are equal, round, and reactive to light  Neck:      Musculoskeletal: Neck supple  Thyroid: No thyromegaly  Cardiovascular:      Rate and Rhythm: Normal rate and regular rhythm  Heart sounds: Normal heart sounds  No murmur  No friction rub  Pulmonary:      Effort: Pulmonary effort is normal  No respiratory distress  Breath sounds: Normal breath sounds  No wheezing or rales  Abdominal:      General: Bowel sounds are normal  There is no distension  Palpations: Abdomen is soft  Tenderness: There is no abdominal tenderness  Musculoskeletal:         General: No deformity  Right lower leg: No edema  Left lower leg: No edema        Comments: Dressing over right foot   Lymphadenopathy:      Cervical: No cervical adenopathy  Skin:     Coloration: Skin is not pale  Nails: There is no clubbing  Neurological:      Mental Status: He is alert and oriented to person, place, and time  He is not disoriented  Psychiatric:         Mood and Affect: Mood normal  Mood is not anxious  Affect is not inappropriate  Behavior: Behavior normal          Thought Content: Thought content normal            Invasive Devices:        Lab Results:   Results from last 7 days   Lab Units 02/21/21  0456 02/20/21  0438 02/19/21  1547   WBC Thousand/uL 20 02* 23 58* 24 24*   HEMOGLOBIN g/dL 11 4* 11 4* 11 9*   HEMATOCRIT % 34 4* 33 7* 35 7*   PLATELETS Thousands/uL 322 330 365   POTASSIUM mmol/L 3 8 4 5 5 1   CHLORIDE mmol/L 104 99* 95*   CO2 mmol/L 19* 18* 22   BUN mg/dL 21 31* 30*   CREATININE mg/dL 1 32* 1 70* 1 93*   CALCIUM mg/dL 8 1* 8 5 9 1   ALK PHOS U/L  --  105 119*   ALT U/L  --  13 18   AST U/L  --  9 10       Other Studies:  X-ray foot, 2nd PIP joint dislocation      Portions of the record may have been created with voice recognition software  Occasional wrong word or "sound a like" substitutions may have occurred due to the inherent limitations of voice recognition software  Read the chart carefully and recognize, using context, where substitutions have occurred  If you have any questions, please contact the dictating provider

## 2021-02-21 NOTE — PROGRESS NOTES
Progress Note - Gege Bush 1950, 78 y o  male MRN: [id number]    Unit/Bed#: S -01 Encounter: [id number]    Primary Care Provider: Ulises Carmona   Date and time admitted to hospital: 2/19/2021  2:09 PM        * Osteomyelitis, right 2nd toe with cellulitis  Assessment & Plan  · Necrosis of the right 2nd digit (distal phalanx), proximal phalanx erythema toes, warmth, tender  Inflammatory signs present of the plantar surface as well  · X-ray of the right foot showed no evidence of osteomyelitis, 2nd PIP joint dislocation  · Patient does not meet sepsis/SIRS criteria  Afebrile, stable vital signs  · WBC 24 2  · Lactic acid within normal limits    Plan:  · Status post incision and drainage right foot and open 2nd ray amputation, 2/21  · Ancef 2 g IV, Flagyl 500 mg IV  · Geriatric pain management  · Wound care  · Podiatry and ID on board  · Appreciate Vascular surgery consult:  · Lower extremity arterial Doppler:  Revealed severe diffuse peripheral arterial disease on both lower extremities  With DULCE in the  50s  Please see plans under peripheral arterial disease  · Monitor vital signs  · Monitor WBC  · PT/OT  Type 2 diabetes mellitus, with long-term current use of insulin [facility])  Assessment & Plan  No results found for: HGBA1C    Recent Labs     02/20/21  1828 02/20/21  2127 02/21/21  0918 02/21/21  1129   POCGLU 238* 255* 153* 162*       Blood Sugar Average: Last 72 hrs:  (P) 255 875   · Patient admitted with blood sugars of 415 mg, beta hydroxy butyrate increased, normal pH, normal bicarb and anion gap on BMP  · History of type 2 diabetes in the past 25 years, follow-up with endocrinologist as outpatient  On 30 NPH a m , 60 units NPH at night, Novolin R 30 units a m  As home medication  · Last hemoglobin A1c 8 0  · Patient admitted poor oral intake, and decreased appetite lately  Not took insulin as was ordered  Plan:  · On basal bolus insulin      · Sliding scale insulin  · Presently, blood sugars at acceptable levels  · A1c level, if not done the last 90 days  · Accu-Chek  · Hypoglycemia protocol  · Diabetic diet  · Adjust treatment accordingly  Dyspepsia  Assessment & Plan  · Occurred after patient's surgical procedure today  · Complains of stomach discomfort and some nausea  · Prescribed with Tums p r n  And Pepcid  · Antiemetic medication       Peripheral arterial disease ([address] )  Assessment & Plan  · LEADS:  Revealed severe bilateral lower extremity peripheral arterial disease  Low DULCE  · Vascular surgery on board  Planning to do arteriogram on this admission  Recommended Nephrology management to optimize kidney function 1st   · Continue vascular medications  Anemia  Assessment & Plan  · Mild  · No active bleeding  · Likely hemodilution from IV fluids  · Monitor  · Further workup and management if this worsens significantly  Hyponatremia  Assessment & Plan  · Resolved  · Pseudohyponatremia in the setting of hyperglycemia   · Monitor  · Manage patient's hyperglycemia  Ambulatory dysfunction  Assessment & Plan  · Per patient, 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23, 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  Lately patient noticed left hip pain, as well mentioned knee pain  · About 2 months ago patient had a fall  · Fall precaution  · Bed alarm  · PT/OT after possible toe amputation     Stage 3 chronic kidney disease  Assessment & Plan  Lab Results   Component Value Date    EGFR 55 02/21/2021    EGFR 41 02/20/2021    EGFR 35 02/19/2021    CREATININE 1 32 (H) 02/21/2021    CREATININE 1 70 (H) 02/20/2021    CREATININE 1 93 (H) 02/19/2021   · Per notes, patient has chronic kidney disease stage III  · Unclear baseline, labs not available  · Serum creatinine improving with IV fluids  · As per nephrologist, may discontinue IV fluids  · Continue to hold off on the lisinopril for now  · Monitor BMP a m  · Avoid nephrotoxins  · Avoid hypotension    · Per recommendation by the vascular surgery service, we consulted Nephrology, to optimize patient's kidney function, prior to arteriogram     Hypothyroidism  Assessment & Plan  · Last TSH was within normal limits per chart review  · Continue levothyroxine 175 mcg    Hyperlipidemia  Assessment & Plan  · Continue atorvastatin 40 mg    Depression  Assessment & Plan  · Continue citalopram 20 mg    Hypertension  Assessment & Plan  · Blood pressure acceptable  · On lisinopril 20 mg as home medication, atenolol 25 mg, amlodipine 10 mg   · Will hold lisinopril for now because of kidney function and plans for arteriogram   · Monitor vital signs      VTE Pharmacologic Prophylaxis:   Pharmacologic: Enoxaparin (Lovenox)  Mechanical VTE Prophylaxis in Place: Yes    Patient Centered Rounds: I have performed bedside rounds with nursing staff today  Discussions with Specialists or Other Care Team Provider:  Podiatrist   Nephrologist     Education and Discussions with Family / Patient:  I discussed our findings, management plans to the patient  Answered questions and concerns  He is okay with the management plans  I offered to call patient's family/brother, but patient declined  Time Spent for Care: 30 minutes  More than 50% of total time spent on counseling and coordination of care as described above  Current Length of Stay: 2 day(s)    Current Patient Status: Inpatient   Certification Statement: The patient will continue to require additional inpatient hospital stay due to Above findings and plans  Discharge Plan:  None yet  Code Status: Level 1 - Full Code      Subjective:   I saw the patient after he had the surgery  Patient did not complain of any foot pains  Patient complained of some stomach/epigastric discomfort and some nausea  No vomiting episodes  No other complaints  No shortness of breath  No other pains      Objective:     Vitals:   Temp (24hrs), Av 2 °F (36 8 °C), Min:97 7 °F (36 5 °C), Max:98 7 °F (37 1 °C)    Temp:  [97 7 °F (36 5 °C)-98 7 °F (37 1 °C)] 98 7 °F (37 1 °C)  HR:  [65-86] 77  Resp:  [16-18] 16  BP: (103-179)/(52-88) 154/67  SpO2:  [92 %-99 %] 92 %  Body mass index is 30 13 kg/m²  Input and Output Summary (last 24 hours): Intake/Output Summary (Last 24 hours) at 2/21/2021 1508  Last data filed at 2/21/2021 1314  Gross per 24 hour   Intake 2900 ml   Output 975 ml   Net 1925 ml       Physical Exam:     Physical Exam  Vitals signs and nursing note reviewed  Constitutional:       General: He is not in acute distress  Appearance: He is not ill-appearing, toxic-appearing or diaphoretic  Cardiovascular:      Rate and Rhythm: Normal rate and regular rhythm  Heart sounds: Normal heart sounds  Pulmonary:      Effort: No respiratory distress  Breath sounds: Normal breath sounds  Abdominal:      General: Bowel sounds are normal  There is no distension  Palpations: Abdomen is soft  Tenderness: There is no abdominal tenderness  Musculoskeletal:      Right lower leg: No edema  Left lower leg: No edema  Skin:     General: Skin is warm  Coloration: Skin is not pale  Findings: No erythema or rash  Comments: Right foot with wound dressing  Neurological:      General: No focal deficit present  Mental Status: He is alert  Psychiatric:         Mood and Affect: Mood normal          Behavior: Behavior normal          Thought Content:  Thought content normal             Additional Data:     Labs:    Results from last 7 days   Lab Units 02/21/21  0456   WBC Thousand/uL 20 02*   HEMOGLOBIN g/dL 11 4*   HEMATOCRIT % 34 4*   PLATELETS Thousands/uL 322   NEUTROS PCT % 83*   LYMPHS PCT % 6*   MONOS PCT % 8   EOS PCT % 1     Results from last 7 days   Lab Units 02/21/21  0456 02/20/21  0438   POTASSIUM mmol/L 3 8 4 5   CHLORIDE mmol/L 104 99*   CO2 mmol/L 19* 18*   BUN mg/dL 21 31*   CREATININE mg/dL 1 32* 1 70*   CALCIUM mg/dL 8 1* 8 5   ALK PHOS U/L  --  105   ALT U/L  --  13   AST U/L  --  9     Results from last 7 days   Lab Units 02/19/21  1547   INR  1 12       * I Have Reviewed All Lab Data Listed Above  * Additional Pertinent Lab Tests Reviewed: Sandy 68 Admission Reviewed    Imaging:    Imaging Reports Reviewed Today Include:  Diagnostic imaging studies that were done on this admission  Imaging Personally Reviewed by Myself Includes:  None  Recent Cultures (last 7 days):     Results from last 7 days   Lab Units 02/19/21  1555 02/19/21  1540   BLOOD CULTURE  No Growth at 24 hrs  No Growth at 24 hrs         Last 24 Hours Medication List:   Current Facility-Administered Medications   Medication Dose Route Frequency Provider Last Rate    acetaminophen  975 mg Oral [facility] Danelle Shields, DPM      amLODIPine  10 mg Oral Daily Danelle Shields, DPM      atenolol  25 mg Oral Daily Danelle Shields, DPM      atorvastatin  40 mg Oral Daily With CARYL GutierrezM      calcium carbonate  500 mg Oral BID PRN Bibi Sinclair MD      cefazolin  2,000 mg Intravenous Q8H Danelle Shields, DPM 2,000 mg (02/21/21 1136)    citalopram  20 mg Oral Daily Danelle Shields, DPM      enoxaparin  40 mg Subcutaneous Daily Danelle Shields, DPM      famotidine  20 mg Oral BID Bibi Sinclair MD      HYDROmorphone  0 2 mg Intravenous Q4H PRN Danelle Shields, DPJEANE      insulin glargine  30 Units Subcutaneous HS Danelle Shields, DPM      insulin lispro  1-5 Units Subcutaneous HS Danelle Shields, DPM      insulin lispro  1-6 Units Subcutaneous TID [facility] Danelle Shields, DPM      insulin lispro  14 Units Subcutaneous TID With Meals Danelle Shields DPJEANE      levothyroxine  175 mcg Oral Early Morning Danelle Shields, DPM      metroNIDAZOLE  500 mg Intravenous Q8H Danelle Shields,  mg (02/21/21 1051)    oxyCODONE  2 5 mg Oral Q4H PRN Danelle Shields, DPM      oxyCODONE  5 mg Oral Q4H PRN Danelle Shields, DPM      polyethylene glycol  17 g Oral Daily PRN Danelle Shields DPM      tiotropium  18 mcg Inhalation Daily Maryam Coamo, DPM      traZODone  50 mg Oral HS Maryam Coamo, DPM          Today, Patient Was Seen By: Asha Cordova MD    ** Please Note: Dragon 360 Dictation voice to text software may have been used in the creation of this document   **

## 2021-02-21 NOTE — ANESTHESIA POSTPROCEDURE EVALUATION
Post-Op Assessment Note    CV Status:  Stable    Pain management: adequate     Mental Status:  Sleepy   Hydration Status:  Euvolemic   PONV Controlled:  Controlled   Airway Patency:  Patent      Post Op Vitals Reviewed: Yes      Staff: Anesthesiologist, CRNA   Comments: RN aware nasal trumpet still in place        No complications documented      /52 (02/21/21 0912)    Temp 97 8 °F (36 6 °C) (02/21/21 0912)    Pulse 72 (02/21/21 0912)   Resp 18 (02/21/21 0912)    SpO2 99 % (02/21/21 0912)

## 2021-02-21 NOTE — CONSULTS
Consultation - Infectious Disease   Gege Bush 78 y o  male MRN: [id number]  Unit/Bed#: S -01 Encounter: [id number]      IMPRESSION & RECOMMENDATIONS:   1  Acute clinical osteomyelitis right 2nd toe with gangrene  Patient reported having a fracture after tripping on a wire approximately 3 weeks ago and now on exam has exposed bone with foul-smelling gangrene of the right 2nd toe  Clinically consistent with osteomyelitis  Patient also has tracking cellulitis  Limited antibiotic and hospital exposure  Blood cultures pending  At this time will continue on Ancef with Flagyl  Continue to trend fever curve/vitals  Repeat CBC/chemistry tomorrow  Follow up pending blood culture data  Ongoing follow-up by Podiatry  Will follow-up OR report/cultures  Additional supportive care as per primary  Additional interventions pending clinical course    2  Right 2nd toe cellulitis and fracture  Patient's exam is consistent with cellulitis likely related to recent fracture and then progressing bone infection  Limited antibiotic exposure  Reports subjective improvement to pain since admission  Continue antibiotics as above  Continue to trend fever curve/WBC  Monitor site clinically  Follow-up operative report from Podiatry  Ongoing follow-up by Podiatry and vascular surgery    3  Leukocytosis  Patient noted with significantly elevated white count on admission which is likely due to the above  Slowly down trending  Continue antibiotics as above  Monitor CBC    4  Chronic kidney disease  Patient appears to have some degree of chronic kidney disease  Creatinine remains stable at this time  Maintain Ancef at high-dose  Will further dose adjust antibiotics as needed  Repeat chemistry tomorrow  Fluid hydration as per primary    5  Type 2 diabetes with neuropathy  Patient has known type 2 diabetes as well as reported neuropathy  Suspect poor control given the above complications    Overall increases risk of infections and poor wound healing  Glucose management as per primary  Continue antibiotics as above  Patient will require chronic follow-up with Podiatry    6  Tobacco use  Unfortunately increases risk of poor wound healing as well as vascular disease  Patient has been evaluated by vascular surgery  Will continue antibiotics otherwise as above  Additional interventions as per Surgical Services  Above plan discussed in detail with the patient at bedside  Above plan discussed in detail with primary service attending  ID consult service will continue to follow  HISTORY OF PRESENT ILLNESS:  Reason for Consult:  Right toe gangrene with osteomyelitis    HPI: Evan Mcgovern is a 78y o  year old male with hypertension, hyperlipidemia, type 2 diabetes, chronic kidney disease who is also a smoker  He presented to the hospital yesterday with right foot pain  Reportedly he fractured his right 2nd toe about 3 weeks ago  He noticed increasing redness and pain prompted him to come to the ER  He was found to have necrosis of the 2nd toe along with erythema and tenderness  He appeared to have changes tracking up the foot  X-ray was without findings of osteomyelitis but there was joint dislocation  Patient was admitted for further management  He was noted to have leukocytosis but other vitals were stable  Patient was started on Ancef with Flagyl  White count was 24  Creatinine was 1 93  Patient has been seen by vascular surgery  Patient was seen by Podiatry and on exam he was noted to have exposed bone  He also had gangrenous changes present  Patient is planned for the OR tomorrow  He is currently afebrile  White blood cell count 23  Blood cultures pending  Imaging reviewed  Patient's other vitals are stable  No significant data in Care everywhere  Patient has limited hospital exposure  No significant culture data in our system  We are consulted at this time for further assistance in management      On evaluation, the patient reports that he was frequently dropping things and tripping over wires and that he fractured his toe about 3 weeks ago  He did not think he saw any bone but the wound itself progressed and was not healing  Patient reports he does take gabapentin but recently was getting more dizzy with it and so was not taking it  He currently denies having any nausea vomiting, chest pain or shortness of breath  He reports that the stabbing pain that was present in his foot has improved since admission  Tolerating antibiotic without issue  He denies having any significant surgical history  REVIEW OF SYSTEMS:  A complete 12 point system-based review of systems is negative other than that noted in the HPI  PAST MEDICAL HISTORY:  No past medical history on file  No past surgical history on file  FAMILY HISTORY:  Non-contributory    SOCIAL HISTORY:  Social History   Social History     Substance and Sexual Activity   Alcohol Use Not on file     Social History     Substance and Sexual Activity   Drug Use Not on file     Social History     Tobacco Use   Smoking Status Not on file       ALLERGIES:  No Known Allergies    MEDICATIONS:  All current active medications have been reviewed      PHYSICAL EXAM:  Temp:  [97 6 °F (36 4 °C)-98 5 °F (36 9 °C)] 98 5 °F (36 9 °C)  HR:  [77-86] 78  Resp:  [14-18] 16  BP: (144-178)/(63-82) 155/65  SpO2:  [91 %-96 %] 92 %  Temp (24hrs), Av °F (36 7 °C), Min:97 6 °F (36 4 °C), Max:98 5 °F (36 9 °C)  Current: Temperature: 98 5 °F (36 9 °C)    Intake/Output Summary (Last 24 hours) at 2021 1907  Last data filed at 2021 1435  Gross per 24 hour   Intake 1225 ml   Output 650 ml   Net 575 ml       General Appearance:  Appearing well, nontoxic, and in no distress   Head:  Normocephalic, without obvious abnormality, atraumatic   Eyes:  Conjunctiva pink and sclera anicteric, both eyes   Nose: Nares normal, mucosa normal, no drainage   Throat: Oropharynx moist without lesions; poor dentition noted  Neck: Supple, symmetrical, no adenopathy, no tenderness/mass/nodules   Back:   Symmetric, no curvature, ROM normal, no CVA tenderness; no spinal or paraspinal muscle tenderness to palpation  Lungs:   Clear to auscultation bilaterally, respirations unlabored on room air   Chest Wall:  No tenderness or deformity   Heart:  RRR; no murmur, rub or gallop appreciated   Abdomen:   Soft, non-tender, non-distended, positive bowel sounds    Extremities: No cyanosis, clubbing or edema in the legs  Skin: Patient's right 2nd toe is completely necrotic and foul smelling  There is some tracking erythema up the foot with warmth proximal to that site  There is exposed bone  Lymph nodes: Cervical, supraclavicular nodes normal   Neurologic: Alert and oriented times 3, extremity strength 5/5 and symmetric       LABS, IMAGING, & OTHER STUDIES:  Lab Results:  I have personally reviewed pertinent labs  Results from last 7 days   Lab Units 02/20/21  0438 02/19/21  1547   WBC Thousand/uL 23 58* 24 24*   HEMOGLOBIN g/dL 11 4* 11 9*   PLATELETS Thousands/uL 330 365     Results from last 7 days   Lab Units 02/20/21  0438 02/19/21  1547   POTASSIUM mmol/L 4 5 5 1   CHLORIDE mmol/L 99* 95*   CO2 mmol/L 18* 22   BUN mg/dL 31* 30*   CREATININE mg/dL 1 70* 1 93*   EGFR ml/min/1 73sq m 41 35   CALCIUM mg/dL 8 5 9 1   AST U/L 9 10   ALT U/L 13 18   ALK PHOS U/L 105 119*     Results from last 7 days   Lab Units 02/19/21  1555 02/19/21  1540   BLOOD CULTURE  Received in Microbiology Lab  Culture in Progress  No Growth at 24 hrs  Imaging Studies:   I have personally reviewed pertinent imaging study reports and images in PACS  Other Studies:   I have personally reviewed pertinent reports

## 2021-02-21 NOTE — PROGRESS NOTES
Patient seen prior to OR  Recommended I&D / open 2nd ray amputation for source control of infection before possible vascular intervention  Explained surgical details and post-op course  Discussed all risks and complications related to the patient's condition and surgery including limb loss  The benefits of surgery were also discussed  The patient understood that the surgery would not guarantee desirable outcome  All questions and concerns were addressed and the consent was signed  Proceed to OR as planned

## 2021-02-21 NOTE — OP NOTE
OPERATIVE REPORT - Podiatry  PATIENT NAME: Prosper Villanueva    :  1950  MRN: [id number]  Pt Location: AN OR ROOM 01    SURGERY DATE: 2021    Surgeon(s) and Role:     * Mj Rome DPM - Primary     * Etta Castillo DPM - Assisting    Pre-op Diagnosis:  Gangrene of toe of right foot ([address] ) [I96]  Osteomyelitis ([facility] [address] ) [M86 9]    Post-Op Diagnosis Codes:     * Gangrene of toe of right foot ([address] ) [I96]     * Osteomyelitis ([facility] [address] ) [M86 9]    Procedure(s) (LRB):  INCISION AND DRAINAGE (I&D) EXTREMITY RIGHT FOOT AND OPEN SECOND RAY AMPUTATION (Right)    Specimen(s):  ID Type Source Tests Collected by Time Destination   1 : SECOND RAY RIGHT FOOT Tissue Foot, Right TISSUE EXAM Mj Rome DPM 2021 0824    A : RIGHT FOOT ABCESS Wound Toe, Right ANAEROBIC CULTURE AND GRAM STAIN, WOUND CULTURE Mj Rome DPM 2021 0820        Estimated Blood Loss:   Minimal    Drains:  Negative Pressure Wound Therapy (V A C ) (Active)   Number of days: 0       Anesthesia Type:   Choice with 20 ml of 1% Lidocaine and 0 5% Bupivacaine in a 1:1 mixture  Hemostasis:  Anatomic dissection,  electrocautery    Materials:  * No implants in log *    1 in iodoform packing    Operative Findings:   2nd digit noted to be ischemic, necrotic with exposed proximal phalanx  Sarwat purulence tracking from the 2nd MTPJ plantarly  to the midfoot into the central space deep to the plantar fascia  Necrotic, nonviable tissue noted within the 1st and 2nd interspace without involvement of the 1st MTPJ  Complications:   None    Procedure and Technique:     Under mild sedation, the patient was brought into the operating room and  Remained on the stretcher in the supine position  IV sedation was achieved by anesthesia team and a universal timeout was performed where all parties are in agreement of correct patient, correct procedure and correct site  A block was performed consisting of 20 ml of 1% Lidocaine and 0 5% Bupivacaine in a 1:1 mixture   The foot was then prepped and draped in the usual aseptic manner  Attention was directed to the Laterally: right 2 digit where an incision was made  Dorsally at the midshaft of the 2nd metatarsal distally to encompass the 2nd toe and plantarly to midshaft of the 2nd metatarsal  Utilizing a sterile 15 blade, this incision was carried deep straight to bone  Upon incision chen purulence was noted at the 2nd MTPJ  Soft tissue structures were then reflected off the  2nd metatarsal head and 2nd proximal phalanx  Utilizing a sterile 15 blade, all capsular structures were severed and the toe was then disarticulated at the level of the  2nd MTPJ and then placed on the back table  At this time is noted the 2nd metatarsal head was soft, nonviable  Utilizing sagittal saw a transverse cut was made into the 2nd metatarsal neck, and the distal aspect the 2nd metatarsal was passed to the back table for specimen  Deep cultures were then taken of the plantar space where purulence was noted  Upon removal of the 2nd metatarsal head there was noted purulence tracking plantarly into the midfoot  The previous incision was then extended proximally  Along the plantar midfoot or chen purulence was expressed  At this time blunt dissection with a hemostat was carried deep to the level of the plantar fascia and utilizing a 15 blade plantar fascia was split in order explore the central space  At this time purulence was noted to be expressed from the central space  The wound bed was then inspected  And all necrotic, nonviable soft tissue was removed utilizing a 15 blade  The wound was then flushed with copious amounts of normal sterile saline  The wound bed was once inspected again  and all nonviable soft tissue was removed once again utilizing a 15 blade  The wound was then flushed again with copious amounts of normal sterile saline  At this time all remaining soft tissue and bone was noted to be healthy, bleeding, viable     The wound was then packed with 1 in iodoform packing and dressed with a dry sterile dressing and Ace wrap  Normal hyperemic response was noted to all digits  The patient tolerated the procedure and anesthesia well without immediate complications and transferred to PACU with vital signs stable  As with many limb salvage procedures, we contemplate the possibility of performing further stages to this procedure  Procedures may include debridements, delayed closure, plastic surgery techniques, or more proximal amputations  This procedure may be considered part of a multi-staged limb salvage treatment plan  Dr Caryl Calle was present during the entire procedure and participated in all key aspects  SIGNATURE: Jill Talavera DPM  DATE: February 21, 2021  TIME: 9:17 AM      Portions of the record may have been created with voice recognition software  Occasional wrong word or "sound a like" substitutions may have occurred due to the inherent limitations of voice recognition software  Read the chart carefully and recognize, using context, where substitutions have occurred

## 2021-02-22 PROBLEM — R09.02 HYPOXEMIA: Status: ACTIVE | Noted: 2021-02-22

## 2021-02-22 LAB
ANION GAP SERPL CALCULATED.3IONS-SCNC: 10 MMOL/L (ref 4–13)
BASOPHILS # BLD AUTO: 0.14 THOUSANDS/ΜL (ref 0–0.1)
BASOPHILS NFR BLD AUTO: 1 % (ref 0–1)
BUN SERPL-MCNC: 15 MG/DL (ref 5–25)
CALCIUM SERPL-MCNC: 7.9 MG/DL (ref 8.3–10.1)
CHLORIDE SERPL-SCNC: 105 MMOL/L (ref 100–108)
CO2 SERPL-SCNC: 21 MMOL/L (ref 21–32)
CREAT SERPL-MCNC: 1.23 MG/DL (ref 0.6–1.3)
EOSINOPHIL # BLD AUTO: 0.26 THOUSAND/ΜL (ref 0–0.61)
EOSINOPHIL NFR BLD AUTO: 1 % (ref 0–6)
ERYTHROCYTE [DISTWIDTH] IN BLOOD BY AUTOMATED COUNT: 12.7 % (ref 11.6–15.1)
GFR SERPL CREATININE-BSD FRML MDRD: 60 ML/MIN/1.73SQ M
GLUCOSE SERPL-MCNC: 165 MG/DL (ref 65–140)
GLUCOSE SERPL-MCNC: 171 MG/DL (ref 65–140)
GLUCOSE SERPL-MCNC: 176 MG/DL (ref 65–140)
GLUCOSE SERPL-MCNC: 89 MG/DL (ref 65–140)
GLUCOSE SERPL-MCNC: 92 MG/DL (ref 65–140)
HCT VFR BLD AUTO: 32.4 % (ref 36.5–49.3)
HGB BLD-MCNC: 10.5 G/DL (ref 12–17)
IMM GRANULOCYTES # BLD AUTO: 0.11 THOUSAND/UL (ref 0–0.2)
IMM GRANULOCYTES NFR BLD AUTO: 1 % (ref 0–2)
LYMPHOCYTES # BLD AUTO: 2.05 THOUSANDS/ΜL (ref 0.6–4.47)
LYMPHOCYTES NFR BLD AUTO: 10 % (ref 14–44)
MCH RBC QN AUTO: 29.3 PG (ref 26.8–34.3)
MCHC RBC AUTO-ENTMCNC: 32.4 G/DL (ref 31.4–37.4)
MCV RBC AUTO: 91 FL (ref 82–98)
MONOCYTES # BLD AUTO: 2.17 THOUSAND/ΜL (ref 0.17–1.22)
MONOCYTES NFR BLD AUTO: 11 % (ref 4–12)
NEUTROPHILS # BLD AUTO: 15.39 THOUSANDS/ΜL (ref 1.85–7.62)
NEUTS SEG NFR BLD AUTO: 76 % (ref 43–75)
NRBC BLD AUTO-RTO: 0 /100 WBCS
PLATELET # BLD AUTO: 315 THOUSANDS/UL (ref 149–390)
PMV BLD AUTO: 10.9 FL (ref 8.9–12.7)
POTASSIUM SERPL-SCNC: 3.8 MMOL/L (ref 3.5–5.3)
PSA SERPL-MCNC: 27.2 NG/ML (ref 0–4)
RBC # BLD AUTO: 3.58 MILLION/UL (ref 3.88–5.62)
SODIUM SERPL-SCNC: 136 MMOL/L (ref 136–145)
WBC # BLD AUTO: 20.12 THOUSAND/UL (ref 4.31–10.16)

## 2021-02-22 PROCEDURE — 82948 REAGENT STRIP/BLOOD GLUCOSE: CPT

## 2021-02-22 PROCEDURE — NC001 PR NO CHARGE: Performed by: RADIOLOGY

## 2021-02-22 PROCEDURE — 99232 SBSQ HOSP IP/OBS MODERATE 35: CPT | Performed by: INTERNAL MEDICINE

## 2021-02-22 PROCEDURE — 84153 ASSAY OF PSA TOTAL: CPT | Performed by: INTERNAL MEDICINE

## 2021-02-22 PROCEDURE — 2W1SX6Z COMPRESSION OF RIGHT FOOT USING PRESSURE DRESSING: ICD-10-PCS | Performed by: PODIATRIST

## 2021-02-22 PROCEDURE — 85025 COMPLETE CBC W/AUTO DIFF WBC: CPT | Performed by: STUDENT IN AN ORGANIZED HEALTH CARE EDUCATION/TRAINING PROGRAM

## 2021-02-22 PROCEDURE — 97606 NEG PRS WND THER DME>50 SQCM: CPT | Performed by: PODIATRIST

## 2021-02-22 PROCEDURE — 99232 SBSQ HOSP IP/OBS MODERATE 35: CPT | Performed by: SURGERY

## 2021-02-22 PROCEDURE — 80048 BASIC METABOLIC PNL TOTAL CA: CPT | Performed by: STUDENT IN AN ORGANIZED HEALTH CARE EDUCATION/TRAINING PROGRAM

## 2021-02-22 RX ORDER — INSULIN GLARGINE 100 [IU]/ML
20 INJECTION, SOLUTION SUBCUTANEOUS
Status: DISCONTINUED | OUTPATIENT
Start: 2021-02-22 | End: 2021-02-24

## 2021-02-22 RX ORDER — SODIUM CHLORIDE 9 MG/ML
75 INJECTION, SOLUTION INTRAVENOUS CONTINUOUS
Status: DISCONTINUED | OUTPATIENT
Start: 2021-02-23 | End: 2021-02-23

## 2021-02-22 RX ORDER — AMLODIPINE BESYLATE 10 MG/1
10 TABLET ORAL DAILY
Status: DISCONTINUED | OUTPATIENT
Start: 2021-02-23 | End: 2021-03-07 | Stop reason: HOSPADM

## 2021-02-22 RX ADMIN — OXYCODONE HYDROCHLORIDE 5 MG: 5 TABLET ORAL at 09:35

## 2021-02-22 RX ADMIN — ATORVASTATIN CALCIUM 40 MG: 40 TABLET, FILM COATED ORAL at 17:59

## 2021-02-22 RX ADMIN — INSULIN GLARGINE 20 UNITS: 100 INJECTION, SOLUTION SUBCUTANEOUS at 21:25

## 2021-02-22 RX ADMIN — OXYCODONE HYDROCHLORIDE 5 MG: 5 TABLET ORAL at 13:28

## 2021-02-22 RX ADMIN — METRONIDAZOLE 500 MG: 500 INJECTION, SOLUTION INTRAVENOUS at 04:10

## 2021-02-22 RX ADMIN — FAMOTIDINE 20 MG: 20 TABLET ORAL at 17:58

## 2021-02-22 RX ADMIN — CEFAZOLIN SODIUM 2000 MG: 2 SOLUTION INTRAVENOUS at 19:36

## 2021-02-22 RX ADMIN — LEVOTHYROXINE SODIUM 175 MCG: 175 TABLET ORAL at 05:16

## 2021-02-22 RX ADMIN — INSULIN LISPRO 1 UNITS: 100 INJECTION, SOLUTION INTRAVENOUS; SUBCUTANEOUS at 13:21

## 2021-02-22 RX ADMIN — CITALOPRAM HYDROBROMIDE 20 MG: 20 TABLET ORAL at 09:29

## 2021-02-22 RX ADMIN — ACETAMINOPHEN 975 MG: 325 TABLET, FILM COATED ORAL at 05:15

## 2021-02-22 RX ADMIN — OXYCODONE HYDROCHLORIDE 5 MG: 5 TABLET ORAL at 19:46

## 2021-02-22 RX ADMIN — CEFAZOLIN SODIUM 2000 MG: 2 SOLUTION INTRAVENOUS at 11:30

## 2021-02-22 RX ADMIN — ATENOLOL 25 MG: 25 TABLET ORAL at 09:29

## 2021-02-22 RX ADMIN — CEFAZOLIN SODIUM 2000 MG: 2 SOLUTION INTRAVENOUS at 03:24

## 2021-02-22 RX ADMIN — TRAZODONE HYDROCHLORIDE 50 MG: 50 TABLET ORAL at 21:25

## 2021-02-22 RX ADMIN — TIOTROPIUM BROMIDE 18 MCG: 18 CAPSULE ORAL; RESPIRATORY (INHALATION) at 09:31

## 2021-02-22 RX ADMIN — METRONIDAZOLE 500 MG: 500 INJECTION, SOLUTION INTRAVENOUS at 20:31

## 2021-02-22 RX ADMIN — HYDROMORPHONE HYDROCHLORIDE 0.2 MG: 1 INJECTION, SOLUTION INTRAMUSCULAR; INTRAVENOUS; SUBCUTANEOUS at 14:26

## 2021-02-22 RX ADMIN — ENOXAPARIN SODIUM 40 MG: 40 INJECTION SUBCUTANEOUS at 09:29

## 2021-02-22 RX ADMIN — FAMOTIDINE 20 MG: 20 TABLET ORAL at 09:29

## 2021-02-22 RX ADMIN — METRONIDAZOLE 500 MG: 500 INJECTION, SOLUTION INTRAVENOUS at 12:20

## 2021-02-22 RX ADMIN — AMLODIPINE BESYLATE 10 MG: 10 TABLET ORAL at 09:29

## 2021-02-22 NOTE — ASSESSMENT & PLAN NOTE
· According to the patient's nurse, patient's oxygen saturation went down to 88% overnight  Thus patient was placed eventually on 3 L of nasal cannula to get good oxygen saturations  · When I saw the patient today, patient denies any shortness of breath  Breath sounds on examination bilaterally were clear  · Possible due to atelectasis  Patient was encouraged to use the incentive spirometer more frequently  · Presently, patient's oxygen has been titrated down to 2 L now  Will continue titrating it down to keep oxygen saturation above 89%  If no improvement, may need imaging study/chest x-ray

## 2021-02-22 NOTE — ASSESSMENT & PLAN NOTE
Lab Results   Component Value Date    HGBA1C 9 3 (H) 02/21/2021       Recent Labs     02/21/21  1624 02/21/21  2108 02/22/21  0736 02/22/21  1101   POCGLU 241* 135 89 176*       Blood Sugar Average: Last 72 hrs:  (P) 224   · Patient admitted with blood sugars of 415 mg, beta hydroxy butyrate increased, normal pH, normal bicarb and anion gap on BMP  · History of type 2 diabetes in the past 25 years, follow-up with endocrinologist as outpatient  On 30 NPH a m , 60 units NPH at night, Novolin R 30 units a m  As home medication  · Last hemoglobin A1c 8 0 prior to this admission  · Patient admitted poor oral intake, and decreased appetite lately  Not took insulin as was ordered  Plan:  · On basal bolus insulin  · Sliding scale insulin  · Presently, blood sugars at acceptable levels  · Accu-Chek  · Hypoglycemia protocol  · Diabetic diet  · Adjust treatment accordingly

## 2021-02-22 NOTE — ASSESSMENT & PLAN NOTE
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

## 2021-02-22 NOTE — PROGRESS NOTES
[address] [facility] NOTE   Doni Home 78 y o  male MRN: [id number]  Unit/Bed#: S -01 Encounter: [id number]  Reason for Consult:  Acute kidney injury on CKD    ASSESSMENT and PLAN:  72-year-old male with a history of hypertension, hyperlipidemia, diabetes mellitus type 2, CKD, chronic smoking who was admitted with right foot pain and erythema on 02/19  He underwent I and D and 2nd ray amputation for osteomyelitis on 02/21  Vascular planning angiogram therefore nephrology consulted on 02/21  1  Acute kidney injury (POA):  · Baseline creatinine 1 6 as of November 2020 per Dr Corinne Pitt consult note  · Creatinine 1 93 on admission  · Renal function improving with IV fluid, holding ACE-inhibitor  No NSAIDs/aspirin  · Creatinine down to 1 23  · Workup:  Urinalysis 1-2 RBCs, 1+ protein, positive for glucose  · Etiology:  Likely due to NSAID use with infection in the setting of autoregulatory failure from ACE-inhibitor  · Plan:  · Patient adequately prepared for contrast which is planned for 2/23  · Continue to hold ACE-inhibitor  · No NSAIDs  · NPO past midnight-will provide gentle hydration for renal protection-it appears that arteriogram planned for 12:30 on 2/23  Will start IV fluids and 0800 hours  2  Chronic kidney disease, stage III:  Patient does not follow with Nephrology on an outpatient basis  Etiology of CKD likely diabetic nephropathy, hypertensive nephrosclerosis and possibly nodular sclerosis due to long-term smoking  3  Blood pressure/hypertension:    · Home medication regime includes lisinopril which is on hold  · Currently on amlodipine 10 mg daily and atenolol 25 mg daily  · Blood pressure slightly above goal   Will tolerate at this time  4  Osteomyelitis:  Status post I&D and open 2nd ray amputation on 02/21  Vascular surgery planning arteriogram 2/23  On antibiotics per ID  5  Hyponatremia:  Resolved  Likely due to volume depletion; improving    6  Diabetes mellitus: Management per primary team  7  Elevated PSA:  Urology follow-up strongly recommended  8  Tobacco use:  Recommend cessation    DISPOSITION:  Check labs in the a m   NPO past midnight  Start IV fluids in the a m  Continue to hold lisinopril    SUBJECTIVE / 24H INTERVAL HISTORY:  Complaining of right foot pain  Recent VAC change aggravating pain issue    OBJECTIVE:  Current Weight: Weight - Scale: 95 3 kg (210 lb)  Vitals:    02/22/21 0325 02/22/21 0700 02/22/21 0912 02/22/21 0940   BP: 144/63 153/70     BP Location: Left arm Left arm     Pulse: 65 63     Resp: 18 16     Temp: 98 9 °F (37 2 °C) 98 4 °F (36 9 °C)     TempSrc: Oral Oral     SpO2: 94% 93% 94% 92%   Weight:       Height:           Intake/Output Summary (Last 24 hours) at 2/22/2021 1344  Last data filed at 2/22/2021 1220  Gross per 24 hour   Intake 480 ml   Output 900 ml   Net -420 ml     General: NAD  Skin: no rash  Eyes: anicteric sclera  ENT: moist mucous membrane  Neck: supple  Chest: CTA b/l, no ronchii, no wheeze, no rubs, no rales  Normal effort  CVS: s1s2, no murmur, no gallop, no rub    Regular  Abdomen: soft, nontender, nl sounds  Extremities: no significant edema LE b/l  : no merrill  Neuro: AAOX3  Psych: normal affect  Medications:    Current Facility-Administered Medications:     acetaminophen (TYLENOL) tablet 975 mg, 975 mg, Oral, Q8H [facility] [place], Vi Records, DPM, 975 mg at 02/22/21 0515    amLODIPine (NORVASC) tablet 10 mg, 10 mg, Oral, Daily, Vi Records, DPM, 10 mg at 02/22/21 6595    atenolol (TENORMIN) tablet 25 mg, 25 mg, Oral, Daily, Vi Records, DPM, 25 mg at 02/22/21 4913    atorvastatin (LIPITOR) tablet 40 mg, 40 mg, Oral, Daily With Hilario Gonzalezin, DPM, 40 mg at 02/21/21 1607    calcium carbonate (TUMS) chewable tablet 500 mg, 500 mg, Oral, BID PRN, Bibi Sinclair MD    ceFAZolin (ANCEF) IVPB (premix in dextrose) 2,000 mg 50 mL, 2,000 mg, Intravenous, Q8H, Vi Pollock DPM, Last Rate: 100 mL/hr at 02/22/21 1130, 2,000 mg at 02/22/21 1130    citalopram (CeleXA) tablet 20 mg, 20 mg, Oral, Daily, Iesha Diver, DPM, 20 mg at 02/22/21 0929    enoxaparin (LOVENOX) subcutaneous injection 40 mg, 40 mg, Subcutaneous, Daily, Iesha Diver, DPM, 40 mg at 02/22/21 0929    famotidine (PEPCID) tablet 20 mg, 20 mg, Oral, BID, Bibi Sinclair MD, 20 mg at 02/22/21 0929    HYDROmorphone (DILAUDID) injection 0 2 mg, 0 2 mg, Intravenous, Q4H PRN, Iesha Diver, DPM, 0 2 mg at 02/21/21 2153    insulin glargine (LANTUS) subcutaneous injection 30 Units 0 3 mL, 30 Units, Subcutaneous, HS, Iesha Diver, DPM, 30 Units at 02/21/21 2154    insulin lispro (HumaLOG) 100 units/mL subcutaneous injection 1-5 Units, 1-5 Units, Subcutaneous, HS, Iesha Diver, DPM, 2 Units at 02/20/21 2131    insulin lispro (HumaLOG) 100 units/mL subcutaneous injection 1-6 Units, 1-6 Units, Subcutaneous, TID AC, 1 Units at 02/22/21 1321 **AND** Fingerstick Glucose (POCT), , , TID AC, Iesha Diver, DPM    insulin lispro (HumaLOG) 100 units/mL subcutaneous injection 14 Units, 14 Units, Subcutaneous, TID With Meals, Iesha Diver, DPM, 14 Units at 02/22/21 1321    levothyroxine tablet 175 mcg, 175 mcg, Oral, Early Morning, Iesha Diver, DPM, 175 mcg at 02/22/21 0516    metroNIDAZOLE (FLAGYL) IVPB (premix) 500 mg 100 mL, 500 mg, Intravenous, Q8H, Iesha Diver, DPM, Last Rate: 200 mL/hr at 02/22/21 1220, 500 mg at 02/22/21 1220    oxyCODONE (ROXICODONE) IR tablet 2 5 mg, 2 5 mg, Oral, Q4H PRN, Iesha Diver, DPM    oxyCODONE (ROXICODONE) IR tablet 5 mg, 5 mg, Oral, Q4H PRN, Iesha Diver, DPM, 5 mg at 02/22/21 1328    polyethylene glycol (MIRALAX) packet 17 g, 17 g, Oral, Daily PRN, Iesha Diver, DPM    tiotropium (SPIRIVA) capsule for inhaler 18 mcg, 18 mcg, Inhalation, Daily, Iesha Diver, DPM, 18 mcg at 02/22/21 0931    traZODone (DESYREL) tablet 50 mg, 50 mg, Oral, HS, Iesha Diver, DPM, 50 mg at 02/21/21 8610    Laboratory Results:  Results from last 7 days   Lab Units 02/22/21  5745 02/21/21  7448 02/20/21  0438 02/19/21  1547   WBC Thousand/uL 20 12* 20 02* 23 58* 24 24*   HEMOGLOBIN g/dL 10 5* 11 4* 11 4* 11 9*   HEMATOCRIT % 32 4* 34 4* 33 7* 35 7*   PLATELETS Thousands/uL 315 322 330 365   POTASSIUM mmol/L 3 8 3 8 4 5 5 1   CHLORIDE mmol/L 105 104 99* 95*   CO2 mmol/L 21 19* 18* 22   BUN mg/dL 15 21 31* 30*   CREATININE mg/dL 1 23 1 32* 1 70* 1 93*   CALCIUM mg/dL 7 9* 8 1* 8 5 9 1

## 2021-02-22 NOTE — ASSESSMENT & PLAN NOTE
· Mild  · No active bleeding  · Likely hemodilution from IV fluids plus acute blood loss anemia from the surgery  · Monitor  · Further workup and management if this worsens significantly

## 2021-02-22 NOTE — ASSESSMENT & PLAN NOTE
· Resolved  · Occurred after patient's surgical procedure today  · Complains of stomach discomfort and some nausea  · Prescribed with Tums p r n  And Pepcid  · Antiemetic medication  Spenser Clifford

## 2021-02-22 NOTE — PROGRESS NOTES
When checking pt's vitals his oxygen saturation was at 88% on room air  Put pt on 2L NC and only came up to 89%  Bumped him up to 3L NC and he was at 91%  Left Pt on 3L for the night and gave him an incentive spirometer and instructed him how to use it

## 2021-02-22 NOTE — PLAN OF CARE
Problem: Potential for Falls  Goal: Patient will remain free of falls  Description: INTERVENTIONS:  - Assess patient frequently for physical needs  -  Identify cognitive and physical deficits and behaviors that affect risk of falls    -  [place] fall precautions as indicated by assessment   - Educate patient/family on patient safety including physical limitations  - Instruct patient to call for assistance with activity based on assessment  - Modify environment to reduce risk of injury  - Consider OT/PT consult to assist with strengthening/mobility  Outcome: Progressing     Problem: PAIN - ADULT  Goal: Verbalizes/displays adequate comfort level or baseline comfort level  Description: Interventions:  - Encourage patient to monitor pain and request assistance  - Assess pain using appropriate pain scale  - Administer analgesics based on type and severity of pain and evaluate response  - Implement non-pharmacological measures as appropriate and evaluate response  - Consider cultural and social influences on pain and pain management  - Notify physician/advanced practitioner if interventions unsuccessful or patient reports new pain  Outcome: Progressing     Problem: INFECTION - ADULT  Goal: Absence or prevention of progression during hospitalization  Description: INTERVENTIONS:  - Assess and monitor for signs and symptoms of infection  - Monitor lab/diagnostic results  - Monitor all insertion sites, i e  indwelling lines, tubes, and drains  - Monitor endotracheal if appropriate and nasal secretions for changes in amount and color  - [place] appropriate cooling/warming therapies per order  - Administer medications as ordered  - Instruct and encourage patient and family to use good hand hygiene technique  - Identify and instruct in appropriate isolation precautions for identified infection/condition  Outcome: Progressing     Problem: SAFETY ADULT  Goal: Patient will remain free of falls  Description: INTERVENTIONS:  - Assess patient frequently for physical needs  -  Identify cognitive and physical deficits and behaviors that affect risk of falls  -  [place] fall precautions as indicated by assessment   - Educate patient/family on patient safety including physical limitations  - Instruct patient to call for assistance with activity based on assessment  - Modify environment to reduce risk of injury  - Consider OT/PT consult to assist with strengthening/mobility  Outcome: Progressing     Problem: DISCHARGE PLANNING  Goal: Discharge to home or other facility with appropriate resources  Description: INTERVENTIONS:  - Identify barriers to discharge w/patient and caregiver  - Arrange for needed discharge resources and transportation as appropriate  - Identify discharge learning needs (meds, wound care, etc )  - Arrange for interpretive services to assist at discharge as needed  - Refer to Case Management Department for coordinating discharge planning if the patient needs post-hospital services based on physician/advanced practitioner order or complex needs related to functional status, cognitive ability, or social support system  Outcome: Progressing     Problem: Knowledge Deficit  Goal: Patient/family/caregiver demonstrates understanding of disease process, treatment plan, medications, and discharge instructions  Description: Complete learning assessment and assess knowledge base    Interventions:  - Provide teaching at level of understanding  - Provide teaching via preferred learning methods  Outcome: Progressing     Problem: Prexisting or High Potential for Compromised Skin Integrity  Goal: Skin integrity is maintained or improved  Description: INTERVENTIONS:  - Identify patients at risk for skin breakdown  - Assess and monitor skin integrity  - Assess and monitor nutrition and hydration status  - Monitor labs   - Assess for incontinence   - Turn and reposition patient  - Assist with mobility/ambulation  - Relieve pressure over bony prominences  - Avoid friction and shearing  - Provide appropriate hygiene as needed including keeping skin clean and dry  - Evaluate need for skin moisturizer/barrier cream  - Collaborate with interdisciplinary team   - Patient/family teaching  - Consider wound care consult   Outcome: Progressing

## 2021-02-22 NOTE — PROGRESS NOTES
Progress Note - Vascular Surgery   Rashard Best 78 y o  male MRN: [id number]  Unit/Bed#: S -01 Encounter: [id number]    Assessment:  67yo M with DM, CKD, tobacco use, who p/w R 2nd toe tissue loss following injury 3 weeks ago  Now POD#1 s/p I&D and R 2nd ray amputation by podiatry  Crt 1 23    Plan:   Plan for IR agram -- timing TBD, hopefully tomorrow pending nephro clearance and IR scheduling   NPO p MN in case of procedure tomorrow   IVF pre- and post-contrast per nephro recs   [facility] per podiatry    Subjective/Objective     Subjective:   No acute events overnight  Objective:    Blood pressure 144/63, pulse 65, temperature 98 9 °F (37 2 °C), temperature source Oral, resp  rate 18, height 5' 10" (1 778 m), weight 95 3 kg (210 lb), SpO2 94 %  ,Body mass index is 30 13 kg/m²  Intake/Output Summary (Last 24 hours) at 2/22/2021 0556  Last data filed at 2/22/2021 0410  Gross per 24 hour   Intake 1480 ml   Output 775 ml   Net 705 ml       Invasive Devices     Peripheral Intravenous Line            Peripheral IV 02/19/21 Right;Ventral (anterior) Forearm 2 days    Peripheral IV 02/20/21 Dorsal (posterior); Left Forearm 1 day                Physical Exam:   Gen:  NAD  CV:  non-palpable DP/PT bilaterally  Lungs: nl effort  Abd:  soft, NT/ND  Ext:  R foot surgical dressing in place  Neuro: A&Ox3     Results from last 7 days   Lab Units 02/22/21  0435 02/21/21  0456 02/20/21  0438   WBC Thousand/uL 20 12* 20 02* 23 58*   HEMOGLOBIN g/dL 10 5* 11 4* 11 4*   HEMATOCRIT % 32 4* 34 4* 33 7*   PLATELETS Thousands/uL 315 322 330     Results from last 7 days   Lab Units 02/22/21  0435 02/21/21  0456 02/20/21  0438   POTASSIUM mmol/L 3 8 3 8 4 5   CHLORIDE mmol/L 105 104 99*   CO2 mmol/L 21 19* 18*   BUN mg/dL 15 21 31*   CREATININE mg/dL 1 23 1 32* 1 70*   CALCIUM mg/dL 7 9* 8 1* 8 5     Results from last 7 days   Lab Units 02/19/21  1547   INR  1 12   PTT seconds 33

## 2021-02-22 NOTE — ASSESSMENT & PLAN NOTE
· LEADS:  Revealed severe bilateral lower extremity peripheral arterial disease  Low DULCE  · Vascular surgery on board  Planning to do arteriogram on this admission  According to vascular surgery, timing to be determined, hopefully tomorrow pending nephro clearance and IR scheduling  Thus NPO post midnight in case of procedure tomorrow  · Continue vascular medications

## 2021-02-22 NOTE — TELEMEDICINE
INTERPROFESSIONAL (PHONE) CONSULTATION - Interventional Radiology  Kavita Dewey 78 y o  male MRN: [id number]  Unit/Bed#: S -01 Encounter: [id number]    IR has been consulted to evaluate the patient, determine the appropriate procedure, and whether or not a procedure can and should be performed regarding the care of Kavita Dewey  We were consulted by Dr Cali President concerning R 2nd toe tissue loss, and to possibly perform an arteriogram if medically appropriate for the patient  IP Consult to IR  Consult performed by: Althea Pa DO  Consult ordered by: Carmelita Estrella MD        02/22/21    Assessment/Recommendation:   Briefly, 78 y M with DM, CKD p/w R 2nd toe tissue loss s/p injury 3 weeks ago  POD1 I&D / 2nd ray amputation  Neph cleared, Cr improving  Plan for RLE arteriogram Tue  NPO MN  Total time spent in review of data, discussion with requesting provider and rendering advice was 10 minutes  Patient or appropriate family member was verbally informed by VS of this consultative service on their behalf to provide more timely access to specialty care in lieu of an in person consultation  Verbal consent was obtained  Thank you for allowing Interventional Radiology to participate in the care of Kavita Dewey  Please don't hesitate to call or TigerText us with any questions       Althea Pa DO

## 2021-02-22 NOTE — PROGRESS NOTES
Progress Note - Infectious Disease   Marshall Sanchez 78 y o  male MRN: [id number]  Unit/Bed#: S -01 Encounter: [id number]      Impression/Plan:  1  Acute clinical osteomyelitis right 2nd toe with gangrene  Patient reported having a fracture after tripping on a wire approximately 3 weeks ago and now on exam has exposed bone with foul-smelling gangrene of the right 2nd toe  Clinically consistent with osteomyelitis  Patient also has tracking cellulitis  Patient underwent right 2nd toe amputation and extensive I &D of plantar abscess found to have a tracked into the midfoot  OR gs with 1+ Gram-positive cocci in pairs, cx pending  Continue on Ancef with Flagyl  Continue to trend fever curve/vitals  Repeat CBC/chemistry tomorrow  Follow up pending blood culture data  Ongoing follow-up by Podiatry  Will follow-up OR cultures  Additional supportive care as per primary  Additional interventions pending clinical course  Plan for angiogram following source control procedure      2  Right 2nd toe cellulitis and fracture  Patient's exam is consistent with cellulitis likely related to recent fracture and then progressing bone infection  Patient underwent right 2nd toe amputation and extensive I &D of plantar abscess found to have a tracked into the midfoot  OR gs with 1+ Gram-positive cocci in pairs, cx pending  Continue antibiotics as above  Continue to trend fever curve/WBC  Monitor site clinically  Ongoing follow-up by Podiatry  Ongoing follow-up vascular surgery     3  Leukocytosis  Patient noted with significantly elevated white count on admission which is likely due to the above  Slowly down trending  Continue antibiotics as above  Monitor CBC     4  Chronic kidney disease  Patient appears to have some degree of chronic kidney disease  Creatinine remains stable at this time    Maintain Ancef at high-dose  Will further dose adjust antibiotics as needed  Repeat chemistry tomorrow  Fluid hydration as per primary     5  Type 2 diabetes with neuropathy  Patient has known type 2 diabetes as well as reported neuropathy  Suspect poor control given the above complications  Overall increases risk of infections and poor wound healing  Glucose management as per primary  Continue antibiotics as above  Patient will require chronic follow-up with Podiatry     6  Tobacco use  Unfortunately increases risk of poor wound healing as well as vascular disease  Patient has been evaluated by vascular surgery  Will continue antibiotics otherwise as above  Additional interventions as per Surgical Services      Above plan discussed in detail with the patient, family at bedside and Dr Chari Puentes  Antibiotics:  Cefazolin/Flagyl  POD #1    Subjective:  Patient s/p vac change just now  Tissue base clean and beefy red on vac change  Patient reports pain in the foot now and is requesting pain medication; RN notified  ROS: Patient has no high fever, chills, sweats overnight; no nausea, vomiting, diarrhea; no cough, shortness of breath  Objective:  Vitals:  Temp:  [97 7 °F (36 5 °C)-99 7 °F (37 6 °C)] 98 4 °F (36 9 °C)  HR:  [63-78] 63  Resp:  [16-18] 16  BP: (144-155)/(63-70) 153/70  SpO2:  [91 %-94 %] 92 %  Temp (24hrs), Av 7 °F (37 1 °C), Min:97 7 °F (36 5 °C), Max:99 7 °F (37 6 °C)  Current: Temperature: 98 4 °F (36 9 °C)    General Appearance:  Awake, alert, cooperative, resting in bed, wincing in discomforting and with restless lower legs  Throat: Oropharynx moist without lesions  Lungs:   Clear to auscultation bilaterally; no wheezes, rhonchi or rales; respirations unlabored   Heart:  RRR; S1-S2 heard, no murmur   Abdomen:   Soft, non-tender, non-distended, positive bowel sounds       Extremities: Right foot wound vac in place with scant serous output in canister, + venodynes, arm IV site nontender           Skin: No rashes      Labs, Imaging, & Other studies:   All pertinent labs and imaging studies were personally reviewed  Results from last 7 days   Lab Units 02/22/21  0435 02/21/21  0456 02/20/21  0438   WBC Thousand/uL 20 12* 20 02* 23 58*   HEMOGLOBIN g/dL 10 5* 11 4* 11 4*   PLATELETS Thousands/uL 315 322 330     Results from last 7 days   Lab Units 02/22/21  0435  02/20/21  0438 02/19/21  1547   POTASSIUM mmol/L 3 8   < > 4 5 5 1   CHLORIDE mmol/L 105   < > 99* 95*   CO2 mmol/L 21   < > 18* 22   BUN mg/dL 15   < > 31* 30*   CREATININE mg/dL 1 23   < > 1 70* 1 93*   EGFR ml/min/1 73sq m 60   < > 41 35   CALCIUM mg/dL 7 9*   < > 8 5 9 1   AST U/L  --   --  9 10   ALT U/L  --   --  13 18   ALK PHOS U/L  --   --  105 119*    < > = values in this interval not displayed  Results from last 7 days   Lab Units 02/20/21  0438 02/19/21  1547   PROCALCITONIN ng/ml 0 13 0 10     Results from last 7 days   Lab Units 02/19/21  1555 02/19/21  1540   BLOOD CULTURE  No Growth at 48 hrs  No Growth at 48 hrs

## 2021-02-22 NOTE — ASSESSMENT & PLAN NOTE
Lab Results   Component Value Date    EGFR 60 02/22/2021    EGFR 55 02/21/2021    EGFR 41 02/20/2021    CREATININE 1 23 02/22/2021    CREATININE 1 32 (H) 02/21/2021    CREATININE 1 70 (H) 02/20/2021   · Per notes, patient has chronic kidney disease stage III  · Unclear baseline, labs not available  · Serum creatinine improved with IV fluids  · As per nephrologist, if serum creatinine remains 1 3 to 1 6, okay to undergo lower extremity angiogram; to start IV fluids again, tomorrow 2/23 at 8:00 a m  · Continue to hold off on the lisinopril for now  · Monitor BMP a m  · Avoid nephrotoxins  · Avoid hypotension  · Repeat PSA was elevated at 27 2, nephrologist recommended urology evaluation either as an inpatient versus outpatient  I think this can be taken care of in the outpatient, unless patient develops post renal obstruction  Outpatient follow-up with urologist (this needs to be set up on discharge, if not seen as an inpatient)

## 2021-02-22 NOTE — UTILIZATION REVIEW
Initial Clinical Review    Admission: Date/Time/Statement:   Admission Orders (From admission, onward)     Ordered        02/19/21 1736  Inpatient Admission  Once                   Orders Placed This Encounter   Procedures    Inpatient Admission     Standing Status:   Standing     Number of Occurrences:   1     Order Specific Question:   Level of Care     Answer:   Med Surg [16]     Order Specific Question:   Estimated length of stay     Answer:   More than 2 Midnights     Order Specific Question:   Certification     Answer:   I certify that inpatient services are medically necessary for this patient for a duration of greater than two midnights  See H&P and MD Progress Notes for additional information about the patient's course of treatment  ED Arrival Information     Expected Arrival Acuity Means of Arrival Escorted By Service Admission Type    - 2/19/2021 14:09 Urgent Ambulance [facility] EMS Hospitalist Urgent    Arrival Complaint    general weakness        Chief Complaint   Patient presents with    Weakness - Generalized     PT comes to ED with c/o increasing weakness "for many weeks, today I just cant get around anymore" Per EMS "brother told us that his big toe on right foot is black" (PT is diabetic BS for EMS was 406)     Assessment/Plan:   77 yo male,  To ER via EMS From home,   Admitted IP status, medsurg level of care for treatment of  Rt 2nd toe  Osteomyelitis  In setting of  hyperglycemia, leukocytosis, elevated acetone and elevated crt  H/o DMT2 & CKD 3  (unknown baseline)   Reports injured 2nd toe Rt foot few wks prior  (fall)    Progressively turning black since  For last 3 days has felt ill - did not take his insulin or other meds, today unable to get out of bed d/t weakness  Pseudohyponatremia in the setting of hyperglycemia     EXAM:  Dry mucous membranes, Rt 2nd toe  W/obvious deformity, gangrenous, foul smelling  with surrounding erythema on plantar & dorsal surface of the foot   Dorsalis pedis on right not palpable  Will initiate  IVABs,  Provide pain management & wound care,  Consult podiatry/ID/vascular  Manage BS w/basal and SSI per qid accucks  Trend VS, CBC,  Renal indices - HOLD lisinopril, avoid hypotension  2/20  Vascular surgery consult:  Obtain b/l  Lower extremity arterial Doppler study; Toe/wound care per podiatry   2/20   INTERNAL MED:  crt improved w/IVF, cont  Increase basal insulin  PT/OT after possible toe amputation     2/20  Podiatry:  Acute osteomyelitis right 2nd toe with gangrene and exposed bone,  Cellulitis right foot, suspicious for abscess, PAD      limb threatening right foot infection -  will need open 2nd ray amputation / I&D to control the source of the infection before possible vascular intervention  NPO after midnight    Plan OR tomorrow      ED Triage Vitals   Temperature Pulse Respirations Blood Pressure SpO2   02/19/21 1541 02/19/21 1541 02/19/21 1541 02/19/21 1541 02/19/21 1541   98 °F (36 7 °C) 78 14 147/65 98 %      Temp Source Heart Rate Source Patient Position - Orthostatic VS BP Location FiO2 (%)   02/19/21 1926 02/22/21 0325 02/19/21 1926 02/19/21 1541 --   Oral Monitor Lying Right arm       Pain Score       02/19/21 1926       Worst Possible Pain          Wt Readings from Last 1 Encounters:   02/19/21 95 3 kg (210 lb)     Additional Vital Signs:     02/19/21 1926  97 7 °F (36 5 °C)  83  14  144/82  94 %  None (Room air)  Lying     02/20/21 0700  97 6 °F (36 4 °C)  82  18  178/73Abnormal   91 %  None (Room air)  Lying   02/20/21 0300  98 1 °F (36 7 °C)  77  16  171/69Abnormal   95 %  None (Room air)         Pertinent Labs/Diagnostic Test Results:   2/19  ekg - nsr   2/19  CXR -  nad  2/19  Xray  2nd toe of the right foot showed no evidence of osteomyelitis, 2nd PIP joint dislocation      Results from last 7 days   Lab Units 02/22/21  0435 02/21/21  0456 02/20/21  0438 02/19/21  1547   WBC Thousand/uL 20 12* 20 02* 23 58* 24 24*   HEMOGLOBIN g/dL 10 5* 11 4* 11 4* 11 9*   HEMATOCRIT % 32 4* 34 4* 33 7* 35 7*   PLATELETS Thousands/uL 315 322 330 365   NEUTROS ABS Thousands/µL 15 39* 16 78* 19 93* 20 24*         Results from last 7 days   Lab Units 02/22/21  0435 02/21/21  0456 02/20/21  0438 02/19/21  1547   SODIUM mmol/L 136 135* 130* 130*   POTASSIUM mmol/L 3 8 3 8 4 5 5 1   CHLORIDE mmol/L 105 104 99* 95*   CO2 mmol/L 21 19* 18* 22   ANION GAP mmol/L 10 12 13 13   BUN mg/dL 15 21 31* 30*   CREATININE mg/dL 1 23 1 32* 1 70* 1 93*   EGFR ml/min/1 73sq m 60 55 41 35   CALCIUM mg/dL 7 9* 8 1* 8 5 9 1     Results from last 7 days   Lab Units 02/20/21  0438 02/19/21  1547   AST U/L 9 10   ALT U/L 13 18   ALK PHOS U/L 105 119*   TOTAL PROTEIN g/dL 6 8 7 8   ALBUMIN g/dL 2 4* 2 9*   TOTAL BILIRUBIN mg/dL 0 28 0 34     Results from last 7 days   Lab Units 02/22/21  1101 02/22/21  0736 02/21/21  2108 02/21/21  1624 02/21/21  1129 02/21/21  0918 02/20/21  2127 02/20/21  1828 02/20/21  1601 02/20/21  1126 02/20/21  0717 02/19/21  2110   POC GLUCOSE mg/dl 176* 89 135 241* 162* 153* 255* 238* 207* 229* 395* 408*     Results from last 7 days   Lab Units 02/22/21  0435 02/21/21  0456 02/20/21  0438 02/19/21  1547   GLUCOSE RANDOM mg/dL 92 166* 390* 415*     BETA-HYDROXYBUTYRATE   Date Value Ref Range Status   02/19/2021 1 4 (H) <0 6 mmol/L Final          Results from last 7 days   Lab Units 02/19/21  1547   PH DHAVAL  7 309   PCO2 DHAVAL mm Hg 43 2   PO2 DHAVAL mm Hg 18 1*   HCO3 DHAVAL mmol/L 21 2*   BASE EXC DHAVAL mmol/L -4 9   O2 CONTENT DHAVAL ml/dL 4 8   O2 HGB, VENOUS % 28 0*     Results from last 7 days   Lab Units 02/19/21  1547   TROPONIN I ng/mL <0 02         Results from last 7 days   Lab Units 02/19/21  1547   PROTIME seconds 14 5   INR  1 12   PTT seconds 33         Results from last 7 days   Lab Units 02/20/21  0438 02/19/21  1547   PROCALCITONIN ng/ml 0 13 0 10     Results from last 7 days   Lab Units 02/19/21  1547   LACTIC ACID mmol/L 1 4     Results from last 7 days Lab Units 02/19/21 1911   CLARITY UA  Clear   COLOR UA  Yellow   SPEC GRAV UA  1 025   PH UA  5 5   GLUCOSE UA mg/dl >=1000 (1%)*   KETONES UA mg/dl Trace*   BLOOD UA  Small*   PROTEIN UA mg/dl 30 (1+)*   NITRITE UA  Negative   BILIRUBIN UA  Negative   UROBILINOGEN UA E U /dl 0 2   LEUKOCYTES UA  Elevated glucose may cause decreased leukocyte values  See urine microscopic for [facility] result/*   WBC UA /hpf 0-1   RBC UA /hpf 1-2   BACTERIA UA /hpf Occasional   EPITHELIAL CELLS WET PREP /hpf Occasional     Results from last 7 days   Lab Units 02/21/21  0820 02/19/21  1555 02/19/21  1540   BLOOD CULTURE   --  No Growth at 48 hrs  No Growth at 48 hrs  GRAM STAIN RESULT  No Polys*  1+ Gram positive cocci in pairs*  --   --    ED Treatment:   Medication Administration from 02/19/2021 1409 to 02/19/2021 1919       Date/Time Order Dose Route Action     02/19/2021 1556 cefTRIAXone (ROCEPHIN) 2,000 mg in dextrose 5 % 50 mL IVPB 2,000 mg Intravenous New Bag     02/19/2021 1640 vancomycin (VANCOCIN) 1,750 mg in sodium chloride 0 9 % 500 mL IVPB 1,750 mg Intravenous New Bag        Admitting Diagnosis: Osteomyelitis (HCC) [M86 9]  General weakness [R53 1]  Generalized weakness [R53 1]  Gangrene of toe of right foot ([facility] [address] ) Maricruz Upton  Age/Sex: 78 y o  male     Admission Orders:  Scds;  Send stool for cdiff r/o;  Vs q 4 hr;  I/O q shift; Wound care daily  ;  accucks qid w/SSI:  Lo carb diet;     On Sunday - npo after 2359;    IP CONSULT TO PODIATRY  IP CONSULT TO VASCULAR SURGERY  IP CONSULT TO INFECTIOUS DISEASES    Scheduled Medications:  acetaminophen, 975 mg, Oral, Q8H [address]  [START ON 2/23/2021] amLODIPine, 10 mg, Oral, Daily  atenolol, 25 mg, Oral, Daily  atorvastatin, 40 mg, Oral, Daily With Dinner  cefazolin, 2,000 mg, Intravenous, Q8H  citalopram, 20 mg, Oral, Daily  enoxaparin, 40 mg, Subcutaneous, Daily  famotidine, 20 mg, Oral, BID  insulin glargine, 30 Units, Subcutaneous, HS  insulin lispro, 1-5 Units, Subcutaneous, HS  insulin lispro, 1-6 Units, Subcutaneous, TID AC  insulin lispro, 14 Units, Subcutaneous, TID With Meals  levothyroxine, 175 mcg, Oral, Early Morning  metroNIDAZOLE, 500 mg, Intravenous, Q8H  tiotropium, 18 mcg, Inhalation, Daily  traZODone, 50 mg, Oral, HS      Continuous IV Infusions:  [START ON 2/23/2021] sodium chloride, 75 mL/hr, Intravenous, Continuous      PRN Meds:  calcium carbonate, 500 mg, Oral, BID PRN  HYDROmorphone, 0 2 mg, Intravenous, Q4H PRN  oxyCODONE, 2 5 mg, Oral, Q4H PRN  oxyCODONE, 5 mg, Oral, Q4H PRN  polyethylene glycol, 17 g, Oral, Daily PRN      Network Utilization Review Department  ATTENTION: Please call with any questions or concerns to [phone] and carefully listen to the prompts so that you are directed to the right person  All voicemails are confidential   Shyla Leon all requests for admission clinical reviews, approved or denied determinations and any other requests to dedicated fax number below belonging to the campus where the patient is receiving treatment   List of dedicated fax numbers for the Facilities:  [address] DENIALS (Administrative/Medical Necessity) [phone]   [address] (Maternity/NICU/Pediatrics) [phone]   [address] Ashwini Mayen 5[address] ([address]  Rosana [place] [facility] "Dorinda" 103) [address] [place] [address] Libby Vigil [address] [place] [address] [phone]

## 2021-02-22 NOTE — CASE MANAGEMENT
LOS: 2 days  Re-admission: No  Risk of re-admission: Green  Bundle: No    CM met with pt at bedside and introduced self/role with dcp  Pt reports he resides with his brother, Faustino Carter, in a multi story home with 2 LINDA  Pt reports he stays in the basement which has 12 steps down  Pt reports his bedroom and full bathroom are in the basement  Pt reports bathroom has walk in shower and shower seat  Pt reports he also has a microwave, griddle, and toaster oven to make food there  Pt reports he is independent with ADLs and ambulation prior to his toe starting to hurt (about a month ago)  Pt reports he was spending more time in bed and using either crutches, RW, or Rolator  Pt also stopped driving due to the pain in his toe  Brother would assist with driving  Pt reports [employer] also provided transport for medical appointments if needed  Pt reports himself and his brother are both retired  Pt reports being set up with [facility] in the past but didn't feel he needed them  Pt denies hx of STR  Pt reports depression - Managed by PCP  No hx of IP Hospitalization  Pt reports hx of drug/alcohol abuse in his 31's - no hx of IP treatment and denies current use  PCP: AROLDO Thompson through   Pharmacy: [facility] mail order or [facility]  Pt reports his brother, Faustino Carter, is his POA  Pt reports Faustino Carter will transport him home at d/c  Pt reports he could stay on the first floor if needed, but his preference would be to scoot on his butt if needed down to the basement  CM reviewed d/c planning process including the following: identifying help at home, patient preference for d/c planning needs, Discharge Lounge, [facility] Meds to Bed program, availability of treatment team to discuss questions or concerns patient and/or family may have regarding understanding medications and recognizing signs and symptoms once discharged  CM also encouraged patient to follow up with all recommended appointments after discharge   Patient advised of importance for patient and family to participate in managing patients medical well being

## 2021-02-22 NOTE — PROCEDURES
Patient has postoperative wound right foot stemming from diabetic foot ulcer  I explained the patient the purpose of a wound VAC oral consent was obtained  The purpose of the wound VAC was explained  All packing was removed from the patient's right foot  A time-out was performed  Using standard protocol 2 black sponges were placed within the wound with an occlusive dressing  Vac setting at 125 Low continuous pressure  Wound measures 17 x 3 x 1  Wound bed is branular with exposed 2nd metatarsal shaft  NO necrosis, pus or malodor

## 2021-02-22 NOTE — PROGRESS NOTES
Progress Note - Gayland Due 1950, 78 y o  male MRN: [id number]    Unit/Bed#: S -01 Encounter: [id number]    Primary Care Provider: Subha Pantoja   Date and time admitted to hospital: 2/19/2021  2:09 PM        * Osteomyelitis, right 2nd toe with cellulitis  Assessment & Plan  · Necrosis of the right 2nd digit (distal phalanx), proximal phalanx erythema toes, warmth, tender  Inflammatory signs present of the plantar surface as well  · X-ray of the right foot showed no evidence of osteomyelitis, 2nd PIP joint dislocation  · Patient does not meet sepsis/SIRS criteria  Afebrile, stable vital signs  · WBC 24 2  · Lactic acid within normal limits    Plan:  · Status post incision and drainage right foot and open 2nd ray amputation, 2/21  · Ancef 2 g IV, Flagyl 500 mg IV  · Geriatric pain management  · Wound care  · Podiatry and ID on board  · Appreciate Vascular surgery consult:  · Lower extremity arterial Doppler:  Revealed severe diffuse peripheral arterial disease on both lower extremities  With DULCE in the  50s  Please see plans under peripheral arterial disease  · Monitor vital signs  · Monitor WBC  · PT/OT  Type 2 diabetes mellitus, with long-term current use of insulin [facility])  Assessment & Plan  Lab Results   Component Value Date    HGBA1C 9 3 (H) 02/21/2021       Recent Labs     02/21/21  1624 02/21/21  2108 02/22/21  0736 02/22/21  1101   POCGLU 241* 135 89 176*       Blood Sugar Average: Last 72 hrs:  (P) 224   · Patient admitted with blood sugars of 415 mg, beta hydroxy butyrate increased, normal pH, normal bicarb and anion gap on BMP  · History of type 2 diabetes in the past 25 years, follow-up with endocrinologist as outpatient  On 30 NPH a m , 60 units NPH at night, Novolin R 30 units a m  As home medication  · Last hemoglobin A1c 8 0 prior to this admission  · Patient admitted poor oral intake, and decreased appetite lately    Not took insulin as was ordered  Plan:  · On basal bolus insulin  · Sliding scale insulin  · Presently, blood sugars at acceptable levels  · Accu-Chek  · Hypoglycemia protocol  · Diabetic diet  · Adjust treatment accordingly  Stage 3 chronic kidney disease  Assessment & Plan  Lab Results   Component Value Date    EGFR 60 02/22/2021    EGFR 55 02/21/2021    EGFR 41 02/20/2021    CREATININE 1 23 02/22/2021    CREATININE 1 32 (H) 02/21/2021    CREATININE 1 70 (H) 02/20/2021   · Per notes, patient has chronic kidney disease stage III  · Unclear baseline, labs not available  · Serum creatinine improved with IV fluids  · As per nephrologist, if serum creatinine remains 1 3 to 1 6, okay to undergo lower extremity angiogram; to start IV fluids again, tomorrow 2/23 at 8:00 a m  · Continue to hold off on the lisinopril for now  · Monitor BMP a m  · Avoid nephrotoxins  · Avoid hypotension  · Repeat PSA was elevated at 27 2, nephrologist recommended urology evaluation either as an inpatient versus outpatient  I think this can be taken care of in the outpatient, unless patient develops post renal obstruction  Outpatient follow-up with urologist (this needs to be set up on discharge, if not seen as an inpatient)  Hypoxemia  Assessment & Plan  · According to the patient's nurse, patient's oxygen saturation went down to 88% overnight  Thus patient was placed eventually on 3 L of nasal cannula to get good oxygen saturations  · When I saw the patient today, patient denies any shortness of breath  Breath sounds on examination bilaterally were clear  · Possible due to atelectasis  Patient was encouraged to use the incentive spirometer more frequently  · Presently, patient's oxygen has been titrated down to 2 L now  Will continue titrating it down to keep oxygen saturation above 89%  If no improvement, may need imaging study/chest x-ray  Dyspepsia  Assessment & Plan  · Resolved    · Occurred after patient's surgical procedure today  · Complains of stomach discomfort and some nausea  · Prescribed with Tums p r n  And Pepcid  · Antiemetic medication       Peripheral arterial disease ([address] )  Assessment & Plan  · LEADS:  Revealed severe bilateral lower extremity peripheral arterial disease  Low DULCE  · Vascular surgery on board  Planning to do arteriogram on this admission  According to vascular surgery, timing to be determined, hopefully tomorrow pending nephro clearance and IR scheduling  Thus NPO post midnight in case of procedure tomorrow  · Continue vascular medications  Anemia  Assessment & Plan  · Mild  · No active bleeding  · Likely hemodilution from IV fluids plus acute blood loss anemia from the surgery  · Monitor  · Further workup and management if this worsens significantly  Hyponatremia  Assessment & Plan  · Resolved  · Pseudohyponatremia in the setting of hyperglycemia   · Monitor  · Manage patient's hyperglycemia  Ambulatory dysfunction  Assessment & Plan  · Per patient, 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23, 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  Lately patient noticed left hip pain, as well mentioned knee pain  · About 2 months ago patient had a fall  · Fall precaution  · Bed alarm  · PT/OT eval/management    Hypothyroidism  Assessment & Plan  · Last TSH was within normal limits per chart review  · Continue levothyroxine 175 mcg    Hyperlipidemia  Assessment & Plan  · Continue atorvastatin 40 mg    Depression  Assessment & Plan  · Continue citalopram 20 mg    Hypertension  Assessment & Plan  · Blood pressure acceptable    · On lisinopril 20 mg as home medication, atenolol 25 mg, amlodipine 10 mg   · Will hold lisinopril for now because of kidney function and plans for arteriogram   · Monitor vital signs      VTE Pharmacologic Prophylaxis:   Pharmacologic: Enoxaparin (Lovenox)  Mechanical VTE Prophylaxis in Place: Yes    Patient Centered Rounds: I have performed bedside rounds with nursing staff today     Discussions with Specialists or Other Care Team Provider:  Case management  Education and Discussions with Family / Patient:  I discussed our findings, management and plans to the patient  Answered questions and concerns  He is okay with the management and plans  I offered to call patient's family/brother, but patient declined    Time Spent for Care: 30 minutes  More than 50% of total time spent on counseling and coordination of care as described above  Current Length of Stay: 3 day(s)    Current Patient Status: Inpatient   Certification Statement: The patient will continue to require additional inpatient hospital stay due to Above findings and plans  Discharge Plan:  None yet today  Code Status: Level 1 - Full Code      Subjective:   Patient is doing fine and feels better  Patient denies any shortness of breath or any pains  Patient denies any cough  No complaints today  Objective:     Vitals:   Temp (24hrs), Av 8 °F (37 1 °C), Min:97 7 °F (36 5 °C), Max:99 7 °F (37 6 °C)    Temp:  [97 7 °F (36 5 °C)-99 7 °F (37 6 °C)] 99 1 °F (37 3 °C)  HR:  [63-78] 68  Resp:  [16-20] 20  BP: (131-155)/(61-70) 131/61  SpO2:  [90 %-94 %] 90 %  Body mass index is 30 13 kg/m²  Input and Output Summary (last 24 hours): Intake/Output Summary (Last 24 hours) at 2021 1554  Last data filed at 2021 1421  Gross per 24 hour   Intake 480 ml   Output 1100 ml   Net -620 ml       Physical Exam:     Physical Exam  Vitals signs and nursing note reviewed  Constitutional:       General: He is not in acute distress  Appearance: He is not ill-appearing, toxic-appearing or diaphoretic  Cardiovascular:      Rate and Rhythm: Normal rate and regular rhythm  Heart sounds: Normal heart sounds  No murmur  No friction rub  No gallop  Pulmonary:      Effort: Pulmonary effort is normal  No respiratory distress  Breath sounds: Normal breath sounds  No stridor   No wheezing, rhonchi or rales    Abdominal:      General: Bowel sounds are normal  There is no distension  Palpations: Abdomen is soft  Tenderness: There is no abdominal tenderness  Musculoskeletal:         General: No tenderness  Right lower leg: No edema  Left lower leg: No edema  Comments: Right foot with wound dressing  Skin:     General: Skin is warm  Neurological:      General: No focal deficit present  Mental Status: He is alert and oriented to person, place, and time  Psychiatric:         Mood and Affect: Mood normal          Behavior: Behavior normal          Thought Content: Thought content normal             Additional Data:     Labs:    Results from last 7 days   Lab Units 02/22/21  0435   WBC Thousand/uL 20 12*   HEMOGLOBIN g/dL 10 5*   HEMATOCRIT % 32 4*   PLATELETS Thousands/uL 315   NEUTROS PCT % 76*   LYMPHS PCT % 10*   MONOS PCT % 11   EOS PCT % 1     Results from last 7 days   Lab Units 02/22/21  0435  02/20/21  0438   POTASSIUM mmol/L 3 8   < > 4 5   CHLORIDE mmol/L 105   < > 99*   CO2 mmol/L 21   < > 18*   BUN mg/dL 15   < > 31*   CREATININE mg/dL 1 23   < > 1 70*   CALCIUM mg/dL 7 9*   < > 8 5   ALK PHOS U/L  --   --  105   ALT U/L  --   --  13   AST U/L  --   --  9    < > = values in this interval not displayed  Results from last 7 days   Lab Units 02/19/21  1547   INR  1 12       * I Have Reviewed All Lab Data Listed Above  * Additional Pertinent Lab Tests Reviewed: [facility]    Imaging:    Imaging Reports Reviewed Today Include:  Diagnostic imaging studies that were done on this admission  Imaging Personally Reviewed by Myself Includes:  None  Recent Cultures (last 7 days):     Results from last 7 days   Lab Units 02/21/21  0820 02/19/21  1555 02/19/21  1540   BLOOD CULTURE   --  No Growth at 48 hrs  No Growth at 48 hrs     GRAM STAIN RESULT  No Polys*  1+ Gram positive cocci in pairs*  --   --        Last 24 Hours Medication List:   Current Facility-Administered Medications   Medication Dose Route Frequency Provider Last Rate    acetaminophen  975 mg Oral [facility] Gladys Calixto      [START ON 2/23/2021] amLODIPine  10 mg Oral Daily Brendan Spurling, MD      atenolol  25 mg Oral Daily [employer], DPM      atorvastatin  40 mg Oral Daily With Myriam Sine, DPJEANE      calcium carbonate  500 mg Oral BID PRN Bibi Sinclair MD      cefazolin  2,000 mg Intravenous Q8H [employer], DPM 2,000 mg (02/22/21 1130)    citalopram  20 mg Oral Daily [employer], DPM      enoxaparin  40 mg Subcutaneous Daily Alease Ganesh, DPM      famotidine  20 mg Oral BID Bibi Sincliar MD      HYDROmorphone  0 2 mg Intravenous Q4H PRN [employer], DPM      insulin glargine  30 Units Subcutaneous HS [employer], DPM      insulin lispro  1-5 Units Subcutaneous HS [employer], DPM      insulin lispro  1-6 Units Subcutaneous TID AC [employer], DPM      insulin lispro  14 Units Subcutaneous TID With Meals [employer], DPM      levothyroxine  175 mcg Oral Early Morning [employer], DPM      metroNIDAZOLE  500 mg Intravenous Q8H [employer],  mg (02/22/21 1220)    oxyCODONE  2 5 mg Oral Q4H PRN [employer], DPM      oxyCODONE  5 mg Oral Q4H PRN [employer], DPM      polyethylene glycol  17 g Oral Daily PRN [employer], DPM      [START ON 2/23/2021] sodium chloride  75 mL/hr Intravenous Continuous Cuauhtemoc Carry, CRNP      tiotropium  18 mcg Inhalation Daily [employer], DPM      traZODone  50 mg Oral HS [employer], DPM          Today, Patient Was Seen By: Hayley Vee MD    ** Please Note: Dragon 360 Dictation voice to text software may have been used in the creation of this document   **

## 2021-02-23 ENCOUNTER — APPOINTMENT (INPATIENT)
Dept: RADIOLOGY | Facility: HOSPITAL | Age: 71
DRG: 240 | End: 2021-02-23
Attending: SURGERY
Payer: COMMERCIAL

## 2021-02-23 LAB
ANION GAP SERPL CALCULATED.3IONS-SCNC: 10 MMOL/L (ref 4–13)
BUN SERPL-MCNC: 19 MG/DL (ref 5–25)
CALCIUM SERPL-MCNC: 7.9 MG/DL (ref 8.3–10.1)
CHLORIDE SERPL-SCNC: 102 MMOL/L (ref 100–108)
CO2 SERPL-SCNC: 19 MMOL/L (ref 21–32)
CREAT SERPL-MCNC: 1.27 MG/DL (ref 0.6–1.3)
ERYTHROCYTE [DISTWIDTH] IN BLOOD BY AUTOMATED COUNT: 12.7 % (ref 11.6–15.1)
GFR SERPL CREATININE-BSD FRML MDRD: 58 ML/MIN/1.73SQ M
GLUCOSE SERPL-MCNC: 177 MG/DL (ref 65–140)
GLUCOSE SERPL-MCNC: 178 MG/DL (ref 65–140)
GLUCOSE SERPL-MCNC: 188 MG/DL (ref 65–140)
GLUCOSE SERPL-MCNC: 193 MG/DL (ref 65–140)
GLUCOSE SERPL-MCNC: 205 MG/DL (ref 65–140)
GLUCOSE SERPL-MCNC: 249 MG/DL (ref 65–140)
HCT VFR BLD AUTO: 32.5 % (ref 36.5–49.3)
HGB BLD-MCNC: 10.3 G/DL (ref 12–17)
KCT BLD-ACNC: 189 SEC (ref 89–137)
MCH RBC QN AUTO: 28.9 PG (ref 26.8–34.3)
MCHC RBC AUTO-ENTMCNC: 31.7 G/DL (ref 31.4–37.4)
MCV RBC AUTO: 91 FL (ref 82–98)
OSMOLALITY UR: 436 MMOL/KG
PLATELET # BLD AUTO: 274 THOUSANDS/UL (ref 149–390)
PMV BLD AUTO: 11.6 FL (ref 8.9–12.7)
POTASSIUM SERPL-SCNC: 4.2 MMOL/L (ref 3.5–5.3)
RBC # BLD AUTO: 3.57 MILLION/UL (ref 3.88–5.62)
SODIUM 24H UR-SCNC: 29 MOL/L
SODIUM SERPL-SCNC: 131 MMOL/L (ref 136–145)
SPECIMEN SOURCE: ABNORMAL
WBC # BLD AUTO: 16.51 THOUSAND/UL (ref 4.31–10.16)

## 2021-02-23 PROCEDURE — 80048 BASIC METABOLIC PNL TOTAL CA: CPT | Performed by: NURSE PRACTITIONER

## 2021-02-23 PROCEDURE — 85347 COAGULATION TIME ACTIVATED: CPT

## 2021-02-23 PROCEDURE — C1887 CATHETER, GUIDING: HCPCS

## 2021-02-23 PROCEDURE — 99232 SBSQ HOSP IP/OBS MODERATE 35: CPT | Performed by: INTERNAL MEDICINE

## 2021-02-23 PROCEDURE — C1725 CATH, TRANSLUMIN NON-LASER: HCPCS

## 2021-02-23 PROCEDURE — 37226 HB FEM/POPL REVASC W/STENT: CPT

## 2021-02-23 PROCEDURE — C1769 GUIDE WIRE: HCPCS

## 2021-02-23 PROCEDURE — 85027 COMPLETE CBC AUTOMATED: CPT | Performed by: INTERNAL MEDICINE

## 2021-02-23 PROCEDURE — 99152 MOD SED SAME PHYS/QHP 5/>YRS: CPT

## 2021-02-23 PROCEDURE — C1874 STENT, COATED/COV W/DEL SYS: HCPCS

## 2021-02-23 PROCEDURE — C1760 CLOSURE DEV, VASC: HCPCS

## 2021-02-23 PROCEDURE — 76937 US GUIDE VASCULAR ACCESS: CPT | Performed by: RADIOLOGY

## 2021-02-23 PROCEDURE — 99153 MOD SED SAME PHYS/QHP EA: CPT

## 2021-02-23 PROCEDURE — 83935 ASSAY OF URINE OSMOLALITY: CPT | Performed by: INTERNAL MEDICINE

## 2021-02-23 PROCEDURE — 99152 MOD SED SAME PHYS/QHP 5/>YRS: CPT | Performed by: RADIOLOGY

## 2021-02-23 PROCEDURE — 047K35Z DILATION OF RIGHT FEMORAL ARTERY WITH TWO DRUG-ELUTING INTRALUMINAL DEVICES, PERCUTANEOUS APPROACH: ICD-10-PCS | Performed by: RADIOLOGY

## 2021-02-23 PROCEDURE — 75710 ARTERY X-RAYS ARM/LEG: CPT

## 2021-02-23 PROCEDURE — 37226 PR REVASCULARIZE FEM/POP ARTERY,ANGIOPLASTY/STENT: CPT | Performed by: RADIOLOGY

## 2021-02-23 PROCEDURE — 84300 ASSAY OF URINE SODIUM: CPT | Performed by: INTERNAL MEDICINE

## 2021-02-23 PROCEDURE — G9198 NO ORDER FOR CEPH NO REASON: HCPCS | Performed by: RADIOLOGY

## 2021-02-23 PROCEDURE — 82948 REAGENT STRIP/BLOOD GLUCOSE: CPT

## 2021-02-23 PROCEDURE — B41DYZZ FLUOROSCOPY OF AORTA AND BILATERAL LOWER EXTREMITY ARTERIES USING OTHER CONTRAST: ICD-10-PCS | Performed by: RADIOLOGY

## 2021-02-23 PROCEDURE — 99232 SBSQ HOSP IP/OBS MODERATE 35: CPT | Performed by: SURGERY

## 2021-02-23 PROCEDURE — C1894 INTRO/SHEATH, NON-LASER: HCPCS

## 2021-02-23 PROCEDURE — 75625 CONTRAST EXAM ABDOMINL AORTA: CPT | Performed by: RADIOLOGY

## 2021-02-23 PROCEDURE — 75625 CONTRAST EXAM ABDOMINL AORTA: CPT

## 2021-02-23 PROCEDURE — 75710 ARTERY X-RAYS ARM/LEG: CPT | Performed by: RADIOLOGY

## 2021-02-23 RX ORDER — SODIUM CHLORIDE 9 MG/ML
75 INJECTION, SOLUTION INTRAVENOUS CONTINUOUS
Status: DISPENSED | OUTPATIENT
Start: 2021-02-23 | End: 2021-02-23

## 2021-02-23 RX ORDER — HEPARIN SODIUM 1000 [USP'U]/ML
INJECTION, SOLUTION INTRAVENOUS; SUBCUTANEOUS CODE/TRAUMA/SEDATION MEDICATION
Status: COMPLETED | OUTPATIENT
Start: 2021-02-23 | End: 2021-02-23

## 2021-02-23 RX ORDER — SODIUM BICARBONATE 650 MG/1
650 TABLET ORAL
Status: DISCONTINUED | OUTPATIENT
Start: 2021-02-23 | End: 2021-03-07 | Stop reason: HOSPADM

## 2021-02-23 RX ORDER — MIDAZOLAM HYDROCHLORIDE 2 MG/2ML
INJECTION, SOLUTION INTRAMUSCULAR; INTRAVENOUS CODE/TRAUMA/SEDATION MEDICATION
Status: COMPLETED | OUTPATIENT
Start: 2021-02-23 | End: 2021-02-23

## 2021-02-23 RX ORDER — CLOPIDOGREL BISULFATE 75 MG/1
75 TABLET ORAL DAILY
Status: DISCONTINUED | OUTPATIENT
Start: 2021-02-24 | End: 2021-03-07 | Stop reason: HOSPADM

## 2021-02-23 RX ORDER — LIDOCAINE WITH 8.4% SOD BICARB 0.9%(10ML)
SYRINGE (ML) INJECTION CODE/TRAUMA/SEDATION MEDICATION
Status: COMPLETED | OUTPATIENT
Start: 2021-02-23 | End: 2021-02-23

## 2021-02-23 RX ORDER — FENTANYL CITRATE 50 UG/ML
INJECTION, SOLUTION INTRAMUSCULAR; INTRAVENOUS CODE/TRAUMA/SEDATION MEDICATION
Status: COMPLETED | OUTPATIENT
Start: 2021-02-23 | End: 2021-02-23

## 2021-02-23 RX ADMIN — ENOXAPARIN SODIUM 40 MG: 40 INJECTION SUBCUTANEOUS at 09:57

## 2021-02-23 RX ADMIN — ATORVASTATIN CALCIUM 40 MG: 40 TABLET, FILM COATED ORAL at 18:26

## 2021-02-23 RX ADMIN — MIDAZOLAM HYDROCHLORIDE 2 MG: 1 INJECTION, SOLUTION INTRAMUSCULAR; INTRAVENOUS at 15:08

## 2021-02-23 RX ADMIN — IODIXANOL 175 ML: 320 INJECTION, SOLUTION INTRAVASCULAR at 17:09

## 2021-02-23 RX ADMIN — MIDAZOLAM HYDROCHLORIDE 1 MG: 1 INJECTION, SOLUTION INTRAMUSCULAR; INTRAVENOUS at 16:09

## 2021-02-23 RX ADMIN — FAMOTIDINE 20 MG: 20 TABLET ORAL at 09:57

## 2021-02-23 RX ADMIN — INSULIN LISPRO 3 UNITS: 100 INJECTION, SOLUTION INTRAVENOUS; SUBCUTANEOUS at 18:32

## 2021-02-23 RX ADMIN — FENTANYL CITRATE 100 MCG: 50 INJECTION, SOLUTION INTRAMUSCULAR; INTRAVENOUS at 15:34

## 2021-02-23 RX ADMIN — CEFAZOLIN SODIUM 2000 MG: 2 SOLUTION INTRAVENOUS at 04:00

## 2021-02-23 RX ADMIN — OXYCODONE HYDROCHLORIDE 5 MG: 5 TABLET ORAL at 10:08

## 2021-02-23 RX ADMIN — Medication 8 ML: at 15:08

## 2021-02-23 RX ADMIN — FENTANYL CITRATE 100 MCG: 50 INJECTION, SOLUTION INTRAMUSCULAR; INTRAVENOUS at 16:38

## 2021-02-23 RX ADMIN — OXYCODONE HYDROCHLORIDE 5 MG: 5 TABLET ORAL at 01:14

## 2021-02-23 RX ADMIN — FAMOTIDINE 20 MG: 20 TABLET ORAL at 18:26

## 2021-02-23 RX ADMIN — TRAZODONE HYDROCHLORIDE 50 MG: 50 TABLET ORAL at 21:11

## 2021-02-23 RX ADMIN — CEFAZOLIN SODIUM 2000 MG: 2 SOLUTION INTRAVENOUS at 11:55

## 2021-02-23 RX ADMIN — SODIUM BICARBONATE 650 MG: 650 TABLET ORAL at 10:03

## 2021-02-23 RX ADMIN — OXYCODONE HYDROCHLORIDE 5 MG: 5 TABLET ORAL at 05:19

## 2021-02-23 RX ADMIN — FENTANYL CITRATE 100 MCG: 50 INJECTION, SOLUTION INTRAMUSCULAR; INTRAVENOUS at 15:09

## 2021-02-23 RX ADMIN — SODIUM BICARBONATE 650 MG: 650 TABLET ORAL at 18:26

## 2021-02-23 RX ADMIN — LEVOTHYROXINE SODIUM 175 MCG: 175 TABLET ORAL at 05:19

## 2021-02-23 RX ADMIN — METRONIDAZOLE 500 MG: 500 INJECTION, SOLUTION INTRAVENOUS at 19:58

## 2021-02-23 RX ADMIN — CITALOPRAM HYDROBROMIDE 20 MG: 20 TABLET ORAL at 09:56

## 2021-02-23 RX ADMIN — MIDAZOLAM HYDROCHLORIDE 1 MG: 1 INJECTION, SOLUTION INTRAMUSCULAR; INTRAVENOUS at 16:18

## 2021-02-23 RX ADMIN — METRONIDAZOLE 500 MG: 500 INJECTION, SOLUTION INTRAVENOUS at 05:12

## 2021-02-23 RX ADMIN — HEPARIN SODIUM 5000 UNITS: 1000 INJECTION INTRAVENOUS; SUBCUTANEOUS at 15:57

## 2021-02-23 RX ADMIN — HEPARIN SODIUM 2000 UNITS: 1000 INJECTION INTRAVENOUS; SUBCUTANEOUS at 16:12

## 2021-02-23 RX ADMIN — TIOTROPIUM BROMIDE 18 MCG: 18 CAPSULE ORAL; RESPIRATORY (INHALATION) at 09:59

## 2021-02-23 RX ADMIN — INSULIN LISPRO 2 UNITS: 100 INJECTION, SOLUTION INTRAVENOUS; SUBCUTANEOUS at 11:57

## 2021-02-23 RX ADMIN — AMLODIPINE BESYLATE 10 MG: 10 TABLET ORAL at 09:57

## 2021-02-23 RX ADMIN — INSULIN GLARGINE 20 UNITS: 100 INJECTION, SOLUTION SUBCUTANEOUS at 21:11

## 2021-02-23 RX ADMIN — INSULIN LISPRO 1 UNITS: 100 INJECTION, SOLUTION INTRAVENOUS; SUBCUTANEOUS at 21:12

## 2021-02-23 RX ADMIN — SODIUM CHLORIDE 75 ML/HR: 0.9 INJECTION, SOLUTION INTRAVENOUS at 09:59

## 2021-02-23 RX ADMIN — ATENOLOL 25 MG: 25 TABLET ORAL at 09:57

## 2021-02-23 RX ADMIN — SODIUM CHLORIDE 75 ML/HR: 0.9 INJECTION, SOLUTION INTRAVENOUS at 07:43

## 2021-02-23 RX ADMIN — METRONIDAZOLE 500 MG: 500 INJECTION, SOLUTION INTRAVENOUS at 11:11

## 2021-02-23 RX ADMIN — CEFAZOLIN SODIUM 2000 MG: 2 SOLUTION INTRAVENOUS at 21:09

## 2021-02-23 NOTE — PROGRESS NOTES
Progress Note - Vascular Surgery   Armida Mosley 78 y o  male MRN: [id number]  Unit/Bed#: S -01 Encounter: [id number]      Subjective:  NAEO, complaining of some pain due to pressure in left heel but right lower extremity pain stable, no other complaints    Vitals:  /63 (BP Location: Left arm)   Pulse 72   Temp 98 2 °F (36 8 °C) (Oral)   Resp 21   Ht 5' 10" (1 778 m)   Wt 95 3 kg (210 lb)   SpO2 90%   BMI 30 13 kg/m²     I/Os:  I/O last 3 completed shifts: In: 2030 [I V :1400; IV Piggyback:630]  Out: 5168 [MMEKB:3107]  I/O this shift:  In: -   Out: 175 [Urine:175]    Lab Results and Cultures:   CBC with diff:   Lab Results   Component Value Date    WBC 16 51 (H) 02/23/2021    HGB 10 3 (L) 02/23/2021    HCT 32 5 (L) 02/23/2021    MCV 91 02/23/2021     02/23/2021    MCH 28 9 02/23/2021    MCHC 31 7 02/23/2021    RDW 12 7 02/23/2021    MPV 11 6 02/23/2021    NRBC 0 02/22/2021   ,   BMP/CMP:  Lab Results   Component Value Date    SODIUM 131 (L) 02/23/2021    K 4 2 02/23/2021     02/23/2021    CO2 19 (L) 02/23/2021    BUN 19 02/23/2021    CREATININE 1 27 02/23/2021    CALCIUM 7 9 (L) 02/23/2021    AST 9 02/20/2021    ALT 13 02/20/2021    ALKPHOS 105 02/20/2021    EGFR 58 02/23/2021   ,   Lipid Panel: No results found for: CHOL,   Coags:   Lab Results   Component Value Date    PTT 33 02/19/2021    INR 1 12 02/19/2021   ,     Blood Culture:   Lab Results   Component Value Date    BLOODCX No Growth at 72 hrs  02/19/2021   ,   Urinalysis:   Lab Results   Component Value Date    COLORU Yellow 02/19/2021    CLARITYU Clear 02/19/2021    SPECGRAV 1 025 02/19/2021    PHUR 5 5 02/19/2021    LEUKOCYTESUR (A) 02/19/2021     Elevated glucose may cause decreased leukocyte values   See urine microscopic for [facility] result/    NITRITE Negative 02/19/2021    GLUCOSEU >=1000 (1%) (A) 02/19/2021    KETONESU Trace (A) 02/19/2021    BILIRUBINUR Negative 02/19/2021    BLOODU Small (A) 02/19/2021   ,   Urine Culture: No results found for: URINECX,   Wound Culure: No results found for: WOUNDCULT    Medications:  Current Facility-Administered Medications   Medication Dose Route Frequency    acetaminophen (TYLENOL) tablet 975 mg  975 mg Oral Q8H [address]    amLODIPine (NORVASC) tablet 10 mg  10 mg Oral Daily    atenolol (TENORMIN) tablet 25 mg  25 mg Oral Daily    atorvastatin (LIPITOR) tablet 40 mg  40 mg Oral Daily With Dinner    calcium carbonate (TUMS) chewable tablet 500 mg  500 mg Oral BID PRN    ceFAZolin (ANCEF) IVPB (premix in dextrose) 2,000 mg 50 mL  2,000 mg Intravenous Q8H    citalopram (CeleXA) tablet 20 mg  20 mg Oral Daily    enoxaparin (LOVENOX) subcutaneous injection 40 mg  40 mg Subcutaneous Daily    famotidine (PEPCID) tablet 20 mg  20 mg Oral BID    HYDROmorphone (DILAUDID) injection 0 2 mg  0 2 mg Intravenous Q4H PRN    insulin glargine (LANTUS) subcutaneous injection 20 Units 0 2 mL  20 Units Subcutaneous HS    insulin lispro (HumaLOG) 100 units/mL subcutaneous injection 1-5 Units  1-5 Units Subcutaneous HS    insulin lispro (HumaLOG) 100 units/mL subcutaneous injection 1-6 Units  1-6 Units Subcutaneous TID AC    insulin lispro (HumaLOG) 100 units/mL subcutaneous injection 7 Units  7 Units Subcutaneous TID With Meals    levothyroxine tablet 175 mcg  175 mcg Oral Early Morning    metroNIDAZOLE (FLAGYL) IVPB (premix) 500 mg 100 mL  500 mg Intravenous Q8H    oxyCODONE (ROXICODONE) IR tablet 2 5 mg  2 5 mg Oral Q4H PRN    oxyCODONE (ROXICODONE) IR tablet 5 mg  5 mg Oral Q4H PRN    polyethylene glycol (MIRALAX) packet 17 g  17 g Oral Daily PRN    sodium chloride 0 9 % infusion  75 mL/hr Intravenous Continuous    tiotropium (SPIRIVA) capsule for inhaler 18 mcg  18 mcg Inhalation Daily    traZODone (DESYREL) tablet 50 mg  50 mg Oral HS       Imaging:  None    Physical Exam:  General: AAO x 3, NAD  HEENT: Normocephalic, atraumatic, conjunctiva normal, EOMI, PERRLA  Neck: No JVD, No LAD  Cardio: RRR  Resp: NWB on RA  Abd: Soft, nontender, nondistended, No guarding, no rebound  Neuro: Sensation and motor intact x 4 limbs  Extremities: WWP  Skin: Warm and dry    Wound/Incision:  Right foot wound packed and wrapped with kerlex    Pulse exam:  R: Wrapped with kerlex  L: Weakly palpable DP, PT non palpable    Assessment:  69yo M with DM, CKD, tobacco use, who p/w R 2nd toe tissue loss following injury 3 weeks ago   Now POD#2 s/p I&D and R 2nd ray amputation by podiatry      Cr Pending (from 1 23)    Plan:  Plan for IR agram today at 12:30pm, will follow up  NPOpM  IVF pre and post agram per nephro  [address] per podiatry  Rest of care per primary    Nancy Hewitt MD  2/23/2021

## 2021-02-23 NOTE — ASSESSMENT & PLAN NOTE
· LEADS:  Revealed severe bilateral lower extremity peripheral arterial disease  Low DULCE    · Awaiting angiogram today

## 2021-02-23 NOTE — PROGRESS NOTES
NEPHROLOGY PROGRESS NOTE   Evan Due 78 y o  male MRN: [id number]  Unit/Bed#: S -01 Encounter: [id number]    ASSESSMENT & PLAN:  Acute kidney injury, POA  -Baseline creatinine:  1 6 mg/dl in November 2020 based on records from media section from PCP's office  -Admission creatinine:  1 93 mg/dl  - Work up:   · UA with microscopy:  UA with 1-2 RBC per high-power field with 1+ protein and glucose  · Imaging:  No renal imaging  -Etiology:  Acute kidney injury likely due to use of NSAIDs and foot infection in the setting of autoregulatory failure from use of ACE inhibitor     Patient was taking aspirin 325 mg 2 to 3 times a day to help with pain  [facility] Course:  Renal function improving since admission with IV fluid and holding ACE inhibitor and NSAIDs  Creatinine stable at 1 27 on 02/23  -Plan:   · Renal function already improving and creatinine down to 1 27    sodium is slightly low at 131 bicarb level slightly low at 19  · Discussed with patient about risk of contrast induced nephropathy and possibly of dialysis and he verbalized understanding and willing to proceed with arteriogram   · To minimize the risk of contrast induced nephropathy, continue IV normal saline 75 mL/hour 1 hour before and is up to 6 hours post IV contrast administration  Patient is receiving IV normal saline currently, okay to stop after 6 hours of the procedure if any shortness of breath,changed to IV normal saline for 12 hours from now  · Continue to monitor renal function closely  · Avoid nephrotoxins and dose all medications per EGFR  · Avoid hypotension                   · Continue to hold ACE inhibitor                              Chronic Kidney Disease Stage 3  -Outpatient Nephrologist:  None  -Baseline Creatinine:  1 6 mg/dL based on labs from November 2020  -Etiology:  Chronic kidney disease likely due to diabetic nephropathy, hypertensive nephrosclerosis and age-related nephron loss and possibly nodular sclerosis due to long-term smoking  -Avoid Nephrotoxins and Dose all medications per eGFR  -Will need outpatient follow up after discharge  -quantify proteinuria as outpatient     BP/hypertension  -Home Medication:  Lisinopril  -Currently BP  elevated today  -Plan:  Continue to hold ACE inhibitor  Continue amlodipine 10 mg daily and atenolol 25 mg daily  If blood pressure persistently elevated, may consider increasing the dose of atenolol  Heart rate is currently acceptable    Hyponatremia:  Likely from volume depletion, also possibility of foot pain causing ADH secretion     Admission sodium 130 improving to 135-136 till February 22nd but again trended down to 131 on 02/23  Ordered workup for hyponatremia  continue to monitor  Recommend free water restriction 1500 mL per day when diet resumed after angiogram today  Metabolic acidosis:  Bicarb level 19  -started on oral sodium bicarbonate 650 mg twice daily  Would also help with the sodium level  Continue to monitor      Osteomyelitis, right 2nd digit right foot status post I&D diet foot and open 2nd ray amputation on 02/21 by Podiatry  Plan for lower extremity arteriogram by vascular surgery today  Continue antibiotic per ID        Diabetes mellitus type 2:  Management per primary team     Anemia:  Hemoglobin stable at 10 3     Elevated PSA:  Patient has PSA total of 37 3 on blood work in November 2020  Repeat PSA 27 2:  Recommend Urology consult either inpatient or outpatient   He currently denies any urinary symptoms but would monitor for urine retention if any worsening renal function    Discussed with Dr Pratt         SUBJECTIVE:  Complaining of pain right leg  No chest pain or shortness of breath    No nausea vomiting    OBJECTIVE:  Current Weight: Weight - Scale: 95 3 kg (210 lb)  Vitals:    02/23/21 0703   BP: 163/71   Pulse: 78   Resp: 20   Temp: 97 9 °F (36 6 °C)   SpO2: (!) 89%       Intake/Output Summary (Last 24 hours) at 2/23/2021 0857  Last data filed at 2/23/2021 0512  Gross per 24 hour   Intake 690 ml   Output 775 ml   Net -85 ml       Physical Exam  General:  Ill looking, awake  Eyes: Conjunctivae pink,  Sclera anicteric  ENT: lips and mucous membranes moist  Neck: supple   Chest: Clear to Auscultation both lungs,  no crackles, ronchus or wheezing  CVS: S1 & S2 present, normal rate, regular rhythm, no murmur    Abdomen: soft, non-tender, non-distended, Bowel sounds normoactive  Extremities: no edema of  legs  Skin: no rash  Neuro: awake, alert, oriented x 3   Psych: Mood and affect appropriate       Medications:    Current Facility-Administered Medications:     acetaminophen (TYLENOL) tablet 975 mg, 975 mg, Oral, Q8H [facility], Sheryle Rinne, DPJEANE, 975 mg at 02/22/21 0515    amLODIPine (NORVASC) tablet 10 mg, 10 mg, Oral, Daily, Raya Lenz MD    atenolol (TENORMIN) tablet 25 mg, 25 mg, Oral, Daily, Sheryle Rinne, DPM, 25 mg at 02/22/21 5531    atorvastatin (LIPITOR) tablet 40 mg, 40 mg, Oral, Daily With Dinner, Sheryle Rinne, DPM, 40 mg at 02/22/21 1759    calcium carbonate (TUMS) chewable tablet 500 mg, 500 mg, Oral, BID PRN, Bibi Sinclair MD    ceFAZolin (ANCEF) IVPB (premix in dextrose) 2,000 mg 50 mL, 2,000 mg, Intravenous, Q8H, Sheryle Rinne, DPM, Last Rate: 100 mL/hr at 02/23/21 0400, 2,000 mg at 02/23/21 0400    citalopram (CeleXA) tablet 20 mg, 20 mg, Oral, Daily, Sheryle Rinne, DPM, 20 mg at 02/22/21 0929    enoxaparin (LOVENOX) subcutaneous injection 40 mg, 40 mg, Subcutaneous, Daily, Sheryle Rinne, DPM, 40 mg at 02/22/21 0929    famotidine (PEPCID) tablet 20 mg, 20 mg, Oral, BID, Bibi Sinclair MD, 20 mg at 02/22/21 1758    HYDROmorphone (DILAUDID) injection 0 2 mg, 0 2 mg, Intravenous, Q4H PRN, Sheryle Rinne, DPM, 0 2 mg at 02/22/21 1426    insulin glargine (LANTUS) subcutaneous injection 20 Units 0 2 mL, 20 Units, Subcutaneous, HS, Bibi Sinclair MD, 20 Units at 02/22/21 2125    insulin lispro (HumaLOG) 100 units/mL subcutaneous injection 1-5 Units, 1-5 Units, Subcutaneous, HS, Dasia Hotter, DPM, 2 Units at 02/20/21 2131    insulin lispro (HumaLOG) 100 units/mL subcutaneous injection 1-6 Units, 1-6 Units, Subcutaneous, TID AC, Stopped at 02/22/21 1759 **AND** Fingerstick Glucose (POCT), , , TID AC, Dasia Hotter, DPM    insulin lispro (HumaLOG) 100 units/mL subcutaneous injection 7 Units, 7 Units, Subcutaneous, TID With Meals, Bibi Sinclair MD    levothyroxine tablet 175 mcg, 175 mcg, Oral, Early Morning, Dasia Hotter, DPM, 175 mcg at 02/23/21 0519    metroNIDAZOLE (FLAGYL) IVPB (premix) 500 mg 100 mL, 500 mg, Intravenous, Q8H, Dasia Hotter, DPM, Last Rate: 200 mL/hr at 02/23/21 0512, 500 mg at 02/23/21 9985    oxyCODONE (ROXICODONE) IR tablet 2 5 mg, 2 5 mg, Oral, Q4H PRN, Dasia Hotter, DPM    oxyCODONE (ROXICODONE) IR tablet 5 mg, 5 mg, Oral, Q4H PRN, Dasia Hotter, DPM, 5 mg at 02/23/21 0519    polyethylene glycol (MIRALAX) packet 17 g, 17 g, Oral, Daily PRN, Dasia Hotter, DPM    sodium chloride 0 9 % infusion, 75 mL/hr, Intravenous, Continuous, Zollie Sames, CRNP, Last Rate: 75 mL/hr at 02/23/21 0743, 75 mL/hr at 02/23/21 0743    tiotropium (SPIRIVA) capsule for inhaler 18 mcg, 18 mcg, Inhalation, Daily, Dasia Hotter, DPM, 18 mcg at 02/22/21 0931    traZODone (DESYREL) tablet 50 mg, 50 mg, Oral, HS, Dasia Hotter, DPM, 50 mg at 02/22/21 2125    Invasive Devices:        Lab Results:   Results from last 7 days   Lab Units 02/23/21  0530 02/22/21  0435 02/21/21  0456 02/20/21  0438 02/19/21  1547   WBC Thousand/uL 16 51* 20 12* 20 02* 23 58* 24 24*   HEMOGLOBIN g/dL 10 3* 10 5* 11 4* 11 4* 11 9*   HEMATOCRIT % 32 5* 32 4* 34 4* 33 7* 35 7*   PLATELETS Thousands/uL 274 315 322 330 365   POTASSIUM mmol/L 4 2 3 8 3 8 4 5 5 1   CHLORIDE mmol/L 102 105 104 99* 95*   CO2 mmol/L 19* 21 19* 18* 22   BUN mg/dL 19 15 21 31* 30*   CREATININE mg/dL 1 27 1 23 1 32* 1 70* 1 93*   CALCIUM mg/dL 7 9* 7 9* 8 1* 8 5 9 1   ALK PHOS U/L  --   --   --  105 119*   ALT U/L  --   --   --  13 18   AST U/L  --   --   --  9 10       Previous work up:         Portions of the record may have been created with voice recognition software  Occasional wrong word or "sound a like" substitutions may have occurred due to the inherent limitations of voice recognition software  Read the chart carefully and recognize, using context, where substitutions have occurred  If you have any questions, please contact the dictating provider

## 2021-02-23 NOTE — PROGRESS NOTES
Progress Note - Cynthia Tyson 1950, 78 y o  male MRN: [id number]    Unit/Bed#: S -01 Encounter: [id number]    Primary Care Provider: Ulises Andrade   Date and time admitted to hospital: 2/19/2021  2:09 PM        * Osteomyelitis, right 2nd toe with cellulitis  Assessment & Plan  · Necrosis of the right 2nd digit (distal phalanx), proximal phalanx erythema toes, warmth, tender  Inflammatory signs present of the plantar surface as well  · X-ray of the right foot showed no evidence of osteomyelitis, 2nd PIP joint dislocation    Plan:  · Status post incision and drainage right foot and open 2nd ray amputation, 2/21  · Ancef 2 g IV, Flagyl 500 mg IV  · Lower extremity arterial Doppler:  Revealed severe diffuse peripheral arterial disease on both lower extremities  With DULCE in the  50s  Please see plans under peripheral arterial disease  · Patient for angiogram today follow-up results      Peripheral arterial disease ([facility] [address] )  Assessment & Plan  · LEADS:  Revealed severe bilateral lower extremity peripheral arterial disease  Low DULCE  · Awaiting angiogram today    Stage 3 chronic kidney disease  Assessment & Plan  Lab Results   Component Value Date    EGFR 58 02/23/2021    EGFR 60 02/22/2021    EGFR 55 02/21/2021    CREATININE 1 27 02/23/2021    CREATININE 1 23 02/22/2021    CREATININE 1 32 (H) 02/21/2021   · Per notes, patient has chronic kidney disease stage III  · Unclear baseline, labs not available  · Serum creatinine improved with IV fluids  · As per nephrologist, if serum creatinine remains 1 3 to 1 6, okay to undergo lower extremity angiogram; to start IV fluids again, tomorrow 2/23 at 8:00 a m  · Continue to hold off on the lisinopril for now  · Clear for angiogram per Nephrology continue IV fluid        Type 2 diabetes mellitus, with long-term current use of insulin [facility])  Assessment & Plan  Lab Results   Component Value Date    HGBA1C 9 3 (H) 02/21/2021       Recent Labs 02/22/21 2039 02/23/21  0607 02/23/21  0705 02/23/21  1127   POCGLU 165* 188* 177* 193*       Blood Sugar Average: Last 72 hrs:  (P) 198 375   · Patient admitted with blood sugars of 415 mg, beta hydroxy butyrate increased, normal pH, normal bicarb and anion gap on BMP  · History of type 2 diabetes in the past 25 years, follow-up with endocrinologist as outpatient  On 30 NPH a m , 60 units NPH at night, Novolin R 30 units a m  As home medication  · Last hemoglobin A1c 8 0 prior to this admission  Plan:  · On basal bolus insulin  · Sliding scale insulin  · Presently, blood sugars at acceptable levels  Hyponatremia  Assessment & Plan  · Nephrology follow-up appreciated  · Follow-up workup    Hypertension  Assessment & Plan  · Blood pressure acceptable  · On lisinopril 20 mg as home medication, atenolol 25 mg, amlodipine 10 mg   · Will hold lisinopril for now because of kidney function and plans for arteriogram   · Monitor vital signs      VTE Pharmacologic Prophylaxis:   Pharmacologic: Enoxaparin (Lovenox)  Mechanical VTE Prophylaxis in Place: No    Patient Centered Rounds: I have performed bedside rounds with nursing staff today  Discussions with Specialists or Other Care Team Provider:  Case discussed with Nephrology    Education and Discussions with Family / Patient:  Offered to call patient's family patient refused    Time Spent for Care: 30 minutes  More than 50% of total time spent on counseling and coordination of care as described above      Current Length of Stay: 4 day(s)    Current Patient Status: Inpatient   Certification Statement: The patient will continue to require additional inpatient hospital stay due to Need for angiogram and possible further intervention    Discharge Plan:  Dependent angiogram and vascular workup    Code Status: Level 1 - Full Code      Subjective:   Patient comfortable no acute distress awaiting angiogram   Currently hungry as he is NPO for angiogram    Objective:     Vitals:   Temp (24hrs), Av 5 °F (36 9 °C), Min:97 9 °F (36 6 °C), Max:99 1 °F (37 3 °C)    Temp:  [97 9 °F (36 6 °C)-99 1 °F (37 3 °C)] 97 9 °F (36 6 °C)  HR:  [68-78] 78  Resp:  [20-21] 20  BP: (131-165)/(61-71) 163/71  SpO2:  [89 %-91 %] 91 %  Body mass index is 30 13 kg/m²  Input and Output Summary (last 24 hours): Intake/Output Summary (Last 24 hours) at 2021 1354  Last data filed at 2021 0936  Gross per 24 hour   Intake 468 75 ml   Output 375 ml   Net 93 75 ml       Physical Exam:     Physical Exam  Constitutional:       General: He is not in acute distress  Appearance: He is not diaphoretic  Cardiovascular:      Rate and Rhythm: Normal rate and regular rhythm  Heart sounds: No murmur  No gallop  Pulmonary:      Effort: Pulmonary effort is normal  No respiratory distress  Breath sounds: Normal breath sounds  No wheezing, rhonchi or rales  Abdominal:      General: There is no distension  Palpations: Abdomen is soft  Tenderness: There is no abdominal tenderness  There is no guarding     Skin:     Coloration: Skin is not jaundiced      )    Additional Data:     Labs:    Results from last 7 days   Lab Units 21  0530 21  0435   WBC Thousand/uL 16 51* 20 12*   HEMOGLOBIN g/dL 10 3* 10 5*   HEMATOCRIT % 32 5* 32 4*   PLATELETS Thousands/uL 274 315   NEUTROS PCT %  --  76*   LYMPHS PCT %  --  10*   MONOS PCT %  --  11   EOS PCT %  --  1     Results from last 7 days   Lab Units 21  0530  21  0438   SODIUM mmol/L 131*   < > 130*   POTASSIUM mmol/L 4 2   < > 4 5   CHLORIDE mmol/L 102   < > 99*   CO2 mmol/L 19*   < > 18*   BUN mg/dL 19   < > 31*   CREATININE mg/dL 1 27   < > 1 70*   ANION GAP mmol/L 10   < > 13   CALCIUM mg/dL 7 9*   < > 8 5   ALBUMIN g/dL  --   --  2 4*   TOTAL BILIRUBIN mg/dL  --   --  0 28   ALK PHOS U/L  --   --  105   ALT U/L  --   --  13   AST U/L  --   --  9   GLUCOSE RANDOM mg/dL 178*   < > 390*    < > = values in this interval not displayed  Results from last 7 days   Lab Units 02/19/21  1547   INR  1 12     Results from last 7 days   Lab Units 02/23/21  1127 02/23/21  0705 02/23/21  0607 02/22/21  2039 02/22/21  1600 02/22/21  1101 02/22/21  0736 02/21/21  2108 02/21/21  1624 02/21/21  1129 02/21/21  0918 02/20/21  2127   POC GLUCOSE mg/dl 193* 177* 188* 165* 171* 176* 89 135 241* 162* 153* 255*     Results from last 7 days   Lab Units 02/21/21  0456   HEMOGLOBIN A1C % 9 3*     Results from last 7 days   Lab Units 02/20/21  0438 02/19/21  1547   LACTIC ACID mmol/L  --  1 4   PROCALCITONIN ng/ml 0 13 0 10           * I Have Reviewed All Lab Data Listed Above  * Additional Pertinent Lab Tests Reviewed: All Labs Within Last 24 Hours Reviewed    Imaging:    Imaging Reports Reviewed Today Include:   Imaging Personally Reviewed by Myself Includes:      Recent Cultures (last 7 days):     Results from last 7 days   Lab Units 02/21/21  0820 02/19/21  1555 02/19/21  1540   BLOOD CULTURE   --  No Growth at 72 hrs  No Growth at 72 hrs  GRAM STAIN RESULT  No Polys*  1+ Gram positive cocci in pairs*  --   --    WOUND CULTURE  Culture results to follow    --   --        Last 24 Hours Medication List:   Current Facility-Administered Medications   Medication Dose Route Frequency Provider Last Rate    acetaminophen  975 mg Oral On license of [facility] Evan Achilles, DPM      amLODIPine  10 mg Oral Daily Stephani Brar MD      atenolol  25 mg Oral Daily Evan Achilles, DPM      atorvastatin  40 mg Oral Daily With Jose Mays DPM      calcium carbonate  500 mg Oral BID PRN Bibi Sinclair MD      cefazolin  2,000 mg Intravenous Q8H Evan Achilles, DPM 2,000 mg (02/23/21 1155)    citalopram  20 mg Oral Daily Evan Achilles, DPM      enoxaparin  40 mg Subcutaneous Daily Evan Achilles, DPM      famotidine  20 mg Oral BID Bibi Sinclair MD      HYDROmorphone  0 2 mg Intravenous Q4H PRN Evan Achilles, DPM      insulin glargine  20 Units Subcutaneous HS Bibi Sinclair MD      insulin lispro  1-5 Units Subcutaneous HS Singleton Kristin, DPM      insulin lispro  1-6 Units Subcutaneous TID AC Singleton Kristin, DPM      insulin lispro  7 Units Subcutaneous TID With Meals Bibi Sicnlair MD      levothyroxine  175 mcg Oral Early Morning Singleton Kristin, DPM      metroNIDAZOLE  500 mg Intravenous Q8H Singleton Kristin,  mg (02/23/21 1111)    oxyCODONE  2 5 mg Oral Q4H PRN Singleton Kristin, DPM      oxyCODONE  5 mg Oral Q4H PRN Singleton Kristin, DPM      polyethylene glycol  17 g Oral Daily PRN Singleton Kristin, DPM      sodium bicarbonate  650 mg Oral BID after meals Doni Jeffries MD      sodium chloride  75 mL/hr Intravenous Continuous Doni Jeffries MD 75 mL/hr (02/23/21 0959)    tiotropium  18 mcg Inhalation Daily Singleton Kristin, DPM      traZODone  50 mg Oral HS Singleton Kristin, DPM          Today, Patient Was Seen By: Caro Ramirez MD    ** Please Note: Dictation voice to text software may have been used in the creation of this document   **

## 2021-02-23 NOTE — ASSESSMENT & PLAN NOTE
Lab Results   Component Value Date    HGBA1C 9 3 (H) 02/21/2021       Recent Labs     02/22/21 2039 02/23/21  0607 02/23/21  0705 02/23/21  1127   POCGLU 165* 188* 177* 193*       Blood Sugar Average: Last 72 hrs:  (P) 198 375   · Patient admitted with blood sugars of 415 mg, beta hydroxy butyrate increased, normal pH, normal bicarb and anion gap on BMP  · History of type 2 diabetes in the past 25 years, follow-up with endocrinologist as outpatient  On 30 NPH a m , 60 units NPH at night, Novolin R 30 units a m  As home medication  · Last hemoglobin A1c 8 0 prior to this admission  Plan:  · On basal bolus insulin  · Sliding scale insulin  · Presently, blood sugars at acceptable levels

## 2021-02-23 NOTE — ASSESSMENT & PLAN NOTE
· Necrosis of the right 2nd digit (distal phalanx), proximal phalanx erythema toes, warmth, tender  Inflammatory signs present of the plantar surface as well  · X-ray of the right foot showed no evidence of osteomyelitis, 2nd PIP joint dislocation    Plan:  · Status post incision and drainage right foot and open 2nd ray amputation, 2/21  · Ancef 2 g IV, Flagyl 500 mg IV  · Lower extremity arterial Doppler:  Revealed severe diffuse peripheral arterial disease on both lower extremities  With DULCE in the  50s  Please see plans under peripheral arterial disease    · Patient for angiogram today follow-up results

## 2021-02-23 NOTE — PROGRESS NOTES
Progress Note - Infectious Disease   iPo Hughes 78 y o  male MRN: [id number]  Unit/Bed#: S -01 Encounter: [id number]      Impression/Plan:  1  Acute clinical osteomyelitis right 2nd toe with gangrene   Patient reported having a fracture after tripping on a wire approximately 3 weeks ago and now on exam has exposed bone with foul-smelling gangrene of the right 2nd toe   Clinically consistent with osteomyelitis   Patient also has tracking cellulitis   Patient underwent right 2nd toe amputation and extensive I &D of plantar abscess found to have a tracked into the midfoot  OR gs with 1+ Gram-positive cocci in pairs, cx pending  Continue on Ancef with Flagyl  Continue to trend fever curve/vitals  Repeat CBC/chemistry tomorrow  Follow up pending blood culture data  Ongoing follow-up by Podiatry  Will follow-up OR cultures  Additional supportive care as per primary  Additional interventions pending clinical course  Plan for IR angiogram today     2  Right 2nd toe cellulitis and fracture   Patient's exam is consistent with cellulitis likely related to recent fracture and then progressing bone infection   Patient underwent right 2nd toe amputation and extensive I &D of plantar abscess found to have a tracked into the midfoot  OR gs with 1+ Gram-positive cocci in pairs, cx pending  Continue antibiotics as above  Continue to trend fever curve/WBC  Monitor site clinically  Ongoing follow-up by Podiatry  Ongoing follow-up vascular surgery     3  Leukocytosis   Patient noted with significantly elevated white count on admission which is likely due to the above   Slowly down trending  Continue antibiotics as above  Monitor CBC     4  Chronic kidney disease   Patient appears to have some degree of chronic kidney disease   Creatinine remains stable at this time    Maintain Ancef at high-dose  Will further dose adjust antibiotics as needed  Repeat chemistry tomorrow  Fluid hydration as per primary     5  Type 2 diabetes with neuropathy   Patient has known type 2 diabetes as well as reported neuropathy   Suspect poor control given the above complications   Overall increases risk of infections and poor wound healing  Glucose management as per primary  Continue antibiotics as above  Patient will require chronic follow-up with Podiatry     6  Tobacco use   Unfortunately increases risk of poor wound healing as well as vascular disease        Above plan discussed in detail with the patient and PCT at bedside      Antibiotics:  Cefazolin/Flagyl  POD #2     Subjective:  Patient s/p sponge bathe in bed  He is scheduled for IR angiogram this afternoon  He reports he wants to be Knocked out" for procedure  Patient reports pain in the foot with movement of RLE    ROS: Patient has no high fever, chills, sweats overnight; no nausea, vomiting, diarrhea; no cough, shortness of breath  Objective:  Vitals:  Temp:  [97 9 °F (36 6 °C)-99 1 °F (37 3 °C)] 97 9 °F (36 6 °C)  HR:  [68-78] 78  Resp:  [20-21] 20  BP: (131-165)/(61-71) 163/71  SpO2:  [89 %-91 %] 91 %  Temp (24hrs), Av 5 °F (36 9 °C), Min:97 9 °F (36 6 °C), Max:99 1 °F (37 3 °C)  Current: Temperature: 97 9 °F (36 6 °C)    General Appearance:  Awake, alert, cooperative, resting in bed, no acute distress, all washed up in bed just now  Throat: Oropharynx moist without lesions  Lungs:   Clear to auscultation bilaterally; no wheezes, rhonchi or rales; respirations unlabored   Heart:  RRR; S1-S2 heard, no murmur   Abdomen:   Soft, non-tender, non-distended, positive bowel sounds       Extremities: Right foot gauze wrap intact with wound VAC in place and scant bloody drainage in tubing, left arm IV site nontender, + venodynes   Skin: No rashes      Labs, Imaging, & Other studies:   All pertinent labs and imaging studies were personally reviewed  Results from last 7 days   Lab Units 21  0530 21  0435 21  0456   WBC Thousand/uL 16 51* 20 12* 20 02* HEMOGLOBIN g/dL 10 3* 10 5* 11 4*   PLATELETS Thousands/uL 274 315 322     Results from last 7 days   Lab Units 02/23/21  0530  02/20/21  0438 02/19/21  1547   POTASSIUM mmol/L 4 2   < > 4 5 5 1   CHLORIDE mmol/L 102   < > 99* 95*   CO2 mmol/L 19*   < > 18* 22   BUN mg/dL 19   < > 31* 30*   CREATININE mg/dL 1 27   < > 1 70* 1 93*   EGFR ml/min/1 73sq m 58   < > 41 35   CALCIUM mg/dL 7 9*   < > 8 5 9 1   AST U/L  --   --  9 10   ALT U/L  --   --  13 18   ALK PHOS U/L  --   --  105 119*    < > = values in this interval not displayed  Results from last 7 days   Lab Units 02/20/21  0438 02/19/21  1547   PROCALCITONIN ng/ml 0 13 0 10     Results from last 7 days   Lab Units 02/21/21  0820 02/19/21  1555 02/19/21  1540   BLOOD CULTURE   --  No Growth at 72 hrs  No Growth at 72 hrs  GRAM STAIN RESULT  No Polys*  1+ Gram positive cocci in pairs*  --   --    WOUND CULTURE  Culture results to follow    --   --

## 2021-02-23 NOTE — PLAN OF CARE
Problem: Potential for Falls  Goal: Patient will remain free of falls  Description: INTERVENTIONS:  - Assess patient frequently for physical needs  -  Identify cognitive and physical deficits and behaviors that affect risk of falls    -  [place] fall precautions as indicated by assessment   - Educate patient/family on patient safety including physical limitations  - Instruct patient to call for assistance with activity based on assessment  - Modify environment to reduce risk of injury  - Consider OT/PT consult to assist with strengthening/mobility  Outcome: Progressing     Problem: PAIN - ADULT  Goal: Verbalizes/displays adequate comfort level or baseline comfort level  Description: Interventions:  - Encourage patient to monitor pain and request assistance  - Assess pain using appropriate pain scale  - Administer analgesics based on type and severity of pain and evaluate response  - Implement non-pharmacological measures as appropriate and evaluate response  - Consider cultural and social influences on pain and pain management  - Notify physician/advanced practitioner if interventions unsuccessful or patient reports new pain  Outcome: Progressing     Problem: INFECTION - ADULT  Goal: Absence or prevention of progression during hospitalization  Description: INTERVENTIONS:  - Assess and monitor for signs and symptoms of infection  - Monitor lab/diagnostic results  - Monitor all insertion sites, i e  indwelling lines, tubes, and drains  - Monitor endotracheal if appropriate and nasal secretions for changes in amount and color  - [place] appropriate cooling/warming therapies per order  - Administer medications as ordered  - Instruct and encourage patient and family to use good hand hygiene technique  - Identify and instruct in appropriate isolation precautions for identified infection/condition  Outcome: Progressing     Problem: SAFETY ADULT  Goal: Patient will remain free of falls  Description: INTERVENTIONS:  - Assess patient frequently for physical needs  -  Identify cognitive and physical deficits and behaviors that affect risk of falls  -  [place] fall precautions as indicated by assessment   - Educate patient/family on patient safety including physical limitations  - Instruct patient to call for assistance with activity based on assessment  - Modify environment to reduce risk of injury  - Consider OT/PT consult to assist with strengthening/mobility  Outcome: Progressing     Problem: DISCHARGE PLANNING  Goal: Discharge to home or other facility with appropriate resources  Description: INTERVENTIONS:  - Identify barriers to discharge w/patient and caregiver  - Arrange for needed discharge resources and transportation as appropriate  - Identify discharge learning needs (meds, wound care, etc )  - Arrange for interpretive services to assist at discharge as needed  - Refer to Case Management Department for coordinating discharge planning if the patient needs post-hospital services based on physician/advanced practitioner order or complex needs related to functional status, cognitive ability, or social support system  Outcome: Progressing     Problem: Knowledge Deficit  Goal: Patient/family/caregiver demonstrates understanding of disease process, treatment plan, medications, and discharge instructions  Description: Complete learning assessment and assess knowledge base    Interventions:  - Provide teaching at level of understanding  - Provide teaching via preferred learning methods  Outcome: Progressing     Problem: Prexisting or High Potential for Compromised Skin Integrity  Goal: Skin integrity is maintained or improved  Description: INTERVENTIONS:  - Identify patients at risk for skin breakdown  - Assess and monitor skin integrity  - Assess and monitor nutrition and hydration status  - Monitor labs   - Assess for incontinence   - Turn and reposition patient  - Assist with mobility/ambulation  - Relieve pressure over bony prominences  - Avoid friction and shearing  - Provide appropriate hygiene as needed including keeping skin clean and dry  - Evaluate need for skin moisturizer/barrier cream  - Collaborate with interdisciplinary team   - Patient/family teaching  - Consider wound care consult   Outcome: Progressing     Problem: Nutrition/Hydration-ADULT  Goal: Nutrient/Hydration intake appropriate for improving, restoring or maintaining nutritional needs  Description: Monitor and assess patient's nutrition/hydration status for malnutrition  Collaborate with interdisciplinary team and initiate plan and interventions as ordered  Monitor patient's weight and dietary intake as ordered or per policy  Utilize nutrition screening tool and intervene as necessary  Determine patient's food preferences and provide high-protein, high-caloric foods as appropriate       INTERVENTIONS:  - Monitor oral intake, urinary output, labs, and treatment plans  - Assess nutrition and hydration status and recommend course of action  - Evaluate amount of meals eaten  - Assist patient with eating if necessary   - Allow adequate time for meals  - Recommend/ encourage appropriate diets, oral nutritional supplements, and vitamin/mineral supplements  - Order, calculate, and assess calorie counts as needed  - Recommend, monitor, and adjust tube feedings and TPN/PPN based on assessed needs  - Assess need for intravenous fluids  - Provide specific nutrition/hydration education as appropriate  - Include patient/family/caregiver in decisions related to nutrition  Outcome: Progressing

## 2021-02-23 NOTE — BRIEF OP NOTE (RAD/CATH)
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

## 2021-02-23 NOTE — SEDATION DOCUMENTATION
Peripheral angiogram with Stenting of right SFA successfully completed  Left groin sheath removed with Perclose and 15 mins of digital pressure to achieve hemostasis  Distal pulses by doppler, patient tolerated fairly well with significant amounts of sedation/analgesics  Hemodynamically stable for transfer back to room via bed  Report and care assumed by primary RN

## 2021-02-23 NOTE — INTERVAL H&P NOTE
Update: (This section must be completed if the H&P was completed greater than 24 hrs to procedure or admission)    H&P reviewed  After examining the patient, I find no changed to the H&P since it had been written  9O with right lower extremity critical limb ischemia, status post amputation for osteomyelitis    Plan to optimize vascular inflow for wound healing    Risks benefits alternatives discussed including risk of distal embolization, access site bleeding    Prior duplex imaging reviewed  We will sedate    Mp2 ASA3    Patient re-evaluated   Accept as history and physical     Ernie Zamarripa MD/February 23, 2021/3:05 PM

## 2021-02-24 ENCOUNTER — APPOINTMENT (INPATIENT)
Dept: VASCULAR ULTRASOUND | Facility: HOSPITAL | Age: 71
DRG: 240 | End: 2021-02-24
Payer: COMMERCIAL

## 2021-02-24 LAB
ANION GAP SERPL CALCULATED.3IONS-SCNC: 11 MMOL/L (ref 4–13)
BACTERIA BLD CULT: NORMAL
BACTERIA BLD CULT: NORMAL
BUN SERPL-MCNC: 21 MG/DL (ref 5–25)
CALCIUM SERPL-MCNC: 7.8 MG/DL (ref 8.3–10.1)
CHLORIDE SERPL-SCNC: 102 MMOL/L (ref 100–108)
CO2 SERPL-SCNC: 22 MMOL/L (ref 21–32)
CORTIS AM PEAK SERPL-MCNC: 9.5 UG/DL (ref 4.2–22.4)
CREAT SERPL-MCNC: 1.19 MG/DL (ref 0.6–1.3)
GFR SERPL CREATININE-BSD FRML MDRD: 62 ML/MIN/1.73SQ M
GLUCOSE SERPL-MCNC: 200 MG/DL (ref 65–140)
GLUCOSE SERPL-MCNC: 237 MG/DL (ref 65–140)
GLUCOSE SERPL-MCNC: 258 MG/DL (ref 65–140)
GLUCOSE SERPL-MCNC: 295 MG/DL (ref 65–140)
GLUCOSE SERPL-MCNC: 312 MG/DL (ref 65–140)
POTASSIUM SERPL-SCNC: 4.9 MMOL/L (ref 3.5–5.3)
SODIUM SERPL-SCNC: 135 MMOL/L (ref 136–145)
TSH SERPL DL<=0.05 MIU/L-ACNC: 0.43 UIU/ML (ref 0.36–3.74)
URATE SERPL-MCNC: 7.2 MG/DL (ref 4.2–8)

## 2021-02-24 PROCEDURE — 97606 NEG PRS WND THER DME>50 SQCM: CPT | Performed by: PODIATRIST

## 2021-02-24 PROCEDURE — 84443 ASSAY THYROID STIM HORMONE: CPT | Performed by: INTERNAL MEDICINE

## 2021-02-24 PROCEDURE — 99232 SBSQ HOSP IP/OBS MODERATE 35: CPT | Performed by: INTERNAL MEDICINE

## 2021-02-24 PROCEDURE — 80048 BASIC METABOLIC PNL TOTAL CA: CPT | Performed by: INTERNAL MEDICINE

## 2021-02-24 PROCEDURE — 82948 REAGENT STRIP/BLOOD GLUCOSE: CPT

## 2021-02-24 PROCEDURE — 93926 LOWER EXTREMITY STUDY: CPT

## 2021-02-24 PROCEDURE — NC001 PR NO CHARGE: Performed by: SURGERY

## 2021-02-24 PROCEDURE — 84550 ASSAY OF BLOOD/URIC ACID: CPT | Performed by: INTERNAL MEDICINE

## 2021-02-24 PROCEDURE — 82533 TOTAL CORTISOL: CPT | Performed by: INTERNAL MEDICINE

## 2021-02-24 RX ORDER — INSULIN GLARGINE 100 [IU]/ML
25 INJECTION, SOLUTION SUBCUTANEOUS
Status: DISCONTINUED | OUTPATIENT
Start: 2021-02-24 | End: 2021-02-25

## 2021-02-24 RX ORDER — METRONIDAZOLE 500 MG/1
500 TABLET ORAL EVERY 8 HOURS SCHEDULED
Status: DISCONTINUED | OUTPATIENT
Start: 2021-02-24 | End: 2021-02-25

## 2021-02-24 RX ADMIN — METRONIDAZOLE 500 MG: 500 TABLET ORAL at 21:49

## 2021-02-24 RX ADMIN — INSULIN GLARGINE 25 UNITS: 100 INJECTION, SOLUTION SUBCUTANEOUS at 21:49

## 2021-02-24 RX ADMIN — FAMOTIDINE 20 MG: 20 TABLET ORAL at 16:22

## 2021-02-24 RX ADMIN — LEVOTHYROXINE SODIUM 175 MCG: 175 TABLET ORAL at 05:37

## 2021-02-24 RX ADMIN — ACETAMINOPHEN 975 MG: 325 TABLET, FILM COATED ORAL at 05:35

## 2021-02-24 RX ADMIN — AMPICILLIN SODIUM 2000 MG: 2 INJECTION, POWDER, FOR SOLUTION INTRAMUSCULAR; INTRAVENOUS at 21:50

## 2021-02-24 RX ADMIN — OXYCODONE HYDROCHLORIDE 5 MG: 5 TABLET ORAL at 05:37

## 2021-02-24 RX ADMIN — INSULIN LISPRO 4 UNITS: 100 INJECTION, SOLUTION INTRAVENOUS; SUBCUTANEOUS at 16:23

## 2021-02-24 RX ADMIN — HYDROMORPHONE HYDROCHLORIDE 0.2 MG: 1 INJECTION, SOLUTION INTRAMUSCULAR; INTRAVENOUS; SUBCUTANEOUS at 07:19

## 2021-02-24 RX ADMIN — CEFAZOLIN SODIUM 2000 MG: 2 SOLUTION INTRAVENOUS at 11:07

## 2021-02-24 RX ADMIN — INSULIN LISPRO 3 UNITS: 100 INJECTION, SOLUTION INTRAVENOUS; SUBCUTANEOUS at 08:52

## 2021-02-24 RX ADMIN — FAMOTIDINE 20 MG: 20 TABLET ORAL at 08:50

## 2021-02-24 RX ADMIN — ACETAMINOPHEN 975 MG: 325 TABLET, FILM COATED ORAL at 21:49

## 2021-02-24 RX ADMIN — METRONIDAZOLE 500 MG: 500 INJECTION, SOLUTION INTRAVENOUS at 11:54

## 2021-02-24 RX ADMIN — TIOTROPIUM BROMIDE 18 MCG: 18 CAPSULE ORAL; RESPIRATORY (INHALATION) at 08:51

## 2021-02-24 RX ADMIN — ATENOLOL 25 MG: 25 TABLET ORAL at 08:50

## 2021-02-24 RX ADMIN — CLOPIDOGREL BISULFATE 75 MG: 75 TABLET ORAL at 08:51

## 2021-02-24 RX ADMIN — TRAZODONE HYDROCHLORIDE 50 MG: 50 TABLET ORAL at 21:49

## 2021-02-24 RX ADMIN — CEFAZOLIN SODIUM 2000 MG: 2 SOLUTION INTRAVENOUS at 04:52

## 2021-02-24 RX ADMIN — ENOXAPARIN SODIUM 40 MG: 40 INJECTION SUBCUTANEOUS at 08:50

## 2021-02-24 RX ADMIN — INSULIN LISPRO 1 UNITS: 100 INJECTION, SOLUTION INTRAVENOUS; SUBCUTANEOUS at 21:50

## 2021-02-24 RX ADMIN — SODIUM BICARBONATE 650 MG: 650 TABLET ORAL at 08:51

## 2021-02-24 RX ADMIN — CITALOPRAM HYDROBROMIDE 20 MG: 20 TABLET ORAL at 08:51

## 2021-02-24 RX ADMIN — OXYCODONE HYDROCHLORIDE 5 MG: 5 TABLET ORAL at 01:08

## 2021-02-24 RX ADMIN — ATORVASTATIN CALCIUM 40 MG: 40 TABLET, FILM COATED ORAL at 16:22

## 2021-02-24 RX ADMIN — AMLODIPINE BESYLATE 10 MG: 10 TABLET ORAL at 08:51

## 2021-02-24 RX ADMIN — METRONIDAZOLE 500 MG: 500 INJECTION, SOLUTION INTRAVENOUS at 05:32

## 2021-02-24 RX ADMIN — OXYCODONE HYDROCHLORIDE 5 MG: 5 TABLET ORAL at 14:44

## 2021-02-24 RX ADMIN — SODIUM BICARBONATE 650 MG: 650 TABLET ORAL at 16:22

## 2021-02-24 RX ADMIN — AMPICILLIN SODIUM 2000 MG: 2 INJECTION, POWDER, FOR SOLUTION INTRAMUSCULAR; INTRAVENOUS at 14:45

## 2021-02-24 RX ADMIN — INSULIN LISPRO 5 UNITS: 100 INJECTION, SOLUTION INTRAVENOUS; SUBCUTANEOUS at 11:14

## 2021-02-24 RX ADMIN — ACETAMINOPHEN 975 MG: 325 TABLET, FILM COATED ORAL at 14:44

## 2021-02-24 NOTE — PLAN OF CARE
Problem: Potential for Falls  Goal: Patient will remain free of falls  Description: INTERVENTIONS:  - Assess patient frequently for physical needs  -  Identify cognitive and physical deficits and behaviors that affect risk of falls    -  [place] fall precautions as indicated by assessment   - Educate patient/family on patient safety including physical limitations  - Instruct patient to call for assistance with activity based on assessment  - Modify environment to reduce risk of injury  - Consider OT/PT consult to assist with strengthening/mobility  Outcome: Progressing     Problem: PAIN - ADULT  Goal: Verbalizes/displays adequate comfort level or baseline comfort level  Description: Interventions:  - Encourage patient to monitor pain and request assistance  - Assess pain using appropriate pain scale  - Administer analgesics based on type and severity of pain and evaluate response  - Implement non-pharmacological measures as appropriate and evaluate response  - Consider cultural and social influences on pain and pain management  - Notify physician/advanced practitioner if interventions unsuccessful or patient reports new pain  Outcome: Progressing     Problem: INFECTION - ADULT  Goal: Absence or prevention of progression during hospitalization  Description: INTERVENTIONS:  - Assess and monitor for signs and symptoms of infection  - Monitor lab/diagnostic results  - Monitor all insertion sites, i e  indwelling lines, tubes, and drains  - Monitor endotracheal if appropriate and nasal secretions for changes in amount and color  - [place] appropriate cooling/warming therapies per order  - Administer medications as ordered  - Instruct and encourage patient and family to use good hand hygiene technique  - Identify and instruct in appropriate isolation precautions for identified infection/condition  Outcome: Progressing     Problem: SAFETY ADULT  Goal: Patient will remain free of falls  Description: INTERVENTIONS:  - Assess patient frequently for physical needs  -  Identify cognitive and physical deficits and behaviors that affect risk of falls  -  [place] fall precautions as indicated by assessment   - Educate patient/family on patient safety including physical limitations  - Instruct patient to call for assistance with activity based on assessment  - Modify environment to reduce risk of injury  - Consider OT/PT consult to assist with strengthening/mobility  Outcome: Progressing     Problem: DISCHARGE PLANNING  Goal: Discharge to home or other facility with appropriate resources  Description: INTERVENTIONS:  - Identify barriers to discharge w/patient and caregiver  - Arrange for needed discharge resources and transportation as appropriate  - Identify discharge learning needs (meds, wound care, etc )  - Arrange for interpretive services to assist at discharge as needed  - Refer to Case Management Department for coordinating discharge planning if the patient needs post-hospital services based on physician/advanced practitioner order or complex needs related to functional status, cognitive ability, or social support system  Outcome: Progressing     Problem: Knowledge Deficit  Goal: Patient/family/caregiver demonstrates understanding of disease process, treatment plan, medications, and discharge instructions  Description: Complete learning assessment and assess knowledge base    Interventions:  - Provide teaching at level of understanding  - Provide teaching via preferred learning methods  Outcome: Progressing     Problem: Prexisting or High Potential for Compromised Skin Integrity  Goal: Skin integrity is maintained or improved  Description: INTERVENTIONS:  - Identify patients at risk for skin breakdown  - Assess and monitor skin integrity  - Assess and monitor nutrition and hydration status  - Monitor labs   - Assess for incontinence   - Turn and reposition patient  - Assist with mobility/ambulation  - Relieve pressure over bony prominences  - Avoid friction and shearing  - Provide appropriate hygiene as needed including keeping skin clean and dry  - Evaluate need for skin moisturizer/barrier cream  - Collaborate with interdisciplinary team   - Patient/family teaching  - Consider wound care consult   Outcome: Progressing     Problem: Nutrition/Hydration-ADULT  Goal: Nutrient/Hydration intake appropriate for improving, restoring or maintaining nutritional needs  Description: Monitor and assess patient's nutrition/hydration status for malnutrition  Collaborate with interdisciplinary team and initiate plan and interventions as ordered  Monitor patient's weight and dietary intake as ordered or per policy  Utilize nutrition screening tool and intervene as necessary  Determine patient's food preferences and provide high-protein, high-caloric foods as appropriate       INTERVENTIONS:  - Monitor oral intake, urinary output, labs, and treatment plans  - Assess nutrition and hydration status and recommend course of action  - Evaluate amount of meals eaten  - Assist patient with eating if necessary   - Allow adequate time for meals  - Recommend/ encourage appropriate diets, oral nutritional supplements, and vitamin/mineral supplements  - Order, calculate, and assess calorie counts as needed  - Recommend, monitor, and adjust tube feedings and TPN/PPN based on assessed needs  - Assess need for intravenous fluids  - Provide specific nutrition/hydration education as appropriate  - Include patient/family/caregiver in decisions related to nutrition  Outcome: Progressing

## 2021-02-24 NOTE — ASSESSMENT & PLAN NOTE
· Necrosis of the right 2nd digit (distal phalanx), proximal phalanx erythema toes, warmth, tender  Inflammatory signs present of the plantar surface as well  · X-ray of the right foot showed no evidence of osteomyelitis, 2nd PIP joint dislocation    Plan:  · Status post incision and drainage right foot and open 2nd ray amputation, 2/21    · PTA of R SFA performed with placement of RACHELLE yesterday  · Wound VAC changed today by Podiatry  · Follow-up operative cultures

## 2021-02-24 NOTE — PROGRESS NOTES
Progress Note - Infectious Disease   Heidi Laughlin 78 y o  male MRN: [id number]  Unit/Bed#: S -01 Encounter: [id number]      Impression/Plan:  1  Acute clinical osteomyelitis right 2nd toe with gangrene   Patient reported having a fracture after tripping on a wire approximately 3 weeks ago and now on exam has exposed bone with foul-smelling gangrene of the right 2nd toe   Clinically consistent with osteomyelitis   Patient also has tracking cellulitis   Patient underwent right 2nd toe amputation and extensive I &D of plantar abscess found to have a tracked into the midfoot   OR gs with 1+ Gram-positive cocci in pairs, cx 2+ Enterococcus faecalis, anaerobic cx pending  VAC changed today, 2nd metatarsal stump is exposed but clean  Patient is status post successful recannulization and stenting the right SFA  Switch Cefazolin to Ampicillin IV  Continue on Flagyl  Continue to trend fever curve/vitals  Repeat CBC/chemistry tomorrow  Ongoing follow-up by Podiatry  Will follow-up final OR cultures  Plan is for washout and partial closure on Friday, 2/26/2021    2  Right 2nd toe cellulitis and fracture   Patient's exam is consistent with cellulitis likely related to recent fracture and then progressing bone infection   Patient underwent right 2nd toe amputation and extensive I &D of plantar abscess found to have a tracked into the midfoot   OR gs with 1+ Gram-positive cocci in pairs, cx 2+ Enterococcus faecalis, anaerobic cx pending  Antibiotics as above  Continue to trend fever curve/WBC  Monitor site clinically  Ongoing follow-up by Podiatry  Ongoing follow-up vascular surgery     3  Leukocytosis   Patient noted with significantly elevated white count on admission which is likely due to the above   Slowly down trending    Continue antibiotics as above  Monitor CBC     4  Chronic kidney disease   Patient appears to have some degree of chronic kidney disease   Creatinine trended down to 1 19  Will start high dose ampicillin  Will further dose adjust antibiotics as needed  Repeat chemistry tomorrow  Fluid hydration as per primary     5  Type 2 diabetes with neuropathy   Patient has known type 2 diabetes as well as reported neuropathy   Suspect poor control given the above complications   Overall increases risk of infections and poor wound healing  Glucose management as per primary  Continue antibiotics as above  Patient will require chronic follow-up with Podiatry     6  Tobacco use   Unfortunately increases risk of poor wound healing as well as vascular disease        Above plan discussed in detail with the patient and PCT at bedside      Antibiotics:  Ampicillin D1/Flagyl  POD #3     Subjective:  Patient is s/p IR s/p recannulization and stenting the right SFA / and vac change this am   He reports less pain in his right foot  ROS: Patient has no fever, chills, sweats overnight; no nausea, vomiting, diarrhea; no cough, shortness of breath  Objective:  Vitals:  Temp:  [97 5 °F (36 4 °C)-98 °F (36 7 °C)] 98 °F (36 7 °C)  HR:  [68-80] 73  Resp:  [16-20] 20  BP: (119-166)/(52-77) 166/77  SpO2:  [88 %-98 %] 97 %  Temp (24hrs), Av 8 °F (36 6 °C), Min:97 5 °F (36 4 °C), Max:98 °F (36 7 °C)  Current: Temperature: 98 °F (36 7 °C)    General Appearance:  Awake, alert, cooperative, resting in bed, no acute distress  Throat: Oropharynx moist without lesions  Lungs:   Clear to auscultation bilaterally; no wheezes, rhonchi or rales; respirations unlabored   Heart:  RRR; S1-S2 heard, no murmur   Abdomen:   Soft, non-tender, non-distended, positive bowel sounds  Extremities: Right foot heavily gauze wrapped with wound VAC intact and no output in canister yet    No erythema outside of gauze wrap, arm IV site nontender       Skin: No rashes      Labs, Imaging, & Other studies:   All pertinent labs and imaging studies were personally reviewed  Results from last 7 days   Lab Units 21  0568 21  1546 02/21/21  0456   WBC Thousand/uL 16 51* 20 12* 20 02*   HEMOGLOBIN g/dL 10 3* 10 5* 11 4*   PLATELETS Thousands/uL 274 315 322     Results from last 7 days   Lab Units 02/24/21  0449  02/20/21  0438 02/19/21  1547   POTASSIUM mmol/L 4 9   < > 4 5 5 1   CHLORIDE mmol/L 102   < > 99* 95*   CO2 mmol/L 22   < > 18* 22   BUN mg/dL 21   < > 31* 30*   CREATININE mg/dL 1 19   < > 1 70* 1 93*   EGFR ml/min/1 73sq m 62   < > 41 35   CALCIUM mg/dL 7 8*   < > 8 5 9 1   AST U/L  --   --  9 10   ALT U/L  --   --  13 18   ALK PHOS U/L  --   --  105 119*    < > = values in this interval not displayed  Results from last 7 days   Lab Units 02/20/21  0438 02/19/21  1547   PROCALCITONIN ng/ml 0 13 0 10     Results from last 7 days   Lab Units 02/21/21  0820 02/19/21  1555 02/19/21  1540   BLOOD CULTURE   --  No Growth After 4 Days  No Growth After 4 Days     GRAM STAIN RESULT  No Polys*  1+ Gram positive cocci in pairs*  --   --    WOUND CULTURE  2+ Growth of Enterococcus faecalis*  --   --

## 2021-02-24 NOTE — TREATMENT PLAN
IR op note reviewed  Angiogram revealed long segment chronic occlusion of R SFA  PTA of R SFA performed with placement of RACHELLE  [place] runoff via AT/PT  Will obtain post-procedure RLEAD to re-evaluate blood flow      Rama Ball MD

## 2021-02-24 NOTE — PROGRESS NOTES
Qi [facility] PROGRESS NOTE   Nathalie York 78 y o  male MRN: [id number]  Unit/Bed#: S -01 Encounter: [id number]  Reason for Consult: CKD    ASSESSMENT and PLAN:  1  Acute kidney injury (POA):  · Admission creatinine 1 93 on February 19, 2021  · Etiology felt to be due to autoregulatory failure from NSAID and ACE inhibitor use  · Renal function improved with IVF and holding ACEi and NSAIDs  · UA unremarkable  No renal imaging done  · Renal function stable at this time  · No evidence of contrast nephropathy  2  Chronic kidney disease, stage III:   · Baseline creatinine is probably around 1 3-1 6  · No prior nephrology follow-up  3  Peripheral arterial disease:    · S/p arteriogram done on February 23,2021 with angioplasty and stents placed  4  Hypertension  · Outpatient regimen: Amlodipine 10 mg daily, atenolol 25 mg daily, lisinopril 20 mg daily  · BP on the high side but acceptable  · Continue Amlodipine and Atenolol  · Possibly restart Lisinopril over next 24-48 hours if K is stable  5  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:  · Abx per ID  6  Hyponatremia:  · Resolved  Na is normal considering hyperglycemia  7  Metabolic acidosis:  · CO2 up to 22  · Continue sodium bicarbonate 650 mg BID  8  Anemia:   · Hgb stable  9  Elevated PSA:  · PSA 27 2  · Needs urology follow up as an outpatient  · I stressed the importance of this to the patient  DISPOSITION:  · Stable renal function  · Monitor off IVF  · May restart Lisinopril at 10 mg daily in 24 to 48 h if K is better  SUBJECTIVE / 24H INTERVAL HISTORY:  Had arteriogram done yesterday and had angioplasty and stents placed  Appears to have tolerated the procedure without any issues  IV fluids stopped overnight  Denies any chest pain or shortness of breath        OBJECTIVE:  Current Weight: Weight - Scale: 95 3 kg (210 lb)  Vitals:    02/23/21 2141 02/23/21 2246 02/23/21 2247 02/24/21 4672 BP: 130/60 131/62  166/74   BP Location: Right arm Right arm  Right arm   Pulse: 74 76  73   Resp:  16  18   Temp:  97 8 °F (36 6 °C)  97 8 °F (36 6 °C)   TempSrc:  Oral  Oral   SpO2: 90% (!) 88% 91% 91%   Weight:       Height:           Intake/Output Summary (Last 24 hours) at 2/24/2021 1025  Last data filed at 2/24/2021 0855  Gross per 24 hour   Intake 2031 25 ml   Output 1100 ml   Net 931 25 ml     General: conscious, cooperative, no distress  Skin: dry  Eyes: pink conjunctivae  ENT: moist mucous membranes  Chest/Lungs: equal chest expansion, clear breath sounds  CVS: distinct heart sounds, normal rate, regular rhythm, no rub  Abdomen: soft, non tender, non distended, normal bowel sounds  Extremities: no edema of L  R foot with ACE wrap  : no merrill catheter  Neuro: awake, alert     Psych: appropriate affect    Medications:    Current Facility-Administered Medications:     acetaminophen (TYLENOL) tablet 975 mg, 975 mg, Oral, Q8H [facility], Maryjane Mantilla DPM, 975 mg at 02/24/21 0535    amLODIPine (NORVASC) tablet 10 mg, 10 mg, Oral, Daily, Steve Coe MD, 10 mg at 02/24/21 0851    atenolol (TENORMIN) tablet 25 mg, 25 mg, Oral, Daily, Maryjane Mantilla DPM, 25 mg at 02/24/21 0850    atorvastatin (LIPITOR) tablet 40 mg, 40 mg, Oral, Daily With [facility] Glenn, DPM, 40 mg at 02/23/21 1826    calcium carbonate (TUMS) chewable tablet 500 mg, 500 mg, Oral, BID PRN, Bibi Sinclair MD    ceFAZolin (ANCEF) IVPB (premix in dextrose) 2,000 mg 50 mL, 2,000 mg, Intravenous, Q8H, Maryjane Mantilla DPM, Stopped at 02/24/21 0525    citalopram (CeleXA) tablet 20 mg, 20 mg, Oral, Daily, Maryjane Mantilla DPM, 20 mg at 02/24/21 0851    clopidogrel (PLAVIX) tablet 75 mg, 75 mg, Oral, Daily, Ernie Zamarripa MD, 75 mg at 02/24/21 0851    enoxaparin (LOVENOX) subcutaneous injection 40 mg, 40 mg, Subcutaneous, Daily, Maryjane Mantilla DPM, 40 mg at 02/24/21 0850    famotidine (PEPCID) tablet 20 mg, 20 mg, Oral, BID, Bibi Sinclair MD, 20 mg at 02/24/21 0850    HYDROmorphone (DILAUDID) injection 0 2 mg, 0 2 mg, Intravenous, Q4H PRN, Joselyn Hait, DPM, 0 2 mg at 02/24/21 0719    insulin glargine (LANTUS) subcutaneous injection 20 Units 0 2 mL, 20 Units, Subcutaneous, HS, Bibi Sinclair MD, 20 Units at 02/23/21 2111    insulin lispro (HumaLOG) 100 units/mL subcutaneous injection 1-5 Units, 1-5 Units, Subcutaneous, HS, Joselyn Hait, DPM, 1 Units at 02/23/21 2112    insulin lispro (HumaLOG) 100 units/mL subcutaneous injection 1-6 Units, 1-6 Units, Subcutaneous, TID AC, 3 Units at 02/24/21 0852 **AND** Fingerstick Glucose (POCT), , , TID AC, Joselyn Hait, DPM    insulin lispro (HumaLOG) 100 units/mL subcutaneous injection 7 Units, 7 Units, Subcutaneous, TID With Meals, Bibi Sinclair MD, 7 Units at 02/24/21 0853    levothyroxine tablet 175 mcg, 175 mcg, Oral, Early Morning, Joselyn Hait, DPM, 175 mcg at 02/24/21 0537    metroNIDAZOLE (FLAGYL) IVPB (premix) 500 mg 100 mL, 500 mg, Intravenous, Q8H, Joselyn Hait, DPM, Stopped at 02/24/21 0615    oxyCODONE (ROXICODONE) IR tablet 2 5 mg, 2 5 mg, Oral, Q4H PRN, Joselyn Hait, DPM    oxyCODONE (ROXICODONE) IR tablet 5 mg, 5 mg, Oral, Q4H PRN, Joselyn Hait, DPM, 5 mg at 02/24/21 0537    polyethylene glycol (MIRALAX) packet 17 g, 17 g, Oral, Daily PRN, Joselyn Hait, DPM    sodium bicarbonate tablet 650 mg, 650 mg, Oral, BID after meals, Merari Coates MD, 650 mg at 02/24/21 0851    tiotropium (SPIRIVA) capsule for inhaler 18 mcg, 18 mcg, Inhalation, Daily, Joselyn Hait, DPM, 18 mcg at 02/24/21 0851    traZODone (DESYREL) tablet 50 mg, 50 mg, Oral, HS, Joselyn Hait, DPM, 50 mg at 02/23/21 2111    Laboratory Results:  Results from last 7 days   Lab Units 02/24/21  0449 02/23/21  0530 02/22/21  0435 02/21/21  0456 02/20/21  0438 02/19/21  1547   WBC Thousand/uL  --  16 51* 20 12* 20 02* 23 58* 24 24*   HEMOGLOBIN g/dL  --  10 3* 10 5* 11 4* 11 4* 11 9*   HEMATOCRIT %  --  32 5* 32 4* 34 4* 33 7* 35 7*   PLATELETS Thousands/uL  --  274 315 322 330 365   POTASSIUM mmol/L 4 9 4 2 3 8 3 8 4 5 5 1   CHLORIDE mmol/L 102 102 105 104 99* 95*   CO2 mmol/L 22 19* 21 19* 18* 22   BUN mg/dL 21 19 15 21 31* 30*   CREATININE mg/dL 1 19 1 27 1 23 1 32* 1 70* 1 93*   CALCIUM mg/dL 7 8* 7 9* 7 9* 8 1* 8 5 9 1

## 2021-02-24 NOTE — ASSESSMENT & PLAN NOTE
Lab Results   Component Value Date    HGBA1C 9 3 (H) 02/21/2021       Recent Labs     02/23/21  1831 02/23/21  2044 02/24/21  0701 02/24/21  1112   POCGLU 249* 205* 258* 312*       Blood Sugar Average: Last 72 hrs:  (P) 191 6   · Patient admitted with blood sugars of 415 mg, beta hydroxy butyrate increased, normal pH, normal bicarb and anion gap on BMP  · History of type 2 diabetes in the past 25 years, follow-up with endocrinologist as outpatient  On 30 NPH a m , 60 units NPH at night, Novolin R 30 units a m  As home medication  · Last hemoglobin A1c 8 0 prior to this admission      Plan:  · Poorly-controlled diabetes  · Increase Lantus and mealtime insulin

## 2021-02-24 NOTE — ASSESSMENT & PLAN NOTE
Lab Results   Component Value Date    EGFR 62 02/24/2021    EGFR 58 02/23/2021    EGFR 60 02/22/2021    CREATININE 1 19 02/24/2021    CREATININE 1 27 02/23/2021    CREATININE 1 23 02/22/2021   · Per notes, patient has chronic kidney disease stage III    · Renal follow-up appreciated

## 2021-02-24 NOTE — ASSESSMENT & PLAN NOTE
· LEADS:  Revealed severe bilateral lower extremity peripheral arterial disease  Low DULCE    · As above

## 2021-02-24 NOTE — PROGRESS NOTES
Angiogram imaging reviewed -  Excellent results with recanalization of Right SFA with stenting  AT and PT runoff positive  Ok to perform debridement and definitive foot amputation per podiatry

## 2021-02-24 NOTE — PROCEDURES
VAC change  Patient was given IV pain medication for wound VAC change  I reviewed the recent angiogram with successful recanalization and stenting of the SFA  He has 2 vessel runoff to the foot  The wound VAC was occluded due to a clot in the tubing  The dressing was changed  See below  Patient did report pain 8/10 at 1 point during the change but following procedure was resting comfortably with no pain  1  Removed 2 black sponges  2  Wound measures 17 x 3 x 1 cm  Wound bed is 80% red 15% yellow 5% gray  The 2nd metatarsal stump is exposed but clean  There is no purulence malodor or necrosis  There is no cellulitis  3  Applied wound VAC dressing with 4 black sponges at 125 Low continuous pressure  Plan  1  Maintain VAC at 125 low continuous pressure  2  With improved perfusion to the foot will plan for Washout and partial closure Friday with Dr Miles Contreras     3  Nonweightbearing to right foot

## 2021-02-24 NOTE — PROGRESS NOTES
Progress Note - Gege Bush 1950, 78 y o  male MRN: [id number]    Unit/Bed#: S -01 Encounter: [id number]    Primary Care Provider: Ulises Carmona   Date and time admitted to hospital: 2/19/2021  2:09 PM        * Osteomyelitis, right 2nd toe with cellulitis  Assessment & Plan  · Necrosis of the right 2nd digit (distal phalanx), proximal phalanx erythema toes, warmth, tender  Inflammatory signs present of the plantar surface as well  · X-ray of the right foot showed no evidence of osteomyelitis, 2nd PIP joint dislocation    Plan:  · Status post incision and drainage right foot and open 2nd ray amputation, 2/21  · PTA of R SFA performed with placement of RACHELLE yesterday  · Wound VAC changed today by Podiatry  · Follow-up operative cultures      Peripheral arterial disease ([facility] [address] )  Assessment & Plan  · LEADS:  Revealed severe bilateral lower extremity peripheral arterial disease  Low DULCE  · As above    Stage 3 chronic kidney disease  Assessment & Plan  Lab Results   Component Value Date    EGFR 62 02/24/2021    EGFR 58 02/23/2021    EGFR 60 02/22/2021    CREATININE 1 19 02/24/2021    CREATININE 1 27 02/23/2021    CREATININE 1 23 02/22/2021   · Per notes, patient has chronic kidney disease stage III  · Renal follow-up appreciated      Type 2 diabetes mellitus, with long-term current use of insulin [facility])  Assessment & Plan  Lab Results   Component Value Date    HGBA1C 9 3 (H) 02/21/2021       Recent Labs     02/23/21  1831 02/23/21  2044 02/24/21  0701 02/24/21  1112   POCGLU 249* 205* 258* 312*       Blood Sugar Average: Last 72 hrs:  (P) 191 6   · Patient admitted with blood sugars of 415 mg, beta hydroxy butyrate increased, normal pH, normal bicarb and anion gap on BMP  · History of type 2 diabetes in the past 25 years, follow-up with endocrinologist as outpatient  On 30 NPH a m , 60 units NPH at night, Novolin R 30 units a m  As home medication    · Last hemoglobin A1c 8 0 prior to this admission  Plan:  · Poorly-controlled diabetes  · Increase Lantus and mealtime insulin        VTE Pharmacologic Prophylaxis:   Pharmacologic: Enoxaparin (Lovenox)  Mechanical VTE Prophylaxis in Place: No    Patient Centered Rounds: I have performed bedside rounds with nursing staff today  Education and Discussions with Family / Patient:  I offered to call patient's brother patient refused    Time Spent for Care: 30 minutes  More than 50% of total time spent on counseling and coordination of care as described above  Current Length of Stay: 5 day(s)    Current Patient Status: Inpatient   Certification Statement: The patient will continue to require additional inpatient hospital stay due to Need for continue IV antibiotics    Discharge Plan: Will most likely need rehab at discharge    Code Status: Level 1 - Full Code      Subjective:   No pain at this time    Objective:     Vitals:   Temp (24hrs), Av 8 °F (36 6 °C), Min:97 5 °F (36 4 °C), Max:98 °F (36 7 °C)    Temp:  [97 5 °F (36 4 °C)-98 °F (36 7 °C)] 98 °F (36 7 °C)  HR:  [68-80] 73  Resp:  [16-20] 20  BP: (119-166)/(52-77) 166/77  SpO2:  [88 %-98 %] 97 %  Body mass index is 30 13 kg/m²  Input and Output Summary (last 24 hours): Intake/Output Summary (Last 24 hours) at 2021 1213  Last data filed at 2021 0855  Gross per 24 hour   Intake 1881 25 ml   Output 1100 ml   Net 781 25 ml       Physical Exam:     Physical Exam  Constitutional:       General: He is not in acute distress  Appearance: He is not diaphoretic  HENT:      Head: Normocephalic and atraumatic  Cardiovascular:      Heart sounds: No murmur  No gallop  Pulmonary:      Effort: No respiratory distress  Breath sounds: No wheezing or rales  Abdominal:      General: There is no distension  Palpations: Abdomen is soft  Tenderness: There is no abdominal tenderness  There is no guarding or rebound           Additional Data:     Labs:    Results from last 7 days   Lab Units 02/23/21  0530 02/22/21  0435   WBC Thousand/uL 16 51* 20 12*   HEMOGLOBIN g/dL 10 3* 10 5*   HEMATOCRIT % 32 5* 32 4*   PLATELETS Thousands/uL 274 315   NEUTROS PCT %  --  76*   LYMPHS PCT %  --  10*   MONOS PCT %  --  11   EOS PCT %  --  1     Results from last 7 days   Lab Units 02/24/21  0449  02/20/21  0438   SODIUM mmol/L 135*   < > 130*   POTASSIUM mmol/L 4 9   < > 4 5   CHLORIDE mmol/L 102   < > 99*   CO2 mmol/L 22   < > 18*   BUN mg/dL 21   < > 31*   CREATININE mg/dL 1 19   < > 1 70*   ANION GAP mmol/L 11   < > 13   CALCIUM mg/dL 7 8*   < > 8 5   ALBUMIN g/dL  --   --  2 4*   TOTAL BILIRUBIN mg/dL  --   --  0 28   ALK PHOS U/L  --   --  105   ALT U/L  --   --  13   AST U/L  --   --  9   GLUCOSE RANDOM mg/dL 237*   < > 390*    < > = values in this interval not displayed  Results from last 7 days   Lab Units 02/19/21  1547   INR  1 12     Results from last 7 days   Lab Units 02/24/21  1112 02/24/21  0701 02/23/21  2044 02/23/21  1831 02/23/21  1127 02/23/21  0705 02/23/21  0607 02/22/21  2039 02/22/21  1600 02/22/21  1101 02/22/21  0736 02/21/21  2108   POC GLUCOSE mg/dl 312* 258* 205* 249* 193* 177* 188* 165* 171* 176* 89 135     Results from last 7 days   Lab Units 02/21/21  0456   HEMOGLOBIN A1C % 9 3*     Results from last 7 days   Lab Units 02/20/21  0438 02/19/21  1547   LACTIC ACID mmol/L  --  1 4   PROCALCITONIN ng/ml 0 13 0 10           * I Have Reviewed All Lab Data Listed Above  * Additional Pertinent Lab Tests Reviewed: All Labs Within Last 24 Hours Reviewed    Imaging:    Imaging Reports Reviewed Today Include:   Imaging Personally Reviewed by Myself Includes:      Recent Cultures (last 7 days):     Results from last 7 days   Lab Units 02/21/21  0820 02/19/21  1555 02/19/21  1540   BLOOD CULTURE   --  No Growth After 4 Days  No Growth After 4 Days     GRAM STAIN RESULT  No Polys*  1+ Gram positive cocci in pairs*  --   --    WOUND CULTURE  2+ Growth of Enterococcus faecalis*  --   --        Last 24 Hours Medication List:   Current Facility-Administered Medications   Medication Dose Route Frequency Provider Last Rate    acetaminophen  975 mg Oral [facility] Daralene Hunger, DPM      amLODIPine  10 mg Oral Daily Ceci Martínez MD      atenolol  25 mg Oral Daily Daralene Hunger, DPM      atorvastatin  40 mg Oral Daily With Matilda Hamming, DPM      calcium carbonate  500 mg Oral BID PRN Bibi Sinclair MD      cefazolin  2,000 mg Intravenous Q8H Daralene Hunger, DPM 2,000 mg (02/24/21 1107)    citalopram  20 mg Oral Daily Daralene Hunger, DPM      clopidogrel  75 mg Oral Daily Vinayak Ortiz MD      enoxaparin  40 mg Subcutaneous Daily Daralene Hunger, DPM      famotidine  20 mg Oral BID Bibi Sinclair MD      HYDROmorphone  0 2 mg Intravenous Q4H PRN Daralene Hunger, DPM      insulin glargine  25 Units Subcutaneous HS Vj Verde MD      insulin lispro  1-5 Units Subcutaneous HS Daralene Hunger, DPM      insulin lispro  1-6 Units Subcutaneous TID AC Daralene Hunger, DPM      insulin lispro  10 Units Subcutaneous TID With Meals Vj Verde MD      levothyroxine  175 mcg Oral Early Morning Daralene Hunger, DPM      metroNIDAZOLE  500 mg Intravenous Q8H Daralene Hunger,  mg (02/24/21 1154)    oxyCODONE  2 5 mg Oral Q4H PRN Daralene Hunger, DPM      oxyCODONE  5 mg Oral Q4H PRN Daralene Hunger, DPM      polyethylene glycol  17 g Oral Daily PRN Daralene Hunger, DPM      sodium bicarbonate  650 mg Oral BID after meals Tae Owens MD      tiotropium  18 mcg Inhalation Daily Daralene Hunger, DPM      traZODone  50 mg Oral HS Daralene Hunger, DPM          Today, Patient Was Seen By: Vj Verde MD    ** Please Note: Dictation voice to text software may have been used in the creation of this document   **

## 2021-02-25 ENCOUNTER — APPOINTMENT (INPATIENT)
Dept: RADIOLOGY | Facility: HOSPITAL | Age: 71
DRG: 240 | End: 2021-02-25
Payer: COMMERCIAL

## 2021-02-25 LAB
ANION GAP SERPL CALCULATED.3IONS-SCNC: 8 MMOL/L (ref 4–13)
BACTERIA SPEC ANAEROBE CULT: NORMAL
BACTERIA WND AEROBE CULT: ABNORMAL
BACTERIA WND AEROBE CULT: ABNORMAL
BUN SERPL-MCNC: 18 MG/DL (ref 5–25)
CALCIUM SERPL-MCNC: 7.7 MG/DL (ref 8.3–10.1)
CHLORIDE SERPL-SCNC: 104 MMOL/L (ref 100–108)
CO2 SERPL-SCNC: 25 MMOL/L (ref 21–32)
CREAT SERPL-MCNC: 1.13 MG/DL (ref 0.6–1.3)
ERYTHROCYTE [DISTWIDTH] IN BLOOD BY AUTOMATED COUNT: 12.5 % (ref 11.6–15.1)
GFR SERPL CREATININE-BSD FRML MDRD: 66 ML/MIN/1.73SQ M
GLUCOSE SERPL-MCNC: 116 MG/DL (ref 65–140)
GLUCOSE SERPL-MCNC: 152 MG/DL (ref 65–140)
GLUCOSE SERPL-MCNC: 177 MG/DL (ref 65–140)
GLUCOSE SERPL-MCNC: 198 MG/DL (ref 65–140)
GLUCOSE SERPL-MCNC: 215 MG/DL (ref 65–140)
GRAM STN SPEC: ABNORMAL
GRAM STN SPEC: ABNORMAL
HCT VFR BLD AUTO: 32 % (ref 36.5–49.3)
HGB BLD-MCNC: 10.6 G/DL (ref 12–17)
MCH RBC QN AUTO: 28.9 PG (ref 26.8–34.3)
MCHC RBC AUTO-ENTMCNC: 33.1 G/DL (ref 31.4–37.4)
MCV RBC AUTO: 87 FL (ref 82–98)
OSMOLALITY UR/SERPL-RTO: 290 MMOL/KG (ref 282–298)
PLATELET # BLD AUTO: 380 THOUSANDS/UL (ref 149–390)
PMV BLD AUTO: 11.1 FL (ref 8.9–12.7)
POTASSIUM SERPL-SCNC: 3.5 MMOL/L (ref 3.5–5.3)
RBC # BLD AUTO: 3.67 MILLION/UL (ref 3.88–5.62)
SODIUM SERPL-SCNC: 137 MMOL/L (ref 136–145)
WBC # BLD AUTO: 16.27 THOUSAND/UL (ref 4.31–10.16)

## 2021-02-25 PROCEDURE — 82948 REAGENT STRIP/BLOOD GLUCOSE: CPT

## 2021-02-25 PROCEDURE — 93922 UPR/L XTREMITY ART 2 LEVELS: CPT | Performed by: SURGERY

## 2021-02-25 PROCEDURE — 80048 BASIC METABOLIC PNL TOTAL CA: CPT | Performed by: INTERNAL MEDICINE

## 2021-02-25 PROCEDURE — 93926 LOWER EXTREMITY STUDY: CPT | Performed by: SURGERY

## 2021-02-25 PROCEDURE — 99233 SBSQ HOSP IP/OBS HIGH 50: CPT | Performed by: INTERNAL MEDICINE

## 2021-02-25 PROCEDURE — 99232 SBSQ HOSP IP/OBS MODERATE 35: CPT | Performed by: INTERNAL MEDICINE

## 2021-02-25 PROCEDURE — 71045 X-RAY EXAM CHEST 1 VIEW: CPT

## 2021-02-25 PROCEDURE — 85027 COMPLETE CBC AUTOMATED: CPT | Performed by: INTERNAL MEDICINE

## 2021-02-25 PROCEDURE — 83930 ASSAY OF BLOOD OSMOLALITY: CPT | Performed by: INTERNAL MEDICINE

## 2021-02-25 PROCEDURE — 84145 PROCALCITONIN (PCT): CPT | Performed by: INTERNAL MEDICINE

## 2021-02-25 RX ORDER — INSULIN GLARGINE 100 [IU]/ML
30 INJECTION, SOLUTION SUBCUTANEOUS
Status: DISCONTINUED | OUTPATIENT
Start: 2021-02-25 | End: 2021-02-27

## 2021-02-25 RX ADMIN — ATENOLOL 25 MG: 25 TABLET ORAL at 08:40

## 2021-02-25 RX ADMIN — FAMOTIDINE 20 MG: 20 TABLET ORAL at 08:41

## 2021-02-25 RX ADMIN — AMPICILLIN SODIUM 2000 MG: 2 INJECTION, POWDER, FOR SOLUTION INTRAMUSCULAR; INTRAVENOUS at 08:51

## 2021-02-25 RX ADMIN — ACETAMINOPHEN 975 MG: 325 TABLET, FILM COATED ORAL at 05:44

## 2021-02-25 RX ADMIN — LEVOTHYROXINE SODIUM 175 MCG: 175 TABLET ORAL at 05:45

## 2021-02-25 RX ADMIN — INSULIN LISPRO 2 UNITS: 100 INJECTION, SOLUTION INTRAVENOUS; SUBCUTANEOUS at 08:50

## 2021-02-25 RX ADMIN — SODIUM BICARBONATE 650 MG: 650 TABLET ORAL at 08:41

## 2021-02-25 RX ADMIN — AMLODIPINE BESYLATE 10 MG: 10 TABLET ORAL at 08:41

## 2021-02-25 RX ADMIN — OXYCODONE HYDROCHLORIDE 5 MG: 5 TABLET ORAL at 08:56

## 2021-02-25 RX ADMIN — OXYCODONE HYDROCHLORIDE 5 MG: 5 TABLET ORAL at 21:46

## 2021-02-25 RX ADMIN — AMPICILLIN SODIUM 2000 MG: 2 INJECTION, POWDER, FOR SOLUTION INTRAMUSCULAR; INTRAVENOUS at 21:17

## 2021-02-25 RX ADMIN — CLOPIDOGREL BISULFATE 75 MG: 75 TABLET ORAL at 08:41

## 2021-02-25 RX ADMIN — CALCIUM CARBONATE (ANTACID) CHEW TAB 500 MG 500 MG: 500 CHEW TAB at 06:09

## 2021-02-25 RX ADMIN — METRONIDAZOLE 500 MG: 500 TABLET ORAL at 05:45

## 2021-02-25 RX ADMIN — AMPICILLIN SODIUM 2000 MG: 2 INJECTION, POWDER, FOR SOLUTION INTRAMUSCULAR; INTRAVENOUS at 03:35

## 2021-02-25 RX ADMIN — OXYCODONE HYDROCHLORIDE 5 MG: 5 TABLET ORAL at 15:13

## 2021-02-25 RX ADMIN — AMPICILLIN SODIUM 2000 MG: 2 INJECTION, POWDER, FOR SOLUTION INTRAMUSCULAR; INTRAVENOUS at 15:10

## 2021-02-25 RX ADMIN — INSULIN LISPRO 1 UNITS: 100 INJECTION, SOLUTION INTRAVENOUS; SUBCUTANEOUS at 12:15

## 2021-02-25 RX ADMIN — TIOTROPIUM BROMIDE 18 MCG: 18 CAPSULE ORAL; RESPIRATORY (INHALATION) at 08:41

## 2021-02-25 RX ADMIN — CITALOPRAM HYDROBROMIDE 20 MG: 20 TABLET ORAL at 08:41

## 2021-02-25 RX ADMIN — FAMOTIDINE 20 MG: 20 TABLET ORAL at 17:48

## 2021-02-25 RX ADMIN — ENOXAPARIN SODIUM 40 MG: 40 INJECTION SUBCUTANEOUS at 08:40

## 2021-02-25 RX ADMIN — SODIUM BICARBONATE 650 MG: 650 TABLET ORAL at 17:48

## 2021-02-25 RX ADMIN — INSULIN GLARGINE 15 UNITS: 100 INJECTION, SOLUTION SUBCUTANEOUS at 21:46

## 2021-02-25 RX ADMIN — ATORVASTATIN CALCIUM 40 MG: 40 TABLET, FILM COATED ORAL at 17:48

## 2021-02-25 RX ADMIN — TRAZODONE HYDROCHLORIDE 50 MG: 50 TABLET ORAL at 21:17

## 2021-02-25 RX ADMIN — OXYCODONE HYDROCHLORIDE 5 MG: 5 TABLET ORAL at 03:35

## 2021-02-25 NOTE — ASSESSMENT & PLAN NOTE
· Intermittent episodes of hypoxia  · Suspected acute pulmonary insufficiency secondary to atelectasis  · Check will chest x-ray for atelectasis  · Incentive spirometer

## 2021-02-25 NOTE — PROGRESS NOTES
Qi 50 PROGRESS NOTE   Cynthia Tyson 78 y o  male MRN: [id number]  Unit/Bed#: S -01 Encounter: [id number]  Reason for Consult: CKD    ASSESSMENT and PLAN:  1  Acute kidney injury (POA):  · Admission creatinine 1 93 on February 19, 2021  · Etiology felt to be due to autoregulatory failure from NSAID and ACE inhibitor use  · Renal function improved with IVF and holding ACEi and NSAIDs  · UA unremarkable  No renal imaging done  · SCr down to 1 13 today - no contrast nephropathy  2  Chronic kidney disease, stage III:   · Baseline creatinine is probably around 1 3-1 6 (while on ACEi)  · No prior nephrology follow-up  3  Peripheral arterial disease:    · S/p arteriogram done on February 23,2021 with angioplasty and stents placed  4  Hypertension  · Outpatient regimen: Amlodipine 10 mg daily, atenolol 25 mg daily, lisinopril 20 mg daily  · BP on the high side but acceptable  · Continue Amlodipine and Atenolol  · Restart Lisinopril after OR tomorrow if renal function stable - hold off on starting for now to avoid tong-operative FAUSTO  5  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:  · Abx per ID  6  Hyponatremia:  · Resolved  Na is normal considering hyperglycemia  7  Metabolic acidosis:  · CO2 up to 25  · Continue sodium bicarbonate 650 mg BID  8  Anemia:   · Hgb stable  9  Elevated PSA:  · PSA 27 2  · Needs urology follow up as an outpatient  · I stressed the importance of this to the patient  DISPOSITION:  · Stable renal function  · For OR tomorrow  · If renal function is stable in the next 24 hours, may start Lisinopril at 10 mg daily  SUBJECTIVE / 24H INTERVAL HISTORY:  Denies CP or SOB  No new events       OBJECTIVE:  Current Weight: Weight - Scale: 95 3 kg (210 lb)  Vitals:    02/24/21 1531 02/24/21 2229 02/25/21 0300 02/25/21 0818   BP: 149/67 115/54 134/60 148/74   BP Location: Left arm Right arm Right arm Left arm   Pulse: 73 70 72 72   Resp: 18 18 Temp: (!) 97 4 °F (36 3 °C) (!) 97 4 °F (36 3 °C) (!) 97 4 °F (36 3 °C) 98 2 °F (36 8 °C)   TempSrc: Oral Oral Oral Oral   SpO2: (!) 87% 90% 92% 90%   Weight:       Height:           Intake/Output Summary (Last 24 hours) at 2/25/2021 1059  Last data filed at 2/24/2021 2229  Gross per 24 hour   Intake --   Output 600 ml   Net -600 ml     General: conscious, cooperative, no distress  Skin: dry  Eyes: pink conjunctivae  ENT: moist mucous membranes  Chest/Lungs: equal chest expansion, clear breath sounds  CVS: distinct heart sounds, normal rate, regular rhythm, no rub  Abdomen: soft, non tender, non distended, normal bowel sounds  Extremities: no edema  (+) dressing in foot  : no merrill catheter  Neuro: awake, alert     Psych: appropriate affect    Medications:    Current Facility-Administered Medications:     acetaminophen (TYLENOL) tablet 975 mg, 975 mg, Oral, Q8H [facility], Daquan Dixon, DPM, 975 mg at 02/25/21 0544    amLODIPine (NORVASC) tablet 10 mg, 10 mg, Oral, Daily, Joanna Walsh MD, 10 mg at 02/25/21 0841    ampicillin (OMNIPEN) 2,000 mg in sodium chloride 0 9 % 100 mL IVPB, 2,000 mg, Intravenous, Q6H, Beverly Dickinson DO, Last Rate: 200 mL/hr at 02/25/21 0851, 2,000 mg at 02/25/21 0851    atenolol (TENORMIN) tablet 25 mg, 25 mg, Oral, Daily, Daquan Locus, DPM, 25 mg at 02/25/21 0840    atorvastatin (LIPITOR) tablet 40 mg, 40 mg, Oral, Daily With Clarissa Puff, DPM, 40 mg at 02/24/21 1622    calcium carbonate (TUMS) chewable tablet 500 mg, 500 mg, Oral, BID PRN, Bibi Sinclair MD, 500 mg at 02/25/21 0609    citalopram (CeleXA) tablet 20 mg, 20 mg, Oral, Daily, Daquan Dixon, DPM, 20 mg at 02/25/21 0841    clopidogrel (PLAVIX) tablet 75 mg, 75 mg, Oral, Daily, Alannah Hayes MD, 75 mg at 02/25/21 0841    enoxaparin (LOVENOX) subcutaneous injection 40 mg, 40 mg, Subcutaneous, Daily, Daquan Dixon, DPM, 40 mg at 02/25/21 0840    famotidine (PEPCID) tablet 20 mg, 20 mg, Oral, BID, Bibi Sinclair MD, 20 mg at 02/25/21 0841    HYDROmorphone (DILAUDID) injection 0 2 mg, 0 2 mg, Intravenous, Q4H PRN, Ermalinda Martyr, DPM, 0 2 mg at 02/24/21 0719    insulin glargine (LANTUS) subcutaneous injection 30 Units 0 3 mL, 30 Units, Subcutaneous, HS, Jose Lawrence MD    insulin lispro (HumaLOG) 100 units/mL subcutaneous injection 1-5 Units, 1-5 Units, Subcutaneous, HS, Ermalinda Martyr, DPM, 1 Units at 02/24/21 2150    insulin lispro (HumaLOG) 100 units/mL subcutaneous injection 1-6 Units, 1-6 Units, Subcutaneous, TID AC, 2 Units at 02/25/21 0850 **AND** Fingerstick Glucose (POCT), , , TID AC, Ermalinda Martyr, DPM    insulin lispro (HumaLOG) 100 units/mL subcutaneous injection 10 Units, 10 Units, Subcutaneous, TID With Meals, Jose Lawrence MD, 10 Units at 02/25/21 0850    levothyroxine tablet 175 mcg, 175 mcg, Oral, Early Morning, Ermalinda Martyr, DPM, 175 mcg at 02/25/21 0545    metroNIDAZOLE (FLAGYL) tablet 500 mg, 500 mg, Oral, Q8H [address], Beverly Aldea, DO, 500 mg at 02/25/21 0545    oxyCODONE (ROXICODONE) IR tablet 2 5 mg, 2 5 mg, Oral, Q4H PRN, Ermalinda Martyr, DPM    oxyCODONE (ROXICODONE) IR tablet 5 mg, 5 mg, Oral, Q4H PRN, Ermalinda Martyr, DPM, 5 mg at 02/25/21 0856    polyethylene glycol (MIRALAX) packet 17 g, 17 g, Oral, Daily PRN, Ermalinda Martyr, DPM    sodium bicarbonate tablet 650 mg, 650 mg, Oral, BID after meals, Carmen Metzger MD, 650 mg at 02/25/21 0841    tiotropium (SPIRIVA) capsule for inhaler 18 mcg, 18 mcg, Inhalation, Daily, Ermalinda Martyr, DPM, 18 mcg at 02/25/21 0841    traZODone (DESYREL) tablet 50 mg, 50 mg, Oral, HS, Ermalinda Martyr, DPM, 50 mg at 02/24/21 9339    Laboratory Results:  Results from last 7 days   Lab Units 02/25/21  0945 02/24/21  0449 02/23/21  0530 02/22/21  0435 02/21/21  0456 02/20/21  0438 02/19/21  1547   WBC Thousand/uL  --   --  16 51* 20 12* 20 02* 23 58* 24 24*   HEMOGLOBIN g/dL  --   --  10 3* 10 5* 11 4* 11 4* 11 9*   HEMATOCRIT %  --   --  32 5* 32 4* 34 4* 33 7* 35 7*   PLATELETS Thousands/uL  -- --  274 315 322 330 365   POTASSIUM mmol/L 3 5 4 9 4 2 3 8 3 8 4 5 5 1   CHLORIDE mmol/L 104 102 102 105 104 99* 95*   CO2 mmol/L 25 22 19* 21 19* 18* 22   BUN mg/dL 18 21 19 15 21 31* 30*   CREATININE mg/dL 1 13 1 19 1 27 1 23 1 32* 1 70* 1 93*   CALCIUM mg/dL 7 7* 7 8* 7 9* 7 9* 8 1* 8 5 9 1

## 2021-02-25 NOTE — PROGRESS NOTES
Progress Note - Infectious Disease   Rashard Best 78 y o  male MRN: [id number]  Unit/Bed#: S -01 Encounter: [id number]      Impression/Plan:  1  Acute clinical osteomyelitis right 2nd toe with gangrene   Patient reported having a fracture after tripping on a wire approximately 3 weeks ago and now on exam has exposed bone with foul-smelling gangrene of the right 2nd toe   Clinically consistent with osteomyelitis   Patient also has tracking cellulitis   Patient underwent right 2nd toe amputation and extensive I &D of plantar abscess found to have a tracked into the midfoot   OR gs with 1+ Gram-positive cocci in pairs, cx 2+ Enterococcus faecalis, anaerobic cx negative  Patient is status post successful recannulization and stenting the right SFA  Continue Ampicillin IV perioperatively  Discontinue Flagyl  Continue to trend fever curve/vitals  Repeat CBC/chemistry tomorrow  Ongoing follow-up by Podiatry  Plan is for washout and partial closure on Friday, 2/26/2021  Follow-up OR findings  Anticipate transition to oral antibiotics postop if presumed surgical cure of osteomyelitis      2  Right 2nd toe cellulitis and fracture   Patient's exam is consistent with cellulitis likely related to recent fracture and then progressing bone infection   Patient underwent right 2nd toe amputation and extensive I &D of plantar abscess found to have a tracked into the midfoot   OR gs with 1+ Gram-positive cocci in pairs, cx 2+ Enterococcus faecalis, anaerobic cx pending  Antibiotics as above  Continue to trend fever curve/WBC  Monitor site clinically  Ongoing follow-up by Podiatry  Ongoing follow-up vascular surgery     3  Leukocytosis   Patient noted with significantly elevated white count on admission which is likely due to the above   Slowly down trending    Continue antibiotics as above  Monitor CBC     4  Chronic kidney disease   Patient appears to have some degree of chronic kidney disease   Creatinine trended down to 1 13  Continue high dose ampicillin  Will further dose adjust antibiotics as needed  Repeat chemistry tomorrow  Fluid hydration as per primary     5  Type 2 diabetes with neuropathy   Patient has known type 2 diabetes as well as reported neuropathy   Suspect poor control given the above complications   Overall increases risk of infections and poor wound healing  Glucose management as per primary  Continue antibiotics as above  Patient will require chronic follow-up with Podiatry     6  Tobacco use   Unfortunately increases risk of poor wound healing as well as vascular disease        Above plan discussed in detail with the patient and PCT at bedside      Antibiotics:  Ampicillin D2  POD #4     Subjective:  Patient is s/p IR s/p successful recannulization and stenting the right SFA   He reports less pain in his right foot  He is scheduled for OR washout and partial closure tomorrow    ROS: Patient has no fever, chills, sweats overnight; no nausea, vomiting, diarrhea; no cough, shortness of breath  Objective:  Vitals:  Temp:  [97 4 °F (36 3 °C)-98 2 °F (36 8 °C)] 98 2 °F (36 8 °C)  HR:  [70-73] 72  Resp:  [18] 18  BP: (115-149)/(54-74) 148/74  SpO2:  [87 %-92 %] 90 %  Temp (24hrs), Av 6 °F (36 4 °C), Min:97 4 °F (36 3 °C), Max:98 2 °F (36 8 °C)  Current: Temperature: 98 2 °F (36 8 °C)    General Appearance:  Awake, alert, cooperative, resting in bed, no acute distress  Throat: Oropharynx moist without lesions  Lungs:   Clear to auscultation bilaterally; no wheezes, rhonchi or rales; respirations unlabored   Heart:  RRR; S1-S2 heard, no murmur   Abdomen:   Soft, non-tender, non-distended, positive bowel sounds  Extremities: Right foot gauze wrap with wound VAC intact    No leaking or erythema outside of gauze wrap, arm IV site nontender   Skin: No rashes      Labs, Imaging, & Other studies:   All pertinent labs and imaging studies were personally reviewed  Results from last 7 days   Lab Units 02/23/21  0530 02/22/21  0435 02/21/21  0456   WBC Thousand/uL 16 51* 20 12* 20 02*   HEMOGLOBIN g/dL 10 3* 10 5* 11 4*   PLATELETS Thousands/uL 274 315 322     Results from last 7 days   Lab Units 02/25/21  0945  02/20/21  0438 02/19/21  1547   POTASSIUM mmol/L 3 5   < > 4 5 5 1   CHLORIDE mmol/L 104   < > 99* 95*   CO2 mmol/L 25   < > 18* 22   BUN mg/dL 18   < > 31* 30*   CREATININE mg/dL 1 13   < > 1 70* 1 93*   EGFR ml/min/1 73sq m 66   < > 41 35   CALCIUM mg/dL 7 7*   < > 8 5 9 1   AST U/L  --   --  9 10   ALT U/L  --   --  13 18   ALK PHOS U/L  --   --  105 119*    < > = values in this interval not displayed  Results from last 7 days   Lab Units 02/20/21  0438 02/19/21  1547   PROCALCITONIN ng/ml 0 13 0 10     Results from last 7 days   Lab Units 02/21/21  0820 02/19/21  1555 02/19/21  1540   BLOOD CULTURE   --  No Growth After 5 Days  No Growth After 5 Days     GRAM STAIN RESULT  No Polys*  1+ Gram positive cocci in pairs*  --   --    WOUND CULTURE  2+ Growth of Enterococcus faecalis*  Few Colonies of   --   --

## 2021-02-25 NOTE — PROGRESS NOTES
Progress Note - Gucci Gagnon 1950, 78 y o  male MRN: [id number]    Unit/Bed#: S -01 Encounter: [id number]    Primary Care Provider: Subha Rodriguez   Date and time admitted to hospital: 2/19/2021  2:09 PM        * Osteomyelitis, right 2nd toe with cellulitis  Assessment & Plan  · Necrosis of the right 2nd digit (distal phalanx), proximal phalanx erythema toes, warmth, tender  Inflammatory signs present of the plantar surface as well  · X-ray of the right foot showed no evidence of osteomyelitis, 2nd PIP joint dislocation    Plan:  · Status post incision and drainage right foot and open 2nd ray amputation, 2/21  · PTA of R SFA performed with placement of RACHELLE yesterday  · Patient for OR tomorrow  · Change in antibiotics for Enterococcus per ID noted and appreciated      Peripheral arterial disease ([facility] [address] )  Assessment & Plan  · LEADS:  Revealed severe bilateral lower extremity peripheral arterial disease  Low DULCE  · Good results from angiogram    Stage 3 chronic kidney disease  Assessment & Plan  Lab Results   Component Value Date    EGFR 62 02/24/2021    EGFR 58 02/23/2021    EGFR 60 02/22/2021    CREATININE 1 19 02/24/2021    CREATININE 1 27 02/23/2021    CREATININE 1 23 02/22/2021   · Per notes, patient has chronic kidney disease stage III    · Renal follow-up appreciated  · Check BMP today      Hypoxemia  Assessment & Plan  · Intermittent episodes of hypoxia  · Suspected acute pulmonary insufficiency secondary to atelectasis  · Check will chest x-ray for atelectasis  · Incentive spirometer    Type 2 diabetes mellitus, with long-term current use of insulin [facility])  Assessment & Plan  Lab Results   Component Value Date    HGBA1C 9 3 (H) 02/21/2021       Recent Labs     02/24/21  1112 02/24/21  1610 02/24/21  2106 02/25/21  0823   POCGLU 312* 295* 200* 215*       Blood Sugar Average: Last 72 hrs:  (P) 206 [id number]   · Patient admitted with blood sugars of 415 mg, beta hydroxy butyrate increased, normal pH, normal bicarb and anion gap on BMP  · History of type 2 diabetes in the past 25 years, follow-up with endocrinologist as outpatient  On 30 NPH a m , 60 units NPH at night, Novolin R 30 units a m  As home medication  · Last hemoglobin A1c 8 0 prior to this admission  Plan:  · Poorly-controlled diabetes  · Lantus and mealtime insulin increased yesterday  · Sugars improved will increase Lantus to 30 units      VTE Pharmacologic Prophylaxis:   Pharmacologic: Enoxaparin (Lovenox)  Mechanical VTE Prophylaxis in Place:  No  Patient Centered Rounds: I have performed bedside rounds with nursing staff today  Education and Discussions with Family / Patient:  Offered to call patient's brother patient refused    Time Spent for Care: 30 minutes  More than 50% of total time spent on counseling and coordination of care as described above  Current Length of Stay: 6 day(s)    Current Patient Status: Inpatient   Certification Statement: The patient will continue to require additional inpatient hospital stay due to Need for continue surgical intervention    Discharge Plan:  Patient for OR tomorrow will most likely be here through weekend    Code Status: Level 1 - Full Code      Subjective:   Patient comfortable no acute distress lying flat in bed    Objective:     Vitals:   Temp (24hrs), Av 7 °F (36 5 °C), Min:97 4 °F (36 3 °C), Max:98 2 °F (36 8 °C)    Temp:  [97 4 °F (36 3 °C)-98 2 °F (36 8 °C)] 98 2 °F (36 8 °C)  HR:  [70-73] 72  Resp:  [18-20] 18  BP: (115-166)/(54-77) 148/74  SpO2:  [87 %-97 %] 90 %  Body mass index is 30 13 kg/m²  Input and Output Summary (last 24 hours): Intake/Output Summary (Last 24 hours) at 2021 0831  Last data filed at 2021 2229  Gross per 24 hour   Intake --   Output 800 ml   Net -800 ml       Physical Exam:     Physical Exam  Constitutional:       General: He is not in acute distress  Appearance: He is not diaphoretic  Cardiovascular:      Rate and Rhythm: Normal rate and regular rhythm  Pulses: Normal pulses  Heart sounds: No murmur  No gallop  Pulmonary:      Effort: Pulmonary effort is normal  No respiratory distress  Breath sounds: Normal breath sounds  No wheezing, rhonchi or rales  Abdominal:      General: There is no distension  Palpations: Abdomen is soft  Tenderness: There is no abdominal tenderness  There is no guarding or rebound  Neurological:      Mental Status: He is alert  Additional Data:     Labs:    Results from last 7 days   Lab Units 02/23/21  0530 02/22/21  0435   WBC Thousand/uL 16 51* 20 12*   HEMOGLOBIN g/dL 10 3* 10 5*   HEMATOCRIT % 32 5* 32 4*   PLATELETS Thousands/uL 274 315   NEUTROS PCT %  --  76*   LYMPHS PCT %  --  10*   MONOS PCT %  --  11   EOS PCT %  --  1     Results from last 7 days   Lab Units 02/24/21  0449  02/20/21  0438   SODIUM mmol/L 135*   < > 130*   POTASSIUM mmol/L 4 9   < > 4 5   CHLORIDE mmol/L 102   < > 99*   CO2 mmol/L 22   < > 18*   BUN mg/dL 21   < > 31*   CREATININE mg/dL 1 19   < > 1 70*   ANION GAP mmol/L 11   < > 13   CALCIUM mg/dL 7 8*   < > 8 5   ALBUMIN g/dL  --   --  2 4*   TOTAL BILIRUBIN mg/dL  --   --  0 28   ALK PHOS U/L  --   --  105   ALT U/L  --   --  13   AST U/L  --   --  9   GLUCOSE RANDOM mg/dL 237*   < > 390*    < > = values in this interval not displayed       Results from last 7 days   Lab Units 02/19/21  1547   INR  1 12     Results from last 7 days   Lab Units 02/25/21  0823 02/24/21  2106 02/24/21  1610 02/24/21  1112 02/24/21  0701 02/23/21  2044 02/23/21  1831 02/23/21  1127 02/23/21  0705 02/23/21  0607 02/22/21  2039 02/22/21  1600   POC GLUCOSE mg/dl 215* 200* 295* 312* 258* 205* 249* 193* 177* 188* 165* 171*     Results from last 7 days   Lab Units 02/21/21  0456   HEMOGLOBIN A1C % 9 3*     Results from last 7 days   Lab Units 02/20/21  0438 02/19/21  1547   LACTIC ACID mmol/L  --  1 4   PROCALCITONIN ng/ml 0  13 0 10           * I Have Reviewed All Lab Data Listed Above  * Additional Pertinent Lab Tests Reviewed: All Labs Within Last 24 Hours Reviewed    Imaging:    Imaging Reports Reviewed Today Include:   Imaging Personally Reviewed by Myself Includes:      Recent Cultures (last 7 days):     Results from last 7 days   Lab Units 02/21/21  0820 02/19/21  1555 02/19/21  1540   BLOOD CULTURE   --  No Growth After 5 Days  No Growth After 5 Days     GRAM STAIN RESULT  No Polys*  1+ Gram positive cocci in pairs*  --   --    WOUND CULTURE  2+ Growth of Enterococcus faecalis*  --   --        Last 24 Hours Medication List:   Current Facility-Administered Medications   Medication Dose Route Frequency Provider Last Rate    acetaminophen  975 mg Oral [facility] Delcia Kati, DPM      amLODIPine  10 mg Oral Daily Kary Mcfadden MD      ampicillin  2,000 mg Intravenous Q6H Beverly Dickinson DO 2,000 mg (02/25/21 0335)    atenolol  25 mg Oral Daily Delcia Kati, DPM      atorvastatin  40 mg Oral Daily With Arlene Holiday, DPM      calcium carbonate  500 mg Oral BID PRN Bibi Sinclair MD      citalopram  20 mg Oral Daily Delcia Croon, DPM      clopidogrel  75 mg Oral Daily Jasiel Ferrara MD      enoxaparin  40 mg Subcutaneous Daily Delcia Croon, DPM      famotidine  20 mg Oral BID Bibi Sinclair MD      HYDROmorphone  0 2 mg Intravenous Q4H PRN Delcia Krunalon, DPM      insulin glargine  30 Units Subcutaneous HS Sarah Parikh MD      insulin lispro  1-5 Units Subcutaneous HS Delcia Krunalon, DPM      insulin lispro  1-6 Units Subcutaneous TID AC Delcia Krunalon, DPM      insulin lispro  10 Units Subcutaneous TID With Meals Sarah Parikh MD      levothyroxine  175 mcg Oral Early Morning Delcia Croon, DPM      metroNIDAZOLE  500 mg Oral [facility] Beverly Dale DO      oxyCODONE  2 5 mg Oral Q4H PRN Delcia Croon, DPM      oxyCODONE  5 mg Oral Q4H PRN Delcia Croon, DPM      polyethylene glycol  17 g Oral Daily PRN Delcia Krunalon, DPM     Laly Splinter sodium bicarbonate  650 mg Oral BID after meals Vicie Krabbe, MD      tiotropium  18 mcg Inhalation Daily Isa Benitez DPM      traZODone  50 mg Oral HS Isa Benitez DPM          Today, Patient Was Seen By: Zully Vasquez MD    ** Please Note: Dictation voice to text software may have been used in the creation of this document   **

## 2021-02-25 NOTE — ASSESSMENT & PLAN NOTE
Lab Results   Component Value Date    HGBA1C 9 3 (H) 02/21/2021       Recent Labs     02/24/21  1112 02/24/21  1610 02/24/21  2106 02/25/21  0823   POCGLU 312* 295* 200* 215*       Blood Sugar Average: Last 72 hrs:  (P) 523 [id number]   · Patient admitted with blood sugars of 415 mg, beta hydroxy butyrate increased, normal pH, normal bicarb and anion gap on BMP  · History of type 2 diabetes in the past 25 years, follow-up with endocrinologist as outpatient  On 30 NPH a m , 60 units NPH at night, Novolin R 30 units a m  As home medication  · Last hemoglobin A1c 8 0 prior to this admission      Plan:  · Poorly-controlled diabetes  · Lantus and mealtime insulin increased yesterday  · Sugars improved will increase Lantus to 30 units

## 2021-02-25 NOTE — ASSESSMENT & PLAN NOTE
· LEADS:  Revealed severe bilateral lower extremity peripheral arterial disease  Low DULCE    · Good results from angiogram

## 2021-02-25 NOTE — ASSESSMENT & PLAN NOTE
Lab Results   Component Value Date    EGFR 62 02/24/2021    EGFR 58 02/23/2021    EGFR 60 02/22/2021    CREATININE 1 19 02/24/2021    CREATININE 1 27 02/23/2021    CREATININE 1 23 02/22/2021   · Per notes, patient has chronic kidney disease stage III    · Renal follow-up appreciated  · Check BMP today

## 2021-02-25 NOTE — ASSESSMENT & PLAN NOTE
· Necrosis of the right 2nd digit (distal phalanx), proximal phalanx erythema toes, warmth, tender  Inflammatory signs present of the plantar surface as well  · X-ray of the right foot showed no evidence of osteomyelitis, 2nd PIP joint dislocation    Plan:  · Status post incision and drainage right foot and open 2nd ray amputation, 2/21    · PTA of R SFA performed with placement of RACHELLE yesterday  · Patient for OR tomorrow  · Change in antibiotics for Enterococcus per ID noted and appreciated

## 2021-02-26 ENCOUNTER — ANESTHESIA (INPATIENT)
Dept: PERIOP | Facility: HOSPITAL | Age: 71
DRG: 240 | End: 2021-02-26
Payer: COMMERCIAL

## 2021-02-26 ENCOUNTER — APPOINTMENT (INPATIENT)
Dept: RADIOLOGY | Facility: HOSPITAL | Age: 71
DRG: 240 | End: 2021-02-26
Payer: COMMERCIAL

## 2021-02-26 ENCOUNTER — ANESTHESIA EVENT (INPATIENT)
Dept: PERIOP | Facility: HOSPITAL | Age: 71
DRG: 240 | End: 2021-02-26
Payer: COMMERCIAL

## 2021-02-26 ENCOUNTER — TELEPHONE (OUTPATIENT)
Dept: VASCULAR SURGERY | Facility: CLINIC | Age: 71
End: 2021-02-26

## 2021-02-26 LAB
ANION GAP SERPL CALCULATED.3IONS-SCNC: 12 MMOL/L (ref 4–13)
BUN SERPL-MCNC: 12 MG/DL (ref 5–25)
CALCIUM SERPL-MCNC: 7.8 MG/DL (ref 8.3–10.1)
CHLORIDE SERPL-SCNC: 103 MMOL/L (ref 100–108)
CO2 SERPL-SCNC: 23 MMOL/L (ref 21–32)
CREAT SERPL-MCNC: 1.1 MG/DL (ref 0.6–1.3)
ERYTHROCYTE [DISTWIDTH] IN BLOOD BY AUTOMATED COUNT: 12.7 % (ref 11.6–15.1)
GFR SERPL CREATININE-BSD FRML MDRD: 69 ML/MIN/1.73SQ M
GLUCOSE SERPL-MCNC: 133 MG/DL (ref 65–140)
GLUCOSE SERPL-MCNC: 164 MG/DL (ref 65–140)
GLUCOSE SERPL-MCNC: 170 MG/DL (ref 65–140)
GLUCOSE SERPL-MCNC: 192 MG/DL (ref 65–140)
GLUCOSE SERPL-MCNC: 265 MG/DL (ref 65–140)
HCT VFR BLD AUTO: 31.8 % (ref 36.5–49.3)
HGB BLD-MCNC: 10.5 G/DL (ref 12–17)
MCH RBC QN AUTO: 28.8 PG (ref 26.8–34.3)
MCHC RBC AUTO-ENTMCNC: 33 G/DL (ref 31.4–37.4)
MCV RBC AUTO: 87 FL (ref 82–98)
PLATELET # BLD AUTO: 387 THOUSANDS/UL (ref 149–390)
PMV BLD AUTO: 10.8 FL (ref 8.9–12.7)
POTASSIUM SERPL-SCNC: 3.5 MMOL/L (ref 3.5–5.3)
PROCALCITONIN SERPL-MCNC: 0.21 NG/ML
PROCALCITONIN SERPL-MCNC: 0.28 NG/ML
RBC # BLD AUTO: 3.65 MILLION/UL (ref 3.88–5.62)
SODIUM SERPL-SCNC: 138 MMOL/L (ref 136–145)
WBC # BLD AUTO: 15.81 THOUSAND/UL (ref 4.31–10.16)

## 2021-02-26 PROCEDURE — 13160 SEC CLSR SURG WND/DEHSN XTN: CPT | Performed by: PODIATRIST

## 2021-02-26 PROCEDURE — 73630 X-RAY EXAM OF FOOT: CPT

## 2021-02-26 PROCEDURE — NC001 PR NO CHARGE: Performed by: PODIATRIST

## 2021-02-26 PROCEDURE — 85027 COMPLETE CBC AUTOMATED: CPT | Performed by: INTERNAL MEDICINE

## 2021-02-26 PROCEDURE — 84145 PROCALCITONIN (PCT): CPT | Performed by: INTERNAL MEDICINE

## 2021-02-26 PROCEDURE — 82948 REAGENT STRIP/BLOOD GLUCOSE: CPT

## 2021-02-26 PROCEDURE — 80048 BASIC METABOLIC PNL TOTAL CA: CPT | Performed by: INTERNAL MEDICINE

## 2021-02-26 PROCEDURE — 0QBN0ZZ EXCISION OF RIGHT METATARSAL, OPEN APPROACH: ICD-10-PCS | Performed by: PODIATRIST

## 2021-02-26 PROCEDURE — 99232 SBSQ HOSP IP/OBS MODERATE 35: CPT | Performed by: INTERNAL MEDICINE

## 2021-02-26 PROCEDURE — 28122 PARTIAL REMOVAL OF FOOT BONE: CPT | Performed by: PODIATRIST

## 2021-02-26 RX ORDER — MAGNESIUM HYDROXIDE 1200 MG/15ML
LIQUID ORAL AS NEEDED
Status: DISCONTINUED | OUTPATIENT
Start: 2021-02-26 | End: 2021-02-26 | Stop reason: HOSPADM

## 2021-02-26 RX ORDER — FENTANYL CITRATE/PF 50 MCG/ML
25 SYRINGE (ML) INJECTION
Status: DISCONTINUED | OUTPATIENT
Start: 2021-02-26 | End: 2021-02-26 | Stop reason: HOSPADM

## 2021-02-26 RX ORDER — FENTANYL CITRATE 50 UG/ML
INJECTION, SOLUTION INTRAMUSCULAR; INTRAVENOUS AS NEEDED
Status: DISCONTINUED | OUTPATIENT
Start: 2021-02-26 | End: 2021-02-26

## 2021-02-26 RX ORDER — SODIUM CHLORIDE, SODIUM GLUCONATE, SODIUM ACETATE, POTASSIUM CHLORIDE, MAGNESIUM CHLORIDE, SODIUM PHOSPHATE, DIBASIC, AND POTASSIUM PHOSPHATE .53; .5; .37; .037; .03; .012; .00082 G/100ML; G/100ML; G/100ML; G/100ML; G/100ML; G/100ML; G/100ML
60 INJECTION, SOLUTION INTRAVENOUS ONCE
Status: COMPLETED | OUTPATIENT
Start: 2021-02-26 | End: 2021-02-26

## 2021-02-26 RX ORDER — MIDAZOLAM HYDROCHLORIDE 2 MG/2ML
INJECTION, SOLUTION INTRAMUSCULAR; INTRAVENOUS AS NEEDED
Status: DISCONTINUED | OUTPATIENT
Start: 2021-02-26 | End: 2021-02-26

## 2021-02-26 RX ORDER — ONDANSETRON 2 MG/ML
INJECTION INTRAMUSCULAR; INTRAVENOUS AS NEEDED
Status: DISCONTINUED | OUTPATIENT
Start: 2021-02-26 | End: 2021-02-26

## 2021-02-26 RX ORDER — LIDOCAINE HYDROCHLORIDE 10 MG/ML
INJECTION, SOLUTION EPIDURAL; INFILTRATION; INTRACAUDAL; PERINEURAL AS NEEDED
Status: DISCONTINUED | OUTPATIENT
Start: 2021-02-26 | End: 2021-02-26

## 2021-02-26 RX ORDER — KETAMINE HCL IN NACL, ISO-OSM 100MG/10ML
SYRINGE (ML) INJECTION AS NEEDED
Status: DISCONTINUED | OUTPATIENT
Start: 2021-02-26 | End: 2021-02-26

## 2021-02-26 RX ORDER — PROPOFOL 10 MG/ML
INJECTION, EMULSION INTRAVENOUS AS NEEDED
Status: DISCONTINUED | OUTPATIENT
Start: 2021-02-26 | End: 2021-02-26

## 2021-02-26 RX ORDER — GLYCOPYRROLATE 0.2 MG/ML
INJECTION INTRAMUSCULAR; INTRAVENOUS AS NEEDED
Status: DISCONTINUED | OUTPATIENT
Start: 2021-02-26 | End: 2021-02-26

## 2021-02-26 RX ORDER — BUPIVACAINE HYDROCHLORIDE 2.5 MG/ML
INJECTION, SOLUTION EPIDURAL; INFILTRATION; INTRACAUDAL AS NEEDED
Status: DISCONTINUED | OUTPATIENT
Start: 2021-02-26 | End: 2021-02-26 | Stop reason: HOSPADM

## 2021-02-26 RX ORDER — SODIUM CHLORIDE, SODIUM LACTATE, POTASSIUM CHLORIDE, CALCIUM CHLORIDE 600; 310; 30; 20 MG/100ML; MG/100ML; MG/100ML; MG/100ML
INJECTION, SOLUTION INTRAVENOUS CONTINUOUS PRN
Status: DISCONTINUED | OUTPATIENT
Start: 2021-02-26 | End: 2021-02-26

## 2021-02-26 RX ORDER — DEXAMETHASONE SODIUM PHOSPHATE 10 MG/ML
INJECTION, SOLUTION INTRAMUSCULAR; INTRAVENOUS AS NEEDED
Status: DISCONTINUED | OUTPATIENT
Start: 2021-02-26 | End: 2021-02-26

## 2021-02-26 RX ORDER — LIDOCAINE HYDROCHLORIDE 10 MG/ML
INJECTION, SOLUTION EPIDURAL; INFILTRATION; INTRACAUDAL; PERINEURAL AS NEEDED
Status: DISCONTINUED | OUTPATIENT
Start: 2021-02-26 | End: 2021-02-26 | Stop reason: HOSPADM

## 2021-02-26 RX ORDER — ONDANSETRON 2 MG/ML
4 INJECTION INTRAMUSCULAR; INTRAVENOUS ONCE AS NEEDED
Status: DISCONTINUED | OUTPATIENT
Start: 2021-02-26 | End: 2021-02-26 | Stop reason: HOSPADM

## 2021-02-26 RX ORDER — HYDROMORPHONE HCL/PF 1 MG/ML
0.5 SYRINGE (ML) INJECTION
Status: DISCONTINUED | OUTPATIENT
Start: 2021-02-26 | End: 2021-02-26 | Stop reason: HOSPADM

## 2021-02-26 RX ADMIN — OXYCODONE HYDROCHLORIDE 5 MG: 5 TABLET ORAL at 08:27

## 2021-02-26 RX ADMIN — SODIUM CHLORIDE, SODIUM LACTATE, POTASSIUM CHLORIDE, AND CALCIUM CHLORIDE: .6; .31; .03; .02 INJECTION, SOLUTION INTRAVENOUS at 18:50

## 2021-02-26 RX ADMIN — INSULIN GLARGINE 15 UNITS: 100 INJECTION, SOLUTION SUBCUTANEOUS at 22:17

## 2021-02-26 RX ADMIN — MIDAZOLAM HYDROCHLORIDE 2 MG: 1 INJECTION, SOLUTION INTRAMUSCULAR; INTRAVENOUS at 18:47

## 2021-02-26 RX ADMIN — AMLODIPINE BESYLATE 10 MG: 10 TABLET ORAL at 08:28

## 2021-02-26 RX ADMIN — OXYCODONE HYDROCHLORIDE 2.5 MG: 5 TABLET ORAL at 22:17

## 2021-02-26 RX ADMIN — Medication 30 MG: at 18:55

## 2021-02-26 RX ADMIN — ATENOLOL 25 MG: 25 TABLET ORAL at 08:28

## 2021-02-26 RX ADMIN — AMPICILLIN SODIUM 2000 MG: 2 INJECTION, POWDER, FOR SOLUTION INTRAMUSCULAR; INTRAVENOUS at 03:25

## 2021-02-26 RX ADMIN — AMPICILLIN SODIUM 2000 MG: 2 INJECTION, POWDER, FOR SOLUTION INTRAMUSCULAR; INTRAVENOUS at 08:34

## 2021-02-26 RX ADMIN — DEXAMETHASONE SODIUM PHOSPHATE 4 MG: 10 INJECTION, SOLUTION INTRAMUSCULAR; INTRAVENOUS at 19:00

## 2021-02-26 RX ADMIN — FENTANYL CITRATE 25 MCG: 50 INJECTION, SOLUTION INTRAMUSCULAR; INTRAVENOUS at 18:55

## 2021-02-26 RX ADMIN — INSULIN LISPRO 2 UNITS: 100 INJECTION, SOLUTION INTRAVENOUS; SUBCUTANEOUS at 22:18

## 2021-02-26 RX ADMIN — FENTANYL CITRATE 25 MCG: 50 INJECTION, SOLUTION INTRAMUSCULAR; INTRAVENOUS at 18:57

## 2021-02-26 RX ADMIN — Medication 20 MG: at 18:57

## 2021-02-26 RX ADMIN — LIDOCAINE HYDROCHLORIDE 100 MG: 10 INJECTION, SOLUTION EPIDURAL; INFILTRATION; INTRACAUDAL at 18:55

## 2021-02-26 RX ADMIN — SODIUM CHLORIDE, SODIUM GLUCONATE, SODIUM ACETATE, POTASSIUM CHLORIDE, MAGNESIUM CHLORIDE, SODIUM PHOSPHATE, DIBASIC, AND POTASSIUM PHOSPHATE 60 ML/HR: .53; .5; .37; .037; .03; .012; .00082 INJECTION, SOLUTION INTRAVENOUS at 12:27

## 2021-02-26 RX ADMIN — PROPOFOL 150 MG: 10 INJECTION, EMULSION INTRAVENOUS at 18:56

## 2021-02-26 RX ADMIN — SODIUM BICARBONATE 650 MG: 650 TABLET ORAL at 08:27

## 2021-02-26 RX ADMIN — TIOTROPIUM BROMIDE 18 MCG: 18 CAPSULE ORAL; RESPIRATORY (INHALATION) at 08:31

## 2021-02-26 RX ADMIN — TRAZODONE HYDROCHLORIDE 50 MG: 50 TABLET ORAL at 22:04

## 2021-02-26 RX ADMIN — CLOPIDOGREL BISULFATE 75 MG: 75 TABLET ORAL at 08:27

## 2021-02-26 RX ADMIN — PROPOFOL 50 MG: 10 INJECTION, EMULSION INTRAVENOUS at 18:55

## 2021-02-26 RX ADMIN — CITALOPRAM HYDROBROMIDE 20 MG: 20 TABLET ORAL at 08:27

## 2021-02-26 RX ADMIN — AMPICILLIN SODIUM 2000 MG: 2 INJECTION, POWDER, FOR SOLUTION INTRAMUSCULAR; INTRAVENOUS at 15:11

## 2021-02-26 RX ADMIN — ONDANSETRON 4 MG: 2 INJECTION INTRAMUSCULAR; INTRAVENOUS at 19:00

## 2021-02-26 RX ADMIN — FENTANYL CITRATE 25 MCG: 50 INJECTION, SOLUTION INTRAMUSCULAR; INTRAVENOUS at 19:00

## 2021-02-26 RX ADMIN — FENTANYL CITRATE 25 MCG: 50 INJECTION, SOLUTION INTRAMUSCULAR; INTRAVENOUS at 19:09

## 2021-02-26 RX ADMIN — FAMOTIDINE 20 MG: 20 TABLET ORAL at 08:28

## 2021-02-26 RX ADMIN — GLYCOPYRROLATE 0.1 MG: 0.2 INJECTION, SOLUTION INTRAMUSCULAR; INTRAVENOUS at 19:00

## 2021-02-26 NOTE — ASSESSMENT & PLAN NOTE
· Intermittent episodes of hypoxia  · Checks x-ray with questionable pneumonia  · Procalcitonin ordered but unhelpful in findings of acute known infection  · Will discuss with ID further antibiotics  · Four clinical standpoint patient does not seem to have pneumonia would not change antibiotics till ID evaluation

## 2021-02-26 NOTE — PROGRESS NOTES
Progress Note - Infectious Disease   Rosaura Section 78 y o  male MRN: [id number]  Unit/Bed#: S -01 Encounter: [id number]      Impression/Plan:  1  Acute clinical osteomyelitis right 2nd toe with gangrene   Patient reported having a fracture after tripping on a wire approximately 3 weeks ago and now on exam has exposed bone with foul-smelling gangrene of the right 2nd toe   Clinically consistent with osteomyelitis   Patient also has tracking cellulitis   Patient underwent right 2nd toe amputation and extensive I &D of plantar abscess found to have a tracked into the midfoot   OR gs with 1+ Gram-positive cocci in pairs, cx 2+ Enterococcus faecalis, anaerobic cx negative   Patient is status post successful recannulization and stenting the right SFA  Continue Ampicillin IV perioperatively  Continue to trend fever curve/vitals  Repeat CBC/chemistry tomorrow  Ongoing follow-up by Podiatry  Plan is for washout and partial closure today  Follow-up OR findings  Anticipate transition to oral antibiotics postop if presumed surgical cure of osteomyelitis      2  Right 2nd toe cellulitis and fracture   Patient's exam is consistent with cellulitis likely related to recent fracture and then progressing bone infection   Patient underwent right 2nd toe amputation and extensive I &D of plantar abscess found to have a tracked into the midfoot   OR gs with 1+ Gram-positive cocci in pairs, cx 2+ Enterococcus faecalis, anaerobic cx negative  Antibiotics as above  Continue to trend fever curve/WBC  Monitor site clinically  Ongoing follow-up by Podiatry  Ongoing follow-up vascular surgery     3  Leukocytosis   Patient noted with significantly elevated white count on admission which is likely due to the above   Slowly down trending    Continue antibiotics as above  Monitor CBC     4  Chronic kidney disease   Patient appears to have some degree of chronic kidney disease   Creatinine trended down to 1 13  Continue high dose ampicillin  Will further dose adjust antibiotics as needed  Repeat chemistry tomorrow  Fluid hydration as per primary     5  Type 2 diabetes with neuropathy   Patient has known type 2 diabetes as well as reported neuropathy   Suspect poor control given the above complications   Overall increases risk of infections and poor wound healing  Glucose management as per primary  Continue antibiotics as above  Patient will require chronic follow-up with Podiatry     6  Tobacco use   Unfortunately increases risk of poor wound healing as well as vascular disease        Above plan discussed in detail with the patient and PCT at bedside      7  Hypoxemia  O2 stats 87%  up to 90s on RA  21 CXR with right LLL patchy opacity  Procalcitonin level low  No clinical evidence of pneumonia  Monitor O2 status  No antibiotic change indicated for this    Antibiotics:  Ampicillin D3  POD #5     Subjective:  Patient is s/p IR s/p successful recannulization and stenting the right SFA   He reports less pain in his right foot  He is NPO and scheduled for OR washout and partial closure today  He denies SOB at rest, cough or any respiratory changes  He reports when he gets tangled in the vac cords sometimes getting to the bathroom, he gets winded  ROS: Patient has no fever, chills, sweats overnight; no nausea, vomiting, diarrhea; no cough, shortness of breath at present     Objective:  Vitals:  Temp:  [98 1 °F (36 7 °C)-98 9 °F (37 2 °C)] 98 1 °F (36 7 °C)  HR:  [65-73] 68  Resp:  [18-20] 18  BP: (117-175)/(55-77) 175/74  SpO2:  [91 %-94 %] 92 %  Temp (24hrs), Av 4 °F (36 9 °C), Min:98 1 °F (36 7 °C), Max:98 9 °F (37 2 °C)  Current: Temperature: 98 1 °F (36 7 °C)    General Appearance:  Awake, alert, cooperative, resting in bed in left lateral decub position, no acute distress  Throat: Oropharynx moist without lesions      Lungs:   Fairly clear to auscultation bilaterally; with faint rales at bases; respirations unlabored on RA, no active cough   Heart:  RRR; S1-S2 heard, no murmur   Abdomen:   Soft, non-tender, non-distended, positive bowel sounds  Extremities: Right foot gauze wrapped with wound VAC intact with scant output in canister, no erythema outside of gauze wrap, arm IV site nontender   Skin: No rashes      Labs, Imaging, & Other studies:   All pertinent labs and imaging studies were personally reviewed  Results from last 7 days   Lab Units 02/26/21 0552 02/25/21 1943 02/23/21  0530   WBC Thousand/uL 15 81* 16 27* 16 51*   HEMOGLOBIN g/dL 10 5* 10 6* 10 3*   PLATELETS Thousands/uL 387 380 274     Results from last 7 days   Lab Units 02/26/21  0552  02/20/21  0438 02/19/21  1547   POTASSIUM mmol/L 3 5   < > 4 5 5 1   CHLORIDE mmol/L 103   < > 99* 95*   CO2 mmol/L 23   < > 18* 22   BUN mg/dL 12   < > 31* 30*   CREATININE mg/dL 1 10   < > 1 70* 1 93*   EGFR ml/min/1 73sq m 69   < > 41 35   CALCIUM mg/dL 7 8*   < > 8 5 9 1   AST U/L  --   --  9 10   ALT U/L  --   --  13 18   ALK PHOS U/L  --   --  105 119*    < > = values in this interval not displayed  Results from last 7 days   Lab Units 02/26/21  0552 02/25/21 1943 02/20/21  0438 02/19/21  1547   PROCALCITONIN ng/ml 0 21 0 28* 0 13 0 10     Results from last 7 days   Lab Units 02/21/21  0820 02/19/21  1555 02/19/21  1540   BLOOD CULTURE   --  No Growth After 5 Days  No Growth After 5 Days     GRAM STAIN RESULT  No Polys*  1+ Gram positive cocci in pairs*  --   --    WOUND CULTURE  2+ Growth of Enterococcus faecalis*  Few Colonies of   --   --

## 2021-02-26 NOTE — ASSESSMENT & PLAN NOTE
Lab Results   Component Value Date    EGFR 69 02/26/2021    EGFR 66 02/25/2021    EGFR 62 02/24/2021    CREATININE 1 10 02/26/2021    CREATININE 1 13 02/25/2021    CREATININE 1 19 02/24/2021   · Per notes, patient has chronic kidney disease stage III    · Renal function stable

## 2021-02-26 NOTE — PROGRESS NOTES
[facility] PROGRESS NOTE   Alissa Dawson 78 y o  male MRN: [id number]  Unit/Bed#: S -01 Encounter: [id number]  Reason for Consult: CKD    ASSESSMENT and PLAN:  1  Acute kidney injury (POA):  · Admission creatinine 1 93 on February 19, 2021  · Etiology felt to be due to autoregulatory failure from NSAID and ACE inhibitor use  · Renal function improved with IVF and holding ACEi and NSAIDs  · UA unremarkable  No renal imaging done  · Renal function stable - SCr 1 10 today  2  Chronic kidney disease, stage III:   · Baseline creatinine is probably around 1 3-1 6 (while on ACEi)  · No prior nephrology follow-up  3  Peripheral arterial disease:    · S/p arteriogram done on February 23,2021 with angioplasty and stents placed  · No contrast nephropathy noted  4  Hypertension  · Outpatient regimen: Amlodipine 10 mg daily, atenolol 25 mg daily, lisinopril 20 mg daily  · BP remains above goal    · Continue Amlodipine and Atenolol  · Restart Lisinopril tomorrow at 10 mg daily if renal function stable  5  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:  · Abx per ID  6  Metabolic acidosis:  · CO2 stable  · Continue sodium bicarbonate 650 mg BID  7  Anemia:   · Hgb stable  8  Elevated PSA:  · PSA 27 2  · Needs urology follow up as an outpatient  · I stressed the importance of this to the patient  DISPOSITION:  · Stable renal function  · Place on IVF x 1 L since currently NPO  · Restart Lisinopril 10 mg daily tomorrow if BP remains high  SUBJECTIVE / 24H INTERVAL HISTORY:  Going to OR later today  Currently NPO  Denies CP or SOB       OBJECTIVE:  Current Weight: Weight - Scale: 95 3 kg (210 lb)  Vitals:    02/25/21 0818 02/25/21 1519 02/25/21 2204 02/26/21 0725   BP: 148/74 117/55 159/67 170/77   BP Location: Left arm Right arm Left arm Left arm   Pulse: 72 65 73 71   Resp:  18 18 20   Temp: 98 2 °F (36 8 °C)  98 3 °F (36 8 °C) 98 9 °F (37 2 °C)   TempSrc: Oral  Oral Oral SpO2: 90% 91% 92% 94%   Weight:       Height:           Intake/Output Summary (Last 24 hours) at 2/26/2021 1022  Last data filed at 2/26/2021 0846  Gross per 24 hour   Intake --   Output 1300 ml   Net -1300 ml     General: conscious, cooperative, no distress  Skin: dry  Eyes: pink conjunctivae  ENT: moist mucous membranes  Chest/Lungs: equal chest expansion, clear breath sounds  CVS: distinct heart sounds, normal rate, regular rhythm, no rub  Abdomen: soft, non tender, non distended, normal bowel sounds  Extremities: no edema  (+) dressing on R foot  : no merrill catheter  Neuro: awake, alert     Psych: appropriate affect    Medications:    Current Facility-Administered Medications:     acetaminophen (TYLENOL) tablet 975 mg, 975 mg, Oral, Q8H [address], Delcia Kati, DPM, 975 mg at 02/25/21 0544    amLODIPine (NORVASC) tablet 10 mg, 10 mg, Oral, Daily, Kary Mcfadden MD, 10 mg at 02/26/21 0828    ampicillin (OMNIPEN) 2,000 mg in sodium chloride 0 9 % 100 mL IVPB, 2,000 mg, Intravenous, Q6H, Beverly Dickinson DO, Last Rate: 200 mL/hr at 02/26/21 0834, 2,000 mg at 02/26/21 0834    atenolol (TENORMIN) tablet 25 mg, 25 mg, Oral, Daily, Deljuvenal Parikh, DPM, 25 mg at 02/26/21 7108    atorvastatin (LIPITOR) tablet 40 mg, 40 mg, Oral, Daily With Umair Mcfarlane DPM, 40 mg at 02/25/21 1748    calcium carbonate (TUMS) chewable tablet 500 mg, 500 mg, Oral, BID PRN, Bibi Sinclair MD, 500 mg at 02/25/21 0609    citalopram (CeleXA) tablet 20 mg, 20 mg, Oral, Daily, Samircibetsy Parikh, DPM, 20 mg at 02/26/21 0827    clopidogrel (PLAVIX) tablet 75 mg, 75 mg, Oral, Daily, Jasiel Ferrara MD, 75 mg at 02/26/21 0827    enoxaparin (LOVENOX) subcutaneous injection 40 mg, 40 mg, Subcutaneous, Daily, Renate Parikh DPM, 40 mg at 02/25/21 0840    famotidine (PEPCID) tablet 20 mg, 20 mg, Oral, BID, Bibi Sinclair MD, 20 mg at 02/26/21 0828    HYDROmorphone (DILAUDID) injection 0 2 mg, 0 2 mg, Intravenous, Q4H PRN, CARYL ElkinsM, 0 2 mg at 02/24/21 0719    insulin glargine (LANTUS) subcutaneous injection 30 Units 0 3 mL, 30 Units, Subcutaneous, HS, Leopold Pellegrini, MD, 15 Units at 02/25/21 2146    insulin lispro (HumaLOG) 100 units/mL subcutaneous injection 1-5 Units, 1-5 Units, Subcutaneous, HS, Stephania Puls, DPM, 1 Units at 02/24/21 2150    insulin lispro (HumaLOG) 100 units/mL subcutaneous injection 1-6 Units, 1-6 Units, Subcutaneous, TID AC, 1 Units at 02/25/21 1215 **AND** Fingerstick Glucose (POCT), , , TID AC, Stephania Puls, DPM    insulin lispro (HumaLOG) 100 units/mL subcutaneous injection 10 Units, 10 Units, Subcutaneous, TID With Meals, Leopold Pellegrini, MD, 10 Units at 02/25/21 1215    levothyroxine tablet 175 mcg, 175 mcg, Oral, Early Morning, Stephania Puls, DPM, 175 mcg at 02/25/21 0545    oxyCODONE (ROXICODONE) IR tablet 2 5 mg, 2 5 mg, Oral, Q4H PRN, Stephania Puls, DPM    oxyCODONE (ROXICODONE) IR tablet 5 mg, 5 mg, Oral, Q4H PRN, Stephania Puls, DPM, 5 mg at 02/26/21 0827    polyethylene glycol (MIRALAX) packet 17 g, 17 g, Oral, Daily PRN, Stephania Puls, DPM    sodium bicarbonate tablet 650 mg, 650 mg, Oral, BID after meals, Donna Spear MD, 650 mg at 02/26/21 0827    tiotropium [facility]) capsule for inhaler 18 mcg, 18 mcg, Inhalation, Daily, Stephania Puls, DPM, 18 mcg at 02/26/21 0831    traZODone (DESYREL) tablet 50 mg, 50 mg, Oral, HS, Stephania Puls, DPM, 50 mg at 02/25/21 2117    Laboratory Results:  Results from last 7 days   Lab Units 02/26/21  0552 02/25/21  1943 02/25/21  0945 02/24/21  0449 02/23/21  0530 02/22/21  0435 02/21/21  0456 02/20/21  0438 02/19/21  1547   WBC Thousand/uL 15 81* 16 27*  --   --  16 51* 20 12* 20 02* 23 58* 24 24*   HEMOGLOBIN g/dL 10 5* 10 6*  --   --  10 3* 10 5* 11 4* 11 4* 11 9*   HEMATOCRIT % 31 8* 32 0*  --   --  32 5* 32 4* 34 4* 33 7* 35 7*   PLATELETS Thousands/uL 387 380  --   --  274 315 322 330 365   POTASSIUM mmol/L 3 5  --  3 5 4 9 4 2 3 8 3 8 4 5 5 1   CHLORIDE mmol/L 103  --  104 102 102 105 104 99* 95*   CO2 mmol/L 23  --  25 22 19* 21 19* 18* 22   BUN mg/dL 12  --  18 21 19 15 21 31* 30*   CREATININE mg/dL 1 10  --  1 13 1 19 1 27 1 23 1 32* 1 70* 1 93*   CALCIUM mg/dL 7 8*  --  7 7* 7 8* 7 9* 7 9* 8 1* 8 5 9 1

## 2021-02-26 NOTE — ANESTHESIA PREPROCEDURE EVALUATION
Procedure:  DEBRIDEMENT WOUND [facility] OUT) with partial closure (Right Foot)    Relevant Problems   CARDIO   (+) Hyperlipidemia   (+) Hypertension   (+) Peripheral arterial disease (HCC)      ENDO   (+) Hypothyroidism   (+) Type 2 diabetes mellitus, with long-term current use of insulin (HCC)      /RENAL   (+) Stage 3 chronic kidney disease      HEMATOLOGY   (+) Anemia      NEURO/PSYCH   (+) Depression      Other   (+) Osteomyelitis, right 2nd toe with cellulitis             Anesthesia Plan  ASA Score- 3     Anesthesia Type- general with ASA Monitors  Additional Monitors:   Airway Plan: LMA  Plan Factors-    Chart reviewed  EKG reviewed  Existing labs reviewed  Induction- intravenous  Postoperative Plan-     Informed Consent- Anesthetic plan and risks discussed with patient  I personally reviewed this patient with the CRNA  Discussed and agreed on the Anesthesia Plan with the CRNA  Abdirahman Lynn

## 2021-02-26 NOTE — ASSESSMENT & PLAN NOTE
· Necrosis of the right 2nd digit (distal phalanx), proximal phalanx erythema toes, warmth, tender  Inflammatory signs present of the plantar surface as well  · X-ray of the right foot showed no evidence of osteomyelitis, 2nd PIP joint dislocation    Plan:  · Status post incision and drainage right foot and open 2nd ray amputation, 2/21    · PTA of R SFA performed with placement of RACHELLE yesterday  · Patient for OR today  · Change in antibiotics for Enterococcus per ID noted and appreciated

## 2021-02-26 NOTE — PROGRESS NOTES
Progress Note - Rosanna Laird 1950, 78 y o  male MRN: [id number]    Unit/Bed#: S -01 Encounter: [id number]    Primary Care Provider: Ulises Corey   Date and time admitted to hospital: 2/19/2021  2:09 PM        * Osteomyelitis, right 2nd toe with cellulitis  Assessment & Plan  · Necrosis of the right 2nd digit (distal phalanx), proximal phalanx erythema toes, warmth, tender  Inflammatory signs present of the plantar surface as well  · X-ray of the right foot showed no evidence of osteomyelitis, 2nd PIP joint dislocation    Plan:  · Status post incision and drainage right foot and open 2nd ray amputation, 2/21  · PTA of R SFA performed with placement of RACHELLE yesterday  · Patient for OR today  · Change in antibiotics for Enterococcus per ID noted and appreciated      Stage 3 chronic kidney disease  Assessment & Plan  Lab Results   Component Value Date    EGFR 69 02/26/2021    EGFR 66 02/25/2021    EGFR 62 02/24/2021    CREATININE 1 10 02/26/2021    CREATININE 1 13 02/25/2021    CREATININE 1 19 02/24/2021   · Per notes, patient has chronic kidney disease stage III    · Renal function stable      Hypoxemia  Assessment & Plan  · Intermittent episodes of hypoxia  · Checks x-ray with questionable pneumonia  · Procalcitonin ordered but unhelpful in findings of acute known infection  · Will discuss with ID further antibiotics  · Four clinical standpoint patient does not seem to have pneumonia would not change antibiotics till ID evaluation    Type 2 diabetes mellitus, with long-term current use of insulin [facility])  Assessment & Plan  Lab Results   Component Value Date    HGBA1C 9 3 (H) 02/21/2021       Recent Labs     02/25/21  1123 02/25/21  1601 02/25/21  2131 02/26/21  0725   POCGLU 177* 116 152* 170*       Blood Sugar Average: Last 72 hrs:  (P) 207 [id number]   · Patient admitted with blood sugars of 415 mg, beta hydroxy butyrate increased, normal pH, normal bicarb and anion gap on BMP  · History of type 2 diabetes in the past 25 years, follow-up with endocrinologist as outpatient  On 30 NPH a m , 60 units NPH at night, Novolin R 30 units a m  As home medication  · Last hemoglobin A1c 8 0 prior to this admission  Plan:  · Poorly-controlled diabetes  · Lantus and mealtime insulin increased yesterday  · Sugars improved  · Lantus was increased to 30 units yesterday      VTE Pharmacologic Prophylaxis:   Pharmacologic: Enoxaparin (Lovenox)  Mechanical VTE Prophylaxis in Place: No    Patient Centered Rounds: I have performed bedside rounds with nursing staff today  Education and Discussions with Family / Patient:  I offered to call patient's family patient refused    Time Spent for Care: 30 minutes  More than 50% of total time spent on counseling and coordination of care as described above  Current Length of Stay: 7 day(s)    Current Patient Status: Inpatient   Certification Statement: The patient will continue to require additional inpatient hospital stay due to Need for surgical treatment of ostial for toe    Discharge Plan:  Depending on surgical course    Code Status: Level 1 - Full Code      Subjective:   Patient comfortable denies cough    Objective:     Vitals:   Temp (24hrs), Av 6 °F (37 °C), Min:98 3 °F (36 8 °C), Max:98 9 °F (37 2 °C)    Temp:  [98 3 °F (36 8 °C)-98 9 °F (37 2 °C)] 98 9 °F (37 2 °C)  HR:  [65-73] 71  Resp:  [18-20] 20  BP: (117-170)/(55-77) 170/77  SpO2:  [91 %-94 %] 94 %  Body mass index is 30 13 kg/m²  Input and Output Summary (last 24 hours): Intake/Output Summary (Last 24 hours) at 2021 0954  Last data filed at 2021 0846  Gross per 24 hour   Intake --   Output 1300 ml   Net -1300 ml       Physical Exam:     Physical Exam  Constitutional:       General: He is not in acute distress  Appearance: He is not toxic-appearing  Cardiovascular:      Rate and Rhythm: Normal rate and regular rhythm  Heart sounds: No murmur   No gallop  Pulmonary:      Effort: No respiratory distress  Breath sounds: Normal breath sounds  No wheezing, rhonchi or rales  Abdominal:      General: There is no distension  Tenderness: There is no abdominal tenderness  There is no guarding or rebound  Neurological:      Mental Status: He is oriented to person, place, and time  Additional Data:     Labs:    Results from last 7 days   Lab Units 02/26/21  0552  02/22/21  0435   WBC Thousand/uL 15 81*   < > 20 12*   HEMOGLOBIN g/dL 10 5*   < > 10 5*   HEMATOCRIT % 31 8*   < > 32 4*   PLATELETS Thousands/uL 387   < > 315   NEUTROS PCT %  --   --  76*   LYMPHS PCT %  --   --  10*   MONOS PCT %  --   --  11   EOS PCT %  --   --  1    < > = values in this interval not displayed  Results from last 7 days   Lab Units 02/26/21  0552  02/20/21  0438   SODIUM mmol/L 138   < > 130*   POTASSIUM mmol/L 3 5   < > 4 5   CHLORIDE mmol/L 103   < > 99*   CO2 mmol/L 23   < > 18*   BUN mg/dL 12   < > 31*   CREATININE mg/dL 1 10   < > 1 70*   ANION GAP mmol/L 12   < > 13   CALCIUM mg/dL 7 8*   < > 8 5   ALBUMIN g/dL  --   --  2 4*   TOTAL BILIRUBIN mg/dL  --   --  0 28   ALK PHOS U/L  --   --  105   ALT U/L  --   --  13   AST U/L  --   --  9   GLUCOSE RANDOM mg/dL 133   < > 390*    < > = values in this interval not displayed       Results from last 7 days   Lab Units 02/19/21  1547   INR  1 12     Results from last 7 days   Lab Units 02/26/21  0725 02/25/21  2131 02/25/21  1601 02/25/21  1123 02/25/21  0823 02/24/21  2106 02/24/21  1610 02/24/21  1112 02/24/21  0701 02/23/21  2044 02/23/21  1831 02/23/21  1127   POC GLUCOSE mg/dl 170* 152* 116 177* 215* 200* 295* 312* 258* 205* 249* 193*     Results from last 7 days   Lab Units 02/21/21  0456   HEMOGLOBIN A1C % 9 3*     Results from last 7 days   Lab Units 02/25/21  1943 02/20/21  0438 02/19/21  1547   LACTIC ACID mmol/L  --   --  1 4   PROCALCITONIN ng/ml 0 28* 0 13 0 10           * I Have Reviewed All Lab Data Listed Above  * Additional Pertinent Lab Tests Reviewed: All Labs Within Last 24 Hours Reviewed    Imaging:    Imaging Reports Reviewed Today Include:   Imaging Personally Reviewed by Myself Includes:      Recent Cultures (last 7 days):     Results from last 7 days   Lab Units 02/21/21  0820 02/19/21  1555 02/19/21  1540   BLOOD CULTURE   --  No Growth After 5 Days  No Growth After 5 Days     GRAM STAIN RESULT  No Polys*  1+ Gram positive cocci in pairs*  --   --    WOUND CULTURE  2+ Growth of Enterococcus faecalis*  Few Colonies of   --   --        Last 24 Hours Medication List:   Current Facility-Administered Medications   Medication Dose Route Frequency Provider Last Rate    acetaminophen  975 mg Oral [facility] Ace Pandey, DPM      amLODIPine  10 mg Oral Daily Florecita Land MD      ampicillin  2,000 mg Intravenous Q6H Beverly Aldea, DO 2,000 mg (02/26/21 0834)    atenolol  25 mg Oral Daily Ace Pandey, DPM      atorvastatin  40 mg Oral Daily With Carolyn Munoz DPM      calcium carbonate  500 mg Oral BID PRN Bibi Sinclair MD      citalopram  20 mg Oral Daily Ace Pandey, DPM      clopidogrel  75 mg Oral Daily Ronal Abrams MD      enoxaparin  40 mg Subcutaneous Daily Ace Pandey, CESILIA      famotidine  20 mg Oral BID Bibi Sinclair MD      HYDROmorphone  0 2 mg Intravenous Q4H PRN Ace Dannie, DPM      insulin glargine  30 Units Subcutaneous HS Carolin Suarez MD      insulin lispro  1-5 Units Subcutaneous HS Ace Pandey DPM      insulin lispro  1-6 Units Subcutaneous TID AC Ace Pandey DPM      insulin lispro  10 Units Subcutaneous TID With Meals Carolin Suarez MD      levothyroxine  175 mcg Oral Early Morning Ace Pandey, DPM      oxyCODONE  2 5 mg Oral Q4H PRN Familiaon Dannie, CARYLM      oxyCODONE  5 mg Oral Q4H PRN Familiaon Dannie, DPM      polyethylene glycol  17 g Oral Daily PRN Ace Pandey, DPM      sodium bicarbonate  650 mg Oral BID after meals Fletcher Jewell MD      tiotropium  18 mcg Inhalation Daily Arnette Mass, DPM      traZODone  50 mg Oral HS Griselda Hutchison, DPM          Today, Patient Was Seen By: Solange Platt MD    ** Please Note: Dictation voice to text software may have been used in the creation of this document   **

## 2021-02-26 NOTE — PLAN OF CARE
Problem: Potential for Falls  Goal: Patient will remain free of falls  Description: INTERVENTIONS:  - Assess patient frequently for physical needs  -  Identify cognitive and physical deficits and behaviors that affect risk of falls    -  [place] fall precautions as indicated by assessment   - Educate patient/family on patient safety including physical limitations  - Instruct patient to call for assistance with activity based on assessment  - Modify environment to reduce risk of injury  - Consider OT/PT consult to assist with strengthening/mobility  Outcome: Progressing     Problem: PAIN - ADULT  Goal: Verbalizes/displays adequate comfort level or baseline comfort level  Description: Interventions:  - Encourage patient to monitor pain and request assistance  - Assess pain using appropriate pain scale  - Administer analgesics based on type and severity of pain and evaluate response  - Implement non-pharmacological measures as appropriate and evaluate response  - Consider cultural and social influences on pain and pain management  - Notify physician/advanced practitioner if interventions unsuccessful or patient reports new pain  Outcome: Progressing     Problem: INFECTION - ADULT  Goal: Absence or prevention of progression during hospitalization  Description: INTERVENTIONS:  - Assess and monitor for signs and symptoms of infection  - Monitor lab/diagnostic results  - Monitor all insertion sites, i e  indwelling lines, tubes, and drains  - Monitor endotracheal if appropriate and nasal secretions for changes in amount and color  - [place] appropriate cooling/warming therapies per order  - Administer medications as ordered  - Instruct and encourage patient and family to use good hand hygiene technique  - Identify and instruct in appropriate isolation precautions for identified infection/condition  Outcome: Progressing     Problem: SAFETY ADULT  Goal: Patient will remain free of falls  Description: INTERVENTIONS:  - Assess patient frequently for physical needs  -  Identify cognitive and physical deficits and behaviors that affect risk of falls  -  [place] fall precautions as indicated by assessment   - Educate patient/family on patient safety including physical limitations  - Instruct patient to call for assistance with activity based on assessment  - Modify environment to reduce risk of injury  - Consider OT/PT consult to assist with strengthening/mobility  Outcome: Progressing     Problem: DISCHARGE PLANNING  Goal: Discharge to home or other facility with appropriate resources  Description: INTERVENTIONS:  - Identify barriers to discharge w/patient and caregiver  - Arrange for needed discharge resources and transportation as appropriate  - Identify discharge learning needs (meds, wound care, etc )  - Arrange for interpretive services to assist at discharge as needed  - Refer to Case Management Department for coordinating discharge planning if the patient needs post-hospital services based on physician/advanced practitioner order or complex needs related to functional status, cognitive ability, or social support system  Outcome: Progressing     Problem: Knowledge Deficit  Goal: Patient/family/caregiver demonstrates understanding of disease process, treatment plan, medications, and discharge instructions  Description: Complete learning assessment and assess knowledge base    Interventions:  - Provide teaching at level of understanding  - Provide teaching via preferred learning methods  Outcome: Progressing     Problem: Prexisting or High Potential for Compromised Skin Integrity  Goal: Skin integrity is maintained or improved  Description: INTERVENTIONS:  - Identify patients at risk for skin breakdown  - Assess and monitor skin integrity  - Assess and monitor nutrition and hydration status  - Monitor labs   - Assess for incontinence   - Turn and reposition patient  - Assist with mobility/ambulation  - Relieve pressure over bony prominences  - Avoid friction and shearing  - Provide appropriate hygiene as needed including keeping skin clean and dry  - Evaluate need for skin moisturizer/barrier cream  - Collaborate with interdisciplinary team   - Patient/family teaching  - Consider wound care consult   Outcome: Progressing     Problem: Nutrition/Hydration-ADULT  Goal: Nutrient/Hydration intake appropriate for improving, restoring or maintaining nutritional needs  Description: Monitor and assess patient's nutrition/hydration status for malnutrition  Collaborate with interdisciplinary team and initiate plan and interventions as ordered  Monitor patient's weight and dietary intake as ordered or per policy  Utilize nutrition screening tool and intervene as necessary  Determine patient's food preferences and provide high-protein, high-caloric foods as appropriate       INTERVENTIONS:  - Monitor oral intake, urinary output, labs, and treatment plans  - Assess nutrition and hydration status and recommend course of action  - Evaluate amount of meals eaten  - Assist patient with eating if necessary   - Allow adequate time for meals  - Recommend/ encourage appropriate diets, oral nutritional supplements, and vitamin/mineral supplements  - Order, calculate, and assess calorie counts as needed  - Recommend, monitor, and adjust tube feedings and TPN/PPN based on assessed needs  - Assess need for intravenous fluids  - Provide specific nutrition/hydration education as appropriate  - Include patient/family/caregiver in decisions related to nutrition  Outcome: Progressing

## 2021-02-26 NOTE — ASSESSMENT & PLAN NOTE
Lab Results   Component Value Date    HGBA1C 9 3 (H) 02/21/2021       Recent Labs     02/25/21  1123 02/25/21  1601 02/25/21  2131 02/26/21  0725   POCGLU 177* 116 152* 170*       Blood Sugar Average: Last 72 hrs:  (P) 207 [id number]   · Patient admitted with blood sugars of 415 mg, beta hydroxy butyrate increased, normal pH, normal bicarb and anion gap on BMP  · History of type 2 diabetes in the past 25 years, follow-up with endocrinologist as outpatient  On 30 NPH a m , 60 units NPH at night, Novolin R 30 units a m  As home medication  · Last hemoglobin A1c 8 0 prior to this admission      Plan:  · Poorly-controlled diabetes  · Lantus and mealtime insulin increased yesterday  · Sugars improved  · Lantus was increased to 30 units yesterday

## 2021-02-27 PROBLEM — N18.9 ACUTE ON CHRONIC KIDNEY FAILURE (HCC): Status: ACTIVE | Noted: 2021-02-27

## 2021-02-27 PROBLEM — N17.9 ACUTE ON CHRONIC KIDNEY FAILURE (HCC): Status: ACTIVE | Noted: 2021-02-27

## 2021-02-27 PROBLEM — D72.829 LEUKOCYTOSIS: Status: ACTIVE | Noted: 2021-02-27

## 2021-02-27 LAB
ANION GAP SERPL CALCULATED.3IONS-SCNC: 11 MMOL/L (ref 4–13)
BUN SERPL-MCNC: 20 MG/DL (ref 5–25)
CALCIUM SERPL-MCNC: 7.5 MG/DL (ref 8.3–10.1)
CHLORIDE SERPL-SCNC: 100 MMOL/L (ref 100–108)
CO2 SERPL-SCNC: 22 MMOL/L (ref 21–32)
CREAT SERPL-MCNC: 1.27 MG/DL (ref 0.6–1.3)
GFR SERPL CREATININE-BSD FRML MDRD: 58 ML/MIN/1.73SQ M
GLUCOSE SERPL-MCNC: 124 MG/DL (ref 65–140)
GLUCOSE SERPL-MCNC: 228 MG/DL (ref 65–140)
GLUCOSE SERPL-MCNC: 280 MG/DL (ref 65–140)
GLUCOSE SERPL-MCNC: 308 MG/DL (ref 65–140)
GLUCOSE SERPL-MCNC: 328 MG/DL (ref 65–140)
GLUCOSE SERPL-MCNC: 350 MG/DL (ref 65–140)
GLUCOSE SERPL-MCNC: 350 MG/DL (ref 65–140)
GLUCOSE SERPL-MCNC: 389 MG/DL (ref 65–140)
GLUCOSE SERPL-MCNC: >500 MG/DL (ref 65–140)
POTASSIUM SERPL-SCNC: 5.1 MMOL/L (ref 3.5–5.3)
SODIUM SERPL-SCNC: 133 MMOL/L (ref 136–145)

## 2021-02-27 PROCEDURE — 99024 POSTOP FOLLOW-UP VISIT: CPT | Performed by: PODIATRIST

## 2021-02-27 PROCEDURE — 80048 BASIC METABOLIC PNL TOTAL CA: CPT | Performed by: PODIATRIST

## 2021-02-27 PROCEDURE — 82948 REAGENT STRIP/BLOOD GLUCOSE: CPT

## 2021-02-27 PROCEDURE — 99232 SBSQ HOSP IP/OBS MODERATE 35: CPT | Performed by: INTERNAL MEDICINE

## 2021-02-27 RX ORDER — INSULIN GLARGINE 100 [IU]/ML
33 INJECTION, SOLUTION SUBCUTANEOUS
Status: DISCONTINUED | OUTPATIENT
Start: 2021-02-27 | End: 2021-02-27

## 2021-02-27 RX ADMIN — OXYCODONE HYDROCHLORIDE 5 MG: 5 TABLET ORAL at 07:50

## 2021-02-27 RX ADMIN — SODIUM BICARBONATE 650 MG: 650 TABLET ORAL at 17:22

## 2021-02-27 RX ADMIN — AMPICILLIN SODIUM 2000 MG: 2 INJECTION, POWDER, FOR SOLUTION INTRAMUSCULAR; INTRAVENOUS at 14:38

## 2021-02-27 RX ADMIN — OXYCODONE HYDROCHLORIDE 5 MG: 5 TABLET ORAL at 17:23

## 2021-02-27 RX ADMIN — INSULIN LISPRO 5 UNITS: 100 INJECTION, SOLUTION INTRAVENOUS; SUBCUTANEOUS at 07:57

## 2021-02-27 RX ADMIN — SODIUM CHLORIDE 15 UNITS/HR: 9 INJECTION, SOLUTION INTRAVENOUS at 12:52

## 2021-02-27 RX ADMIN — TRAZODONE HYDROCHLORIDE 50 MG: 50 TABLET ORAL at 23:17

## 2021-02-27 RX ADMIN — CITALOPRAM HYDROBROMIDE 20 MG: 20 TABLET ORAL at 09:20

## 2021-02-27 RX ADMIN — OXYCODONE HYDROCHLORIDE 5 MG: 5 TABLET ORAL at 03:46

## 2021-02-27 RX ADMIN — ATENOLOL 25 MG: 25 TABLET ORAL at 09:20

## 2021-02-27 RX ADMIN — SODIUM BICARBONATE 650 MG: 650 TABLET ORAL at 09:20

## 2021-02-27 RX ADMIN — CLOPIDOGREL BISULFATE 75 MG: 75 TABLET ORAL at 09:20

## 2021-02-27 RX ADMIN — ENOXAPARIN SODIUM 40 MG: 40 INJECTION SUBCUTANEOUS at 09:21

## 2021-02-27 RX ADMIN — TIOTROPIUM BROMIDE 18 MCG: 18 CAPSULE ORAL; RESPIRATORY (INHALATION) at 09:21

## 2021-02-27 RX ADMIN — AMPICILLIN SODIUM 2000 MG: 2 INJECTION, POWDER, FOR SOLUTION INTRAMUSCULAR; INTRAVENOUS at 02:55

## 2021-02-27 RX ADMIN — OXYCODONE HYDROCHLORIDE 5 MG: 5 TABLET ORAL at 13:34

## 2021-02-27 RX ADMIN — FAMOTIDINE 20 MG: 20 TABLET ORAL at 17:23

## 2021-02-27 RX ADMIN — FAMOTIDINE 20 MG: 20 TABLET ORAL at 09:20

## 2021-02-27 RX ADMIN — AMPICILLIN SODIUM 2000 MG: 2 INJECTION, POWDER, FOR SOLUTION INTRAMUSCULAR; INTRAVENOUS at 20:08

## 2021-02-27 RX ADMIN — LEVOTHYROXINE SODIUM 175 MCG: 175 TABLET ORAL at 05:49

## 2021-02-27 RX ADMIN — ATORVASTATIN CALCIUM 40 MG: 40 TABLET, FILM COATED ORAL at 17:22

## 2021-02-27 RX ADMIN — ACETAMINOPHEN 975 MG: 325 TABLET, FILM COATED ORAL at 23:16

## 2021-02-27 RX ADMIN — AMPICILLIN SODIUM 2000 MG: 2 INJECTION, POWDER, FOR SOLUTION INTRAMUSCULAR; INTRAVENOUS at 09:20

## 2021-02-27 RX ADMIN — AMLODIPINE BESYLATE 10 MG: 10 TABLET ORAL at 09:20

## 2021-02-27 NOTE — PROGRESS NOTES
Patient seen prior to OR  Discussed his condition and treatment plan  Plan for debridement/ wash out with possible closure / wound VAC today  Explained surgical details and post-op course  Discussed all risks and complications related to the patient's condition and surgery  The patient understood that the surgery would not guarantee desirable outcome  All questions and concerns were addressed and the consent was signed  Proceed to OR as planned

## 2021-02-27 NOTE — OP NOTE
OPERATIVE REPORT - Podiatry  PATIENT NAME: Norma Ang    :  1950  MRN: [id number]  Pt Location: AN OR ROOM 03    SURGERY DATE: 2021    Surgeon(s) and Role:     * Goyo Martinez DPM - Primary     * Dutch Lerma DPM - Assisting    Pre-op Diagnosis:  Gangrene of toe of right foot ([address] ) [I96]  Osteomyelitis of right foot, unspecified type ([address] ) [M86 9]    Post-Op Diagnosis Codes:     * Gangrene of toe of right foot ([address] ) Aixa Ganser     * Osteomyelitis of right foot, unspecified type ([address] ) [M86 9]    Procedure(s) (LRB):  DEBRIDEMENT WOUND [facility]) with partial closure (Right)      Specimen(s):  * No specimens in log *    Estimated Blood Loss:   Minimal    Drains:  Negative Pressure Wound Therapy (V A C ) (Active)   Black foam- # applied 6 21   Cycle Continuous 21   Target Pressure (mmHg) 125 21 1900   Canister Changed Yes 21   Dressing Status Clean;Dry; Intact 21   Output (mL) 0 mL 21 2100   Number of days: 4       Implants:  * No implants in log *    Anesthesia Type:   General/LMA with 10 ml of 1% Lidocaine and 0 5% Bupivacaine in a 1:1 mixture    Hemostasis:  Surgical dissection  Materials:  Nylon suture and wound VAC see implants section  Operative Findings:    Bleeding appeared to be adequate for healing potential   Soha-incisional skin appeared erythematous with proximal margins appear necrotic  Distal tip of remaining bone appeared to be necrotic  Plantar fat appeared to be  necrotic and discolored/ green in color  Complications:   None    Procedure and Technique:     Under mild sedation, the patient was brought into the operating room and left on stretcher in the supine position  A time out was performed to confirm the correct patient, procedure and site with all parties in agreement  Following IV sedation, a field digital block was performed consisting of 10 ml of 1% Lidocaine and 0 5% Bupivacaine in a 1:1 mixture   The foot was then scrubbed, prepped and draped in the usual aseptic manner  Attention was directed to the right foot where additional erythema, warmth and necrotic subcutaneous tissues were noted  Nonviable tissue was removed utilizing scalpel and rongeur or down to the level of the bone  Hemostasis was achieved utilizing electrocautery cauterizing all bleeders as necessary  this was quite difficult indicating possible calcified vessels  Distal tip of 2nd metatarsal was noted to be necrotic the decision was made to remove necrotic portion  Proximal bone was noted to be white hard to the touch  No purulence or sinus tracts were noted in the wound  Proximal plantar subcutaneous tissues were removed with necrotic tissues and discoloration including green coloration  The surgical incision was irrigated with copious amounts of normal sterile saline  Decision was made to attempt to close dorsal skin order to cover 2nd metatarsal bone  Remaining wound measured 12 5 cm x 2 cm x 3 cm and some plantar bone was still exposed  Remaining wound base was healthy bleeding tissue and appeared to have some granulation tissue on distal wound margins  Skin edges were reapproximated and closure was obtained utilizing interrupted retention sutures utilizing 3-0  Nylon  The foot was then cleansed and dried  The incision site was dressed with Wound Vac Dresing applied at 125mmHg, 4x4 gauze  This was then covered with a Kerlix      The patient tolerated the procedure and anesthesia well and was transported to the PACU with vital signs stable  As with many limb salvage procedures, we contemplate the possibility of performing further stages to this procedure  Procedures may include debridements, delayed closure, plastic surgery techniques, or more proximal amputations  This procedure may be considered part of a multi-staged limb salvage treatment plan        Dr Norma Zamora was present during the entire procedure and participated in all key aspects  SIGNATURE: Manuel Juan DPM  DATE: February 26, 2021  TIME: 7:57 PM      Portions of the record may have been created with voice recognition software  Occasional wrong word or "sound a like" substitutions may have occurred due to the inherent limitations of voice recognition software  Read the chart carefully and recognize, using context, where substitutions have occurred

## 2021-02-27 NOTE — PROGRESS NOTES
[facility] - Progress Note  Patient: Anabell Quiroga 78 y o  male MRN: [id number]  Unit/Bed#: S -01 Encounter: [id number]  Primary Care Provider: AROLDO Elmore  Date Of Visit: 02/27/21        Assessment & Plan:    * Osteomyelitis of toe with cellulitis  Assessment & Plan  · Necrosis of the right 2nd digit (distal phalanx), proximal phalanx erythema toes, warmth, tender    Inflammatory signs present of the plantar surface as well  · X-ray of the right foot showed no evidence of osteomyelitis, 2nd PIP joint dislocation  · S/p I&D of the right foot with open 2nd ray amputation on 2/21 - s/p off repeat washout with wound VAC insertion and partial closure yesterday  · Podiatry discussed in detail long-term prognosis with patient and likely need for possible curative transmetatarsal amputation, however, patient refusing at this time, opting for further attempt at limb salvage  · Wound culture growing Enterococcus faecalis - antibiotic course transition to IV Ampicillin per Infectious Disease  · PRN pain control and supportive care  · PT/OT as tolerated - nonweightbearing to affected extremity per podiatry    Peripheral arterial disease  Assessment & Plan  · S/p right SFA stenting on 2/23 - follow-up LEADs noted  · Continue Plavix/statin    Acute kidney injury - Chronic kidney disease stage 3  Assessment & Plan  · Baseline creatinine approximately 1 3-1 6 - currently stable @ 1 27  · Monitor renal function and urine output - limit/avoid nephrotoxins as possible - holding ACEI  · On sodium bicarbonate supplementation - acidosis resolved    Insulin-dependent diabetes mellitus  Assessment & Plan  Lab Results   Component Value Date    HGBA1C 9 3 (H) 02/21/2021     · Blood sugars have worsened today ranging in the 500s -> will transition to an insulin drip w/ Q2h Accu-Cheks  · Carbohydrate restricted diet    Essential hypertension  Assessment & Plan  · Continue Norvasc/Tenormin  · Holding ACEI in the setting of acute kidney injury    Leukocytosis  Assessment & Plan  · Likely reactive due to acute medical issue(s)  · Monitor WBC count    Hypothyroidism  Assessment & Plan  · Continue Synthroid    Hyperlipidemia  Assessment & Plan  · Continue Lipitor    Anemia of chronic disease  Assessment & Plan  · Monitor H/H    Hypoxemia  Assessment & Plan  · Intermittent episodes of hypoxia - currently on 2 L via nasal cannula at rest  · CXR with questionable infiltrates although in the absence of upper respiratory symptoms  · Infectious Disease following - on IV Ampicillin already for primary issue  · Encourage incentive spirometry    Depression  Assessment & Plan  · Continue Celexa      DVT Prophylaxis:  Lovenox       Patient Centered Rounds:  I have performed bedside rounds and discussed plan of care with nursing today  Discussions with Specialists or Other Care Team Provider:  see above assessments if applicable    Education and Discussions with Family / Patient:  Patient at bedside, along with brother  Time Spent for Care:  32 minutes  More than 50% of total time spent on counseling and coordination of care as described above  Current Length of Stay: 8 day(s)    Current Patient Status: Inpatient   Certification Statement:  Patient will continue to require additional hospital stay due to assessments as noted above  Code Status: Level 1 - Full Code        Subjective:     Seen/examined earlier today  Patient was sitting upright in a chair reclined at the time my encounter  Wound VAC remains in place  Denies significant pain to the affected foot at this time  He acknowledges his uncontrolled blood sugars and need for an insulin drip this time  Overall, he remains in positive spirits          Objective:     Vitals:   Temp (24hrs), Av 2 °F (36 8 °C), Min:97 5 °F (36 4 °C), Max:99 5 °F (37 5 °C)    Temp:  [97 5 °F (36 4 °C)-99 5 °F (37 5 °C)] 98 3 °F (36 8 °C)  HR:  [65-73] 67  Resp:  [16-20] 16  BP: (119-165)/(57-89) 119/57  SpO2:  [90 %-97 %] 91 %  Body mass index is 30 13 kg/m²  Input and Output Summary (last 24 hours): Intake/Output Summary (Last 24 hours) at 2/27/2021 1458  Last data filed at 2/27/2021 1100  Gross per 24 hour   Intake 550 ml   Output 400 ml   Net 150 ml       Physical Exam:     GENERAL:  Well-developed/nourished - weak/fatigued  HEAD:  Normocephalic - atraumatic  EYES: PERRL - EOMI   MOUTH:  Mucosa moist  NECK:  Supple - full range of motion  CARDIAC:  Rate control - S1/S2 positive  PULMONARY:  Clear breath sounds bilaterally - nonlabored respirations  ABDOMEN:  Soft - nontender/nondistended - active bowel sounds  MUSCULOSKELETAL:  Motor strength/range of motion deconditioned - s/p right 2nd toe amputation dressed/wrapped s/p wound VAC placement  NEUROLOGIC:  Alert/oriented at baseline  SKIN:  Chronic wrinkles/blemishes   PSYCHIATRIC:  Mood/affect stable      Additional Data:     Labs & Recent Cultures:    Results from last 7 days   Lab Units 02/26/21  0552  02/22/21  0435   WBC Thousand/uL 15 81*   < > 20 12*   HEMOGLOBIN g/dL 10 5*   < > 10 5*   HEMATOCRIT % 31 8*   < > 32 4*   PLATELETS Thousands/uL 387   < > 315   NEUTROS PCT %  --   --  76*   LYMPHS PCT %  --   --  10*   MONOS PCT %  --   --  11   EOS PCT %  --   --  1    < > = values in this interval not displayed       Results from last 7 days   Lab Units 02/27/21  0543   POTASSIUM mmol/L 5 1   CHLORIDE mmol/L 100   CO2 mmol/L 22   BUN mg/dL 20   CREATININE mg/dL 1 27   CALCIUM mg/dL 7 5*         Results from last 7 days   Lab Units 02/27/21  1127 02/27/21  0739 02/27/21  0238 02/26/21  2102 02/26/21  1524 02/26/21  1115 02/26/21  0725 02/25/21  2131 02/25/21  1601 02/25/21  1123 02/25/21  0823 02/24/21  2106   POC GLUCOSE mg/dl >500* 328* 308* 265* 192* 164* 170* 152* 116 177* 215* 200*     Results from last 7 days   Lab Units 02/21/21  0456   HEMOGLOBIN A1C % 9 3*     Results from last 7 days   Lab Units 02/26/21  0552 02/25/21  1943   PROCALCITONIN ng/ml 0 21 0 28*         Results from last 7 days   Lab Units 02/21/21  0820   GRAM STAIN RESULT  No Polys*  1+ Gram positive cocci in pairs*   WOUND CULTURE  2+ Growth of Enterococcus faecalis*  Few Colonies of          Last 24 Hours Medication List:   Current Facility-Administered Medications   Medication Dose Route Frequency Provider Last Rate    acetaminophen  975 mg Oral Q8H [address] Jennifer Sharmin, DPM      amLODIPine  10 mg Oral Daily Jennifer Sharmin, DPM      ampicillin  2,000 mg Intravenous Q6H Jennifer Sharmin, DPM 2,000 mg (02/27/21 1438)    atenolol  25 mg Oral Daily Jennifer Sharmin, DPM      atorvastatin  40 mg Oral Daily With [address], DPM      calcium carbonate  500 mg Oral BID PRN Jennifer Sharmin, DPM      citalopram  20 mg Oral Daily Jennifer Sharmin, DPM      clopidogrel  75 mg Oral Daily Jennifer Sharmin, DPM      enoxaparin  40 mg Subcutaneous Daily Jennifer Sharmin, DPM      famotidine  20 mg Oral BID Jennifer Sharmin, DPM      HYDROmorphone  0 2 mg Intravenous Q4H PRN Jennifer Sharmin, DPM      insulin regular (HumuLIN R,NovoLIN R) infusion  0 3-21 Units/hr Intravenous Titrated Sharlene Ferguson MD 15 Units/hr (02/27/21 1252)    levothyroxine  175 mcg Oral Early Morning Jennifer Sharmin, DPM      oxyCODONE  2 5 mg Oral Q4H PRN Jennifer Sharmin, DPM      oxyCODONE  5 mg Oral Q4H PRN Jennifer Sharmin, DPM      polyethylene glycol  17 g Oral Daily PRN Jennifer Sharmin, DPM      sodium bicarbonate  650 mg Oral BID after meals Jennifer Sharmin, DPM      tiotropium  18 mcg Inhalation Daily Jennifer Sharmin, DPM      traZODone  50 mg Oral HS Jennifer Sharmin, DPM                    ** Please Note: This note is constructed using a voice recognition dictation system  An occasional wrong word/phrase or sound-a-like substitution may have been picked up by dictation device due to the inherent limitations of voice recognition software    Read the chart carefully and recognize, using reasonable context, where substitutions may have occurred  **

## 2021-02-27 NOTE — ASSESSMENT & PLAN NOTE
· Necrosis of the right 2nd digit (distal phalanx), proximal phalanx erythema toes, warmth, tender    Inflammatory signs present of the plantar surface as well  · X-ray of the right foot showed no evidence of osteomyelitis, 2nd PIP joint dislocation  · S/p I&D of the right foot with open 2nd ray amputation on 2/21 - s/p off repeat washout with wound VAC insertion and partial closure yesterday  · Podiatry discussed in detail long-term prognosis with patient and likely need for possible curative transmetatarsal amputation, however, patient refusing at this time, opting for further attempt at limb salvage  · Wound culture growing Enterococcus faecalis - antibiotic course transition to IV Ampicillin per Infectious Disease  · PRN pain control and supportive care  · PT/OT as tolerated - nonweightbearing to affected extremity per podiatry

## 2021-02-27 NOTE — ASSESSMENT & PLAN NOTE
Lab Results   Component Value Date    HGBA1C 9 3 (H) 02/21/2021     · Blood sugars have worsened today ranging in the 500s -> will transition to an insulin drip w/ Q2h Accu-Cheks  · Carbohydrate restricted diet

## 2021-02-27 NOTE — ASSESSMENT & PLAN NOTE
· Intermittent episodes of hypoxia - currently on 2 L via nasal cannula at rest  · CXR with questionable infiltrates although in the absence of upper respiratory symptoms  · Infectious Disease following - on IV Ampicillin already for primary issue  · Encourage incentive spirometry

## 2021-02-27 NOTE — PROGRESS NOTES
[address] [facility] NOTE   Gucci Gagnon 78 y o  male MRN: [id number]  Unit/Bed#: S -01 Encounter: [id number]  Reason for Consult: FAUSTO on CKD    ASSESSMENT and PLAN:    82-year-old male with a past medical history of hypertension, CKD, hyperlipidemia, diabetes, smoker who initially presents to right foot pain and erythema  Patient initially underwent 2nd ray amputation for osteomyelitis on 02/21  Nephrology is consulted as the patient needs contrast in history of CKD     1) FAUSTO on CKD III      - no outpatient Nephrologist   - baseline creat 1 3 - 1 6 mg/dL in nov 2020   - admission creat 1 93 mg/dL   - UA with 1-2 RBC, 1+ protein, glucose   - etiology of acute kidney injury-possibly secondary to sepsis, NSAID, ACE-inhibitor, auto regulatory failure  Patient was using aspirin 2 to 3 times a day full dose    -patient received intravenous fluids and Ace inhibitors were held and renal function was slowly improving    - creat 2/27 stable 1 3 mg/dL    2/27-creatinine slightly higher  Was [address] yesterday  Received fluids  Monitor for now  Potassium slightly higher but blood sugars are also elevated  Plan:      - hold on starting ACEi today due to borderline higher K  - low K diet - pt agreeable  Will improve with improved blood pressure control also    - BMP in AM  - needs Urology f/u once acute issues stable for elevated PSA - likely as outpatient     2) HTN      - home is on Lisinopril 20 mg daily, atenolol 25 mg daily, amlodipine 10 mg daily  - inpatient is on atenolol and amlodipine      3) osteomyelitis      - per Primary, Podiatry, Vascular, ID teams   - s/p 2nd ray amputation on 2/21/2021    - antibiotics per ID team  - arteriogram 2/23 with angioplasty and stents  - pt to OR on 2/26 for debridement     4) electrolytes      - stable     5) acid/base      - stable - on bicarb tabs     6) elevated PSA      - per Primary team   - will eventually need Urology consultation    Ava Landin / Omid Rodriguez INTERVAL HISTORY:    Patient states pain is controlled with medication of lower extremity  Blood pressure is 100-885 systolic   no shortness of Breath      OBJECTIVE:  Current Weight: Weight - Scale: 95 3 kg (210 lb)  Vitals:    02/27/21 0100 02/27/21 0200 02/27/21 0300 02/27/21 0737   BP: 142/62 145/64 139/60 141/62   BP Location: Left arm Left arm Left arm Left arm   Pulse: 70 66 66 65   Resp: 17 17 18 18   Temp: 97 5 °F (36 4 °C) 97 5 °F (36 4 °C) 98 °F (36 7 °C) 98 3 °F (36 8 °C)   TempSrc: Oral Oral Oral Oral   SpO2: 93% 94% 93% 94%   Weight:       Height:           Intake/Output Summary (Last 24 hours) at 2/27/2021 0810  Last data filed at 2/27/2021 0549  Gross per 24 hour   Intake 550 ml   Output 800 ml   Net -250 ml     General: NAD  Skin: no rash  Eyes: anicteric sclera  ENT: moist mucous membrane  Neck: supple  Chest: CTA b/l, no ronchii, no wheeze, no rubs, no rales  CVS: s1s2, no murmur, no gallop, no rub  Abdomen: soft, nontender, nl sounds  Extremities: no edema LE b/l, RLE wrapped  : no merrill  Neuro: AAOX3  Psych: normal affect    Medications:    Current Facility-Administered Medications:     acetaminophen (TYLENOL) tablet 975 mg, 975 mg, Oral, Q8H [address], Trevino Francia, DPM, 975 mg at 02/25/21 0544    amLODIPine (NORVASC) tablet 10 mg, 10 mg, Oral, Daily, Trevino Francia, DPM, 10 mg at 02/26/21 2948    ampicillin (OMNIPEN) 2,000 mg in sodium chloride 0 9 % 100 mL IVPB, 2,000 mg, Intravenous, Q6H, Trevino Francia, DPM, Last Rate: 200 mL/hr at 02/27/21 0255, 2,000 mg at 02/27/21 0255    atenolol (TENORMIN) tablet 25 mg, 25 mg, Oral, Daily, Trevino Francia, DPM, 25 mg at 02/26/21 9427    atorvastatin (LIPITOR) tablet 40 mg, 40 mg, Oral, Daily With Dinner, Uriel Feldman, DPM, 40 mg at 02/25/21 1748    calcium carbonate (TUMS) chewable tablet 500 mg, 500 mg, Oral, BID PRN, Uriel Feldman, DPM, 500 mg at 02/25/21 0609    citalopram (CeleXA) tablet 20 mg, 20 mg, Oral, Daily, Uriel Feldman, DPM, 20 mg at 02/26/21 0827    clopidogrel (PLAVIX) tablet 75 mg, 75 mg, Oral, Daily, Nora Moreno, DPM, 75 mg at 02/26/21 0827    enoxaparin (LOVENOX) subcutaneous injection 40 mg, 40 mg, Subcutaneous, Daily, Nora Moreno, DPM, 40 mg at 02/25/21 0840    famotidine (PEPCID) tablet 20 mg, 20 mg, Oral, BID, Nora Moreno, DPM, 20 mg at 02/26/21 1982    HYDROmorphone (DILAUDID) injection 0 2 mg, 0 2 mg, Intravenous, Q4H PRN, Nora Moreno, DPM, 0 2 mg at 02/24/21 0719    insulin glargine (LANTUS) subcutaneous injection 30 Units 0 3 mL, 30 Units, Subcutaneous, HS, Nora Moreno, DPM, 15 Units at 02/26/21 2217    insulin lispro (HumaLOG) 100 units/mL subcutaneous injection 1-5 Units, 1-5 Units, Subcutaneous, HS, Nora Moreno, DPM, 2 Units at 02/26/21 2218    insulin lispro (HumaLOG) 100 units/mL subcutaneous injection 1-6 Units, 1-6 Units, Subcutaneous, TID AC, 5 Units at 02/27/21 0757 **AND** Fingerstick Glucose (POCT), , , TID AC, Nora Moreno, DPM    insulin lispro (HumaLOG) 100 units/mL subcutaneous injection 10 Units, 10 Units, Subcutaneous, TID With Meals, Nora Moreno, DPM, 10 Units at 02/27/21 0757    levothyroxine tablet 175 mcg, 175 mcg, Oral, Early Morning, Nora Moreno, DPM, 175 mcg at 02/27/21 0549    oxyCODONE (ROXICODONE) IR tablet 2 5 mg, 2 5 mg, Oral, Q4H PRN, Nora Moreno, DPM, 2 5 mg at 02/26/21 2217    oxyCODONE (ROXICODONE) IR tablet 5 mg, 5 mg, Oral, Q4H PRN, Nora Moreno, DPM, 5 mg at 02/27/21 0750    polyethylene glycol (MIRALAX) packet 17 g, 17 g, Oral, Daily PRN, Nora Moreno, DPM    sodium bicarbonate tablet 650 mg, 650 mg, Oral, BID after meals, Nora Moreno, DPM, 650 mg at 02/26/21 0827    tiotropium (SPIRIVA) capsule for inhaler 18 mcg, 18 mcg, Inhalation, Daily, Nora Moreno, DPM, 18 mcg at 02/26/21 0831    traZODone (DESYREL) tablet 50 mg, 50 mg, Oral, HS, Nora Moreno, DPM, 50 mg at 02/26/21 4574    Laboratory Results:  Results from last 7 days   Lab Units 02/27/21  6067 02/26/21  4632 02/25/21  1943 02/25/21  0945 02/24/21  0449 02/23/21  0530 02/22/21  0435 02/21/21  0456   WBC Thousand/uL  --  15 81* 16 27*  --   --  16 51* 20 12* 20 02*   HEMOGLOBIN g/dL  --  10 5* 10 6*  --   --  10 3* 10 5* 11 4*   HEMATOCRIT %  --  31 8* 32 0*  --   --  32 5* 32 4* 34 4*   PLATELETS Thousands/uL  --  387 380  --   --  274 315 322   POTASSIUM mmol/L 5 1 3 5  --  3 5 4 9 4 2 3 8 3 8   CHLORIDE mmol/L 100 103  --  104 102 102 105 104   CO2 mmol/L 22 23  --  25 22 19* 21 19*   BUN mg/dL 20 12  --  18 21 19 15 21   CREATININE mg/dL 1 27 1 10  --  1 13 1 19 1 27 1 23 1 32*   CALCIUM mg/dL 7 5* 7 8*  --  7 7* 7 8* 7 9* 7 9* 8 1*

## 2021-02-27 NOTE — ANESTHESIA POSTPROCEDURE EVALUATION
Post-Op Assessment Note    CV Status:  Stable  Pain Score: 0    Pain management: adequate     Mental Status:  Alert and awake   Hydration Status:  Euvolemic   PONV Controlled:  Controlled   Airway Patency:  Patent      Post Op Vitals Reviewed: Yes      Staff: CRNA         No complications documented      /63 (02/26/21 2005)    Temp 98 °F (36 7 °C) (02/26/21 2005)    Pulse 73 (02/26/21 2005)   Resp 16 (02/26/21 2005)    SpO2 96 % (02/26/21 2005)

## 2021-02-27 NOTE — PLAN OF CARE
Problem: Potential for Falls  Goal: Patient will remain free of falls  Description: INTERVENTIONS:  - Assess patient frequently for physical needs  -  Identify cognitive and physical deficits and behaviors that affect risk of falls    -  [place] fall precautions as indicated by assessment   - Educate patient/family on patient safety including physical limitations  - Instruct patient to call for assistance with activity based on assessment  - Modify environment to reduce risk of injury  - Consider OT/PT consult to assist with strengthening/mobility  Outcome: Progressing     Problem: PAIN - ADULT  Goal: Verbalizes/displays adequate comfort level or baseline comfort level  Description: Interventions:  - Encourage patient to monitor pain and request assistance  - Assess pain using appropriate pain scale  - Administer analgesics based on type and severity of pain and evaluate response  - Implement non-pharmacological measures as appropriate and evaluate response  - Consider cultural and social influences on pain and pain management  - Notify physician/advanced practitioner if interventions unsuccessful or patient reports new pain  Outcome: Progressing     Problem: INFECTION - ADULT  Goal: Absence or prevention of progression during hospitalization  Description: INTERVENTIONS:  - Assess and monitor for signs and symptoms of infection  - Monitor lab/diagnostic results  - Monitor all insertion sites, i e  indwelling lines, tubes, and drains  - Monitor endotracheal if appropriate and nasal secretions for changes in amount and color  - [place] appropriate cooling/warming therapies per order  - Administer medications as ordered  - Instruct and encourage patient and family to use good hand hygiene technique  - Identify and instruct in appropriate isolation precautions for identified infection/condition  Outcome: Progressing     Problem: SAFETY ADULT  Goal: Patient will remain free of falls  Description: INTERVENTIONS:  - Assess patient frequently for physical needs  -  Identify cognitive and physical deficits and behaviors that affect risk of falls  -  [place] fall precautions as indicated by assessment   - Educate patient/family on patient safety including physical limitations  - Instruct patient to call for assistance with activity based on assessment  - Modify environment to reduce risk of injury  - Consider OT/PT consult to assist with strengthening/mobility  Outcome: Progressing     Problem: DISCHARGE PLANNING  Goal: Discharge to home or other facility with appropriate resources  Description: INTERVENTIONS:  - Identify barriers to discharge w/patient and caregiver  - Arrange for needed discharge resources and transportation as appropriate  - Identify discharge learning needs (meds, wound care, etc )  - Arrange for interpretive services to assist at discharge as needed  - Refer to Case Management Department for coordinating discharge planning if the patient needs post-hospital services based on physician/advanced practitioner order or complex needs related to functional status, cognitive ability, or social support system  Outcome: Progressing     Problem: Knowledge Deficit  Goal: Patient/family/caregiver demonstrates understanding of disease process, treatment plan, medications, and discharge instructions  Description: Complete learning assessment and assess knowledge base    Interventions:  - Provide teaching at level of understanding  - Provide teaching via preferred learning methods  Outcome: Progressing     Problem: Prexisting or High Potential for Compromised Skin Integrity  Goal: Skin integrity is maintained or improved  Description: INTERVENTIONS:  - Identify patients at risk for skin breakdown  - Assess and monitor skin integrity  - Assess and monitor nutrition and hydration status  - Monitor labs   - Assess for incontinence   - Turn and reposition patient  - Assist with mobility/ambulation  - Relieve pressure over bony prominences  - Avoid friction and shearing  - Provide appropriate hygiene as needed including keeping skin clean and dry  - Evaluate need for skin moisturizer/barrier cream  - Collaborate with interdisciplinary team   - Patient/family teaching  - Consider wound care consult   Outcome: Progressing     Problem: Nutrition/Hydration-ADULT  Goal: Nutrient/Hydration intake appropriate for improving, restoring or maintaining nutritional needs  Description: Monitor and assess patient's nutrition/hydration status for malnutrition  Collaborate with interdisciplinary team and initiate plan and interventions as ordered  Monitor patient's weight and dietary intake as ordered or per policy  Utilize nutrition screening tool and intervene as necessary  Determine patient's food preferences and provide high-protein, high-caloric foods as appropriate       INTERVENTIONS:  - Monitor oral intake, urinary output, labs, and treatment plans  - Assess nutrition and hydration status and recommend course of action  - Evaluate amount of meals eaten  - Assist patient with eating if necessary   - Allow adequate time for meals  - Recommend/ encourage appropriate diets, oral nutritional supplements, and vitamin/mineral supplements  - Order, calculate, and assess calorie counts as needed  - Recommend, monitor, and adjust tube feedings and TPN/PPN based on assessed needs  - Assess need for intravenous fluids  - Provide specific nutrition/hydration education as appropriate  - Include patient/family/caregiver in decisions related to nutrition  Outcome: Progressing

## 2021-02-27 NOTE — ASSESSMENT & PLAN NOTE
· Baseline creatinine approximately 1 3-1 6 - currently stable @ 1 27  · Monitor renal function and urine output - limit/avoid nephrotoxins as possible - holding ACEI  · On sodium bicarbonate supplementation - acidosis resolved

## 2021-02-27 NOTE — PROGRESS NOTES
Postop check    Subjective:  Patient resting comfortably in chair with foot elevated  Wound VAC is running at 1:25 a m  Low continuous pressure with good seal   He reports minimal to no pain  He has no acute concerns  Objective minimal drainage in canister  Dressing is clean dry and intact  Capillary refill is brisk to the remaining digits  Normal ankle range of motion  No calf pain  The 2nd ray is amputated on the right foot  Assessment and plan:    1  I discussed the options moving forward with Mr Marcial Christensen  We discussed transmetatarsal amputation which would allow for more definitive closure versus long-term wound VAC therapy  Patient does not want to lose any more toes and understands that limb salvage will take much longer  He is willing to try wound VAC therapy for now  He understands he must stay off the right foot  He understands this will take many months with numerous outpatient follow-up visits  I think he is making an informed decision  We will complete wound VAC paperwork  No further surgery planned for the patient during this hospital stay  In my opinion regarding his foot we can begin discharge planning  Physical therapy consult for nonweightbearing to the right foot  Patient will need home nursing and wound VAC changes Monday Wednesday and Friday  He would [facility] up [facility] the [facility] with Dr Mark Barnes    Next wound VAC change on Monday

## 2021-02-28 LAB
ANION GAP SERPL CALCULATED.3IONS-SCNC: 8 MMOL/L (ref 4–13)
BASOPHILS # BLD AUTO: 0.08 THOUSANDS/ΜL (ref 0–0.1)
BASOPHILS NFR BLD AUTO: 0 % (ref 0–1)
BUN SERPL-MCNC: 28 MG/DL (ref 5–25)
CALCIUM SERPL-MCNC: 7.3 MG/DL (ref 8.3–10.1)
CHLORIDE SERPL-SCNC: 101 MMOL/L (ref 100–108)
CO2 SERPL-SCNC: 26 MMOL/L (ref 21–32)
CREAT SERPL-MCNC: 1.56 MG/DL (ref 0.6–1.3)
EOSINOPHIL # BLD AUTO: 0.3 THOUSAND/ΜL (ref 0–0.61)
EOSINOPHIL NFR BLD AUTO: 2 % (ref 0–6)
ERYTHROCYTE [DISTWIDTH] IN BLOOD BY AUTOMATED COUNT: 12.8 % (ref 11.6–15.1)
GFR SERPL CREATININE-BSD FRML MDRD: 45 ML/MIN/1.73SQ M
GLUCOSE SERPL-MCNC: 113 MG/DL (ref 65–140)
GLUCOSE SERPL-MCNC: 121 MG/DL (ref 65–140)
GLUCOSE SERPL-MCNC: 136 MG/DL (ref 65–140)
GLUCOSE SERPL-MCNC: 141 MG/DL (ref 65–140)
GLUCOSE SERPL-MCNC: 142 MG/DL (ref 65–140)
GLUCOSE SERPL-MCNC: 183 MG/DL (ref 65–140)
GLUCOSE SERPL-MCNC: 204 MG/DL (ref 65–140)
GLUCOSE SERPL-MCNC: 215 MG/DL (ref 65–140)
GLUCOSE SERPL-MCNC: 223 MG/DL (ref 65–140)
GLUCOSE SERPL-MCNC: 236 MG/DL (ref 65–140)
GLUCOSE SERPL-MCNC: 253 MG/DL (ref 65–140)
GLUCOSE SERPL-MCNC: 57 MG/DL (ref 65–140)
GLUCOSE SERPL-MCNC: 65 MG/DL (ref 65–140)
HCT VFR BLD AUTO: 30 % (ref 36.5–49.3)
HGB BLD-MCNC: 9.9 G/DL (ref 12–17)
IMM GRANULOCYTES # BLD AUTO: 0.19 THOUSAND/UL (ref 0–0.2)
IMM GRANULOCYTES NFR BLD AUTO: 1 % (ref 0–2)
LYMPHOCYTES # BLD AUTO: 2.16 THOUSANDS/ΜL (ref 0.6–4.47)
LYMPHOCYTES NFR BLD AUTO: 12 % (ref 14–44)
MCH RBC QN AUTO: 29.3 PG (ref 26.8–34.3)
MCHC RBC AUTO-ENTMCNC: 33 G/DL (ref 31.4–37.4)
MCV RBC AUTO: 89 FL (ref 82–98)
MONOCYTES # BLD AUTO: 1.06 THOUSAND/ΜL (ref 0.17–1.22)
MONOCYTES NFR BLD AUTO: 6 % (ref 4–12)
NEUTROPHILS # BLD AUTO: 14.1 THOUSANDS/ΜL (ref 1.85–7.62)
NEUTS SEG NFR BLD AUTO: 79 % (ref 43–75)
NRBC BLD AUTO-RTO: 0 /100 WBCS
PLATELET # BLD AUTO: 437 THOUSANDS/UL (ref 149–390)
PMV BLD AUTO: 11.1 FL (ref 8.9–12.7)
POTASSIUM SERPL-SCNC: 3.8 MMOL/L (ref 3.5–5.3)
RBC # BLD AUTO: 3.38 MILLION/UL (ref 3.88–5.62)
SODIUM SERPL-SCNC: 135 MMOL/L (ref 136–145)
WBC # BLD AUTO: 17.89 THOUSAND/UL (ref 4.31–10.16)

## 2021-02-28 PROCEDURE — 85025 COMPLETE CBC W/AUTO DIFF WBC: CPT | Performed by: INTERNAL MEDICINE

## 2021-02-28 PROCEDURE — 99232 SBSQ HOSP IP/OBS MODERATE 35: CPT | Performed by: INTERNAL MEDICINE

## 2021-02-28 PROCEDURE — 82948 REAGENT STRIP/BLOOD GLUCOSE: CPT

## 2021-02-28 PROCEDURE — 80048 BASIC METABOLIC PNL TOTAL CA: CPT | Performed by: INTERNAL MEDICINE

## 2021-02-28 RX ADMIN — TIOTROPIUM BROMIDE 18 MCG: 18 CAPSULE ORAL; RESPIRATORY (INHALATION) at 09:04

## 2021-02-28 RX ADMIN — AMLODIPINE BESYLATE 10 MG: 10 TABLET ORAL at 09:03

## 2021-02-28 RX ADMIN — CLOPIDOGREL BISULFATE 75 MG: 75 TABLET ORAL at 09:03

## 2021-02-28 RX ADMIN — SODIUM BICARBONATE 650 MG: 650 TABLET ORAL at 09:03

## 2021-02-28 RX ADMIN — OXYCODONE HYDROCHLORIDE 5 MG: 5 TABLET ORAL at 02:33

## 2021-02-28 RX ADMIN — SODIUM CHLORIDE 9 UNITS/HR: 9 INJECTION, SOLUTION INTRAVENOUS at 09:03

## 2021-02-28 RX ADMIN — OXYCODONE HYDROCHLORIDE 5 MG: 5 TABLET ORAL at 17:19

## 2021-02-28 RX ADMIN — ATENOLOL 25 MG: 25 TABLET ORAL at 09:03

## 2021-02-28 RX ADMIN — AMPICILLIN SODIUM 2000 MG: 2 INJECTION, POWDER, FOR SOLUTION INTRAMUSCULAR; INTRAVENOUS at 14:20

## 2021-02-28 RX ADMIN — CITALOPRAM HYDROBROMIDE 20 MG: 20 TABLET ORAL at 09:03

## 2021-02-28 RX ADMIN — FAMOTIDINE 20 MG: 20 TABLET ORAL at 09:03

## 2021-02-28 RX ADMIN — LEVOTHYROXINE SODIUM 175 MCG: 175 TABLET ORAL at 06:32

## 2021-02-28 RX ADMIN — ENOXAPARIN SODIUM 40 MG: 40 INJECTION SUBCUTANEOUS at 09:04

## 2021-02-28 RX ADMIN — TRAZODONE HYDROCHLORIDE 50 MG: 50 TABLET ORAL at 22:27

## 2021-02-28 RX ADMIN — AMPICILLIN SODIUM 2000 MG: 2 INJECTION, POWDER, FOR SOLUTION INTRAMUSCULAR; INTRAVENOUS at 20:23

## 2021-02-28 RX ADMIN — OXYCODONE HYDROCHLORIDE 5 MG: 5 TABLET ORAL at 09:17

## 2021-02-28 RX ADMIN — AMPICILLIN SODIUM 2000 MG: 2 INJECTION, POWDER, FOR SOLUTION INTRAMUSCULAR; INTRAVENOUS at 02:35

## 2021-02-28 RX ADMIN — SODIUM BICARBONATE 650 MG: 650 TABLET ORAL at 17:19

## 2021-02-28 RX ADMIN — ATORVASTATIN CALCIUM 40 MG: 40 TABLET, FILM COATED ORAL at 17:19

## 2021-02-28 RX ADMIN — AMPICILLIN SODIUM 2000 MG: 2 INJECTION, POWDER, FOR SOLUTION INTRAMUSCULAR; INTRAVENOUS at 09:10

## 2021-02-28 RX ADMIN — FAMOTIDINE 20 MG: 20 TABLET ORAL at 17:19

## 2021-02-28 NOTE — PROGRESS NOTES
[address] [facility] NOTE   Rashard Best 78 y o  male MRN: [id number]  Unit/Bed#: S -01 Encounter: [id number]  Reason for Consult: FAUSTO on CKD III    ASSESSMENT and PLAN:       62-year-old male with a past medical history of hypertension, CKD, hyperlipidemia, diabetes, smoker who initially presents to right foot pain and erythema   Patient initially underwent 2nd ray amputation for osteomyelitis on 02/21   Nephrology is consulted as the patient needs contrast in history of CKD     1) FAUSTO on CKD III      - no outpatient Nephrologist   - baseline creat 1 3 - 1 6 mg/dL in nov 2020   - admission creat 1 93 mg/dL   - UA with 1-2 RBC, 1+ protein, glucose   - etiology of acute kidney injury-possibly secondary to sepsis, NSAID, ACE-inhibitor, auto regulatory failure   Patient was using aspirin 2 to 3 times a day full dose    -patient received intravenous fluids and Ace inhibitors were held and renal function was slowly improving    - creat 2/27 stable 1 3 mg/dL  -2/28-creatinine slightly higher 1 56 mg/dL  Etiology of rise is unclear  Patient does not appear to have had significant hypotension intraoperatively  Overall creatinine is slightly higher today  Still within baseline but is slowly rising  Urine output is not show a large volume diuresis  Has hyperglycemia though to note      Plan:      - hold on starting ACEi today due to rising creatinine  - BMP in AM  -check PVR  - needs Urology f/u once acute issues stable for elevated PSA - likely as outpatient  -intermittent episodes of hypoxia-per primary team   Chest x-ray new patchy opacity in right lower lobe  -clinically patient is not volume depleted    No urgent indication for volume expansion but if the creatinine continues to rise could consider tomorrow  -consider duplex of lower extremity if continues to have hypoxia     2) HTN      - home is on Lisinopril 20 mg daily, atenolol 25 mg daily, amlodipine 10 mg daily  - inpatient is on atenolol and amlodipine      3) osteomyelitis      - per Primary, Podiatry, Vascular, ID teams   - s/p 2nd ray amputation on 2/21/2021    - antibiotics per ID team  - arteriogram 2/23 with angioplasty and stents  - pt to OR on 2/26 for debridement  - leukocytosis per primary team     4) electrolytes      - stable     5) acid/base      - stable - on bicarb tabs     6) elevated PSA      - per Primary team   - will eventually need Urology consultation    7) hypoxia-being evaluated by primary team   Does not appear to be related to pulmonary edema based on exam     SUBJECTIVE / 24H INTERVAL HISTORY:    Patient's main concern currently is his diet intake and uncontrolled blood sugars  Denies shortness of breath      OBJECTIVE:  Current Weight: Weight - Scale: 95 3 kg (210 lb)  Vitals:    02/28/21 0801 02/28/21 0903 02/28/21 0917 02/28/21 1136   BP: 132/63 132/64  146/63   BP Location: Left arm   Left arm   Pulse: 59   64   Resp: 18   18   Temp: 97 9 °F (36 6 °C)   98 1 °F (36 7 °C)   TempSrc: Oral   Axillary   SpO2: (!) 86%  92% (!) 84%   Weight:       Height:           Intake/Output Summary (Last 24 hours) at 2/28/2021 1208  Last data filed at 2/28/2021 0920  Gross per 24 hour   Intake --   Output 800 ml   Net -800 ml     General: NAD  Skin: no rash  Eyes: anicteric sclera  ENT: moist mucous membrane  Neck: supple  Chest: CTA b/l, no ronchii, no wheeze, no rubs, no rales, diminished air intake at the bases  CVS: s1s2, no murmur, no gallop, no rub  Abdomen: soft, nontender, nl sounds  Extremities:  Trace lower edema LE b/l, right lower extremity VAC in place  : no merrill  Neuro: AAOX3  Psych: normal affect    Medications:    Current Facility-Administered Medications:     acetaminophen (TYLENOL) tablet 975 mg, 975 mg, Oral, Q8H [address], Micki Silk, DPM, 975 mg at 02/27/21 2316    amLODIPine (NORVASC) tablet 10 mg, 10 mg, Oral, Daily, Micki Silk, DPM, 10 mg at 02/28/21 0903    ampicillin (OMNIPEN) 2,000 mg in sodium chloride 0 9 % 100 mL IVPB, 2,000 mg, Intravenous, Q6H, Vidhi Lucio, DPM, Last Rate: 200 mL/hr at 02/28/21 0910, 2,000 mg at 02/28/21 0910    atenolol (TENORMIN) tablet 25 mg, 25 mg, Oral, Daily, Vidhi Gunheather, DPM, 25 mg at 02/28/21 0571    atorvastatin (LIPITOR) tablet 40 mg, 40 mg, Oral, Daily With Dinner, Vidhiwenceslao Lucio, DPM, 40 mg at 02/27/21 1722    calcium carbonate (TUMS) chewable tablet 500 mg, 500 mg, Oral, BID PRN, Vidhi Lucio, DPM, 500 mg at 02/25/21 0609    citalopram (CeleXA) tablet 20 mg, 20 mg, Oral, Daily, Vidhi Naveed, DPM, 20 mg at 02/28/21 2174    clopidogrel (PLAVIX) tablet 75 mg, 75 mg, Oral, Daily, Vidhiwenceslao Lucio, DPM, 75 mg at 02/28/21 0903    enoxaparin (LOVENOX) subcutaneous injection 40 mg, 40 mg, Subcutaneous, Daily, Vidhiwenceslao Lucio, DPM, 40 mg at 02/28/21 7242    famotidine (PEPCID) tablet 20 mg, 20 mg, Oral, BID, Vidhi Naveed, DPM, 20 mg at 02/28/21 9406    HYDROmorphone (DILAUDID) injection 0 2 mg, 0 2 mg, Intravenous, Q4H PRN, Vidhi Lucio, DPM, 0 2 mg at 02/24/21 0719    insulin regular (HumuLIN R,NovoLIN R) 1 Units/mL in sodium chloride 0 9 % 100 mL infusion, 0 3-21 Units/hr, Intravenous, Titrated, Buzz Babcock MD, Last Rate: 1 5 mL/hr at 02/28/21 1014, 1 5 Units/hr at 02/28/21 1014    levothyroxine tablet 175 mcg, 175 mcg, Oral, Early Morning, Vidhi Lucio, DPM, 175 mcg at 02/28/21 6272    oxyCODONE (ROXICODONE) IR tablet 2 5 mg, 2 5 mg, Oral, Q4H PRN, Vidhi Lucio DPM, 2 5 mg at 02/26/21 2217    oxyCODONE (ROXICODONE) IR tablet 5 mg, 5 mg, Oral, Q4H PRN, Vidhi Lucio DPM, 5 mg at 02/28/21 4484    polyethylene glycol (MIRALAX) packet 17 g, 17 g, Oral, Daily PRN, Vidhi Lucio DPM    sodium bicarbonate tablet 650 mg, 650 mg, Oral, BID after meals, Vidhi Lucio DPM, 650 mg at 02/28/21 0903    tiotropium (SPIRIVA) capsule for inhaler 18 mcg, 18 mcg, Inhalation, Daily, Vidhi Lucio DPM, 18 mcg at 02/28/21 0904    traZODone (DESYREL) tablet 50 mg, 50 mg, Oral, HS, Ofelia Llamas, DPM, 50 mg at 02/27/21 2317    Laboratory Results:  Results from last 7 days   Lab Units 02/28/21  0601 02/27/21  0543 02/26/21  0552 02/25/21  1943 02/25/21  0945 02/24/21  0449 02/23/21  0530 02/22/21  0435   WBC Thousand/uL 17 89*  --  15 81* 16 27*  --   --  16 51* 20 12*   HEMOGLOBIN g/dL 9 9*  --  10 5* 10 6*  --   --  10 3* 10 5*   HEMATOCRIT % 30 0*  --  31 8* 32 0*  --   --  32 5* 32 4*   PLATELETS Thousands/uL 437*  --  387 380  --   --  274 315   POTASSIUM mmol/L 3 8 5 1 3 5  --  3 5 4 9 4 2 3 8   CHLORIDE mmol/L 101 100 103  --  104 102 102 105   CO2 mmol/L 26 22 23  --  25 22 19* 21   BUN mg/dL 28* 20 12  --  18 21 19 15   CREATININE mg/dL 1 56* 1 27 1 10  --  1 13 1 19 1 27 1 23   CALCIUM mg/dL 7 3* 7 5* 7 8*  --  7 7* 7 8* 7 9* 7 9*

## 2021-02-28 NOTE — PROGRESS NOTES
[facility] - Progress Note  Patient: Rosanna Laird 78 y o  male MRN: [id number]  Unit/Bed#: S -01 Encounter: [id number]  Primary Care Provider: AROLDO Corey  Date Of Visit: 02/28/21        Assessment & Plan:    * Osteomyelitis of toe with cellulitis  Assessment & Plan  · Necrosis of the right 2nd digit (distal phalanx), proximal phalanx erythema toes, warmth, tender    Inflammatory signs present of the plantar surface as well  · X-ray of the right foot showed no evidence of osteomyelitis, 2nd PIP joint dislocation  · S/p I&D of the right foot with open 2nd ray amputation on 2/21 - s/p off repeat washout with wound VAC insertion and partial closure on 2/26  · Podiatry discussed in detail long-term prognosis with patient and likely need for possible curative transmetatarsal amputation, however, patient refusing at this time, opting for further attempt at limb salvage  · Wound culture growing Enterococcus faecalis - antibiotic course transition to IV Ampicillin per Infectious Disease  · PRN pain control and supportive care  · PT/OT as tolerated - nonweightbearing to affected extremity per podiatry    Peripheral arterial disease  Assessment & Plan  · S/p right SFA stenting on 2/23 - follow-up LEADs noted  · Continue Plavix/statin    Acute kidney injury - Chronic kidney disease stage 3  Assessment & Plan  · Baseline creatinine approximately 1 3-1 6 - at 1 56 today  · Monitor renal function and urine output - limit/avoid nephrotoxins as possible - holding ACEI  · On sodium bicarbonate supplementation - acidosis resolved  · Nephrology following    Insulin-dependent diabetes mellitus  Assessment & Plan  Lab Results   Component Value Date    HGBA1C 9 3 (H) 02/21/2021     · Blood sugars have worsened today ranging in the 500s -> was transitioned to an insulin drip yesterday with mild improvement today - monitor for hypoglycemia  · Carbohydrate restricted diet    Essential hypertension  Assessment & Plan  · Continue Norvasc/Tenormin  · Holding ACEI in the setting of acute kidney injury    Leukocytosis  Assessment & Plan  · Likely reactive due to acute medical issue(s)  · Monitor WBC count    Hypothyroidism  Assessment & Plan  · Continue Synthroid    Hyperlipidemia  Assessment & Plan  · Continue Lipitor    Anemia of chronic disease  Assessment & Plan  · Monitor H/H    Hypoxemia  Assessment & Plan  · Intermittent episodes of hypoxia - currently on 2 L via nasal cannula at rest  · CXR with questionable infiltrates although in the absence of upper respiratory symptoms  · Infectious Disease following - on IV Ampicillin already for primary issue  · Encourage incentive spirometry    Depression  Assessment & Plan  · Continue Celexa      DVT Prophylaxis:  Lovenox       Patient Centered Rounds:  I have performed bedside rounds and discussed plan of care with nursing today  Discussions with Specialists or Other Care Team Provider:  see above assessments if applicable    Education and Discussions with Family / Patient:  Patient at bedside today - discussed with brother, impression, yesterday  Time Spent for Care:  32 minutes  More than 50% of total time spent on counseling and coordination of care as described above  Current Length of Stay: 9 day(s)    Current Patient Status: Inpatient   Certification Statement:  Patient will continue to require additional hospital stay due to assessments as noted above  Code Status: Level 1 - Full Code        Subjective:     Seen/examined earlier today  Does complain of some waxing/waning pain in the foot  Understands that his blood sugars are still relatively uncontrolled and must remain on insulin drip  Denies fever/chills or other constitutional symptoms at this time          Objective:     Vitals:   Temp (24hrs), Av 1 °F (36 7 °C), Min:97 5 °F (36 4 °C), Max:98 7 °F (37 1 °C)    Temp:  [97 5 °F (36 4 °C)-98 7 °F (37 1 °C)] 98 1 °F (36 7 °C)  HR:  [59-81] 64  Resp:  [16-18] 18  BP: (124-146)/(57-64) 146/63  SpO2:  [84 %-93 %] 84 %  Body mass index is 30 13 kg/m²  Input and Output Summary (last 24 hours):        Intake/Output Summary (Last 24 hours) at 2/28/2021 1613  Last data filed at 2/28/2021 1428  Gross per 24 hour   Intake --   Output 1277 ml   Net -1277 ml       Physical Exam:     GENERAL:  Weak/fatigued  HEAD:  Normocephalic - atraumatic  EYES: PERRL - EOMI   MOUTH:  Mucosa moist  NECK:  Supple - full range of motion  CARDIAC:  Rate controlled - S1/S2 positive  PULMONARY:  Fairly clear to auscultation - nonlabored respirations at rest  ABDOMEN:  Soft - nontender/nondistended - active bowel sounds  MUSCULOSKELETAL:  Motor strength/range of motion deconditioned - s/p right 2nd toe amputation dressed/wrapped s/p wound VAC insertion  NEUROLOGIC:  Alert/oriented at baseline  SKIN:  Chronic wrinkles/blemishes   PSYCHIATRIC:  Mood/affect stable      Additional Data:     Labs & Recent Cultures:    Results from last 7 days   Lab Units 02/28/21  0601   WBC Thousand/uL 17 89*   HEMOGLOBIN g/dL 9 9*   HEMATOCRIT % 30 0*   PLATELETS Thousands/uL 437*   NEUTROS PCT % 79*   LYMPHS PCT % 12*   MONOS PCT % 6   EOS PCT % 2     Results from last 7 days   Lab Units 02/28/21  0601   POTASSIUM mmol/L 3 8   CHLORIDE mmol/L 101   CO2 mmol/L 26   BUN mg/dL 28*   CREATININE mg/dL 1 56*   CALCIUM mg/dL 7 3*         Results from last 7 days   Lab Units 02/28/21  1559 02/28/21  1423 02/28/21  1302 02/28/21  1212 02/28/21  1007 02/28/21  0800 02/28/21  0606 02/28/21  0221 02/28/21  0157 02/27/21  2354 02/27/21  2055 02/27/21  1905   POC GLUCOSE mg/dl 223* 253* 204* 136 113 183* 236* 65 57* 124 228* 280*         Results from last 7 days   Lab Units 02/26/21  0552 02/25/21  1943   PROCALCITONIN ng/ml 0 21 0 28*                 Last 24 Hours Medication List:   Current Facility-Administered Medications   Medication Dose Route Frequency Provider Last Rate    acetaminophen 975 mg Oral Q8H [address] [facility]      amLODIPine  10 mg Oral Daily [facility]      ampicillin  2,000 mg Intravenous Q6H [facility], DPM 2,000 mg (02/28/21 1420)    atenolol  25 mg Oral Daily [facility]      atorvastatin  40 mg Oral Daily With [address] [facility] [address], DPM      calcium carbonate  500 mg Oral BID PRN [facility], D[facility]      citalopram  20 mg Oral Daily [facility]      clopidogrel  75 mg Oral Daily [facility]      enoxaparin  40 mg Subcutaneous Daily [facility], D[facility]      famotidine  20 mg Oral BID [facility] D[facility]      HYDROmorphone  0 2 mg Intravenous Q4H PRN [facility], D[facility]      insulin regular (HumuLIN R,NovoLIN R) infusion  0 3-21 Units/hr Intravenous Titrated Meagan Guzman MD      levothyroxine  175 mcg Oral Early Morning [facility], D[facility]      oxyCODONE  2 5 mg Oral Q4H PRN [facility], D[facility]      oxyCODONE  5 mg Oral Q4H PRN [facility], D[facility]      polyethylene glycol  17 g Oral Daily PRN Loletha Katayama, DPM      sodium bicarbonate  650 mg Oral BID after meals [facility], D[facility]      tiotropium  18 mcg Inhalation Daily [facility], DPM      traZODone  50 mg Oral HS [facility], DPM                    ** Please Note: This note is constructed using a voice recognition dictation system  An occasional wrong word/phrase or sound-a-like substitution may have been picked up by dictation device due to the inherent limitations of voice recognition software  Read the chart carefully and recognize, using reasonable context, where substitutions may have occurred  **

## 2021-02-28 NOTE — ASSESSMENT & PLAN NOTE
· Baseline creatinine approximately 1 3-1 6 - at 1 56 today  · Monitor renal function and urine output - limit/avoid nephrotoxins as possible - holding ACEI  · On sodium bicarbonate supplementation - acidosis resolved  · Nephrology following

## 2021-02-28 NOTE — ASSESSMENT & PLAN NOTE
· Necrosis of the right 2nd digit (distal phalanx), proximal phalanx erythema toes, warmth, tender    Inflammatory signs present of the plantar surface as well  · X-ray of the right foot showed no evidence of osteomyelitis, 2nd PIP joint dislocation  · S/p I&D of the right foot with open 2nd ray amputation on 2/21 - s/p off repeat washout with wound VAC insertion and partial closure on 2/26  · Podiatry discussed in detail long-term prognosis with patient and likely need for possible curative transmetatarsal amputation, however, patient refusing at this time, opting for further attempt at limb salvage  · Wound culture growing Enterococcus faecalis - antibiotic course transition to IV Ampicillin per Infectious Disease  · PRN pain control and supportive care  · PT/OT as tolerated - nonweightbearing to affected extremity per podiatry

## 2021-02-28 NOTE — ASSESSMENT & PLAN NOTE
Lab Results   Component Value Date    HGBA1C 9 3 (H) 02/21/2021     · Blood sugars have worsened today ranging in the 500s -> was transitioned to an insulin drip yesterday with mild improvement today - monitor for hypoglycemia  · Carbohydrate restricted diet

## 2021-03-01 LAB
ANION GAP SERPL CALCULATED.3IONS-SCNC: 7 MMOL/L (ref 4–13)
BASOPHILS # BLD AUTO: 0.1 THOUSANDS/ΜL (ref 0–0.1)
BASOPHILS NFR BLD AUTO: 1 % (ref 0–1)
BUN SERPL-MCNC: 17 MG/DL (ref 5–25)
CALCIUM SERPL-MCNC: 7.7 MG/DL (ref 8.3–10.1)
CHLORIDE SERPL-SCNC: 104 MMOL/L (ref 100–108)
CO2 SERPL-SCNC: 27 MMOL/L (ref 21–32)
CREAT SERPL-MCNC: 1.29 MG/DL (ref 0.6–1.3)
EOSINOPHIL # BLD AUTO: 0.42 THOUSAND/ΜL (ref 0–0.61)
EOSINOPHIL NFR BLD AUTO: 4 % (ref 0–6)
ERYTHROCYTE [DISTWIDTH] IN BLOOD BY AUTOMATED COUNT: 13.1 % (ref 11.6–15.1)
GFR SERPL CREATININE-BSD FRML MDRD: 57 ML/MIN/1.73SQ M
GLUCOSE SERPL-MCNC: 108 MG/DL (ref 65–140)
GLUCOSE SERPL-MCNC: 113 MG/DL (ref 65–140)
GLUCOSE SERPL-MCNC: 117 MG/DL (ref 65–140)
GLUCOSE SERPL-MCNC: 117 MG/DL (ref 65–140)
GLUCOSE SERPL-MCNC: 119 MG/DL (ref 65–140)
GLUCOSE SERPL-MCNC: 120 MG/DL (ref 65–140)
GLUCOSE SERPL-MCNC: 120 MG/DL (ref 65–140)
GLUCOSE SERPL-MCNC: 121 MG/DL (ref 65–140)
GLUCOSE SERPL-MCNC: 141 MG/DL (ref 65–140)
GLUCOSE SERPL-MCNC: 148 MG/DL (ref 65–140)
GLUCOSE SERPL-MCNC: 158 MG/DL (ref 65–140)
GLUCOSE SERPL-MCNC: 225 MG/DL (ref 65–140)
HCT VFR BLD AUTO: 30.7 % (ref 36.5–49.3)
HGB BLD-MCNC: 10.1 G/DL (ref 12–17)
IMM GRANULOCYTES # BLD AUTO: 0.11 THOUSAND/UL (ref 0–0.2)
IMM GRANULOCYTES NFR BLD AUTO: 1 % (ref 0–2)
LYMPHOCYTES # BLD AUTO: 2.6 THOUSANDS/ΜL (ref 0.6–4.47)
LYMPHOCYTES NFR BLD AUTO: 21 % (ref 14–44)
MCH RBC QN AUTO: 29 PG (ref 26.8–34.3)
MCHC RBC AUTO-ENTMCNC: 32.9 G/DL (ref 31.4–37.4)
MCV RBC AUTO: 88 FL (ref 82–98)
MONOCYTES # BLD AUTO: 1.4 THOUSAND/ΜL (ref 0.17–1.22)
MONOCYTES NFR BLD AUTO: 12 % (ref 4–12)
NEUTROPHILS # BLD AUTO: 7.5 THOUSANDS/ΜL (ref 1.85–7.62)
NEUTS SEG NFR BLD AUTO: 61 % (ref 43–75)
NRBC BLD AUTO-RTO: 0 /100 WBCS
PLATELET # BLD AUTO: 462 THOUSANDS/UL (ref 149–390)
PMV BLD AUTO: 10.5 FL (ref 8.9–12.7)
POTASSIUM SERPL-SCNC: 3.5 MMOL/L (ref 3.5–5.3)
RBC # BLD AUTO: 3.48 MILLION/UL (ref 3.88–5.62)
SODIUM SERPL-SCNC: 138 MMOL/L (ref 136–145)
WBC # BLD AUTO: 12.13 THOUSAND/UL (ref 4.31–10.16)

## 2021-03-01 PROCEDURE — 80048 BASIC METABOLIC PNL TOTAL CA: CPT | Performed by: INTERNAL MEDICINE

## 2021-03-01 PROCEDURE — 97605 NEG PRS WND THER DME<=50SQCM: CPT | Performed by: PODIATRIST

## 2021-03-01 PROCEDURE — 85025 COMPLETE CBC W/AUTO DIFF WBC: CPT | Performed by: INTERNAL MEDICINE

## 2021-03-01 PROCEDURE — 97116 GAIT TRAINING THERAPY: CPT

## 2021-03-01 PROCEDURE — 97167 OT EVAL HIGH COMPLEX 60 MIN: CPT

## 2021-03-01 PROCEDURE — 82948 REAGENT STRIP/BLOOD GLUCOSE: CPT

## 2021-03-01 PROCEDURE — 2W0SX6Z CHANGE PRESSURE DRESSING ON RIGHT FOOT: ICD-10-PCS | Performed by: PODIATRIST

## 2021-03-01 PROCEDURE — 97530 THERAPEUTIC ACTIVITIES: CPT

## 2021-03-01 PROCEDURE — 99233 SBSQ HOSP IP/OBS HIGH 50: CPT | Performed by: INTERNAL MEDICINE

## 2021-03-01 PROCEDURE — 97163 PT EVAL HIGH COMPLEX 45 MIN: CPT

## 2021-03-01 PROCEDURE — 99232 SBSQ HOSP IP/OBS MODERATE 35: CPT | Performed by: INTERNAL MEDICINE

## 2021-03-01 RX ORDER — ECHINACEA PURPUREA EXTRACT 125 MG
1 TABLET ORAL EVERY 2 HOUR PRN
Status: DISCONTINUED | OUTPATIENT
Start: 2021-03-01 | End: 2021-03-07 | Stop reason: HOSPADM

## 2021-03-01 RX ORDER — AMOXICILLIN 250 MG/1
1000 CAPSULE ORAL EVERY 8 HOURS SCHEDULED
Status: DISPENSED | OUTPATIENT
Start: 2021-03-01 | End: 2021-03-06

## 2021-03-01 RX ORDER — INSULIN GLARGINE 100 [IU]/ML
20 INJECTION, SOLUTION SUBCUTANEOUS EVERY MORNING
Status: DISCONTINUED | OUTPATIENT
Start: 2021-03-01 | End: 2021-03-05

## 2021-03-01 RX ADMIN — AMOXICILLIN 1000 MG: 250 CAPSULE ORAL at 15:46

## 2021-03-01 RX ADMIN — INSULIN GLARGINE 20 UNITS: 100 INJECTION, SOLUTION SUBCUTANEOUS at 10:29

## 2021-03-01 RX ADMIN — SODIUM BICARBONATE 650 MG: 650 TABLET ORAL at 09:00

## 2021-03-01 RX ADMIN — ENOXAPARIN SODIUM 40 MG: 40 INJECTION SUBCUTANEOUS at 09:00

## 2021-03-01 RX ADMIN — CITALOPRAM HYDROBROMIDE 20 MG: 20 TABLET ORAL at 09:00

## 2021-03-01 RX ADMIN — AMOXICILLIN 1000 MG: 250 CAPSULE ORAL at 09:06

## 2021-03-01 RX ADMIN — CLOPIDOGREL BISULFATE 75 MG: 75 TABLET ORAL at 09:00

## 2021-03-01 RX ADMIN — AMLODIPINE BESYLATE 10 MG: 10 TABLET ORAL at 09:00

## 2021-03-01 RX ADMIN — OXYCODONE HYDROCHLORIDE 5 MG: 5 TABLET ORAL at 14:39

## 2021-03-01 RX ADMIN — ATENOLOL 25 MG: 25 TABLET ORAL at 09:00

## 2021-03-01 RX ADMIN — ATORVASTATIN CALCIUM 40 MG: 40 TABLET, FILM COATED ORAL at 17:58

## 2021-03-01 RX ADMIN — OXYCODONE HYDROCHLORIDE 5 MG: 5 TABLET ORAL at 21:01

## 2021-03-01 RX ADMIN — OXYCODONE HYDROCHLORIDE 5 MG: 5 TABLET ORAL at 00:12

## 2021-03-01 RX ADMIN — AMOXICILLIN 1000 MG: 250 CAPSULE ORAL at 22:02

## 2021-03-01 RX ADMIN — TRAZODONE HYDROCHLORIDE 50 MG: 50 TABLET ORAL at 22:02

## 2021-03-01 RX ADMIN — INSULIN LISPRO 5 UNITS: 100 INJECTION, SOLUTION INTRAVENOUS; SUBCUTANEOUS at 17:59

## 2021-03-01 RX ADMIN — FAMOTIDINE 20 MG: 20 TABLET ORAL at 17:58

## 2021-03-01 RX ADMIN — OXYCODONE HYDROCHLORIDE 5 MG: 5 TABLET ORAL at 07:53

## 2021-03-01 RX ADMIN — FAMOTIDINE 20 MG: 20 TABLET ORAL at 09:00

## 2021-03-01 RX ADMIN — LEVOTHYROXINE SODIUM 175 MCG: 175 TABLET ORAL at 05:40

## 2021-03-01 RX ADMIN — TIOTROPIUM BROMIDE 18 MCG: 18 CAPSULE ORAL; RESPIRATORY (INHALATION) at 09:03

## 2021-03-01 RX ADMIN — AMPICILLIN SODIUM 2000 MG: 2 INJECTION, POWDER, FOR SOLUTION INTRAMUSCULAR; INTRAVENOUS at 02:13

## 2021-03-01 RX ADMIN — SODIUM BICARBONATE 650 MG: 650 TABLET ORAL at 17:59

## 2021-03-01 NOTE — PROCEDURES
WOUND VAC CHANGE  1  ORal consent obtained for VAC change  2  Removed 6 black sponges from wound  Cleaned with NSS  3  Right foot wound measures 12 5x2x3  Wound bed 95% red, 5% yellow  No necrosis, pus or malodor  4  Applied 2 black sponge to wound  VAC set at 125 low continuous pressure  PLAN:  1  Stable for DC from my perspective  2  NWB to right foot  3  Follow up with Dr Catalina Rueda at the [facility] wound center  4   VAC instructions placed in DC instructions

## 2021-03-01 NOTE — PROGRESS NOTES
Progress Note - Infectious Disease   Cory Ortega 78 y o  male MRN: [id number]  Unit/Bed#: S -01 Encounter: [id number]      Impression/Plan:  1  Right 2nd toe gangrene, cellulitis and clinical osteomyelitis  Patient reportedly had previous fracture at this site and was found on initial evaluation have exposed bone with foul-smelling gangrenous right toe  Clinically consistent with osteomyelitis  Also noted to have tracking cellulitis  Patient underwent amputation on 2/21  Also found to have problem 2  Cultures isolated Enterococcus  Underwent revascularization  Status post washout and partial closure on 02/26 with presumed source control  At this time will switch to amoxicillin 1 g q 8 hours given 5  Discontinue ampicillin  Continue oral antibiotic for total 7 days postprocedure through 3/4  Continue to trend fever curve/WBC  Ongoing follow-up by Podiatry  Additional supportive care/discharge as per primary    2  Right foot plantar abscess  Patient underwent incision and drainage with amputation on 02/21  Cultures ultimately with Enterococcus  Repeat washout on 02/26 with debridement without any further tracking sinus/purulence  Plan at this time remains for delayed healing attempts with VAC dressing  Continue antibiotics as above  Continue to trend fever curve/WBC  Ongoing follow-up/local care as per Podiatry    3  Leukocytosis  Likely related to the above  Patient remains hemodynamically stable and blood cultures negative  This continues to slowly down trend  Continue to monitor CBC while admitted  Continue antibiotics as above    4  CKD 3  Creatinine appears to be stable  Amoxicillin dosing as above  Monitor chemistry while admitted  Will further dose adjust antibiotics as needed    5  Diabetes mellitus, with neuropathy and obesity  Unfortunately this is likely risk factor for patient's progressive development of wound infection  And also impacts wound healing    BMI noted to be 30 which impacts antibiotic dosing  Plan for higher dosing of amoxicillin as above  Glucose management as per primary  Recommend close follow-up as outpatient with PCP    6  PAD   Status post IR recannulization and stenting of right SFA  Unfortunately risk factor for poor wound healing  Continue local wound care and follow-up with vascular/podiatry  7  Transient hypoxia, with abnormal CXR  No further episodes of hypoxia noted  Appears stable on room air  Continue to monitor respiratory status while admitted  Above plan discussed in detail with patient at bedside  ID consult service will continue to follow  Antibiotics:  Amoxicillin 1  Total antibiotic 11    Initial amputation   Debridement and partial closure     Subjective:  Patient has no fever, chills, sweats; no nausea, vomiting, diarrhea; no cough, shortness of breath; no pain  No new symptoms  He reports having some mild sharp pains that are intermittent at the initial incision site  Tolerating VAC dressing without issue  He is hopeful to go home soon  He is aware of the need for close follow-up and potential relapse  Objective:  Vitals:  Temp:  [97 4 °F (36 3 °C)-98 1 °F (36 7 °C)] 97 6 °F (36 4 °C)  HR:  [64-67] 65  Resp:  [18] 18  BP: (117-151)/(58-67) 117/58  SpO2:  [84 %-94 %] 94 %  Temp (24hrs), Av 7 °F (36 5 °C), Min:97 4 °F (36 3 °C), Max:98 1 °F (36 7 °C)  Current: Temperature: 97 6 °F (36 4 °C)    Physical Exam:   General Appearance:  Alert, interactive, nontoxic, no acute distress  Chronically ill-appearing elderly  Throat: Oropharynx moist without lesions  Poor dentition noted  Lungs:   Clear to auscultation bilaterally; no wheezes, rhonchi or rales; respirations unlabored on room air   Heart:  RRR; no murmur, rub or gallop appreciated   Abdomen:   Soft, non-tender, non-distended, positive bowel sounds  Extremities: No clubbing, cyanosis or edema   Skin: No new rashes or lesions  No new draining wounds noted  Surgical site remains under VAC dressing         Labs, Imaging, & Other studies:   All pertinent labs and imaging studies were personally reviewed  Results from last 7 days   Lab Units 03/01/21  0552 02/28/21  0601 02/26/21  0552   WBC Thousand/uL 12 13* 17 89* 15 81*   HEMOGLOBIN g/dL 10 1* 9 9* 10 5*   PLATELETS Thousands/uL 462* 437* 387     Results from last 7 days   Lab Units 03/01/21  0552   POTASSIUM mmol/L 3 5   CHLORIDE mmol/L 104   CO2 mmol/L 27   BUN mg/dL 17   CREATININE mg/dL 1 29   EGFR ml/min/1 73sq m 57   CALCIUM mg/dL 7 7*

## 2021-03-01 NOTE — PLAN OF CARE
Problem: Potential for Falls  Goal: Patient will remain free of falls  Description: INTERVENTIONS:  - Assess patient frequently for physical needs  -  Identify cognitive and physical deficits and behaviors that affect risk of falls    -  [place] fall precautions as indicated by assessment   - Educate patient/family on patient safety including physical limitations  - Instruct patient to call for assistance with activity based on assessment  - Modify environment to reduce risk of injury  - Consider OT/PT consult to assist with strengthening/mobility  Outcome: Progressing     Problem: PAIN - ADULT  Goal: Verbalizes/displays adequate comfort level or baseline comfort level  Description: Interventions:  - Encourage patient to monitor pain and request assistance  - Assess pain using appropriate pain scale  - Administer analgesics based on type and severity of pain and evaluate response  - Implement non-pharmacological measures as appropriate and evaluate response  - Consider cultural and social influences on pain and pain management  - Notify physician/advanced practitioner if interventions unsuccessful or patient reports new pain  Outcome: Progressing     Problem: INFECTION - ADULT  Goal: Absence or prevention of progression during hospitalization  Description: INTERVENTIONS:  - Assess and monitor for signs and symptoms of infection  - Monitor lab/diagnostic results  - Monitor all insertion sites, i e  indwelling lines, tubes, and drains  - Monitor endotracheal if appropriate and nasal secretions for changes in amount and color  - [place] appropriate cooling/warming therapies per order  - Administer medications as ordered  - Instruct and encourage patient and family to use good hand hygiene technique  - Identify and instruct in appropriate isolation precautions for identified infection/condition  Outcome: Progressing     Problem: SAFETY ADULT  Goal: Patient will remain free of falls  Description: INTERVENTIONS:  - Assess patient frequently for physical needs  -  Identify cognitive and physical deficits and behaviors that affect risk of falls  -  [place] fall precautions as indicated by assessment   - Educate patient/family on patient safety including physical limitations  - Instruct patient to call for assistance with activity based on assessment  - Modify environment to reduce risk of injury  - Consider OT/PT consult to assist with strengthening/mobility  Outcome: Progressing     Problem: DISCHARGE PLANNING  Goal: Discharge to home or other facility with appropriate resources  Description: INTERVENTIONS:  - Identify barriers to discharge w/patient and caregiver  - Arrange for needed discharge resources and transportation as appropriate  - Identify discharge learning needs (meds, wound care, etc )  - Arrange for interpretive services to assist at discharge as needed  - Refer to Case Management Department for coordinating discharge planning if the patient needs post-hospital services based on physician/advanced practitioner order or complex needs related to functional status, cognitive ability, or social support system  Outcome: Progressing     Problem: Knowledge Deficit  Goal: Patient/family/caregiver demonstrates understanding of disease process, treatment plan, medications, and discharge instructions  Description: Complete learning assessment and assess knowledge base    Interventions:  - Provide teaching at level of understanding  - Provide teaching via preferred learning methods  Outcome: Progressing     Problem: Prexisting or High Potential for Compromised Skin Integrity  Goal: Skin integrity is maintained or improved  Description: INTERVENTIONS:  - Identify patients at risk for skin breakdown  - Assess and monitor skin integrity  - Assess and monitor nutrition and hydration status  - Monitor labs   - Assess for incontinence   - Turn and reposition patient  - Assist with mobility/ambulation  - Relieve pressure over bony prominences  - Avoid friction and shearing  - Provide appropriate hygiene as needed including keeping skin clean and dry  - Evaluate need for skin moisturizer/barrier cream  - Collaborate with interdisciplinary team   - Patient/family teaching  - Consider wound care consult   Outcome: Progressing     Problem: Nutrition/Hydration-ADULT  Goal: Nutrient/Hydration intake appropriate for improving, restoring or maintaining nutritional needs  Description: Monitor and assess patient's nutrition/hydration status for malnutrition  Collaborate with interdisciplinary team and initiate plan and interventions as ordered  Monitor patient's weight and dietary intake as ordered or per policy  Utilize nutrition screening tool and intervene as necessary  Determine patient's food preferences and provide high-protein, high-caloric foods as appropriate       INTERVENTIONS:  - Monitor oral intake, urinary output, labs, and treatment plans  - Assess nutrition and hydration status and recommend course of action  - Evaluate amount of meals eaten  - Assist patient with eating if necessary   - Allow adequate time for meals  - Recommend/ encourage appropriate diets, oral nutritional supplements, and vitamin/mineral supplements  - Order, calculate, and assess calorie counts as needed  - Recommend, monitor, and adjust tube feedings and TPN/PPN based on assessed needs  - Assess need for intravenous fluids  - Provide specific nutrition/hydration education as appropriate  - Include patient/family/caregiver in decisions related to nutrition  Outcome: Progressing

## 2021-03-01 NOTE — OCCUPATIONAL THERAPY NOTE
Occupational Therapy Evaluation     Patient Name: Cynthia Tyson  [id number] Date: 3/1/2021  Problem List  Principal Problem:    Osteomyelitis of toe with cellulitis  Active Problems:    Insulin-dependent diabetes mellitus    Essential hypertension    Depression    Hyperlipidemia    Hypothyroidism    Anemia of chronic disease    Peripheral arterial disease    Hypoxemia    Acute kidney injury - Chronic kidney disease stage 3    Leukocytosis    Past Medical History  History reviewed  No pertinent past medical history  Past Surgical History  Past Surgical History:   Procedure Laterality Date    INCISION AND DRAINAGE OF WOUND Right 2/21/2021    Procedure: INCISION AND DRAINAGE (I&D) EXTREMITY RIGHT FOOT AND OPEN SECOND RAY AMPUTATION;  Surgeon: Chito Vinson DPM;  [facility];  Service: Podiatry    IR LOWER EXTREMITY ANGIOGRAM  2/23/2021    WOUND DEBRIDEMENT Right 2/26/2021    Procedure: DEBRIDEMENT WOUND ([address]) with partial closure;  Surgeon: Chito Vinson DPM;  [facility];  Service: Podiatry             03/01/21 1451   OT Last Visit   OT Visit Date 03/01/21   Note Type   Note type Evaluation   Restrictions/Precautions   Weight Bearing Precautions Per Order Yes   RLE Weight Bearing Per Order NWB   Other Precautions Cognitive;WBS;Chair Alarm; Bed Alarm; Fall Risk;Pain;O2;Multiple lines  (wound vac)   Pain Assessment   Pain Assessment Tool 0-10   Pain Score 9   Pain Location/Orientation Orientation: Right;Location: Foot   Home Living   Type of Home House  (12 [address] to descend)   Home Layout Multi-level  (reports staying in basement)   Bathroom Shower/Tub Walk-in shower   Bathroom Toilet Raised   Bathroom Equipment   (none)   Bathroom Accessibility Accessible   Home Equipment   (none)   Additional Comments no use of AD at baseline   Prior Function   Level of [place] Independent with ADLs and functional mobility   Lives With   (brother)   [place] Help From Family   ADL Assistance Independent   IADLs Independent   Falls in the last 6 months 1 to 4   Vocational Retired   Comments (+)drives   Lifestyle   [employer] PTA pt living with brother in basement of multistory house, pt (I) with ADLs and IADLs, no use of AD at baseline, (+)falls, (+)drives   Reciprocal Relationships supportive brother   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   [address] watching tv   Subjective   Subjective "I just can't keep my weight off that foot, my body isn't built for that"   ADL   Eating Assistance 7  Independent   Grooming Assistance 5  Supervision/Setup   UB Bathing Assistance 5  Supervision/Setup   LB Bathing Assistance 3  Moderate Assistance   [address] 5  Supervision/Setup   LB Dressing Assistance 2  Maximal Assistance   LB Dressing Deficit Thread RLE into pants; Thread LLE into pants;Pull up over hips; Increased time to complete;Supervision/safety;Verbal cueing   Toileting Assistance  4  Minimal Assistance   Bed Mobility   Rolling R 5  Supervision   Additional items Increased time required;Verbal cues   Rolling L 5  Supervision   Additional items Increased time required;Verbal cues   Transfers   Sit to Stand 4  Minimal assistance   Additional items Assist x 1; Increased time required;Verbal cues   Stand to Sit 4  Minimal assistance   Additional items Assist x 1; Increased time required;Verbal cues   Additional Comments + 2nd assist to maintain NWB status, pt non-compliant with NWB status   Functional Mobility   Additional Comments unable to complete without maintaining NWB status   Balance   Static Sitting Good   Dynamic Sitting Good   Static Standing Poor +   Dynamic Standing Poor +   Ambulatory Poor +   Activity Tolerance   Activity Tolerance Patient tolerated treatment well   Medical Staff Made Aware PT FERNANDEZ Bhatti   RUE Assessment   RUE Assessment WFL   LUE Assessment   LUE Assessment WFL   Hand Function   Gross Motor Coordination Functional   Fine Motor Coordination Functional   Sensation Light Touch No apparent deficits   Sharp/Dull No apparent deficits   Cognition   Overall Cognitive Status Impaired   Arousal/Participation Alert; Cooperative   Attention Attends with cues to redirect   Orientation Level Oriented X4   Memory Decreased recall of precautions   Following Commands Follows one step commands with increased time or repetition   Comments Pt with decreased insights into deficits, decreased problem solving, decreased safety awareness  Unable to maintain NWB   Cognition Assessment Tools   (Short Blessed Cognitive Assessment)   Score 10  (score indicates impairment consistent with dementia)   Assessment   Limitation Decreased ADL status; Decreased UE strength;Decreased Safe judgement during ADL;Decreased cognition;Decreased endurance;Decreased self-care trans;Decreased high-level ADLs   Prognosis Good   Assessment Patient is a 78 y o  male admitted to [address] [place] [address] on 2/19/2021 due to Osteomyelitis [facility])  Pt currently has wound vac on RLE and is to be strictly NWB  Pt demonstrating poor compliance with NWB throughout session  Comorbidities affecting pt's physical performance at time of assessment include insulin dependent diabetes, HTN, depression, HLD, hypothyroidism, anemia, PAD, hypoxemia, FAUSTO, leukocytosis  Patient has active OT orders and activity orders for Up with assistance  [facility], pt (I) with ADLs and IADLs, no use of AD at baseline, (+)falls, (+)drives  Personal factors affecting pt at time of IE include:steps to enter environment, limited home support, difficulty performing ADLS, difficulty performing IADLS , limited insight into deficits, compliance and decreased initiation and engagement   At the time of evaluation patient currently requires (S) for UB ADLs, mod-max A for LB ADLs, min A for functional transfers, and min A for functional mobility   The following deficits affected patient's occupational performance weakness, decreased functional strength, decreased functional balance, decreased activity tolerance, decreased safety awareness, impaired initiation, impaired memory, impaired sequencing, impaired problem solving, orthopedic restrictions, impaired interpersonal skills and decreased coping skills  Patient would benefit from skilled OT services while in the hospital to address above deficits  Occupational performance areas to be addressed include ADL retraining, bed mobility, functional transfer training, endurance training, cognitive reorientation, patient/family training, equipment evaluation/education, compensatory technique education, energy conservation, activity engagement and activity tolerance in order to maximize patient's level of function  The patient's raw score on the AM-PAC Daily Activity inpatient short form is 16, standardized score is 35 96, less than 39 4  Patients at this level are likely to benefit from DC to post-acute rehabilitation services  From OT standpoint recommend Post Acute Rehabilitation upon D/C  Will continue to follow pt 3-5x/week to address the goals listed below evaluation  Goals   Patient Goals to go home   LTG Time Frame 10-14   Long Term Goal see goals listed below   Plan   Treatment Interventions ADL retraining;Functional transfer training; Endurance training;Cognitive reorientation;Patient/family training;Equipment evaluation/education; Compensatory technique education; Activityengagement; Energy conservation   Goal Expiration Date 03/15/21   OT Treatment Day 0   OT Frequency 3-5x/wk   Recommendation   OT Discharge Recommendation Post-Acute Rehabilitation Services   Additional Comments  Pt UNSAFE to return home as he is currently unable to maintain NWB   AM-PAC Daily Activity Inpatient   Lower Body Dressing 2   Bathing 2   Toileting 2   Upper Body Dressing 3   Grooming 3   Eating 4   Daily Activity Raw Score 16   Daily Activity Standardized Score (Calc for Raw Score >=11) 35 96   AM-PAC Applied Cognition Inpatient   Following a Speech/Presentation 3   Understanding Ordinary Conversation 3   Taking Medications 3   Remembering Where Things Are Placed or Put Away 3   Remembering List of 4-5 Errands 2   Taking Care of Complicated Tasks 2   Applied Cognition Raw Score 16   Applied Cognition Standardized Score 35 03        Goals    -Patient will complete UB ADLs w/ mod I using AE and AD as needed    -Patient will complete LB ADLs w/ mod I using AE and AD as needed    -Patient will complete toileting w/ mod I w/ G hygiene/thoroughness    -Patient will complete bed mobility with Mod I without use of bed rails    -Patient will tolerate therapeutic activities for greater than 15 min, in order to increase tolerance for functional activities      -Patient will perform functional transfers with Mod I to/from all surfaces using DME as needed    -Patient will complete functional mobility during ADL/IADL/leisure tasks with Mod I     -Patient will follow multistep instructions with no cuing to increase cognitive function and independence with tasks     -Patient will demonstrate 100% carryover of energy conservation techniques t/o functional I/ADL/leisure tasks w/o cues s/p skilled education to increase endurance during functional tasks    -Patient will increase BUE strength to 5/5 via AROM/AAROM/PROM exercises to increase independence in ADLs and transfers    -Patient will be attentive 100% of the time during ongoing cognitive assessment w/ G participation to assist w/ safe d/c planning/recommendations     -Patient will demonstrate 100% adherence to NWB status during all functional activities w/o cues from therapist       At the end of the session, all needs met and pt supine in bed, bed alarm activated, HOB elevated and call bell within reach    [facility], OTR/L

## 2021-03-01 NOTE — PHYSICAL THERAPY NOTE
PHYSICAL THERAPY EVALUATION  NAME:  Danie Situ  DATE: 03/01/21    AGE:   78 y o  Mrn:   [id number]  ADMIT DX:  Osteomyelitis ([facility] [address] ) [M86 9]  General weakness [R53 1]  Generalized weakness [R53 1]  Gangrene of toe of right foot ([facility] [address] ) [I96]    History reviewed  No pertinent past medical history  Past Surgical History:   Procedure Laterality Date    INCISION AND DRAINAGE OF WOUND Right 2/21/2021    Procedure: INCISION AND DRAINAGE (I&D) EXTREMITY RIGHT FOOT AND OPEN SECOND RAY AMPUTATION;  Surgeon: Georgina Degroot DPM;  [facility];  Service: Podiatry    IR LOWER EXTREMITY ANGIOGRAM  2/23/2021    WOUND DEBRIDEMENT Right 2/26/2021    Procedure: DEBRIDEMENT WOUND ([address]) with partial closure;  Surgeon: Georgina Degroot DPM;  [facility];  Service: Podiatry     Length Of Stay: 10  Performed at least 2 patient identifiers during session: Name and Birthday  PHYSICAL THERAPY EVALUATION :    03/01/21 [id number]   PT Last Visit   PT Visit Date 03/01/21   Note Type   Note type Evaluation   Pain Assessment   Pain Assessment Tool 0-10   Pain Score 6   Pain Location/Orientation Location: Foot;Orientation: Right   Multiple Pain Sites Yes  (chronic L hip pain)   Home Living   Type of Home House  ([address])   Home Layout Multi-level  (lives in basement)   Home Equipment   (non prior to admit)   Additional Comments no use of AD at baseline   Prior Function   Level of [place] Independent with ADLs and functional mobility   Lives With   (brother)   Receives Help From Family   ADL Assistance Independent   IADLs Independent   Falls in the last 6 months 1 to 4   Vocational Retired   Comments +drives   Restrictions/Precautions   Wells Any Bearing Precautions Per Order Yes   RLE Wells Any Bearing Per Order NWB   Other Precautions Cognitive; Fall Risk;Pain;Multiple lines;O2;Bed Alarm;WBS;Chair Alarm  (+VAC)   General   Additional Pertinent History Procedure(s) #1:  INCISION AND DRAINAGE (I&D) EXTREMITY RIGHT FOOT AND OPEN SECOND RAY AMPUTATION (Right) SURGERY DATE: 2/21/2021 due to Gangrene of toe of right foot,  Osteomyelitis  Procedure #2: DEBRIDEMENT WOUND ([address]) with partial closure (Right) 2/26/2021 due to Gangrene of toe of right foot,  Osteomyelitis of right foot, unspecified type  Per podiatry 2/27: "We discussed transmetatarsal amputation which would allow for more definitive closure versus long-term wound VAC therapy  Patient does not want to lose any more toes and understands that limb salvage will take much longer  He is willing to try wound VAC therapy for now  He understands he must stay off the right foot  He understands this will take many months with numerous outpatient follow-up visits   Physical therapy consult for nonweightbearing to the right foot  Family/Caregiver Present No   Cognition   Overall Cognitive Status Impaired   Arousal/Participation Alert   Attention Attends with cues to redirect   Orientation Level Oriented X4   Memory Decreased recall of precautions   Following Commands Follows one step commands with increased time or repetition   Comments Needs encouragement to participate during both sessions   RUE Assessment   RUE Assessment WFL   LUE Assessment   LUE Assessment WFL   Strength RLE   R Hip Flexion   (tested grossly to 3)   Coordination   Movements are Fluid and Coordinated 0   Coordination and Movement Description semental   Sensation   (vision and hearing)   Light Touch   RLE Light Touch Absent   RLE Light Touch Comments @ toes distal to dressing   LLE Light Touch Grossly intact   Bed Mobility   Rolling R 5  Supervision   Additional items Assist x 1; Increased time required;Verbal cues  (line management)   Rolling L 5  Supervision   Additional items Assist x 1; Increased time required;Verbal cues; Bedrails  (line managemnet)   Supine to Sit 5  Supervision   Additional items Assist x 1; Increased time required;Verbal cues;HOB elevated; Bedrails  (Pt using bed controls to perform supine->sit)   Sit to Supine 5  Supervision   Additional items Assist x 1; Increased time required;Verbal cues; Bedrails   Transfers   Sit to Stand Unable to assess  (declined due to SOB, pain)   Ambulation/Elevation   Gait pattern   (declined due to SOB, pain)   Endurance Deficit   Endurance Deficit Yes   Endurance Deficit Description Spo2 low when on RA <88%   Activity Tolerance   Activity Tolerance Patient limited by pain; Patient limited by fatigue   Medical Staff Made Aware spoke to [facility] from case management   Nurse Made Aware spoke to RN   Assessment   Prognosis Guarded   Problem List Decreased strength;Decreased range of motion;Decreased endurance; Impaired balance;Decreased mobility; Decreased coordination;Decreased cognition; Impaired judgement;Decreased safety awareness; Impaired sensation;Obesity;Orthopedic restrictions;Pain;Decreased skin integrity   Barriers to Discharge Decreased caregiver support; Inaccessible home environment  (flight of stairs to main living environment)   Barriers to Discharge Comments Pt also reports that he would not have enough room to use a walker, knee scooter, or WC   Goals   Patient Goals to go home, not rehab   STG Expiration Date 03/11/21   PT Treatment Day 1  (treatment documented in note)   Plan   Treatment/Interventions Functional transfer training;LE strengthening/ROM; Therapeutic exercise; Endurance training;Cognitive reorientation;Equipment eval/education;Patient/family training;Spoke to case management;Spoke to nursing;Continued evaluation; Bed mobility   PT Frequency Other (Comment)  (3-5x/wk)   Recommendation   PT Discharge Recommendation Post-Acute Rehabilitation Services   Equipment Recommended   (TBD)   AM-PAC Basic Mobility Inpatient   Turning in Bed Without Bedrails 3   Lying on Back to Sitting on Edge of Flat Bed 2   Moving Bed to Chair 1   Standing Up From Chair 1   Walk in Room 1   Climb 3-5 Stairs 1   Basic Mobility Inpatient Raw Score 9   Turning Head Towards Sound 3   Follow Simple Instructions 3   Low Function Basic Mobility Raw Score 15   Low Function Basic Mobility Standardized Score 23 9   (Please find full objective findings from PT assessment regarding body systems outlined above)  Assessment: Pt is a 78 y o  male seen for PT evaluation s/p admit to [facility] on 2/19/2021 w/ Osteomyelitis ([address] )  Pt had above procedures, and has been written to be NWB on LLE at least since 2/21/2021  PT reports that he has been amb to/from bathroom while here  Order placed for PT  Upon evaluation: Pt requires s assistance for bed mobility and not appropriate for weight bearing activity as pt is limited by pain /endurance  Pt's clinical presentation is currently unstable/unpredictable given the functional mobility deficits above, especially (but not limited to) weakness, decreased ROM, decreased endurance, pain and orthopedic restrictions, coupled with fall risks including hx of falls, impaired balance, impaired coordination, impaired judgement, decreased safety awareness, limited sensation/neuropathy and decreased cognition, and combined with medical complications of abnormal renal lab values, abnormal H&H, abnormal WBCs, abnormal sodium values, low SpO2 values and new onset O2 use  Pt to benefit from continued skilled PT tx while in hospital and upon DC to address deficits as defined above and maximize level of functional mobility  Recommend trials w/ walker vs cane vs WC with standing and eventually gait as appropriate-- as pt reports he is amb to/from bathroom with current walker set for height of approx 5'2" and "tries" to keep the weight off of the foot  Goals: Pt will: Perform rolling and supine<>sit bed mobility tasks with modified I to prepare for transfers and reposition in bed  Assess transfers, gait and stairs and set goals to demonstrate compliance with WB limitations and promote proper use of assistive device       The patient's AM-PAC Basic Mobility Inpatient Short Form Raw Score is 15 Standardized Score is 23 9  A standardized score less than 42 9 suggests the patient may benefit from discharge to post-acute rehabilitation services, however please refer to therapist recommendation for discharge planning given other factors that may influence destination as OOB mobility needs to be further assessed  Comorbidities affecting pt's physical performance at time of assessment include: DM, HTN, obesity and active smoker  Personal factors affecting pt at time of IE include: steps to enter environment, limited home support, past experience, behavioral pattern, inability to navigate community distances, limited insight into impairments, recent fall(s) and questionable non-compliance  Ivana Hand PT, DPT  PHYSICAL THERAPY TREATMENT NOTE  Time In:1448  Time Out:1528  Total Time: 40 min  Pt requires PT /OT co-treat due to signficant assistance with mobility, limited WB status as well as limited compliance,  and cognitive-behavioral impairments  PT and OT goals addressed separately  S:  Pt needs encouragement to participate during the PM session--reports at times he gets aggravated and just wants to go home  O:   ·  Supine<>sit S with bed controls  After pt education: Performed sit<>sit with walker and crutches using min A of 2 on multiple attempts (second A to attempt to maintain NWB on RLE)--but pt pushing through to [facility] on RLE despite physical A And verbal instruction  · Trials for ambulation DCed due to pt's limited compliance with NWB in standing  · Standing tolerance max of 45 seconds w/crutches support   Extensive time re: education of podiatry recommendations of NWB RLE and Pt's current mobility limitations  · With encouragement pt agreeable to performing transfer training to/from R [address]  After demonstration, multiple trials, and minimal physical A to maintain NWB on RLE--pt able to transfer to/from R [address] via lateral scoot (w/ WC armrest removed)       A:  Pt most successful in maintaining NWB on RLE by doing lateral scoot transfers to/from target surfaces, and NOT during ambulation trials w/ walker or crutches  However pt reports he "does not have the space" to use a WC in his home  Additionally based on prior notes this admission, Pt will need to f/u with physician @ the wound center  P:  Recommend that pt NOT amb here w/walker and cane to bathroom, and only use commode and WC as target surfaces for mobility  Also do not currently recommend pt go up/down stairs on buttocks as he had difficulty maintainin NWB on RLE for transfers @ bedside, let alone transfers to/from stair  Recommend case management involvement as pt has flight of stairs to negotiate and did not maintain NWB on RLE with sit<>stand transfers--he plans to go up/down on buttocks  Goals adjusted to reflect continued progressive mobility  Goals: Perform rolling and supine<>sit bed mobility tasks with modified I to prepare for transfers and reposition in bed  Perform lateral scoot and sit pivot transfers to consistent min A of 1 to increase Indep in home environment and demonstrate compliance with WB limitations  Assess ambulation trials w/knee scooter with min A for 5' to consistent min A of 1 To demonstrate compliance with WB limitations and promote proper use of assistive device  Propel WC for 50' as alternative to ambulation  Assess stairs as appropriate and set goals       Alexx Silver, PT, DPT

## 2021-03-01 NOTE — ASSESSMENT & PLAN NOTE
· Necrosis of the right 2nd digit (distal phalanx), proximal phalanx erythema toes, warmth, tender    Inflammatory signs present of the plantar surface as well  · X-ray of the right foot showed no evidence of osteomyelitis, 2nd PIP joint dislocation  · S/p I&D of the right foot with open 2nd ray amputation on 2/21/21 - s/p repeat washout with wound VAC insertion and partial closure on 2/26/21  · Podiatry discussed in detail long-term prognosis with patient and likely need for possible curative transmetatarsal amputation, however, patient refusing at this time, opting for further attempt at limb salvage  · Wound culture growing Enterococcus faecalis - antibiotic course transitioned to IV Ampicillin and now Amoxicillin through 3/4/21 per Infectious Disease  · PRN pain control and supportive care  · PT/OT as tolerated - nonweightbearing to affected extremity per podiatry

## 2021-03-01 NOTE — ASSESSMENT & PLAN NOTE
Lab Results   Component Value Date    HGBA1C 9 3 (H) 02/21/2021     · Patient did have severe hyperglycemia during this hospitalization with blood glucoses in the 500s  He required an insulin drip  He was receiving approximately 1 5 units/hour  We will transition to Lantus 20 units daily and lispro 5 units before meals    · Carbohydrate restricted diet

## 2021-03-01 NOTE — PLAN OF CARE
Problem: OCCUPATIONAL THERAPY ADULT  Goal: Performs self-care activities at highest level of function for planned discharge setting  See evaluation for individualized goals  Description: Treatment Interventions: ADL retraining, Functional transfer training, Endurance training, Cognitive reorientation, Patient/family training, Equipment evaluation/education, Compensatory technique education, Activityengagement, Energy conservation          See flowsheet documentation for full assessment, interventions and recommendations  Note: Limitation: Decreased ADL status, Decreased UE strength, Decreased Safe judgement during ADL, Decreased cognition, Decreased endurance, Decreased self-care trans, Decreased high-level ADLs  Prognosis: Good  Assessment: Patient is a 78 y o  male admitted to [address] [place] [address] on 2/19/2021 due to Osteomyelitis [facility])  Pt currently has wound vac on RLE and is to be strictly NWB  Pt demonstrating poor compliance with NWB throughout session  Comorbidities affecting pt's physical performance at time of assessment include insulin dependent diabetes, HTN, depression, HLD, hypothyroidism, anemia, PAD, hypoxemia, FAUSTO, leukocytosis  Patient has active OT orders and activity orders for Up with assistance  PTA pt living with brother in basement of [facility] house, pt (I) with ADLs and IADLs, no use of AD at baseline, (+)falls, (+)drives  Personal factors affecting pt at time of IE include:steps to enter environment, limited home support, difficulty performing ADLS, difficulty performing IADLS , limited insight into deficits, compliance and decreased initiation and engagement   At the time of evaluation patient currently requires (S) for UB ADLs, mod-max A for LB ADLs, min A for functional transfers, and min A for functional mobility   The following deficits affected patient's occupational performance weakness, decreased functional strength, decreased functional balance, decreased activity tolerance, decreased safety awareness, impaired initiation, impaired memory, impaired sequencing, impaired problem solving, orthopedic restrictions, impaired interpersonal skills and decreased coping skills  Patient would benefit from skilled OT services while in the hospital to address above deficits  Occupational performance areas to be addressed include ADL retraining, bed mobility, functional transfer training, endurance training, cognitive reorientation, patient/family training, equipment evaluation/education, compensatory technique education, energy conservation, activity engagement and activity tolerance in order to maximize patient's level of function  The patient's raw score on the AM-PAC Daily Activity inpatient short form is 16, standardized score is 35 96, less than 39 4  Patients at this level are likely to benefit from DC to post-acute rehabilitation services  From OT standpoint recommend Post Acute Rehabilitation upon D/C  Will continue to follow pt 3-5x/week to address the goals listed below evaluation        OT Discharge Recommendation: Post-Acute Rehabilitation Services

## 2021-03-01 NOTE — PROGRESS NOTES
Progress Note - Elvira Quinn 1950, 78 y o  male MRN: [id number]    Unit/Bed#: S -01 Encounter: [id number]    Primary Care Provider: Ulises Plata   Date and time admitted to hospital: 2/19/2021  2:09 PM        Leukocytosis  Assessment & Plan  · Due to osteomyelitis  · Monitor WBC count, improving    Acute kidney injury - Chronic kidney disease stage 3  Assessment & Plan  · Due to sepsis, ACEi, NSAIDs  · Received IVFs  · Baseline creatinine approximately 1 3-1 6 - at 1 29 today (resolved)  · Monitor renal function and urine output - limit/avoid nephrotoxins as possible - holding ACEI  · On sodium bicarbonate supplementation - acidosis resolved  · Nephrology following    Hypoxemia  Assessment & Plan  · Intermittent episodes of hypoxia - currently on 2 L via nasal cannula at rest  · CXR with questionable infiltrates although in the absence of upper respiratory symptoms  · Infectious Disease following - on IV Ampicillin already for primary issue  · Encourage incentive spirometry    Peripheral arterial disease  Assessment & Plan  · S/p right SFA stenting on 2/23 - follow-up LEADs noted  · Continue Plavix/statin    Anemia of chronic disease  Assessment & Plan  · H/H stable    Hypothyroidism  Assessment & Plan  · Continue Synthroid    Hyperlipidemia  Assessment & Plan  · Continue Lipitor    Depression  Assessment & Plan  · Continue Celexa    Essential hypertension  Assessment & Plan  · Continue Norvasc/Tenormin  · Holding ACEI in the setting of acute kidney injury    Insulin-dependent diabetes mellitus  Assessment & Plan  Lab Results   Component Value Date    HGBA1C 9 3 (H) 02/21/2021     · Patient did have severe hyperglycemia during this hospitalization with blood glucoses in the 500s  He required an insulin drip  He was receiving approximately 1 5 units/hour  We will transition to Lantus 20 units daily and lispro 5 units before meals    · Carbohydrate restricted diet    * Osteomyelitis of toe with cellulitis  Assessment & Plan  · Necrosis of the right 2nd digit (distal phalanx), proximal phalanx erythema toes, warmth, tender  Inflammatory signs present of the plantar surface as well  · X-ray of the right foot showed no evidence of osteomyelitis, 2nd PIP joint dislocation  · S/p I&D of the right foot with open 2nd ray amputation on 21 - s/p repeat washout with wound VAC insertion and partial closure on 21  · Podiatry discussed in detail long-term prognosis with patient and likely need for possible curative transmetatarsal amputation, however, patient refusing at this time, opting for further attempt at limb salvage  · Wound culture growing Enterococcus faecalis - antibiotic course transitioned to IV Ampicillin and now Amoxicillin through 3/4/21 per Infectious Disease  · PRN pain control and supportive care  · PT/OT as tolerated - nonweightbearing to affected extremity per podiatry      VTE Pharmacologic Prophylaxis:   Pharmacologic: Enoxaparin (Lovenox)    Patient Centered Rounds: I have performed bedside rounds with nursing staff today  Discussions with Specialists or Other Care Team Provider: ID    Education and Discussions with Family / Patient: Pt's brother over the phone  Time Spent for Care: 30 minutes  More than 50% of total time spent on counseling and coordination of care as described above  Current Length of Stay: 10 day(s)    Current Patient Status: Inpatient   Certification Statement: The patient will continue to require additional inpatient hospital stay due to discharging planning    Discharge Plan: today or tomorrow  Code Status: Level 1 - Full Code      Subjective:   No new complaints      Objective:     Vitals:   Temp (24hrs), Av 7 °F (36 5 °C), Min:97 4 °F (36 3 °C), Max:98 1 °F (36 7 °C)    Temp:  [97 4 °F (36 3 °C)-98 1 °F (36 7 °C)] 97 6 °F (36 4 °C)  HR:  [64-67] 65  Resp:  [18] 18  BP: (117-151)/(58-67) 117/58  SpO2:  [83 %-94 %] 93 %  Body mass index is 30 13 kg/m²  Input and Output Summary (last 24 hours):        Intake/Output Summary (Last 24 hours) at 3/1/2021 1014  Last data filed at 3/1/2021 0901  Gross per 24 hour   Intake 480 ml   Output 2927 ml   Net -2447 ml       Physical Exam:     Physical Exam  Gen: NAD, AAOx3, well developed, well nourished  Eyes: EOMI, PERRLA, no scleral icterus  ENMT:  Oropharynx clear of erythema or exudates, no nasal discharge, no otic discharge, moist mucous membranes  Neck:  Supple  Lymph:  No anterior or posterior cervical or supraclavicular lymphadenopathy  Cardiovascular:  Regular rate and rhythm, normal S1-S2, no murmurs, rubs, or gallops  Lungs:  Clear to auscultation bilaterally, no wheezes, or rales, or rhonchi  Abdomen:  Positive bowel sounds, soft, nontender, nondistended, no palpable organomegaly   Skin: R foot wound vac in place, otherwise intact, no obvious lesions or rashes, no edema  Neuro: Cranial nerves 2-12 are intact, non-focal, 5/5 strength in all 4 extremities      Additional Data:     Labs:    Results from last 7 days   Lab Units 03/01/21  0552   WBC Thousand/uL 12 13*   HEMOGLOBIN g/dL 10 1*   HEMATOCRIT % 30 7*   PLATELETS Thousands/uL 462*   NEUTROS PCT % 61   LYMPHS PCT % 21   MONOS PCT % 12   EOS PCT % 4     Results from last 7 days   Lab Units 03/01/21  0552   SODIUM mmol/L 138   POTASSIUM mmol/L 3 5   CHLORIDE mmol/L 104   CO2 mmol/L 27   BUN mg/dL 17   CREATININE mg/dL 1 29   ANION GAP mmol/L 7   CALCIUM mg/dL 7 7*   GLUCOSE RANDOM mg/dL 113         Results from last 7 days   Lab Units 03/01/21  0945 03/01/21  0742 03/01/21  0558 03/01/21  0411 03/01/21  0205 03/01/21  0007 02/28/21  2210 02/28/21  2007 02/28/21  1814 02/28/21  1559 02/28/21  1423 02/28/21  1302   POC GLUCOSE mg/dl 117 120 119 120 117 121 141* 121 142* 223* 253* 204*         Results from last 7 days   Lab Units 02/26/21  0552 02/25/21  1943   PROCALCITONIN ng/ml 0 21 0 28*           * I Have Reviewed All Lab Data Listed Above   * Additional Pertinent Lab Tests Reviewed: [address] Admission Reviewed    Recent Cultures (last 7 days):           Last 24 Hours Medication List:   Current Facility-Administered Medications   Medication Dose Route Frequency Provider Last Rate    acetaminophen  975 mg Oral Q8H [address] Filiberto Eric, DPM      amLODIPine  10 mg Oral Daily Filiberto Eric, DPM      amoxicillin  1,000 mg Oral [facility] Darryll Boeck, MD      atenolol  25 mg Oral Daily Filiberto Eric, DPM      atorvastatin  40 mg Oral Daily With [address], DPM      calcium carbonate  500 mg Oral BID PRN Filiberto Eric, DPM      citalopram  20 mg Oral Daily Filiberto Eric, DPM      clopidogrel  75 mg Oral Daily Filiberto Eric, DPM      enoxaparin  40 mg Subcutaneous Daily Filiberto Eric, DPM      famotidine  20 mg Oral BID Filiberto Eric, DPM      HYDROmorphone  0 2 mg Intravenous Q4H PRN Filiberto Eric, DPM      insulin glargine  20 Units Subcutaneous QAM Mark Callaway MD      insulin lispro  5 Units Subcutaneous TID With Meals Mark Callaway MD      levothyroxine  175 mcg Oral Early Morning Filiberto Eric, DPM      oxyCODONE  2 5 mg Oral Q4H PRN Filiberto Eric, DPM      oxyCODONE  5 mg Oral Q4H PRN Filiberto Eric, DPM      polyethylene glycol  17 g Oral Daily PRN Filiberto Eric, DPM      sodium bicarbonate  650 mg Oral BID after meals Filiberto Eric, DPM      sodium chloride  1 spray Each Nare Q2H PRN Rachel Morales PA-C      tiotropium  18 mcg Inhalation Daily Filiberto Eric, DPM      traZODone  50 mg Oral HS Filiberto Eric, DPM          Today, Patient Was Seen By: Mark Callaway MD    ** Please Note: Dictation voice to text software may have been used in the creation of this document   **

## 2021-03-01 NOTE — PLAN OF CARE
Problem: Potential for Falls  Goal: Patient will remain free of falls  Description: INTERVENTIONS:  - Assess patient frequently for physical needs  -  Identify cognitive and physical deficits and behaviors that affect risk of falls    -  [place] fall precautions as indicated by assessment   - Educate patient/family on patient safety including physical limitations  - Instruct patient to call for assistance with activity based on assessment  - Modify environment to reduce risk of injury  - Consider OT/PT consult to assist with strengthening/mobility  Outcome: Progressing     Problem: PAIN - ADULT  Goal: Verbalizes/displays adequate comfort level or baseline comfort level  Description: Interventions:  - Encourage patient to monitor pain and request assistance  - Assess pain using appropriate pain scale  - Administer analgesics based on type and severity of pain and evaluate response  - Implement non-pharmacological measures as appropriate and evaluate response  - Consider cultural and social influences on pain and pain management  - Notify physician/advanced practitioner if interventions unsuccessful or patient reports new pain  Outcome: Progressing     Problem: INFECTION - ADULT  Goal: Absence or prevention of progression during hospitalization  Description: INTERVENTIONS:  - Assess and monitor for signs and symptoms of infection  - Monitor lab/diagnostic results  - Monitor all insertion sites, i e  indwelling lines, tubes, and drains  - Monitor endotracheal if appropriate and nasal secretions for changes in amount and color  - [place] appropriate cooling/warming therapies per order  - Administer medications as ordered  - Instruct and encourage patient and family to use good hand hygiene technique  - Identify and instruct in appropriate isolation precautions for identified infection/condition  Outcome: Progressing     Problem: SAFETY ADULT  Goal: Patient will remain free of falls  Description: INTERVENTIONS:  - Assess patient frequently for physical needs  -  Identify cognitive and physical deficits and behaviors that affect risk of falls  -  [place] fall precautions as indicated by assessment   - Educate patient/family on patient safety including physical limitations  - Instruct patient to call for assistance with activity based on assessment  - Modify environment to reduce risk of injury  - Consider OT/PT consult to assist with strengthening/mobility  Outcome: Progressing     Problem: DISCHARGE PLANNING  Goal: Discharge to home or other facility with appropriate resources  Description: INTERVENTIONS:  - Identify barriers to discharge w/patient and caregiver  - Arrange for needed discharge resources and transportation as appropriate  - Identify discharge learning needs (meds, wound care, etc )  - Arrange for interpretive services to assist at discharge as needed  - Refer to Case Management Department for coordinating discharge planning if the patient needs post-hospital services based on physician/advanced practitioner order or complex needs related to functional status, cognitive ability, or social support system  Outcome: Progressing     Problem: Knowledge Deficit  Goal: Patient/family/caregiver demonstrates understanding of disease process, treatment plan, medications, and discharge instructions  Description: Complete learning assessment and assess knowledge base    Interventions:  - Provide teaching at level of understanding  - Provide teaching via preferred learning methods  Outcome: Progressing     Problem: Prexisting or High Potential for Compromised Skin Integrity  Goal: Skin integrity is maintained or improved  Description: INTERVENTIONS:  - Identify patients at risk for skin breakdown  - Assess and monitor skin integrity  - Assess and monitor nutrition and hydration status  - Monitor labs   - Assess for incontinence   - Turn and reposition patient  - Assist with mobility/ambulation  - Relieve pressure over bony prominences  - Avoid friction and shearing  - Provide appropriate hygiene as needed including keeping skin clean and dry  - Evaluate need for skin moisturizer/barrier cream  - Collaborate with interdisciplinary team   - Patient/family teaching  - Consider wound care consult   Outcome: Progressing     Problem: Nutrition/Hydration-ADULT  Goal: Nutrient/Hydration intake appropriate for improving, restoring or maintaining nutritional needs  Description: Monitor and assess patient's nutrition/hydration status for malnutrition  Collaborate with interdisciplinary team and initiate plan and interventions as ordered  Monitor patient's weight and dietary intake as ordered or per policy  Utilize nutrition screening tool and intervene as necessary  Determine patient's food preferences and provide high-protein, high-caloric foods as appropriate       INTERVENTIONS:  - Monitor oral intake, urinary output, labs, and treatment plans  - Assess nutrition and hydration status and recommend course of action  - Evaluate amount of meals eaten  - Assist patient with eating if necessary   - Allow adequate time for meals  - Recommend/ encourage appropriate diets, oral nutritional supplements, and vitamin/mineral supplements  - Order, calculate, and assess calorie counts as needed  - Recommend, monitor, and adjust tube feedings and TPN/PPN based on assessed needs  - Assess need for intravenous fluids  - Provide specific nutrition/hydration education as appropriate  - Include patient/family/caregiver in decisions related to nutrition  Outcome: Progressing

## 2021-03-01 NOTE — ASSESSMENT & PLAN NOTE
· Due to sepsis, ACEi, NSAIDs  · Received IVFs  · Baseline creatinine approximately 1 3-1 6 - at 1 29 today (resolved)  · Monitor renal function and urine output - limit/avoid nephrotoxins as possible - holding ACEI  · On sodium bicarbonate supplementation - acidosis resolved  · Nephrology following

## 2021-03-01 NOTE — PROGRESS NOTES
[address] [facility] NOTE   Inge Hammans 78 y o  male MRN: [id number]  Unit/Bed#: S -01 Encounter: [id number]  Reason for Consult:  Acute kidney injury on CKD    ASSESSMENT and PLAN:  47-year-old male with a history of hypertension, CKD, hyperlipidemia, diabetes who presented with right foot pain and erythema  Nephrology consulted for management of CKD/contrast   1  Acute kidney injury (POA):  · Creatinine 1 93 on admission 2/19  Creatinine improved with some minor fluctuation on 02/28 and is currently 1 29 which is at the lower end of baseline  ACE-inhibitor on hold  · Status post angiogram lower extremity 2/23  · Urinalysis:  1+ protein, 1-2 RBCs  · Etiology:  Infection/sepsis, NSAID use, ACE-inhibitor  · Plan:  Continue to hold ACE-inhibitor  2  Chronic kidney disease, stage III:    · Baseline creatinine 1 3-1 6 mg/dL as of November 2020  Etiology of CKD:  Diabetic nephropathy, long-term smoking, possible nephrosclerosis  3  Hypertension:    · Home regime lisinopril 20 mg daily, atenolol 25 mg daily, amlodipine 10 mg daily currently on atenolol and amlodipine  · Blood pressure acceptable overall  4  Osteomyelitis:  · Podiatry and vascular team following status post ray amputation on 02/21/2021  · Arteriogram 2/23 with angioplasty and stents, status post OR debridement 2/26  · Patient told me today he is quitting smoking  He was previously smoking 3 packs of cigarettes per day  5  Elevated PSA  Will need Urology follow-up  6  Hypoxia:  Significant smoking history likely contributing  Chest x-ray shows new patchy opacity right lower lobe  Appears euvolemic    DISPOSITION:  No change to current plan of care    SUBJECTIVE / 24H INTERVAL HISTORY:  Patient doing well  Complains of intermittent foot pain with treatment  Asking for nicotine patch       OBJECTIVE:  Current Weight: Weight - Scale: 95 3 kg (210 lb)  Vitals:    02/28/21 2209 03/01/21 0740 03/01/21 0906 03/01/21 0916   BP: 145/63 117/58     BP Location: Left arm Left arm     Pulse: 67 65     Resp: 18 18     Temp: 97 7 °F (36 5 °C) 97 6 °F (36 4 °C)     TempSrc: Oral Oral     SpO2: 90% 94% (!) 83% 93%   Weight:       Height:           Intake/Output Summary (Last 24 hours) at 3/1/2021 1434  Last data filed at 3/1/2021 0901  Gross per 24 hour   Intake 480 ml   Output 2450 ml   Net -1970 ml     General: NAD  Skin: no rash  Eyes: anicteric sclera  ENT: moist mucous membrane  Neck: supple  Chest: CTA b/l, no ronchii, no wheeze, no rubs, no rales  CVS: s1s2, no murmur, no gallop, no rub  Abdomen: soft, nontender, nl sounds  Extremities: no significant edema LE b/l    Right foot wrapped with VAC  : no merrill  Neuro: AAOX3  Psych: normal affect  Medications:    Current Facility-Administered Medications:     acetaminophen (TYLENOL) tablet 975 mg, 975 mg, Oral, Q8H [address], Ofelia Llamas, DPM, 975 mg at 02/27/21 2316    amLODIPine (NORVASC) tablet 10 mg, 10 mg, Oral, Daily, Ofelia Llamas, DPM, 10 mg at 03/01/21 0900    amoxicillin (AMOXIL) capsule 1,000 mg, 1,000 mg, Oral, Q8H [address], Lenis Pallas, MD, 1,000 mg at 03/01/21 0906    atenolol (TENORMIN) tablet 25 mg, 25 mg, Oral, Daily, Ofelia Llamas, DPM, 25 mg at 03/01/21 0900    atorvastatin (LIPITOR) tablet 40 mg, 40 mg, Oral, Daily With Dinner, Ofelia Llamas, DPM, 40 mg at 02/28/21 1719    calcium carbonate (TUMS) chewable tablet 500 mg, 500 mg, Oral, BID PRN, Ofelia Llamas, DPM, 500 mg at 02/25/21 0609    citalopram (CeleXA) tablet 20 mg, 20 mg, Oral, Daily, Ofelia Llamas, DPM, 20 mg at 03/01/21 0900    clopidogrel (PLAVIX) tablet 75 mg, 75 mg, Oral, Daily, Ofelia Llamas, DPM, 75 mg at 03/01/21 0900    enoxaparin (LOVENOX) subcutaneous injection 40 mg, 40 mg, Subcutaneous, Daily, Ofelia Llamas, DPJEANE, 40 mg at 03/01/21 0900    famotidine (PEPCID) tablet 20 mg, 20 mg, Oral, BID, Ofelia Llamas, CESILIA, 20 mg at 03/01/21 0900    HYDROmorphone (DILAUDID) injection 0 2 mg, 0 2 mg, Intravenous, Q4H PRN, Johnathan Endo, DPM, 0 2 mg at 02/24/21 0719    insulin glargine (LANTUS) subcutaneous injection 20 Units 0 2 mL, 20 Units, Subcutaneous, QAM, Debra Leslie MD, 20 Units at 03/01/21 1029    insulin lispro (HumaLOG) 100 units/mL subcutaneous injection 5 Units, 5 Units, Subcutaneous, TID With Meals, Debra Leslie MD    levothyroxine tablet 175 mcg, 175 mcg, Oral, Early Morning, Johnathan Endo, DPM, 175 mcg at 03/01/21 0540    oxyCODONE (ROXICODONE) IR tablet 2 5 mg, 2 5 mg, Oral, Q4H PRN, Johnathan Endo, DPM, 2 5 mg at 02/26/21 2217    oxyCODONE (ROXICODONE) IR tablet 5 mg, 5 mg, Oral, Q4H PRN, Johnathan Endo, DPM, 5 mg at 03/01/21 0753    polyethylene glycol (MIRALAX) packet 17 g, 17 g, Oral, Daily PRN, Johnathan Endo, DPM    sodium bicarbonate tablet 650 mg, 650 mg, Oral, BID after meals, Johnathan Endo, DPM, 650 mg at 03/01/21 0900    sodium chloride (OCEAN) 0 65 % nasal spray 1 spray, 1 spray, Each Nare, Q2H PRN, Rocio Russell PA-C    tiotropium (SPIRIVA) capsule for inhaler 18 mcg, 18 mcg, Inhalation, Daily, Johnathan Endo, DPM, 18 mcg at 03/01/21 0247    traZODone (DESYREL) tablet 50 mg, 50 mg, Oral, HS, Johnathan Endo, DPM, 50 mg at 02/28/21 2227    Laboratory Results:  Results from last 7 days   Lab Units 03/01/21  0552 02/28/21  0601 02/27/21  0543 02/26/21  0552 02/25/21  1943 02/25/21  0945 02/24/21  0449 02/23/21  0530   WBC Thousand/uL 12 13* 17 89*  --  15 81* 16 27*  --   --  16 51*   HEMOGLOBIN g/dL 10 1* 9 9*  --  10 5* 10 6*  --   --  10 3*   HEMATOCRIT % 30 7* 30 0*  --  31 8* 32 0*  --   --  32 5*   PLATELETS Thousands/uL 462* 437*  --  387 380  --   --  274   POTASSIUM mmol/L 3 5 3 8 5 1 3 5  --  3 5 4 9 4 2   CHLORIDE mmol/L 104 101 100 103  --  104 102 102   CO2 mmol/L 27 26 22 23  --  25 22 19*   BUN mg/dL 17 28* 20 12  --  18 21 19   CREATININE mg/dL 1 29 1 56* 1 27 1 10  --  1 13 1 19 1 27   CALCIUM mg/dL 7 7* 7 3* 7 5* 7 8*  --  7 7* 7 8* 7 9*

## 2021-03-01 NOTE — DISCHARGE INSTR - OTHER ORDERS
Change wound VAC M, W, F    Flush with NSS and dry well    Apply black sponge to wound at 125 low continuous pressure    Cover vac dressing with rolled gauze and ACE bandage    Followup with Dr Garcia at the wound center

## 2021-03-01 NOTE — PLAN OF CARE
Problem: PHYSICAL THERAPY ADULT  Goal: Performs mobility at highest level of function for planned discharge setting  See evaluation for individualized goals  Description: Treatment/Interventions: Functional transfer training, LE strengthening/ROM, Therapeutic exercise, Endurance training, Cognitive reorientation, Equipment eval/education, Patient/family training, Spoke to case management, Spoke to nursing, Continued evaluation, Bed mobility  Equipment Recommended: (TBD)       See flowsheet documentation for full assessment, interventions and recommendations  Note: Prognosis: Guarded  Problem List: Decreased strength, Decreased range of motion, Decreased endurance, Impaired balance, Decreased mobility, Decreased coordination, Decreased cognition, Impaired judgement, Decreased safety awareness, Impaired sensation, Obesity, Orthopedic restrictions, Pain, Decreased skin integrity  Assessment: Pt is a 78 y o  male seen for PT evaluation s/p admit to [facility] on 2/19/2021 w/ Osteomyelitis ([facility] [address] )  Pt had above procedures, and has been written to be NWB on LLE at least since 2/21/2021  PT reports that he has been amb to/from bathroom while here  Order placed for PT  Upon evaluation: Pt requires s assistance for bed mobility and not appropriate for weight bearing activity as pt is limited by pain /endurance  Pt's clinical presentation is currently unstable/unpredictable given the functional mobility deficits above, especially (but not limited to) weakness, decreased ROM, decreased endurance, pain and orthopedic restrictions, coupled with fall risks including hx of falls, impaired balance, impaired coordination, impaired judgement, decreased safety awareness, limited sensation/neuropathy and decreased cognition, and combined with medical complications of abnormal renal lab values, abnormal H&H, abnormal WBCs, abnormal sodium values, low SpO2 values and new onset O2 use    Pt to benefit from continued skilled PT tx while in hospital and upon DC to address deficits as defined above and maximize level of functional mobility  Recommend trials w/ walker vs cane vs WC with standing and eventually gait as appropriate-- as pt reports he is amb to/from bathroom with current walker set for height of approx 5'2" and "tries" to keep the weight off of the foot  Barriers to Discharge: Decreased caregiver support, Inaccessible home environment(flight of stairs to main living environment)  Barriers to Discharge Comments: Pt also reports that he would not have enough room to use a walker, knee scooter, or WC  PT Discharge Recommendation: Post-Acute Rehabilitation Services          See flowsheet documentation for full assessment

## 2021-03-01 NOTE — CASE MANAGEMENT
CM met with pt at bedside to discuss dcp  CM discused potential need for STR at d/c pending PT/OT [place]oria  Pt reports he will NOT go to STR and plans to return home with VNA  CM discussed benefit of STR vs VNA at d/c  Pt continues to refuse  CM discussed FOC and options for VNA  Pt preference is [facility]  Referral to  VNA via [place] - able to accept  F/U providers updated  As per [facility] - not in-network with pt plan  Referrals to [address] [place] [address] and [address] [place] [address] for wound VAC via [place]  Pt aware

## 2021-03-02 LAB
ANION GAP SERPL CALCULATED.3IONS-SCNC: 8 MMOL/L (ref 4–13)
BASOPHILS # BLD AUTO: 0.09 THOUSANDS/ΜL (ref 0–0.1)
BASOPHILS NFR BLD AUTO: 1 % (ref 0–1)
BUN SERPL-MCNC: 11 MG/DL (ref 5–25)
CALCIUM SERPL-MCNC: 8 MG/DL (ref 8.3–10.1)
CHLORIDE SERPL-SCNC: 101 MMOL/L (ref 100–108)
CO2 SERPL-SCNC: 28 MMOL/L (ref 21–32)
CREAT SERPL-MCNC: 1.18 MG/DL (ref 0.6–1.3)
EOSINOPHIL # BLD AUTO: 0.33 THOUSAND/ΜL (ref 0–0.61)
EOSINOPHIL NFR BLD AUTO: 3 % (ref 0–6)
ERYTHROCYTE [DISTWIDTH] IN BLOOD BY AUTOMATED COUNT: 13.1 % (ref 11.6–15.1)
GFR SERPL CREATININE-BSD FRML MDRD: 63 ML/MIN/1.73SQ M
GLUCOSE SERPL-MCNC: 197 MG/DL (ref 65–140)
GLUCOSE SERPL-MCNC: 213 MG/DL (ref 65–140)
GLUCOSE SERPL-MCNC: 244 MG/DL (ref 65–140)
GLUCOSE SERPL-MCNC: 268 MG/DL (ref 65–140)
HCT VFR BLD AUTO: 30.6 % (ref 36.5–49.3)
HGB BLD-MCNC: 10.1 G/DL (ref 12–17)
IMM GRANULOCYTES # BLD AUTO: 0.06 THOUSAND/UL (ref 0–0.2)
IMM GRANULOCYTES NFR BLD AUTO: 1 % (ref 0–2)
LYMPHOCYTES # BLD AUTO: 2.22 THOUSANDS/ΜL (ref 0.6–4.47)
LYMPHOCYTES NFR BLD AUTO: 20 % (ref 14–44)
MCH RBC QN AUTO: 29.1 PG (ref 26.8–34.3)
MCHC RBC AUTO-ENTMCNC: 33 G/DL (ref 31.4–37.4)
MCV RBC AUTO: 88 FL (ref 82–98)
MONOCYTES # BLD AUTO: 1.53 THOUSAND/ΜL (ref 0.17–1.22)
MONOCYTES NFR BLD AUTO: 14 % (ref 4–12)
NEUTROPHILS # BLD AUTO: 6.75 THOUSANDS/ΜL (ref 1.85–7.62)
NEUTS SEG NFR BLD AUTO: 61 % (ref 43–75)
NRBC BLD AUTO-RTO: 0 /100 WBCS
PLATELET # BLD AUTO: 485 THOUSANDS/UL (ref 149–390)
PMV BLD AUTO: 10.3 FL (ref 8.9–12.7)
POTASSIUM SERPL-SCNC: 4 MMOL/L (ref 3.5–5.3)
RBC # BLD AUTO: 3.47 MILLION/UL (ref 3.88–5.62)
SODIUM SERPL-SCNC: 137 MMOL/L (ref 136–145)
WBC # BLD AUTO: 10.98 THOUSAND/UL (ref 4.31–10.16)

## 2021-03-02 PROCEDURE — 2W0SX6Z CHANGE PRESSURE DRESSING ON RIGHT FOOT: ICD-10-PCS | Performed by: PODIATRIST

## 2021-03-02 PROCEDURE — 80048 BASIC METABOLIC PNL TOTAL CA: CPT | Performed by: INTERNAL MEDICINE

## 2021-03-02 PROCEDURE — 85025 COMPLETE CBC W/AUTO DIFF WBC: CPT | Performed by: INTERNAL MEDICINE

## 2021-03-02 PROCEDURE — 82948 REAGENT STRIP/BLOOD GLUCOSE: CPT

## 2021-03-02 PROCEDURE — 99232 SBSQ HOSP IP/OBS MODERATE 35: CPT | Performed by: INTERNAL MEDICINE

## 2021-03-02 PROCEDURE — 97605 NEG PRS WND THER DME<=50SQCM: CPT | Performed by: PODIATRIST

## 2021-03-02 RX ADMIN — FAMOTIDINE 20 MG: 20 TABLET ORAL at 08:40

## 2021-03-02 RX ADMIN — CITALOPRAM HYDROBROMIDE 20 MG: 20 TABLET ORAL at 08:40

## 2021-03-02 RX ADMIN — SODIUM BICARBONATE 650 MG: 650 TABLET ORAL at 08:40

## 2021-03-02 RX ADMIN — OXYCODONE HYDROCHLORIDE 5 MG: 5 TABLET ORAL at 05:33

## 2021-03-02 RX ADMIN — AMOXICILLIN 1000 MG: 250 CAPSULE ORAL at 05:32

## 2021-03-02 RX ADMIN — AMOXICILLIN 1000 MG: 250 CAPSULE ORAL at 22:33

## 2021-03-02 RX ADMIN — AMOXICILLIN 1000 MG: 250 CAPSULE ORAL at 13:32

## 2021-03-02 RX ADMIN — ATENOLOL 25 MG: 25 TABLET ORAL at 08:43

## 2021-03-02 RX ADMIN — OXYCODONE HYDROCHLORIDE 5 MG: 5 TABLET ORAL at 13:34

## 2021-03-02 RX ADMIN — CLOPIDOGREL BISULFATE 75 MG: 75 TABLET ORAL at 08:39

## 2021-03-02 RX ADMIN — INSULIN LISPRO 5 UNITS: 100 INJECTION, SOLUTION INTRAVENOUS; SUBCUTANEOUS at 11:55

## 2021-03-02 RX ADMIN — AMLODIPINE BESYLATE 10 MG: 10 TABLET ORAL at 08:43

## 2021-03-02 RX ADMIN — OXYCODONE HYDROCHLORIDE 5 MG: 5 TABLET ORAL at 22:34

## 2021-03-02 RX ADMIN — FAMOTIDINE 20 MG: 20 TABLET ORAL at 18:29

## 2021-03-02 RX ADMIN — OXYCODONE HYDROCHLORIDE 5 MG: 5 TABLET ORAL at 09:31

## 2021-03-02 RX ADMIN — SODIUM BICARBONATE 650 MG: 650 TABLET ORAL at 18:29

## 2021-03-02 RX ADMIN — TRAZODONE HYDROCHLORIDE 50 MG: 50 TABLET ORAL at 22:33

## 2021-03-02 RX ADMIN — OXYCODONE HYDROCHLORIDE 5 MG: 5 TABLET ORAL at 18:29

## 2021-03-02 RX ADMIN — INSULIN LISPRO 5 UNITS: 100 INJECTION, SOLUTION INTRAVENOUS; SUBCUTANEOUS at 18:29

## 2021-03-02 RX ADMIN — ENOXAPARIN SODIUM 40 MG: 40 INJECTION SUBCUTANEOUS at 08:40

## 2021-03-02 RX ADMIN — ATORVASTATIN CALCIUM 40 MG: 40 TABLET, FILM COATED ORAL at 18:29

## 2021-03-02 RX ADMIN — INSULIN GLARGINE 20 UNITS: 100 INJECTION, SOLUTION SUBCUTANEOUS at 08:43

## 2021-03-02 RX ADMIN — LEVOTHYROXINE SODIUM 175 MCG: 175 TABLET ORAL at 05:32

## 2021-03-02 RX ADMIN — TIOTROPIUM BROMIDE 18 MCG: 18 CAPSULE ORAL; RESPIRATORY (INHALATION) at 08:47

## 2021-03-02 NOTE — ASSESSMENT & PLAN NOTE
Lab Results   Component Value Date    HGBA1C 9 3 (H) 02/21/2021     · Patient did have severe hyperglycemia during this hospitalization with blood glucoses in the 500s  He required an insulin drip  He was receiving approximately 1 5 units/hour      · Continue Lantus and NovoLog with meals Carbohydrate restricted diet

## 2021-03-02 NOTE — PROGRESS NOTES
Patient had trouble staying off his foot during PT and last night  VAC seal was disrupted and he had bleeding  It was removed and wet to dry placed  VAC reapplied this AM    Wound granular measures 12 5 x 3 x 2  Applied 2 black sponge at 125 low continuous pressure      Patient is unable to stay off the foot and caused acute bleeding  I again revisited the TMA option  I have little french he will be compliant once he gets home as he cannot even stay off the foot with 2 PTs and a nurse helping him  The VAC will not work if the seal gets disrupted every day from weightbearing on it  A TMA will still require NWB but only for a month as opposed to 4-6  IT will also be a little more forgiving to light pressure during transfers  He seems to realize this may be his best option  HE would like to see how he does with PT today and discuss with his family

## 2021-03-02 NOTE — PROGRESS NOTES
Pt has wound vac attached to his right foot  I noticed some break through bleeding on his dressing and the wound vac was saying blockage  Notified Dr Bradley Parra of the following  Told to take off the wound vac and apply a dressing  Dressing applied now clean dry and intact  Notified patient to call if he noticed anymore bleeding through the dressing  Will continue to monitor

## 2021-03-02 NOTE — ASSESSMENT & PLAN NOTE
· Necrosis of the right 2nd digit (distal phalanx), proximal phalanx erythema toes, warmth, tender    Inflammatory signs present of the plantar surface as well  · X-ray of the right foot showed no evidence of osteomyelitis, 2nd PIP joint dislocation  · S/p I&D of the right foot with open 2nd ray amputation on 2/21/21 - s/p repeat washout with wound VAC insertion and partial closure on 2/26/21  · Podiatry discussed in detail long-term prognosis with patient and likely need for possible curative transmetatarsal amputation, however, patient refusing at this time, opting for further attempt at limb salvage  · Wound culture growing Enterococcus faecalis - antibiotic course transitioned to IV Ampicillin and now Amoxicillin through 3/4/21 per Infectious Disease  · PRN pain control and supportive care  · PT/OT as tolerated - nonweightbearing to affected extremity per podiatry  · Patient wished to proceed with transmetatarsal amputation as per his discussion podiatry team

## 2021-03-02 NOTE — CASE MANAGEMENT
TC to [facility] to f/u on referral for Wound VAC  CM spoke with Kathryn and requested CM fax paperwork to their office  (f) [phone]  Wound VAC paperwork faxed with face sheet in the event pt does d/c home with VAC

## 2021-03-02 NOTE — PROGRESS NOTES
RN notified by PCA that patients right foot was bleeding from surgical site  Blood noted on dressing and sheets  Wound vac running 125 continuously  Dr Chari Puentes notified via tiger text and stated to place saline wet to dry dressing  Wound vac removed and wet to dry dressing in place  Patient notified not to get OOB

## 2021-03-02 NOTE — PROGRESS NOTES
Progress Note - Merlin Rad 1950, 78 y o  male MRN: [id number]    Unit/Bed#: S -01 Encounter: [id number]    Primary Care Provider: Ulises Gomez   Date and time admitted to hospital: 2/19/2021  2:09 PM      * Osteomyelitis of toe with cellulitis  Assessment & Plan  · Necrosis of the right 2nd digit (distal phalanx), proximal phalanx erythema toes, warmth, tender  Inflammatory signs present of the plantar surface as well  · X-ray of the right foot showed no evidence of osteomyelitis, 2nd PIP joint dislocation  · S/p I&D of the right foot with open 2nd ray amputation on 2/21/21 - s/p repeat washout with wound VAC insertion and partial closure on 2/26/21  · Podiatry discussed in detail long-term prognosis with patient and likely need for possible curative transmetatarsal amputation, however, patient refusing at this time, opting for further attempt at limb salvage  · Wound culture growing Enterococcus faecalis - antibiotic course transitioned to IV Ampicillin and now Amoxicillin through 3/4/21 per Infectious Disease  · PRN pain control and supportive care  · PT/OT as tolerated - nonweightbearing to affected extremity per podiatry  · Patient wished to proceed with transmetatarsal amputation as per his discussion podiatry team    Insulin-dependent diabetes mellitus  Assessment & Plan  Lab Results   Component Value Date    HGBA1C 9 3 (H) 02/21/2021     · Patient did have severe hyperglycemia during this hospitalization with blood glucoses in the 500s  He required an insulin drip  He was receiving approximately 1 5 units/hour      · Continue Lantus and NovoLog with meals Carbohydrate restricted diet    Peripheral arterial disease  Assessment & Plan  · S/p right SFA stenting on 2/23 - follow-up LEADs noted  · Continue Plavix/statin    Hyperlipidemia  Assessment & Plan  · Continue Lipitor    Essential hypertension  Assessment & Plan  · Continue Norvasc/Tenormin  · Holding ACEI in the setting of acute kidney injury    Acute kidney injury - Chronic kidney disease stage 3  Assessment & Plan  · Due to sepsis, ACEi, NSAIDs  · Received IVFs  · Baseline creatinine approximately 1 3-1 6 - at 1 29 today (resolved)  · Monitor renal function and urine output - limit/avoid nephrotoxins as possible - holding ACEI  · On sodium bicarbonate supplementation - acidosis resolved  · Nephrology following    Anemia of chronic disease  Assessment & Plan  · H/H stable    VTE Pharmacologic Prophylaxis:   Pharmacologic: Enoxaparin (Lovenox)  Mechanical VTE Prophylaxis in Place: Yes    Patient Centered Rounds: I have performed bedside rounds with nursing staff today  Discussions with Specialists or Other Care Team Provider:    Education and Discussions with Family / Patient:  Patient    Time Spent for Care: 30 minutes  More than 50% of total time spent on counseling and coordination of care as described above  Current Length of Stay: 11 day(s)    Current Patient Status: Inpatient   Certification Statement: The patient will continue to require additional inpatient hospital stay due to Osteomyelitis    Discharge Plan:  Pending podiatry input for transmetatarsal amputation    Code Status: Level 1 - Full Code      Subjective:   Feels okay today  He was to proceed with transmetatarsal amputation    Objective:     Vitals:   Temp (24hrs), Av °F (36 7 °C), Min:97 9 °F (36 6 °C), Max:98 2 °F (36 8 °C)    Temp:  [97 9 °F (36 6 °C)-98 2 °F (36 8 °C)] 98 °F (36 7 °C)  HR:  [67-72] 67  Resp:  [15-18] 18  BP: (115-189)/(56-77) 159/67  SpO2:  [90 %-95 %] 93 %  Body mass index is 30 13 kg/m²  Input and Output Summary (last 24 hours): Intake/Output Summary (Last 24 hours) at 3/2/2021 1627  Last data filed at 3/2/2021 0700  Gross per 24 hour   Intake 360 ml   Output 2850 ml   Net -2490 ml       Physical Exam:     Physical Exam    Gen -Patient comfortable in bed  Neck- Supple   No thyromegaly or lymphadenopathy  Lungs-Clear bilaterally without any wheeze or rales   Heart S1-S2, regular rate and rhythm, no murmurs  Abdomen-soft nontender, no organomegaly  Bowel sounds present  Extremities-no cyanosi,  clubbing   Wound VAC in place  Skin- no rash  Neuro-nonfocal       Additional Data:     Labs:    Results from last 7 days   Lab Units 03/02/21  0318   WBC Thousand/uL 10 98*   HEMOGLOBIN g/dL 10 1*   HEMATOCRIT % 30 6*   PLATELETS Thousands/uL 485*   NEUTROS PCT % 61   LYMPHS PCT % 20   MONOS PCT % 14*   EOS PCT % 3     Results from last 7 days   Lab Units 03/02/21  0318   SODIUM mmol/L 137   POTASSIUM mmol/L 4 0   CHLORIDE mmol/L 101   CO2 mmol/L 28   BUN mg/dL 11   CREATININE mg/dL 1 18   ANION GAP mmol/L 8   CALCIUM mg/dL 8 0*   GLUCOSE RANDOM mg/dL 213*         Results from last 7 days   Lab Units 03/02/21  1123 03/02/21  0739 03/01/21  2158 03/01/21  1534 03/01/21  1155 03/01/21  0945 03/01/21  0742 03/01/21  0558 03/01/21  0411 03/01/21  0205 03/01/21  0007 02/28/21  2210   POC GLUCOSE mg/dl 244* 268* 108 141* 158* 117 120 119 120 117 121 141*         Results from last 7 days   Lab Units 02/26/21  0552 02/25/21  1943   PROCALCITONIN ng/ml 0 21 0 28*           * I Have Reviewed All Lab Data Listed Above  * Additional Pertinent Lab Tests Reviewed:  All Labs Within Last 24 Hours Reviewed    Imaging:    Imaging Reports Reviewed Today Include:   Imaging Personally Reviewed by Myself Includes:      Recent Cultures (last 7 days):           Last 24 Hours Medication List:   Current Facility-Administered Medications   Medication Dose Route Frequency Provider Last Rate    acetaminophen  975 mg Oral Q8H [address] Micki Silk, DPM      amLODIPine  10 mg Oral Daily Micki Darci, DPM      amoxicillin  1,000 mg Oral Q8H [address] Keenan Anguiano MD      atenolol  25 mg Oral Daily Micki Silk, DPM      atorvastatin  40 mg Oral Daily With Jade Bray DPM      calcium carbonate  500 mg Oral BID PRN Micki Bejarano DPJEANE      citalopram  20 mg Oral Daily Nora Moreno, CESILIA      clopidogrel  75 mg Oral Daily Nora Moreno, CESILIA      enoxaparin  40 mg Subcutaneous Daily Nora Moreno DPM      famotidine  20 [facility] BID [facility]      HYDROmorphone  0 2 mg Intravenous Q4H PRN Nora Moreno DPM      insulin glargine  20 Units Subcutaneous QAM Rickie Jones MD      insulin lispro  5 Units Subcutaneous TID With Meals Rickie Jones MD      levothyroxine  175 mcg Oral Early Morning [facility]      oxyCODONE  2 5 mg Oral Q4H PRN Nora Moreno, CESILIA      oxyCODONE  5 mg Oral Q4H PRN Nora Mroeno DPM      polyethylene glycol  17 g Oral Daily PRN Nora Moreno, CESILIA      sodium bicarbonate  650 mg Oral BID after meals Nora Moreno DPM      sodium chloride  1 spray Each Nare Q2H PRN Stefan Finch PA-C      tiotropium  18 mcg Inhalation Daily Nora Moreno DPM      traZODone  50 mg Oral HS Nora Moreno DPM          Today, Patient Was Seen By: Iain Riddle MD    ** Please Note: Dictation voice to text software may have been used in the creation of this document   **

## 2021-03-02 NOTE — PROGRESS NOTES
Progress Note - Infectious Disease   Michaela Adame 78 y o  male MRN: [id number]  Unit/Bed#: S -01 Encounter: [id number]      Impression/Plan:  1  Right 2nd toe gangrene, cellulitis and clinical osteomyelitis  Patient reportedly had previous fracture at this site and was found on initial evaluation to have exposed bone with foul-smelling gangrenous right toe  Clinically consistent with osteomyelitis  Also noted to have tracking cellulitis  Patient underwent amputation on 2/21  Also found to have problem 2  Cultures isolated Enterococcus  Underwent revascularization  Status post washout and partial closure on 02/26 with presumed source control  Now consideration for TMA given difficulty with maintaining VAC  Continue amoxicillin 1 g q 8 hours given 5, for now  Recommend IV ampicillin perioperatively  Will likely extend oral antibiotic for additional 48 hours post TMA  Continue to trend fever curve/WBC  Ongoing follow-up/interventions by Podiatry  Additional supportive care as per primary     2  Right foot plantar abscess  Patient underwent incision and drainage with amputation on 02/21  Cultures ultimately with Enterococcus  Repeat washout on 02/26 with debridement without any further tracking sinus/purulence  Patient now plans for TMA given issues with VAC/NWB  Continue antibiotics as above  Continue to trend fever curve/WBC  Ongoing follow-up/local care as per Podiatry     3  Leukocytosis  Likely related to the above  Patient remains hemodynamically stable and blood cultures negative  This continues to slowly down trend  Repeat CBC tomorrow  Continue antibiotics as above     4  CKD 3  Creatinine appears to be stable  Amoxicillin dosing as above  Repeat chemistry tomorrow  Will further dose adjust antibiotics as needed     5  Diabetes mellitus, with neuropathy and obesity  Unfortunately this is likely risk factor for patient's progressive development of wound infection    And also impacts wound healing  BMI noted to be 30 which impacts antibiotic dosing  Higher dosing of amoxicillin as above  Glucose management as per primary  Recommend close follow-up as outpatient with PCP     6  PAD   Status post IR recannulization and stenting of right SFA  Unfortunately risk factor for poor wound healing  Continue local wound care and follow-up with vascular/podiatry       7  Transient hypoxia, with abnormal CXR  No further episodes of hypoxia noted  Appears stable on room air  Continue to monitor respiratory status while admitted      Above plan discussed in detail with patient at bedside  ID consult service will continue to follow  Antibiotics:  Amoxicillin 2  Total antibiotic 12     Initial amputation   Debridement and partial closure     Subjective:  Patient has no fever, chills, sweats; no nausea, vomiting, diarrhea; no cough, shortness of breath; no pain  No new symptoms  Unfortunately he had multiple complications overnight with bleeding from his VAC in inability to maintain nonweightbearing status  Patient plans to pursue TMA  Objective:  Vitals:  Temp:  [97 9 °F (36 6 °C)-98 2 °F (36 8 °C)] 98 °F (36 7 °C)  HR:  [68-79] 69  Resp:  [16-18] 16  BP: (115-189)/(56-79) 168/73  SpO2:  [90 %-95 %] 94 %  Temp (24hrs), Av 1 °F (36 7 °C), Min:97 9 °F (36 6 °C), Max:98 2 °F (36 8 °C)  Current: Temperature: 98 °F (36 7 °C)    Physical Exam:   General Appearance:  Alert, interactive, nontoxic, no acute distress  Chronically ill-appearing and elderly   Throat: Oropharynx moist without lesions  Poor dentition noted   Lungs:   Clear to auscultation bilaterally; no wheezes, rhonchi or rales; respirations unlabored on room air   Heart:  RRR; no murmur, rub or gallop appreciated   Abdomen:   Soft, non-tender, non-distended, positive bowel sounds  Extremities: No clubbing, cyanosis or edema   Skin: No new rashes or lesions  No new draining wounds noted    VAC dressing currently in place and dressing change notes reviewed         Labs, Imaging, & Other studies:   All pertinent labs and imaging studies were personally reviewed  Results from last 7 days   Lab Units 03/02/21  0318 03/01/21  0552 02/28/21  0601   WBC Thousand/uL 10 98* 12 13* 17 89*   HEMOGLOBIN g/dL 10 1* 10 1* 9 9*   PLATELETS Thousands/uL 485* 462* 437*     Results from last 7 days   Lab Units 03/02/21  0318   POTASSIUM mmol/L 4 0   CHLORIDE mmol/L 101   CO2 mmol/L 28   BUN mg/dL 11   CREATININE mg/dL 1 18   EGFR ml/min/1 73sq m 63   CALCIUM mg/dL 8 0*

## 2021-03-02 NOTE — PLAN OF CARE
Problem: Potential for Falls  Goal: Patient will remain free of falls  Description: INTERVENTIONS:  - Assess patient frequently for physical needs  -  Identify cognitive and physical deficits and behaviors that affect risk of falls    -  [place] fall precautions as indicated by assessment   - Educate patient/family on patient safety including physical limitations  - Instruct patient to call for assistance with activity based on assessment  - Modify environment to reduce risk of injury  - Consider OT/PT consult to assist with strengthening/mobility  Outcome: Progressing     Problem: PAIN - ADULT  Goal: Verbalizes/displays adequate comfort level or baseline comfort level  Description: Interventions:  - Encourage patient to monitor pain and request assistance  - Assess pain using appropriate pain scale  - Administer analgesics based on type and severity of pain and evaluate response  - Implement non-pharmacological measures as appropriate and evaluate response  - Consider cultural and social influences on pain and pain management  - Notify physician/advanced practitioner if interventions unsuccessful or patient reports new pain  Outcome: Progressing     Problem: INFECTION - ADULT  Goal: Absence or prevention of progression during hospitalization  Description: INTERVENTIONS:  - Assess and monitor for signs and symptoms of infection  - Monitor lab/diagnostic results  - Monitor all insertion sites, i e  indwelling lines, tubes, and drains  - Monitor endotracheal if appropriate and nasal secretions for changes in amount and color  - [place] appropriate cooling/warming therapies per order  - Administer medications as ordered  - Instruct and encourage patient and family to use good hand hygiene technique  - Identify and instruct in appropriate isolation precautions for identified infection/condition  Outcome: Progressing     Problem: SAFETY ADULT  Goal: Patient will remain free of falls  Description: INTERVENTIONS:  - Assess patient frequently for physical needs  -  Identify cognitive and physical deficits and behaviors that affect risk of falls  -  [place] fall precautions as indicated by assessment   - Educate patient/family on patient safety including physical limitations  - Instruct patient to call for assistance with activity based on assessment  - Modify environment to reduce risk of injury  - Consider OT/PT consult to assist with strengthening/mobility  Outcome: Progressing     Problem: DISCHARGE PLANNING  Goal: Discharge to home or other facility with appropriate resources  Description: INTERVENTIONS:  - Identify barriers to discharge w/patient and caregiver  - Arrange for needed discharge resources and transportation as appropriate  - Identify discharge learning needs (meds, wound care, etc )  - Arrange for interpretive services to assist at discharge as needed  - Refer to Case Management Department for coordinating discharge planning if the patient needs post-hospital services based on physician/advanced practitioner order or complex needs related to functional status, cognitive ability, or social support system  Outcome: Progressing     Problem: Knowledge Deficit  Goal: Patient/family/caregiver demonstrates understanding of disease process, treatment plan, medications, and discharge instructions  Description: Complete learning assessment and assess knowledge base    Interventions:  - Provide teaching at level of understanding  - Provide teaching via preferred learning methods  Outcome: Progressing     Problem: Prexisting or High Potential for Compromised Skin Integrity  Goal: Skin integrity is maintained or improved  Description: INTERVENTIONS:  - Identify patients at risk for skin breakdown  - Assess and monitor skin integrity  - Assess and monitor nutrition and hydration status  - Monitor labs   - Assess for incontinence   - Turn and reposition patient  - Assist with mobility/ambulation  - Relieve pressure over bony prominences  - Avoid friction and shearing  - Provide appropriate hygiene as needed including keeping skin clean and dry  - Evaluate need for skin moisturizer/barrier cream  - Collaborate with interdisciplinary team   - Patient/family teaching  - Consider wound care consult   Outcome: Progressing     Problem: Nutrition/Hydration-ADULT  Goal: Nutrient/Hydration intake appropriate for improving, restoring or maintaining nutritional needs  Description: Monitor and assess patient's nutrition/hydration status for malnutrition  Collaborate with interdisciplinary team and initiate plan and interventions as ordered  Monitor patient's weight and dietary intake as ordered or per policy  Utilize nutrition screening tool and intervene as necessary  Determine patient's food preferences and provide high-protein, high-caloric foods as appropriate       INTERVENTIONS:  - Monitor oral intake, urinary output, labs, and treatment plans  - Assess nutrition and hydration status and recommend course of action  - Evaluate amount of meals eaten  - Assist patient with eating if necessary   - Allow adequate time for meals  - Recommend/ encourage appropriate diets, oral nutritional supplements, and vitamin/mineral supplements  - Order, calculate, and assess calorie counts as needed  - Recommend, monitor, and adjust tube feedings and TPN/PPN based on assessed needs  - Assess need for intravenous fluids  - Provide specific nutrition/hydration education as appropriate  - Include patient/family/caregiver in decisions related to nutrition  Outcome: Progressing

## 2021-03-03 ENCOUNTER — ANESTHESIA EVENT (INPATIENT)
Dept: PERIOP | Facility: HOSPITAL | Age: 71
DRG: 240 | End: 2021-03-03
Payer: COMMERCIAL

## 2021-03-03 LAB
ANION GAP SERPL CALCULATED.3IONS-SCNC: 6 MMOL/L (ref 4–13)
BASOPHILS # BLD AUTO: 0.09 THOUSANDS/ΜL (ref 0–0.1)
BASOPHILS NFR BLD AUTO: 1 % (ref 0–1)
BUN SERPL-MCNC: 11 MG/DL (ref 5–25)
CALCIUM SERPL-MCNC: 7.4 MG/DL (ref 8.3–10.1)
CHLORIDE SERPL-SCNC: 100 MMOL/L (ref 100–108)
CO2 SERPL-SCNC: 28 MMOL/L (ref 21–32)
CREAT SERPL-MCNC: 1.27 MG/DL (ref 0.6–1.3)
EOSINOPHIL # BLD AUTO: 0.29 THOUSAND/ΜL (ref 0–0.61)
EOSINOPHIL NFR BLD AUTO: 3 % (ref 0–6)
ERYTHROCYTE [DISTWIDTH] IN BLOOD BY AUTOMATED COUNT: 13.3 % (ref 11.6–15.1)
GFR SERPL CREATININE-BSD FRML MDRD: 58 ML/MIN/1.73SQ M
GLUCOSE SERPL-MCNC: 165 MG/DL (ref 65–140)
GLUCOSE SERPL-MCNC: 183 MG/DL (ref 65–140)
GLUCOSE SERPL-MCNC: 187 MG/DL (ref 65–140)
GLUCOSE SERPL-MCNC: 231 MG/DL (ref 65–140)
GLUCOSE SERPL-MCNC: 253 MG/DL (ref 65–140)
GLUCOSE SERPL-MCNC: 302 MG/DL (ref 65–140)
HCT VFR BLD AUTO: 28.4 % (ref 36.5–49.3)
HGB BLD-MCNC: 9.3 G/DL (ref 12–17)
IMM GRANULOCYTES # BLD AUTO: 0.06 THOUSAND/UL (ref 0–0.2)
IMM GRANULOCYTES NFR BLD AUTO: 1 % (ref 0–2)
LYMPHOCYTES # BLD AUTO: 2.21 THOUSANDS/ΜL (ref 0.6–4.47)
LYMPHOCYTES NFR BLD AUTO: 20 % (ref 14–44)
MCH RBC QN AUTO: 29.1 PG (ref 26.8–34.3)
MCHC RBC AUTO-ENTMCNC: 32.7 G/DL (ref 31.4–37.4)
MCV RBC AUTO: 89 FL (ref 82–98)
MONOCYTES # BLD AUTO: 1.5 THOUSAND/ΜL (ref 0.17–1.22)
MONOCYTES NFR BLD AUTO: 13 % (ref 4–12)
NEUTROPHILS # BLD AUTO: 7.1 THOUSANDS/ΜL (ref 1.85–7.62)
NEUTS SEG NFR BLD AUTO: 62 % (ref 43–75)
NRBC BLD AUTO-RTO: 0 /100 WBCS
PLATELET # BLD AUTO: 466 THOUSANDS/UL (ref 149–390)
PMV BLD AUTO: 10.3 FL (ref 8.9–12.7)
POTASSIUM SERPL-SCNC: 3.9 MMOL/L (ref 3.5–5.3)
RBC # BLD AUTO: 3.2 MILLION/UL (ref 3.88–5.62)
SODIUM SERPL-SCNC: 134 MMOL/L (ref 136–145)
WBC # BLD AUTO: 11.25 THOUSAND/UL (ref 4.31–10.16)

## 2021-03-03 PROCEDURE — 82948 REAGENT STRIP/BLOOD GLUCOSE: CPT

## 2021-03-03 PROCEDURE — 99232 SBSQ HOSP IP/OBS MODERATE 35: CPT | Performed by: INTERNAL MEDICINE

## 2021-03-03 PROCEDURE — 99233 SBSQ HOSP IP/OBS HIGH 50: CPT | Performed by: INTERNAL MEDICINE

## 2021-03-03 PROCEDURE — 99024 POSTOP FOLLOW-UP VISIT: CPT | Performed by: PODIATRIST

## 2021-03-03 PROCEDURE — 80048 BASIC METABOLIC PNL TOTAL CA: CPT | Performed by: INTERNAL MEDICINE

## 2021-03-03 PROCEDURE — 85025 COMPLETE CBC W/AUTO DIFF WBC: CPT | Performed by: INTERNAL MEDICINE

## 2021-03-03 RX ADMIN — TIOTROPIUM BROMIDE 18 MCG: 18 CAPSULE ORAL; RESPIRATORY (INHALATION) at 10:48

## 2021-03-03 RX ADMIN — AMOXICILLIN 1000 MG: 250 CAPSULE ORAL at 05:53

## 2021-03-03 RX ADMIN — LEVOTHYROXINE SODIUM 175 MCG: 175 TABLET ORAL at 05:53

## 2021-03-03 RX ADMIN — INSULIN LISPRO 5 UNITS: 100 INJECTION, SOLUTION INTRAVENOUS; SUBCUTANEOUS at 18:08

## 2021-03-03 RX ADMIN — OXYCODONE HYDROCHLORIDE 5 MG: 5 TABLET ORAL at 09:59

## 2021-03-03 RX ADMIN — ATORVASTATIN CALCIUM 40 MG: 40 TABLET, FILM COATED ORAL at 18:07

## 2021-03-03 RX ADMIN — SODIUM BICARBONATE 650 MG: 650 TABLET ORAL at 09:46

## 2021-03-03 RX ADMIN — INSULIN LISPRO 5 UNITS: 100 INJECTION, SOLUTION INTRAVENOUS; SUBCUTANEOUS at 09:51

## 2021-03-03 RX ADMIN — ATENOLOL 25 MG: 25 TABLET ORAL at 09:46

## 2021-03-03 RX ADMIN — AMLODIPINE BESYLATE 10 MG: 10 TABLET ORAL at 09:47

## 2021-03-03 RX ADMIN — CITALOPRAM HYDROBROMIDE 20 MG: 20 TABLET ORAL at 09:46

## 2021-03-03 RX ADMIN — CLOPIDOGREL BISULFATE 75 MG: 75 TABLET ORAL at 09:47

## 2021-03-03 RX ADMIN — INSULIN GLARGINE 20 UNITS: 100 INJECTION, SOLUTION SUBCUTANEOUS at 09:46

## 2021-03-03 RX ADMIN — OXYCODONE HYDROCHLORIDE 5 MG: 5 TABLET ORAL at 14:42

## 2021-03-03 RX ADMIN — SODIUM BICARBONATE 650 MG: 650 TABLET ORAL at 18:07

## 2021-03-03 RX ADMIN — TRAZODONE HYDROCHLORIDE 50 MG: 50 TABLET ORAL at 23:00

## 2021-03-03 RX ADMIN — OXYCODONE HYDROCHLORIDE 5 MG: 5 TABLET ORAL at 23:01

## 2021-03-03 RX ADMIN — INSULIN LISPRO 5 UNITS: 100 INJECTION, SOLUTION INTRAVENOUS; SUBCUTANEOUS at 12:44

## 2021-03-03 RX ADMIN — AMOXICILLIN 1000 MG: 250 CAPSULE ORAL at 23:00

## 2021-03-03 RX ADMIN — AMOXICILLIN 1000 MG: 250 CAPSULE ORAL at 13:44

## 2021-03-03 RX ADMIN — ENOXAPARIN SODIUM 40 MG: 40 INJECTION SUBCUTANEOUS at 09:46

## 2021-03-03 NOTE — PROGRESS NOTES
Progress Note - Podiatry  Tanvi Ford 78 y o  male MRN: [id number]  Unit/Bed#: S -01 Encounter: [id number]    Assessment  1  Gangrene right foot status post incision and drainage with large soft tissue deficit  2  Peripheral arterial disease  3  Uncontrolled diabetes neuropathy    Plan:  1  Patient is unable to realistically stay off his foot and both times has disrupted the wound VAC seal   He is considered the long-term limb salvage in what this would actually require and at this point has come around to the idea of the transmetatarsal amputation  It will be a much more definitive closure but should allow for much more return to normal status  He was very clearly made aware of the risks of performing this surgery including nonhealing wound loss of limb, ambulatory dysfunction  Given his options I do think this is the best choice but was very clear that it still comes with risk  We will plan for the transmetatarsal amputation on Thursday  NPO at midnight  Subjective/Objective   Chief Complaint:   Chief Complaint   Patient presents with    Weakness - Generalized     PT comes to ED with c/o increasing weakness "for many weeks, today I just cant get around anymore" Per EMS "brother told us that his big toe on right foot is black" (PT is diabetic BS for EMS was 406)       Subjective: 78 y o  y/o male seen and evaluated at bedside  Patient has wound vacs heel again got disrupted yesterday either during therapy or transferring to the bedside commode  He again cause some acute bleeding but after dressing change seems to be under control  Patient states he does not think he can do the wound VAC for many months on end and would like to revisit the discussion of the forefoot amputation        Blood pressure 138/62, pulse 71, temperature 98 2 °F (36 8 °C), temperature source Oral, resp  rate 18, height 5' 10" (1 778 m), weight 95 3 kg (210 lb), SpO2 91 %  ,Body mass index is 30 13 kg/m²      Lab Results Component Value Date    WBC 11 25 (H) 03/03/2021    HGB 9 3 (L) 03/03/2021    HCT 28 4 (L) 03/03/2021    MCV 89 03/03/2021     (H) 03/03/2021     Lab Results   Component Value Date    CALCIUM 7 4 (L) 03/03/2021    K 3 9 03/03/2021    CO2 28 03/03/2021     03/03/2021    BUN 11 03/03/2021    CREATININE 1 27 03/03/2021         Invasive Devices     Peripheral Intravenous Line            Peripheral IV 03/02/21 Right;Dorsal (posterior) Hand 1 day          Line            Arterial Sheath 6 Fr  Left Femoral 7 days          Drain            Negative Pressure Wound Therapy (V A C ) 8 days                Physical Exam:   General: alert, cooperative and no distress  Right foot dressing is clean dry and intact  No current strike through  No calf pain  Normal ankle range of motion  The 2nd toe is amputated  Wound measures 12 x 3 x 2 approximately  No active bleeding currently  No cellulitis malodor or necrosis  Respiratory:  Normal respiratory effort  No wheeze        Lab, Imaging and other studies:                           Imaging: I have personally reviewed pertinent films in PACS          Portions of the record may have been created with voice recognition software  Occasional wrong word or "sound a like" substitutions may have occurred due to the inherent limitations of voice recognition software  Read the chart carefully and recognize, using context, where substitutions have occurred

## 2021-03-03 NOTE — PROGRESS NOTES
Progress Note - Infectious Disease   Inge Hammans 78 y o  male MRN: [id number]  Unit/Bed#: S -01 Encounter: [id number]      Impression/Plan:  1  Right 2nd toe gangrene, cellulitis and clinical osteomyelitis   Patient reportedly had previous fracture at this site and was found on initial evaluation to have exposed bone with foul-smelling gangrenous right toe   Clinically consistent with osteomyelitis   Also noted to have tracking cellulitis   Patient underwent amputation on 2/21   Also found to have problem 2  Cultures isolated Enterococcus   Underwent revascularization   Status post washout and partial closure on 02/26 with presumed source control  TMA tomorrow, given difficulty with maintaining VAC  Continue amoxicillin 1 g q 8 hours given 5, for now  Recommend IV ampicillin perioperatively  Will extend oral antibiotic for additional 48 hours post TMA  Continue to trend fever curve/WBC  Ongoing follow-up/interventions by Podiatry  Will follow up OR findings and adjust plan if needed  Additional supportive care as per primary     2  Right foot plantar abscess   Patient underwent incision and drainage with amputation on 02/21   Cultures ultimately with Enterococcus   Repeat washout on 02/26 with debridement without any further tracking sinus/purulence  Now plans for TMA given issues with VAC/NWB  Continue antibiotics as above  Continue to trend fever curve/WBC  Ongoing follow-up/local care as per Podiatry     3  Leukocytosis   Patient remains hemodynamically stable and blood cultures negative  Stable today with monocytosis on differential   Likely multifactorial given the above, complications with VAC and possibly drug related  Repeat CBC tomorrow  Continue antibiotics as above     4  CKD 3  Creatinine appears to be stable  Amoxicillin dosing as above  Repeat chemistry tomorrow  Will further dose adjust antibiotics as needed     5   Diabetes mellitus, with neuropathy and obesity   Unfortunately this is likely risk factor for patient's progressive development of wound infection   And also impacts wound healing   BMI noted to be 30 which impacts antibiotic dosing  Higher dosing of amoxicillin as above  Glucose management as per primary  Recommend close follow-up as outpatient with PCP     6  PAD   Status post IR recannulization and stenting of right SFA    Unfortunately risk factor for poor wound healing   Continue local wound care and follow-up with vascular/podiatry       7  Transient hypoxia, with abnormal CXR   No further episodes of hypoxia noted   Appears stable on room air   Continue to monitor respiratory status while admitted      Above plan discussed with patient at bedside       ID consult service will continue to follow  Antibiotics:  Amoxicillin 3  Total antibiotic 13     Initial amputation   Debridement and partial closure     Subjective:  Patient has no fever, chills, sweats; no nausea, vomiting, diarrhea; no cough, shortness of breath; no pain  No new symptoms  Continues to tolerate antibiotics without issue  Objective:  Vitals:  Temp:  [97 3 °F (36 3 °C)-98 2 °F (36 8 °C)] 97 3 °F (36 3 °C)  HR:  [59-71] 59  Resp:  [18] 18  BP: (138-159)/(62-67) 149/67  SpO2:  [91 %-93 %] 93 %  Temp (24hrs), Av 8 °F (36 6 °C), Min:97 3 °F (36 3 °C), Max:98 2 °F (36 8 °C)  Current: Temperature: (!) 97 3 °F (36 3 °C)    Physical Exam:   General Appearance:  Alert, interactive, nontoxic, no acute distress  Chronically ill-appearing  Throat: Oropharynx moist without lesions  Poor dentition noted  Lungs:   Clear to auscultation bilaterally; no wheezes, rhonchi or rales; respirations unlabored on room air   Heart:  RRR; no murmur, rub or gallop appreciated   Abdomen:   Soft, non-tender, non-distended, positive bowel sounds  Extremities: No clubbing, cyanosis or edema   Skin: No new rashes or lesions  No new draining wounds noted  Surgical site currently wrapped         Labs, Imaging, & Other studies:   All pertinent labs and imaging studies were personally reviewed  Results from last 7 days   Lab Units 03/03/21  0539 03/02/21  0318 03/01/21  0552   WBC Thousand/uL 11 25* 10 98* 12 13*   HEMOGLOBIN g/dL 9 3* 10 1* 10 1*   PLATELETS Thousands/uL 466* 485* 462*     Results from last 7 days   Lab Units 03/03/21  0539   POTASSIUM mmol/L 3 9   CHLORIDE mmol/L 100   CO2 mmol/L 28   BUN mg/dL 11   CREATININE mg/dL 1 27   EGFR ml/min/1 73sq m 58   CALCIUM mg/dL 7 4*

## 2021-03-03 NOTE — ASSESSMENT & PLAN NOTE
· Due to sepsis, ACEi, NSAIDs  · Received IVFs  · Baseline creatinine approximately 1 3-1 6 - at 1 29 today (resolved)  · Monitor renal function and urine output - limit/avoid nephrotoxins as possible - holding ACEI  · On sodium bicarbonate supplementation - acidosis resolved  · Nephrology signed off

## 2021-03-03 NOTE — PROGRESS NOTES
Progress Note - Nick Miguel 1950, 78 y o  male MRN: [id number]    Unit/Bed#: S -01 Encounter: [id number]    Primary Care Provider: Subha Pat   Date and time admitted to hospital: 2/19/2021  2:09 PM      * Osteomyelitis of toe with cellulitis  Assessment & Plan  · Necrosis of the right 2nd digit (distal phalanx), proximal phalanx erythema toes, warmth, tender  Inflammatory signs present of the plantar surface as well  · X-ray of the right foot showed no evidence of osteomyelitis, 2nd PIP joint dislocation  · S/p I&D of the right foot with open 2nd ray amputation on 2/21/21 - s/p repeat washout with wound VAC insertion and partial closure on 2/26/21  · Podiatry discussed in detail long-term prognosis with patient and likely need for possible curative transmetatarsal amputation, however, patient refusing at this time, opting for further attempt at limb salvage  · Wound culture growing Enterococcus faecalis - antibiotic course transitioned to IV Ampicillin and now Amoxicillin through 3/4/21 per Infectious Disease  · PRN pain control and supportive care  · PT/OT as tolerated - nonweightbearing to affected extremity per podiatry  · Transmetatarsal amputation planned for 3/4/21    Insulin-dependent diabetes mellitus  Assessment & Plan  Lab Results   Component Value Date    HGBA1C 9 3 (H) 02/21/2021     · Patient did have severe hyperglycemia during this hospitalization with blood glucoses in the 500s  He required an insulin drip  He was receiving approximately 1 5 units/hour      · Continue Lantus and NovoLog with meals Carbohydrate restricted diet    Peripheral arterial disease  Assessment & Plan  · S/p right SFA stenting on 2/23 - follow-up LEADs noted  · Continue Plavix/statin    Hyperlipidemia  Assessment & Plan  · Continue Lipitor    Essential hypertension  Assessment & Plan  · Continue Norvasc/Tenormin  · Holding ACEI in the setting of acute kidney injury    Acute kidney injury - Chronic kidney disease stage 3  Assessment & Plan  · Due to sepsis, ACEi, NSAIDs  · Received IVFs  · Baseline creatinine approximately 1 3-1 6 - at 1 29 today (resolved)  · Monitor renal function and urine output - limit/avoid nephrotoxins as possible - holding ACEI  · On sodium bicarbonate supplementation - acidosis resolved  · Nephrology signed off    Hypothyroidism  Assessment & Plan  · Continue Synthroid    VTE Pharmacologic Prophylaxis:   Pharmacologic: Enoxaparin (Lovenox)  Mechanical VTE Prophylaxis in Place: Yes    Patient Centered Rounds: I have performed bedside rounds with nursing staff today  Discussions with Specialists or Other Care Team Provider    Education and Discussions with Family / Patient:     Time Spent for Care: 30 minutes  More than 50% of total time spent on counseling and coordination of care as described above  Current Length of Stay: 12 day(s)    Current Patient Status: Inpatient   Certification Statement: The patient will continue to require additional inpatient hospital stay due to Transmetatarsal amputation    Discharge Plan:  Pending podiatry intervention    Code Status: Level 1 - Full Code      Subjective:   Feels okay  No complaints    Objective:     Vitals:   Temp (24hrs), Av 8 °F (36 6 °C), Min:97 3 °F (36 3 °C), Max:98 2 °F (36 8 °C)    Temp:  [97 3 °F (36 3 °C)-98 2 °F (36 8 °C)] 97 3 °F (36 3 °C)  HR:  [59-71] 59  Resp:  [18] 18  BP: (138-149)/(62-67) 149/67  SpO2:  [91 %-93 %] 93 %  Body mass index is 30 13 kg/m²  Input and Output Summary (last 24 hours): Intake/Output Summary (Last 24 hours) at 3/3/2021 1516  Last data filed at 3/3/2021 1248  Gross per 24 hour   Intake --   Output 875 ml   Net -875 ml       Physical Exam:     Physical Exam     Gen -Patient comfortable   Neck- Supple   No thyromegaly or lymphadenopathy  Lungs-Clear bilaterally without any wheeze or rales   Heart S1-S2, regular rate and rhythm, no murmurs  Abdomen-soft nontender, no organomegaly  Bowel sounds present  Extremities-no cyanosi,  clubbing or edema  Wound dressing intact  Skin- no rash  Neuro-nonfocal       Additional Data:     Labs:    Results from last 7 days   Lab Units 03/03/21  0539   WBC Thousand/uL 11 25*   HEMOGLOBIN g/dL 9 3*   HEMATOCRIT % 28 4*   PLATELETS Thousands/uL 466*   NEUTROS PCT % 62   LYMPHS PCT % 20   MONOS PCT % 13*   EOS PCT % 3     Results from last 7 days   Lab Units 03/03/21  0539   SODIUM mmol/L 134*   POTASSIUM mmol/L 3 9   CHLORIDE mmol/L 100   CO2 mmol/L 28   BUN mg/dL 11   CREATININE mg/dL 1 27   ANION GAP mmol/L 6   CALCIUM mg/dL 7 4*   GLUCOSE RANDOM mg/dL 231*         Results from last 7 days   Lab Units 03/02/21  1628 03/02/21  1123 03/02/21  0739 03/01/21  2158 03/01/21  1534 03/01/21  1155 03/01/21  0945 03/01/21  0742 03/01/21  0558 03/01/21  0411 03/01/21  0205 03/01/21  0007   POC GLUCOSE mg/dl 197* 244* 268* 108 141* 158* 117 120 119 120 117 121         Results from last 7 days   Lab Units 02/26/21  0552 02/25/21  1943   PROCALCITONIN ng/ml 0 21 0 28*           * I Have Reviewed All Lab Data Listed Above  * Additional Pertinent Lab Tests Reviewed:  All Labs Within Last 24 Hours Reviewed    Imaging:    Imaging Reports Reviewed Today Include:   Imaging Personally Reviewed by Myself Includes:      Recent Cultures (last 7 days):           Last 24 Hours Medication List:   Current Facility-Administered Medications   Medication Dose Route Frequency Provider Last Rate    acetaminophen  975 mg Oral Q8H [facility] Erla Risk, DPM      amLODIPine  10 mg Oral Daily Erla Risk, DPM      amoxicillin  1,000 mg Oral Q8H [facility] Minor Charlton MD      atenolol  25 mg Oral Daily Erla Risk, DPM      atorvastatin  40 mg Oral Daily With [employer], DPM      calcium carbonate  500 mg Oral BID PRN Erla Risk, DPM      citalopram  20 mg Oral Daily Erla Risk, DPM      clopidogrel  75 mg Oral Daily Erla Risk, DPM      enoxaparin  40 mg Subcutaneous Daily Ofelia Llamas, DPM      famotidine  20 mg Oral BID Gladys Paiz      HYDROmorphone  0 2 mg Intravenous Q4H PRN Ofelia Llamas, DPJEANE      insulin glargine  20 Units Subcutaneous QAM Donna Pathak MD      insulin lispro  5 Units Subcutaneous TID With Meals Donna Pathak MD      levothyroxine  175 mcg Oral Early Morning Gladys Paiz     [place] Drop oxyCODONE  2 5 mg Oral Q4H PRN Ofelia Llamas, DPM      oxyCODONE  5 mg Oral Q4H PRN Ofelia Llamas, DPM      polyethylene glycol  17 g Oral Daily PRN Ofelia Llamas, DPM      sodium bicarbonate  650 mg Oral BID after meals Ofelia Llamas, DPJEANE      sodium chloride  1 spray Each Nare Q2H PRN Olive Lizama PA-C      tiotropium  18 mcg Inhalation Daily Ofelia Llamas, DPM      traZODone  50 mg Oral HS Ofelia Llamas, DPJEANE          Today, Patient Was Seen By: Ish Walsh MD    ** Please Note: Dictation voice to text software may have been used in the creation of this document   **

## 2021-03-03 NOTE — CASE MANAGEMENT
CM informed pt for OR Thursday for TMA   No VAC needed at d/c - TC to [employer] [facility] [employer] - CM spoke with Kathryn and cancelled request  Group Topic: [facility] DOLORES Activity Group    Date: 8/10/2020  Start Time: 1100  End Time: 1145  Facilitators: Susan P Schwabe, RN    Focus: Using medication and yoga in recovery  Number in attendance: 9      Goals: Using meditation and yoga in recovery: To help clients experience meditation and yoga as tools to lesson stress and begin to develop greater mindful awareness of their own body  Handouts: Using Meditation and Yoga in Recovery  Method: Group  Attendance: Present  Mood/Affect: Appropriate  Behavior/Socialization: Appropriate to group  Participation: Active  Overall Patient Response to Group: Able to return demo  Individual Response to Group: Participated  Ability to Apply Content to Group: 5

## 2021-03-03 NOTE — PLAN OF CARE
Problem: Potential for Falls  Goal: Patient will remain free of falls  Description: INTERVENTIONS:  - Assess patient frequently for physical needs  -  Identify cognitive and physical deficits and behaviors that affect risk of falls    -  [place] fall precautions as indicated by assessment   - Educate patient/family on patient safety including physical limitations  - Instruct patient to call for assistance with activity based on assessment  - Modify environment to reduce risk of injury  - Consider OT/PT consult to assist with strengthening/mobility  Outcome: Progressing     Problem: PAIN - ADULT  Goal: Verbalizes/displays adequate comfort level or baseline comfort level  Description: Interventions:  - Encourage patient to monitor pain and request assistance  - Assess pain using appropriate pain scale  - Administer analgesics based on type and severity of pain and evaluate response  - Implement non-pharmacological measures as appropriate and evaluate response  - Consider cultural and social influences on pain and pain management  - Notify physician/advanced practitioner if interventions unsuccessful or patient reports new pain  Outcome: Progressing     Problem: INFECTION - ADULT  Goal: Absence or prevention of progression during hospitalization  Description: INTERVENTIONS:  - Assess and monitor for signs and symptoms of infection  - Monitor lab/diagnostic results  - Monitor all insertion sites, i e  indwelling lines, tubes, and drains  - Monitor endotracheal if appropriate and nasal secretions for changes in amount and color  - [place] appropriate cooling/warming therapies per order  - Administer medications as ordered  - Instruct and encourage patient and family to use good hand hygiene technique  - Identify and instruct in appropriate isolation precautions for identified infection/condition  Outcome: Progressing     Problem: SAFETY ADULT  Goal: Patient will remain free of falls  Description: INTERVENTIONS:  - Assess patient frequently for physical needs  -  Identify cognitive and physical deficits and behaviors that affect risk of falls  -  [place] fall precautions as indicated by assessment   - Educate patient/family on patient safety including physical limitations  - Instruct patient to call for assistance with activity based on assessment  - Modify environment to reduce risk of injury  - Consider OT/PT consult to assist with strengthening/mobility  Outcome: Progressing     Problem: DISCHARGE PLANNING  Goal: Discharge to home or other facility with appropriate resources  Description: INTERVENTIONS:  - Identify barriers to discharge w/patient and caregiver  - Arrange for needed discharge resources and transportation as appropriate  - Identify discharge learning needs (meds, wound care, etc )  - Arrange for interpretive services to assist at discharge as needed  - Refer to Case Management Department for coordinating discharge planning if the patient needs post-hospital services based on physician/advanced practitioner order or complex needs related to functional status, cognitive ability, or social support system  Outcome: Progressing     Problem: Knowledge Deficit  Goal: Patient/family/caregiver demonstrates understanding of disease process, treatment plan, medications, and discharge instructions  Description: Complete learning assessment and assess knowledge base    Interventions:  - Provide teaching at level of understanding  - Provide teaching via preferred learning methods  Outcome: Progressing     Problem: Prexisting or High Potential for Compromised Skin Integrity  Goal: Skin integrity is maintained or improved  Description: INTERVENTIONS:  - Identify patients at risk for skin breakdown  - Assess and monitor skin integrity  - Assess and monitor nutrition and hydration status  - Monitor labs   - Assess for incontinence   - Turn and reposition patient  - Assist with mobility/ambulation  - Relieve pressure over bony prominences  - Avoid friction and shearing  - Provide appropriate hygiene as needed including keeping skin clean and dry  - Evaluate need for skin moisturizer/barrier cream  - Collaborate with interdisciplinary team   - Patient/family teaching  - Consider wound care consult   Outcome: Progressing     Problem: Nutrition/Hydration-ADULT  Goal: Nutrient/Hydration intake appropriate for improving, restoring or maintaining nutritional needs  Description: Monitor and assess patient's nutrition/hydration status for malnutrition  Collaborate with interdisciplinary team and initiate plan and interventions as ordered  Monitor patient's weight and dietary intake as ordered or per policy  Utilize nutrition screening tool and intervene as necessary  Determine patient's food preferences and provide high-protein, high-caloric foods as appropriate       INTERVENTIONS:  - Monitor oral intake, urinary output, labs, and treatment plans  - Assess nutrition and hydration status and recommend course of action  - Evaluate amount of meals eaten  - Assist patient with eating if necessary   - Allow adequate time for meals  - Recommend/ encourage appropriate diets, oral nutritional supplements, and vitamin/mineral supplements  - Order, calculate, and assess calorie counts as needed  - Recommend, monitor, and adjust tube feedings and TPN/PPN based on assessed needs  - Assess need for intravenous fluids  - Provide specific nutrition/hydration education as appropriate  - Include patient/family/caregiver in decisions related to nutrition  Outcome: Progressing

## 2021-03-04 ENCOUNTER — APPOINTMENT (INPATIENT)
Dept: RADIOLOGY | Facility: HOSPITAL | Age: 71
DRG: 240 | End: 2021-03-04
Payer: COMMERCIAL

## 2021-03-04 ENCOUNTER — ANESTHESIA (INPATIENT)
Dept: PERIOP | Facility: HOSPITAL | Age: 71
DRG: 240 | End: 2021-03-04
Payer: COMMERCIAL

## 2021-03-04 LAB
ANION GAP SERPL CALCULATED.3IONS-SCNC: 8 MMOL/L (ref 4–13)
BASOPHILS # BLD AUTO: 0.08 THOUSANDS/ΜL (ref 0–0.1)
BASOPHILS NFR BLD AUTO: 1 % (ref 0–1)
BUN SERPL-MCNC: 9 MG/DL (ref 5–25)
CALCIUM SERPL-MCNC: 8 MG/DL (ref 8.3–10.1)
CHLORIDE SERPL-SCNC: 99 MMOL/L (ref 100–108)
CO2 SERPL-SCNC: 27 MMOL/L (ref 21–32)
CREAT SERPL-MCNC: 1.19 MG/DL (ref 0.6–1.3)
EOSINOPHIL # BLD AUTO: 0.25 THOUSAND/ΜL (ref 0–0.61)
EOSINOPHIL NFR BLD AUTO: 2 % (ref 0–6)
ERYTHROCYTE [DISTWIDTH] IN BLOOD BY AUTOMATED COUNT: 13.2 % (ref 11.6–15.1)
GFR SERPL CREATININE-BSD FRML MDRD: 62 ML/MIN/1.73SQ M
GLUCOSE SERPL-MCNC: 248 MG/DL (ref 65–140)
GLUCOSE SERPL-MCNC: 280 MG/DL (ref 65–140)
GLUCOSE SERPL-MCNC: 291 MG/DL (ref 65–140)
HCT VFR BLD AUTO: 27.3 % (ref 36.5–49.3)
HGB BLD-MCNC: 8.9 G/DL (ref 12–17)
IMM GRANULOCYTES # BLD AUTO: 0.09 THOUSAND/UL (ref 0–0.2)
IMM GRANULOCYTES NFR BLD AUTO: 1 % (ref 0–2)
LYMPHOCYTES # BLD AUTO: 2.01 THOUSANDS/ΜL (ref 0.6–4.47)
LYMPHOCYTES NFR BLD AUTO: 18 % (ref 14–44)
MCH RBC QN AUTO: 28.7 PG (ref 26.8–34.3)
MCHC RBC AUTO-ENTMCNC: 32.6 G/DL (ref 31.4–37.4)
MCV RBC AUTO: 88 FL (ref 82–98)
MONOCYTES # BLD AUTO: 1.18 THOUSAND/ΜL (ref 0.17–1.22)
MONOCYTES NFR BLD AUTO: 11 % (ref 4–12)
NEUTROPHILS # BLD AUTO: 7.51 THOUSANDS/ΜL (ref 1.85–7.62)
NEUTS SEG NFR BLD AUTO: 67 % (ref 43–75)
NRBC BLD AUTO-RTO: 0 /100 WBCS
PLATELET # BLD AUTO: 502 THOUSANDS/UL (ref 149–390)
PMV BLD AUTO: 10 FL (ref 8.9–12.7)
POTASSIUM SERPL-SCNC: 3.8 MMOL/L (ref 3.5–5.3)
RBC # BLD AUTO: 3.1 MILLION/UL (ref 3.88–5.62)
SODIUM SERPL-SCNC: 134 MMOL/L (ref 136–145)
WBC # BLD AUTO: 11.12 THOUSAND/UL (ref 4.31–10.16)

## 2021-03-04 PROCEDURE — 88311 DECALCIFY TISSUE: CPT | Performed by: PATHOLOGY

## 2021-03-04 PROCEDURE — 99232 SBSQ HOSP IP/OBS MODERATE 35: CPT | Performed by: INTERNAL MEDICINE

## 2021-03-04 PROCEDURE — 88305 TISSUE EXAM BY PATHOLOGIST: CPT | Performed by: PATHOLOGY

## 2021-03-04 PROCEDURE — 0Y6M0Z9 DETACHMENT AT RIGHT FOOT, PARTIAL 1ST RAY, OPEN APPROACH: ICD-10-PCS | Performed by: PODIATRIST

## 2021-03-04 PROCEDURE — 85025 COMPLETE CBC W/AUTO DIFF WBC: CPT | Performed by: INTERNAL MEDICINE

## 2021-03-04 PROCEDURE — 73630 X-RAY EXAM OF FOOT: CPT

## 2021-03-04 PROCEDURE — 0Y6M0ZB DETACHMENT AT RIGHT FOOT, PARTIAL 2ND RAY, OPEN APPROACH: ICD-10-PCS | Performed by: PODIATRIST

## 2021-03-04 PROCEDURE — 80048 BASIC METABOLIC PNL TOTAL CA: CPT | Performed by: INTERNAL MEDICINE

## 2021-03-04 PROCEDURE — 82948 REAGENT STRIP/BLOOD GLUCOSE: CPT

## 2021-03-04 PROCEDURE — 99024 POSTOP FOLLOW-UP VISIT: CPT | Performed by: PODIATRIST

## 2021-03-04 PROCEDURE — 0Y6M0ZF DETACHMENT AT RIGHT FOOT, PARTIAL 5TH RAY, OPEN APPROACH: ICD-10-PCS | Performed by: PODIATRIST

## 2021-03-04 PROCEDURE — 28805 AMPUTATION THRU METATARSAL: CPT | Performed by: PODIATRIST

## 2021-03-04 RX ORDER — MIDAZOLAM HYDROCHLORIDE 2 MG/2ML
INJECTION, SOLUTION INTRAMUSCULAR; INTRAVENOUS AS NEEDED
Status: DISCONTINUED | OUTPATIENT
Start: 2021-03-04 | End: 2021-03-04

## 2021-03-04 RX ORDER — DEXAMETHASONE SODIUM PHOSPHATE 4 MG/ML
INJECTION, SOLUTION INTRA-ARTICULAR; INTRALESIONAL; INTRAMUSCULAR; INTRAVENOUS; SOFT TISSUE AS NEEDED
Status: DISCONTINUED | OUTPATIENT
Start: 2021-03-04 | End: 2021-03-04

## 2021-03-04 RX ORDER — HYDROMORPHONE HCL/PF 1 MG/ML
0.2 SYRINGE (ML) INJECTION
Status: DISCONTINUED | OUTPATIENT
Start: 2021-03-04 | End: 2021-03-04 | Stop reason: HOSPADM

## 2021-03-04 RX ORDER — FENTANYL CITRATE/PF 50 MCG/ML
25 SYRINGE (ML) INJECTION
Status: COMPLETED | OUTPATIENT
Start: 2021-03-04 | End: 2021-03-04

## 2021-03-04 RX ORDER — ONDANSETRON 2 MG/ML
INJECTION INTRAMUSCULAR; INTRAVENOUS AS NEEDED
Status: DISCONTINUED | OUTPATIENT
Start: 2021-03-04 | End: 2021-03-04

## 2021-03-04 RX ORDER — FENTANYL CITRATE 50 UG/ML
INJECTION, SOLUTION INTRAMUSCULAR; INTRAVENOUS AS NEEDED
Status: DISCONTINUED | OUTPATIENT
Start: 2021-03-04 | End: 2021-03-04

## 2021-03-04 RX ORDER — LIDOCAINE HYDROCHLORIDE 10 MG/ML
INJECTION, SOLUTION EPIDURAL; INFILTRATION; INTRACAUDAL; PERINEURAL AS NEEDED
Status: DISCONTINUED | OUTPATIENT
Start: 2021-03-04 | End: 2021-03-04

## 2021-03-04 RX ORDER — PROPOFOL 10 MG/ML
INJECTION, EMULSION INTRAVENOUS AS NEEDED
Status: DISCONTINUED | OUTPATIENT
Start: 2021-03-04 | End: 2021-03-04

## 2021-03-04 RX ORDER — SODIUM CHLORIDE, SODIUM LACTATE, POTASSIUM CHLORIDE, CALCIUM CHLORIDE 600; 310; 30; 20 MG/100ML; MG/100ML; MG/100ML; MG/100ML
100 INJECTION, SOLUTION INTRAVENOUS CONTINUOUS
Status: DISCONTINUED | OUTPATIENT
Start: 2021-03-04 | End: 2021-03-05

## 2021-03-04 RX ORDER — EPHEDRINE SULFATE 50 MG/ML
INJECTION INTRAVENOUS AS NEEDED
Status: DISCONTINUED | OUTPATIENT
Start: 2021-03-04 | End: 2021-03-04

## 2021-03-04 RX ORDER — ONDANSETRON 2 MG/ML
4 INJECTION INTRAMUSCULAR; INTRAVENOUS ONCE AS NEEDED
Status: DISCONTINUED | OUTPATIENT
Start: 2021-03-04 | End: 2021-03-04 | Stop reason: HOSPADM

## 2021-03-04 RX ORDER — GLYCOPYRROLATE 0.2 MG/ML
INJECTION INTRAMUSCULAR; INTRAVENOUS AS NEEDED
Status: DISCONTINUED | OUTPATIENT
Start: 2021-03-04 | End: 2021-03-04

## 2021-03-04 RX ORDER — SODIUM CHLORIDE, SODIUM LACTATE, POTASSIUM CHLORIDE, CALCIUM CHLORIDE 600; 310; 30; 20 MG/100ML; MG/100ML; MG/100ML; MG/100ML
INJECTION, SOLUTION INTRAVENOUS CONTINUOUS PRN
Status: DISCONTINUED | OUTPATIENT
Start: 2021-03-04 | End: 2021-03-04

## 2021-03-04 RX ORDER — MAGNESIUM HYDROXIDE 1200 MG/15ML
LIQUID ORAL AS NEEDED
Status: DISCONTINUED | OUTPATIENT
Start: 2021-03-04 | End: 2021-03-04 | Stop reason: HOSPADM

## 2021-03-04 RX ADMIN — LIDOCAINE HYDROCHLORIDE 50 MG: 10 INJECTION, SOLUTION EPIDURAL; INFILTRATION; INTRACAUDAL at 15:45

## 2021-03-04 RX ADMIN — INSULIN LISPRO 10 UNITS: 100 INJECTION, SOLUTION INTRAVENOUS; SUBCUTANEOUS at 22:27

## 2021-03-04 RX ADMIN — FENTANYL CITRATE 25 MCG: 50 INJECTION INTRAMUSCULAR; INTRAVENOUS at 17:21

## 2021-03-04 RX ADMIN — OXYCODONE HYDROCHLORIDE 5 MG: 5 TABLET ORAL at 20:46

## 2021-03-04 RX ADMIN — SODIUM CHLORIDE, SODIUM LACTATE, POTASSIUM CHLORIDE, AND CALCIUM CHLORIDE 100 ML/HR: .6; .31; .03; .02 INJECTION, SOLUTION INTRAVENOUS at 20:34

## 2021-03-04 RX ADMIN — AMOXICILLIN 1000 MG: 250 CAPSULE ORAL at 05:15

## 2021-03-04 RX ADMIN — AMLODIPINE BESYLATE 10 MG: 10 TABLET ORAL at 08:14

## 2021-03-04 RX ADMIN — OXYCODONE HYDROCHLORIDE 5 MG: 5 TABLET ORAL at 08:14

## 2021-03-04 RX ADMIN — SODIUM BICARBONATE 650 MG: 650 TABLET ORAL at 08:13

## 2021-03-04 RX ADMIN — INSULIN LISPRO 5 UNITS: 100 INJECTION, SOLUTION INTRAVENOUS; SUBCUTANEOUS at 08:15

## 2021-03-04 RX ADMIN — DEXAMETHASONE SODIUM PHOSPHATE 4 MG: 4 INJECTION INTRA-ARTICULAR; INTRALESIONAL; INTRAMUSCULAR; INTRAVENOUS; SOFT TISSUE at 15:53

## 2021-03-04 RX ADMIN — INSULIN LISPRO 5 UNITS: 100 INJECTION, SOLUTION INTRAVENOUS; SUBCUTANEOUS at 12:40

## 2021-03-04 RX ADMIN — FENTANYL CITRATE 25 MCG: 50 INJECTION, SOLUTION INTRAMUSCULAR; INTRAVENOUS at 15:53

## 2021-03-04 RX ADMIN — INSULIN GLARGINE 10 UNITS: 100 INJECTION, SOLUTION SUBCUTANEOUS at 08:32

## 2021-03-04 RX ADMIN — ATENOLOL 25 MG: 25 TABLET ORAL at 08:13

## 2021-03-04 RX ADMIN — CITALOPRAM HYDROBROMIDE 20 MG: 20 TABLET ORAL at 08:13

## 2021-03-04 RX ADMIN — ONDANSETRON 4 MG: 2 INJECTION INTRAMUSCULAR; INTRAVENOUS at 16:38

## 2021-03-04 RX ADMIN — FENTANYL CITRATE 25 MCG: 50 INJECTION INTRAMUSCULAR; INTRAVENOUS at 17:35

## 2021-03-04 RX ADMIN — FENTANYL CITRATE 25 MCG: 50 INJECTION, SOLUTION INTRAMUSCULAR; INTRAVENOUS at 15:59

## 2021-03-04 RX ADMIN — GLYCOPYRROLATE 0.1 MG: 0.2 INJECTION, SOLUTION INTRAMUSCULAR; INTRAVENOUS at 15:53

## 2021-03-04 RX ADMIN — TIOTROPIUM BROMIDE 18 MCG: 18 CAPSULE ORAL; RESPIRATORY (INHALATION) at 08:15

## 2021-03-04 RX ADMIN — MIDAZOLAM HYDROCHLORIDE 1 MG: 1 INJECTION, SOLUTION INTRAMUSCULAR; INTRAVENOUS at 15:39

## 2021-03-04 RX ADMIN — FENTANYL CITRATE 25 MCG: 50 INJECTION INTRAMUSCULAR; INTRAVENOUS at 17:10

## 2021-03-04 RX ADMIN — TRAZODONE HYDROCHLORIDE 50 MG: 50 TABLET ORAL at 22:24

## 2021-03-04 RX ADMIN — PROPOFOL 200 MG: 10 INJECTION, EMULSION INTRAVENOUS at 15:45

## 2021-03-04 RX ADMIN — INSULIN LISPRO 5 UNITS: 100 INJECTION, SOLUTION INTRAVENOUS; SUBCUTANEOUS at 20:52

## 2021-03-04 RX ADMIN — EPHEDRINE SULFATE 5 MG: 50 INJECTION, SOLUTION INTRAVENOUS at 16:25

## 2021-03-04 RX ADMIN — ONDANSETRON 4 MG: 2 INJECTION INTRAMUSCULAR; INTRAVENOUS at 16:34

## 2021-03-04 RX ADMIN — EPHEDRINE SULFATE 10 MG: 50 INJECTION, SOLUTION INTRAVENOUS at 16:04

## 2021-03-04 RX ADMIN — AMOXICILLIN 1000 MG: 250 CAPSULE ORAL at 22:24

## 2021-03-04 RX ADMIN — LEVOTHYROXINE SODIUM 175 MCG: 175 TABLET ORAL at 05:15

## 2021-03-04 RX ADMIN — SODIUM CHLORIDE, SODIUM LACTATE, POTASSIUM CHLORIDE, AND CALCIUM CHLORIDE: .6; .31; .03; .02 INJECTION, SOLUTION INTRAVENOUS at 15:35

## 2021-03-04 RX ADMIN — AMPICILLIN SODIUM 2000 MG: 2 INJECTION, POWDER, FOR SOLUTION INTRAMUSCULAR; INTRAVENOUS at 15:38

## 2021-03-04 RX ADMIN — FENTANYL CITRATE 25 MCG: 50 INJECTION INTRAMUSCULAR; INTRAVENOUS at 17:42

## 2021-03-04 NOTE — ANESTHESIA POSTPROCEDURE EVALUATION
Post-Op Assessment Note    CV Status:  Stable    Pain management: adequate     Mental Status:  Sleepy   Hydration Status:  Stable   PONV Controlled:  None   Airway Patency:  Patent      Post Op Vitals Reviewed: Yes      Staff: CRNA         No complications documented      BP   163/70   Temp   97 3   Pulse  62   Resp   15   SpO2   100% on 6LFM   Postop VS in PACU noted above, SV non-obstructed

## 2021-03-04 NOTE — PHYSICAL THERAPY NOTE
PHYSICAL THERAPY NOTE  Patient Name: Cynthia Tyson  FMYTA'N Date: 3/4/2021     03/04/21 1600   PT Last Visit   PT Visit Date 03/04/21   Note Type   Note type Evaluation   Cancel Reasons Patient to operating room  (Pt having TMA today    Will need reorders for PT services post op w/ activity and WB restrictions)   Patrica Mullen, PT

## 2021-03-04 NOTE — PLAN OF CARE
Problem: Potential for Falls  Goal: Patient will remain free of falls  Description: INTERVENTIONS:  - Assess patient frequently for physical needs  -  Identify cognitive and physical deficits and behaviors that affect risk of falls    -  [place] fall precautions as indicated by assessment   - Educate patient/family on patient safety including physical limitations  - Instruct patient to call for assistance with activity based on assessment  - Modify environment to reduce risk of injury  - Consider OT/PT consult to assist with strengthening/mobility  Outcome: Progressing     Problem: PAIN - ADULT  Goal: Verbalizes/displays adequate comfort level or baseline comfort level  Description: Interventions:  - Encourage patient to monitor pain and request assistance  - Assess pain using appropriate pain scale  - Administer analgesics based on type and severity of pain and evaluate response  - Implement non-pharmacological measures as appropriate and evaluate response  - Consider cultural and social influences on pain and pain management  - Notify physician/advanced practitioner if interventions unsuccessful or patient reports new pain  Outcome: Progressing     Problem: INFECTION - ADULT  Goal: Absence or prevention of progression during hospitalization  Description: INTERVENTIONS:  - Assess and monitor for signs and symptoms of infection  - Monitor lab/diagnostic results  - Monitor all insertion sites, i e  indwelling lines, tubes, and drains  - Monitor endotracheal if appropriate and nasal secretions for changes in amount and color  - [place] appropriate cooling/warming therapies per order  - Administer medications as ordered  - Instruct and encourage patient and family to use good hand hygiene technique  - Identify and instruct in appropriate isolation precautions for identified infection/condition  Outcome: Progressing     Problem: SAFETY ADULT  Goal: Patient will remain free of falls  Description: INTERVENTIONS:  - Assess patient frequently for physical needs  -  Identify cognitive and physical deficits and behaviors that affect risk of falls  -  [place] fall precautions as indicated by assessment   - Educate patient/family on patient safety including physical limitations  - Instruct patient to call for assistance with activity based on assessment  - Modify environment to reduce risk of injury  - Consider OT/PT consult to assist with strengthening/mobility  Outcome: Progressing     Problem: DISCHARGE PLANNING  Goal: Discharge to home or other facility with appropriate resources  Description: INTERVENTIONS:  - Identify barriers to discharge w/patient and caregiver  - Arrange for needed discharge resources and transportation as appropriate  - Identify discharge learning needs (meds, wound care, etc )  - Arrange for interpretive services to assist at discharge as needed  - Refer to Case Management Department for coordinating discharge planning if the patient needs post-hospital services based on physician/advanced practitioner order or complex needs related to functional status, cognitive ability, or social support system  Outcome: Progressing     Problem: Knowledge Deficit  Goal: Patient/family/caregiver demonstrates understanding of disease process, treatment plan, medications, and discharge instructions  Description: Complete learning assessment and assess knowledge base    Interventions:  - Provide teaching at level of understanding  - Provide teaching via preferred learning methods  Outcome: Progressing     Problem: Prexisting or High Potential for Compromised Skin Integrity  Goal: Skin integrity is maintained or improved  Description: INTERVENTIONS:  - Identify patients at risk for skin breakdown  - Assess and monitor skin integrity  - Assess and monitor nutrition and hydration status  - Monitor labs   - Assess for incontinence   - Turn and reposition patient  - Assist with mobility/ambulation  - Relieve pressure over bony prominences  - Avoid friction and shearing  - Provide appropriate hygiene as needed including keeping skin clean and dry  - Evaluate need for skin moisturizer/barrier cream  - Collaborate with interdisciplinary team   - Patient/family teaching  - Consider wound care consult   Outcome: Progressing     Problem: Nutrition/Hydration-ADULT  Goal: Nutrient/Hydration intake appropriate for improving, restoring or maintaining nutritional needs  Description: Monitor and assess patient's nutrition/hydration status for malnutrition  Collaborate with interdisciplinary team and initiate plan and interventions as ordered  Monitor patient's weight and dietary intake as ordered or per policy  Utilize nutrition screening tool and intervene as necessary  Determine patient's food preferences and provide high-protein, high-caloric foods as appropriate       INTERVENTIONS:  - Monitor oral intake, urinary output, labs, and treatment plans  - Assess nutrition and hydration status and recommend course of action  - Evaluate amount of meals eaten  - Assist patient with eating if necessary   - Allow adequate time for meals  - Recommend/ encourage appropriate diets, oral nutritional supplements, and vitamin/mineral supplements  - Order, calculate, and assess calorie counts as needed  - Recommend, monitor, and adjust tube feedings and TPN/PPN based on assessed needs  - Assess need for intravenous fluids  - Provide specific nutrition/hydration education as appropriate  - Include patient/family/caregiver in decisions related to nutrition  Outcome: Progressing

## 2021-03-04 NOTE — ANESTHESIA PREPROCEDURE EVALUATION
Procedure:  AMPUTATION TRANSMETATARSAL (TMA) right foot (Right Foot)    Relevant Problems   CARDIO   (+) Essential hypertension   (+) Hyperlipidemia   (+) Peripheral arterial disease      ENDO   (+) Hypothyroidism   (+) Insulin-dependent diabetes mellitus      /RENAL   (+) Acute kidney injury - Chronic kidney disease stage 3   (+) Stage 3 chronic kidney disease      HEMATOLOGY   (+) Anemia of chronic disease      NEURO/PSYCH   (+) Depression      Other   (+) Osteomyelitis of toe with cellulitis      EKG 2/19/2021:  Normal sinus rhythm  Possible Inferior infarct , age undetermined  Abnormal ECG  No previous ECGs available    Lab Results   Component Value Date    WBC 11 12 (H) 03/04/2021    HGB 8 9 (L) 03/04/2021    HCT 27 3 (L) 03/04/2021    MCV 88 03/04/2021     (H) 03/04/2021     Lab Results   Component Value Date    SODIUM 134 (L) 03/04/2021    K 3 8 03/04/2021    CL 99 (L) 03/04/2021    CO2 27 03/04/2021    BUN 9 03/04/2021    CREATININE 1 19 03/04/2021    GLUC 280 (H) 03/04/2021    CALCIUM 8 0 (L) 03/04/2021     Lab Results   Component Value Date    INR 1 12 02/19/2021    PROTIME 14 5 02/19/2021     Lab Results   Component Value Date    HGBA1C 9 3 (H) 02/21/2021          Physical Exam    Airway    Mallampati score: I  TM Distance: >3 FB  Neck ROM: full     Dental   No notable dental hx     Cardiovascular      Pulmonary      Other Findings        Anesthesia Plan  ASA Score- 3     Anesthesia Type- general with ASA Monitors  Additional Monitors:   Airway Plan: LMA  Plan Factors-Exercise tolerance (METS): >4 METS  Chart reviewed  EKG reviewed  Existing labs reviewed  Patient summary reviewed  Patient is not a current smoker  There is medical exclusion for perioperative obstructive sleep apnea risk education  Induction- intravenous  Postoperative Plan- Plan for postoperative opioid use  Informed Consent- Anesthetic plan and risks discussed with patient    I personally reviewed this patient with the CRNA  Discussed and agreed on the Anesthesia Plan with the CRNA  Navin Suarez

## 2021-03-04 NOTE — PROGRESS NOTES
Progress Note - Nathalie York 1950, 78 y o  male MRN: [id number]    Unit/Bed#: OR POOL Encounter: [id number]    Primary Care Provider: Ulises Melendez   Date and time admitted to hospital: 2/19/2021  2:09 PM        * Osteomyelitis of toe with cellulitis  Assessment & Plan  · Necrosis of the right 2nd digit (distal phalanx), proximal phalanx erythema toes, warmth, tender  Inflammatory signs present of the plantar surface as well  · X-ray of the right foot showed no evidence of osteomyelitis, 2nd PIP joint dislocation  · S/p I&D of the right foot with open 2nd ray amputation on 2/21/21 - s/p repeat washout with wound VAC insertion and partial closure on 2/26/21  · Podiatry discussed in detail long-term prognosis with patient and likely need for possible curative transmetatarsal amputation, however, patient refusing at this time, opting for further attempt at limb salvage  · Wound culture growing Enterococcus faecalis - antibiotic course transitioned to IV Ampicillin, continued through perioperative period  · PRN pain control and supportive care  · PT/OT as tolerated - nonweightbearing to affected extremity per podiatry  · Transmetatarsal amputation planned for today    Insulin-dependent diabetes mellitus  Assessment & Plan  Lab Results   Component Value Date    HGBA1C 9 3 (H) 02/21/2021     · Patient did have severe hyperglycemia during this hospitalization with blood glucoses in the 500s  He required an insulin drip  He was receiving approximately 1 5 units/hour      · Continue Lantus and NovoLog with meals Carbohydrate restricted diet    Peripheral arterial disease  Assessment & Plan  · S/p right SFA stenting on 2/23 - follow-up LEADs noted  · Continue Plavix/statin    Essential hypertension  Assessment & Plan  · Continue Norvasc/Tenormin  · Holding ACEI in the setting of acute kidney injury    Acute kidney injury - Chronic kidney disease stage 3  Assessment & Plan  · Due to sepsis, ACEi, NSAIDs  · Received IVFs  · Baseline creatinine approximately 1 3-1 6 - at 1 29 today (resolved)  · Monitor renal function and urine output - limit/avoid nephrotoxins as possible - holding ACEI  · On sodium bicarbonate supplementation - acidosis resolved  · Nephrology signed off    Hypothyroidism  Assessment & Plan  · Continue Synthroid    Anemia of chronic disease  Assessment & Plan  · H/H stable    VTE Pharmacologic Prophylaxis:   Pharmacologic: Enoxaparin (Lovenox)  Mechanical VTE Prophylaxis in Place: Yes    Patient Centered Rounds: I have performed bedside rounds with nursing staff today  Discussions with Specialists or Other Care Team Provider:  Podiatrist    Education and Discussions with Family / Patient:  Patient    Time Spent for Care: 30 minutes  More than 50% of total time spent on counseling and coordination of care as described above  Current Length of Stay: 13 day(s)    Current Patient Status: Inpatient   Certification Statement: The patient will continue to require additional inpatient hospital stay due to Transmetatarsal amputation    Discharge Plan:  Pending Podiatry clearance    Code Status: Level 1 - Full Code      Subjective:   Patient plan for transmetatarsal amputation today  Offers no complaints    Objective:     Vitals:   Temp (24hrs), Av 1 °F (36 7 °C), Min:97 7 °F (36 5 °C), Max:98 6 °F (37 °C)    Temp:  [97 7 °F (36 5 °C)-98 6 °F (37 °C)] 98 6 °F (37 °C)  HR:  [62-69] 62  Resp:  [18] 18  BP: (140-151)/(58-67) 144/65  SpO2:  [91 %-95 %] 95 %  Body mass index is 30 13 kg/m²  Input and Output Summary (last 24 hours): Intake/Output Summary (Last 24 hours) at 3/4/2021 1518  Last data filed at 3/4/2021 1242  Gross per 24 hour   Intake --   Output 1800 ml   Net -1800 ml       Physical Exam:     Physical Exam    Gen -Patient comfortable at rest  Neck- Supple   No thyromegaly or lymphadenopathy  Lungs-Clear bilaterally without any wheeze or rales   Heart S1-S2, regular rate and rhythm, no murmurs  Abdomen-soft nontender, no organomegaly  Bowel sounds present  Extremities-no cyanosi,  clubbing or edema  Right foot dressing intact  Skin- no rash  Neuro-nonfocal       Additional Data:     Labs:    Results from last 7 days   Lab Units 03/04/21  0628   WBC Thousand/uL 11 12*   HEMOGLOBIN g/dL 8 9*   HEMATOCRIT % 27 3*   PLATELETS Thousands/uL 502*   NEUTROS PCT % 67   LYMPHS PCT % 18   MONOS PCT % 11   EOS PCT % 2     Results from last 7 days   Lab Units 03/04/21  0628   SODIUM mmol/L 134*   POTASSIUM mmol/L 3 8   CHLORIDE mmol/L 99*   CO2 mmol/L 27   BUN mg/dL 9   CREATININE mg/dL 1 19   ANION GAP mmol/L 8   CALCIUM mg/dL 8 0*   GLUCOSE RANDOM mg/dL 280*         Results from last 7 days   Lab Units 03/04/21  1117 03/04/21  0718 03/03/21  2108 03/03/21  1621 03/03/21  1106 03/03/21  0812 03/02/21  2208 03/02/21  1628 03/02/21  1123 03/02/21  0739 03/01/21  2158 03/01/21  1534   POC GLUCOSE mg/dl 248* 291* 187* 165* 253* 302* 183* 197* 244* 268* 108 141*         Results from last 7 days   Lab Units 02/26/21  0552 02/25/21  1943   PROCALCITONIN ng/ml 0 21 0 28*           * I Have Reviewed All Lab Data Listed Above  * Additional Pertinent Lab Tests Reviewed:  All Labs Within Last 24 Hours Reviewed    Imaging:    Imaging Reports Reviewed Today Include:   Imaging Personally Reviewed by Myself Includes:      Recent Cultures (last 7 days):           Last 24 Hours Medication List:   Current Facility-Administered Medications   Medication Dose Route Frequency Provider Last Rate    [[facility] Hold] acetaminophen  975 mg Oral Q8H [facility] Dutch Lerma DPM      [MAR Hold] amLODIPine  10 mg Oral Daily Dutch Lerma DPM      [[facility] Hold] amoxicillin  1,000 mg Oral [facility] Cheung MD      ampicillin  2,000 mg Intravenous On Call To OR Jeremi Laura DPM      [facility] Hold] atenolol  25 mg Oral Daily Dutch Lerma DPM      [facility] Hold] atorvastatin  40 mg Oral Daily With [address], DPM      [MAR Hold] calcium carbonate  500 mg Oral BID PRN Pleas Gala, DPM      [facility] Hold] citalopram  20 mg Oral Daily Pleas Gala, [facility] Hold] clopidogrel  75 mg Oral Daily Pleas Gala, [facility] Hold] enoxaparin  40 mg Subcutaneous Daily Pleas Gala, [facility] Hold] famotidine  20 mg Oral BID Pleas Gala, [facility] Hold] HYDROmorphone  0 2 mg Intravenous Q4H PRN Pleas Gala, [facility] Hold] insulin glargine  20 Units Subcutaneous QAM Taryn Jackson MD      [facility] Hold] insulin lispro  5 Units Subcutaneous TID With Meals Taryn Jackson MD      [facility] Hold] levothyroxine  175 mcg Oral Early Morning Pleas Gala, [facility] Hold] oxyCODONE  2 5 mg Oral Q4H PRN Pleas Gala, [facility] Hold] oxyCODONE  5 mg Oral Q4H PRN Pleas Gala, [facility] Hold] polyethylene glycol  17 g Oral Daily PRN Pleas Gala, DPM      [facility] Hold] sodium bicarbonate  650 mg Oral BID after meals Pleas Gala, DPM      [facility] Hold] sodium chloride  1 spray Each Nare Q2H PRN Rosi Foster PA-C      [facility] Hold] tiotropium  18 mcg Inhalation Daily Pleas Gala, DPM      [MAR Hold] traZODone  50 mg Oral HS Pleas Gala, DPM          Today, Patient Was Seen By: Kenny Delacruz MD    ** Please Note: Dictation voice to text software may have been used in the creation of this document   **

## 2021-03-04 NOTE — PROGRESS NOTES
Progress Note - Infectious Disease   Marshall Sanchez 78 y o  male MRN: [id number]  Unit/Bed#: S -01 Encounter: [id number]      Impression/Plan:  1  Right 2nd toe gangrene, cellulitis and clinical osteomyelitis   Patient reportedly had previous fracture at this site and was found on initial evaluation to have exposed bone with foul-smelling gangrenous right toe   Clinically consistent with osteomyelitis   Also noted to have tracking cellulitis   Patient underwent amputation on 2/21   Also found to have problem 2  Cultures isolated Enterococcus   Underwent revascularization   Status post washout and partial closure on 02/26 with presumed source control   TMA today, given difficulty with maintaining VAC  Continue amoxicillin 1 g q 8 hours given 5, for now  Recommend ampicillin perioperatively  Will extend oral antibiotic for additional 48 hours post TMA  Continue to trend fever curve/WBC  Ongoing follow-up/interventions by Podiatry  Will follow up OR findings from today  Additional supportive care as per primary     2  Right foot plantar abscess   Patient underwent incision and drainage with amputation on 02/21   Cultures ultimately with Enterococcus   Repeat washout on 02/26 with debridement without any further tracking sinus/purulence   TMA today given issues with VAC/NWB    Continue antibiotics as above  Continue to trend fever curve/WBC  Ongoing follow-up/local care as per Podiatry     3  Leukocytosis   Patient remains hemodynamically stable and blood cultures negative  Stable today with unremarkable differential   Likely multifactorial given the above, complications with VAC and possibly drug related  Repeat CBC tomorrow  Continue antibiotics as above     4  CKD 3  Creatinine appears to be stable  Amoxicillin dosing as above  Repeat chemistry tomorrow  Will further dose adjust antibiotics as needed     5   Diabetes mellitus, with neuropathy and obesity   Unfortunately this is likely risk factor for patient's progressive development of wound infection   And also impacts wound healing   BMI noted to be 30 which impacts antibiotic dosing  Higher dosing of amoxicillin as above  Glucose management as per primary  Recommend close follow-up as outpatient with PCP     6  PAD   Status post IR recannulization and stenting of right SFA    Unfortunately risk factor for poor wound healing   Continue local wound care and follow-up with vascular/podiatry       7  Transient hypoxia, with abnormal CXR   No further episodes of hypoxia noted   Appears stable on room air   Continue to monitor respiratory status while admitted      Above plan discussed in detail with the patient at bedside      ID consult service will continue to follow        Antibiotics:  Amoxicillin 4  Total antibiotic 14     Initial amputation   Debridement and partial closure     Subjective:  Patient has no fever, chills, sweats; no nausea, vomiting, diarrhea; no cough, shortness of breath; no pain  No new symptoms  He denies having any itching or rash on antibiotics  He is ready for TMA today  Objective:  Vitals:  Temp:  [97 7 °F (36 5 °C)-98 °F (36 7 °C)] 98 °F (36 7 °C)  HR:  [64-69] 68  Resp:  [18] 18  BP: (140-151)/(58-67) 140/65  SpO2:  [91 %-93 %] 91 %  Temp (24hrs), Av 9 °F (36 6 °C), Min:97 7 °F (36 5 °C), Max:98 °F (36 7 °C)  Current: Temperature: 98 °F (36 7 °C)    Physical Exam:   General Appearance:  Alert, interactive, nontoxic, no acute distress  Throat: Oropharynx moist without lesions  Poor dentition noted  Lungs:   Clear to auscultation bilaterally; no wheezes, rhonchi or rales; respirations unlabored on room air   Heart:  RRR; no murmur, rub or gallop appreciated   Abdomen:   Soft, non-tender, non-distended, positive bowel sounds  Extremities: No clubbing, cyanosis or edema   Skin: No new rashes or lesions  No new draining wounds noted  No tracking erythema outside of the patient's bandages         Labs, Imaging, & Other studies:   All pertinent labs and imaging studies were personally reviewed  Results from last 7 days   Lab Units 03/04/21  0628 03/03/21  0539 03/02/21  0318   WBC Thousand/uL 11 12* 11 25* 10 98*   HEMOGLOBIN g/dL 8 9* 9 3* 10 1*   PLATELETS Thousands/uL 502* 466* 485*     Results from last 7 days   Lab Units 03/04/21  0628   POTASSIUM mmol/L 3 8   CHLORIDE mmol/L 99*   CO2 mmol/L 27   BUN mg/dL 9   CREATININE mg/dL 1 19   EGFR ml/min/1 73sq m 62   CALCIUM mg/dL 8 0*

## 2021-03-04 NOTE — ASSESSMENT & PLAN NOTE
· Necrosis of the right 2nd digit (distal phalanx), proximal phalanx erythema toes, warmth, tender  Inflammatory signs present of the plantar surface as well  · X-ray of the right foot showed no evidence of osteomyelitis, 2nd PIP joint dislocation  · S/p I&D of the right foot with open 2nd ray amputation on 2/21/21 - s/p repeat washout with wound VAC insertion and partial closure on 2/26/21  · Podiatry discussed in detail long-term prognosis with patient and likely need for possible curative transmetatarsal amputation, however, patient refusing at this time, opting for further attempt at limb salvage  · Wound culture growing Enterococcus faecalis - antibiotic course transitioned to IV Ampicillin, continued through perioperative period    · PRN pain control and supportive care  · PT/OT as tolerated - nonweightbearing to affected extremity per podiatry  · Transmetatarsal amputation planned for today

## 2021-03-04 NOTE — OP NOTE
OPERATIVE REPORT - Podiatry  PATIENT NAME: Evan Mcgovern    :  1950  MRN: [id number]  Pt Location: AN OR ROOM 03    SURGERY DATE: 3/4/2021    Surgeon(s) and Role:     * Regan Hoff DPM - Primary     * Woody Umanzor DPM - Assisting     * Anneliese Wall DPM - Assisting    Pre-op Diagnosis:  Gangrene of toe of right foot ([address] ) Ashok Lopez    Post-Op Diagnosis Codes:     * Gangrene of toe of right foot ([address] ) [I96]    Procedure(s) (LRB):  AMPUTATION TRANSMETATARSAL (TMA) right foot (Right)    Specimen(s):  ID Type Source Tests Collected by Time Destination   1 : Right forefoot Tissue Foot, Right TISSUE EXAM Regan Hoff DPM 3/4/2021 1622        Estimated Blood Loss:   Minimal    Drains:  Negative Pressure Wound Therapy (V A C ) (Active)   Black foam- # applied 6 21 1900   Cycle Continuous 21 0830   Target Pressure (mmHg) 125 21 0830   Canister Changed Yes 21 0830   Dressing Status Clean;Dry; Intact 21 0830   Output (mL) 75 mL 21 2200   Number of days: 10       Anesthesia Type:   General/LMA with 20 ml of 1% Lidocaine and 0 5% Bupivacaine in a 1:1 mixture    Hemostasis:  Surgical dissection  Materials:  Vicryl suture  Nylon suture    Operative Findings:   consistent with preoperative diagnosis  Intraoperatively adequate bleeding was noted, no purulence or clinical signs of acute infection were appreciated intraoperatively  Residual bone quality of all 5 metatarsals was noted to be hard, white, healthy and viable  No concern for osteomyelitis  Complications:   None    Procedure and Technique:     Under mild sedation, the patient was brought into the operating room and placed on the operating room table in the supine position  A time out was performed to confirm the correct patient, procedure and site with all parties in agreement  Following IV sedation, a   Ankle block was performed consisting of 20 ml of 1% Lidocaine and 0 5% Bupivacaine in a 1:1 mixture  The foot was then scrubbed, prepped and draped in the usual aseptic manner and the foot was placed on the operating room table  A fishmouth type incision was drawn out over the forefoot  Utilizing a #10 blade a full thickness incision was made down to bone  The forefoot was then sharply dissected out to isolate the metatarsal shafts  Utilizing a key elevator soft tissues were freed from each of the metatarsals  A sagittal saw was then used to make the transmetatarsal amputation osteotomies  Each metatarsal was cut in a dorsal distal to plantar proximal fashion with care taken to maintain the metatarsal parabola  The entire forefoot was then removed from the table and passed off  The remaining wound bed was then inspected  No purulent sinus tracts were visualized  Any necrotic or nonviable soft tissues were sharply excised from the wound utilizing  Rongeur and scalpel  Any residual exposed tendons were excised proximally  No clinical signs of acute infection appreciated  All metatarsals appeared healthy, hard, white, and viable without signs of infection  The surgical incision was irrigated with copious amounts of normal sterile saline  Subcutaneous closure was obtained utilizing 3-0 Vicryl in an interrupted suture technique  Skin edges were reapproximated and closure was obtained utilizing interrupted retention sutures utilizing  a combination of 2-0/3-0  Nylon suture and skin staples  The foot was then cleansed and dried  The incision site was dressed with Xeroform, 4x4 gauze, and ABD  This was then covered with a Kerlix and an ACE wrap  As with many limb salvage procedures, we contemplate the possibility of performing further stages to this procedure  Procedures may include debridements, delayed closure, plastic surgery techniques, or more proximal amputations  This procedure may be considered part of a multi-staged limb salvage treatment plan   The patient tolerated the procedure and anesthesia well and was transported to the PACU with vital signs stable  Dr Nestor Flynn was present during the entire procedure and participated in all key aspects  SIGNATURE: Tabby Mckenna DPM  DATE: March 4, 2021  TIME: 4:58 PM      Portions of the record may have been created with voice recognition software  Occasional wrong word or "sound a like" substitutions may have occurred due to the inherent limitations of voice recognition software  Read the chart carefully and recognize, using context, where substitutions have occurred

## 2021-03-04 NOTE — PROGRESS NOTES
Podiatry  Patient is NPO for today's planned transmetatarsal amputation right foot  Procedure again was reviewed  Alternatives, risks, complications discussed  Due to his poorly controlled diabetes and peripheral arterial disease he is still at high risk for complications regarding the surgery  Overall the wound is clean and I feel is optimized both from a health standpoint as well as an arterial standpoint  He has no further concerns and states he is prepare for the procedure  Procedure will be later on this afternoon once OR availability becomes open  No other changes in H& P  Appreciate input from Internal Medicine Infectious Disease

## 2021-03-05 ENCOUNTER — TELEPHONE (OUTPATIENT)
Dept: PODIATRY | Facility: CLINIC | Age: 71
End: 2021-03-05

## 2021-03-05 LAB
GLUCOSE SERPL-MCNC: 148 MG/DL (ref 65–140)
GLUCOSE SERPL-MCNC: 194 MG/DL (ref 65–140)
GLUCOSE SERPL-MCNC: 214 MG/DL (ref 65–140)
GLUCOSE SERPL-MCNC: 284 MG/DL (ref 65–140)
GLUCOSE SERPL-MCNC: 309 MG/DL (ref 65–140)
GLUCOSE SERPL-MCNC: 349 MG/DL (ref 65–140)
GLUCOSE SERPL-MCNC: 350 MG/DL (ref 65–140)
GLUCOSE SERPL-MCNC: 355 MG/DL (ref 65–140)
GLUCOSE SERPL-MCNC: 393 MG/DL (ref 65–140)

## 2021-03-05 PROCEDURE — 99232 SBSQ HOSP IP/OBS MODERATE 35: CPT | Performed by: INTERNAL MEDICINE

## 2021-03-05 PROCEDURE — 97530 THERAPEUTIC ACTIVITIES: CPT

## 2021-03-05 PROCEDURE — 97535 SELF CARE MNGMENT TRAINING: CPT

## 2021-03-05 PROCEDURE — 82948 REAGENT STRIP/BLOOD GLUCOSE: CPT

## 2021-03-05 PROCEDURE — 97164 PT RE-EVAL EST PLAN CARE: CPT

## 2021-03-05 RX ORDER — INSULIN GLARGINE 100 [IU]/ML
25 INJECTION, SOLUTION SUBCUTANEOUS EVERY MORNING
Status: DISCONTINUED | OUTPATIENT
Start: 2021-03-06 | End: 2021-03-06

## 2021-03-05 RX ORDER — HYDROMORPHONE HCL/PF 1 MG/ML
0.2 SYRINGE (ML) INJECTION
Status: DISCONTINUED | OUTPATIENT
Start: 2021-03-05 | End: 2021-03-07 | Stop reason: HOSPADM

## 2021-03-05 RX ADMIN — OXYCODONE HYDROCHLORIDE 5 MG: 5 TABLET ORAL at 23:42

## 2021-03-05 RX ADMIN — SODIUM BICARBONATE 650 MG: 650 TABLET ORAL at 16:48

## 2021-03-05 RX ADMIN — TIOTROPIUM BROMIDE 18 MCG: 18 CAPSULE ORAL; RESPIRATORY (INHALATION) at 08:35

## 2021-03-05 RX ADMIN — AMOXICILLIN 1000 MG: 250 CAPSULE ORAL at 21:20

## 2021-03-05 RX ADMIN — INSULIN LISPRO 5 UNITS: 100 INJECTION, SOLUTION INTRAVENOUS; SUBCUTANEOUS at 06:28

## 2021-03-05 RX ADMIN — OXYCODONE HYDROCHLORIDE 5 MG: 5 TABLET ORAL at 06:00

## 2021-03-05 RX ADMIN — ATORVASTATIN CALCIUM 40 MG: 40 TABLET, FILM COATED ORAL at 16:48

## 2021-03-05 RX ADMIN — FAMOTIDINE 20 MG: 20 TABLET ORAL at 16:48

## 2021-03-05 RX ADMIN — INSULIN LISPRO 4 UNITS: 100 INJECTION, SOLUTION INTRAVENOUS; SUBCUTANEOUS at 00:47

## 2021-03-05 RX ADMIN — OXYCODONE HYDROCHLORIDE 5 MG: 5 TABLET ORAL at 15:22

## 2021-03-05 RX ADMIN — OXYCODONE HYDROCHLORIDE 5 MG: 5 TABLET ORAL at 02:26

## 2021-03-05 RX ADMIN — LEVOTHYROXINE SODIUM 175 MCG: 175 TABLET ORAL at 05:03

## 2021-03-05 RX ADMIN — ATENOLOL 25 MG: 25 TABLET ORAL at 08:34

## 2021-03-05 RX ADMIN — INSULIN LISPRO 8 UNITS: 100 INJECTION, SOLUTION INTRAVENOUS; SUBCUTANEOUS at 09:59

## 2021-03-05 RX ADMIN — OXYCODONE HYDROCHLORIDE 5 MG: 5 TABLET ORAL at 19:42

## 2021-03-05 RX ADMIN — INSULIN GLARGINE 20 UNITS: 100 INJECTION, SOLUTION SUBCUTANEOUS at 08:34

## 2021-03-05 RX ADMIN — AMOXICILLIN 1000 MG: 250 CAPSULE ORAL at 05:03

## 2021-03-05 RX ADMIN — INSULIN LISPRO 2 UNITS: 100 INJECTION, SOLUTION INTRAVENOUS; SUBCUTANEOUS at 21:21

## 2021-03-05 RX ADMIN — CLOPIDOGREL BISULFATE 75 MG: 75 TABLET ORAL at 08:34

## 2021-03-05 RX ADMIN — CITALOPRAM HYDROBROMIDE 20 MG: 20 TABLET ORAL at 08:34

## 2021-03-05 RX ADMIN — TRAZODONE HYDROCHLORIDE 50 MG: 50 TABLET ORAL at 21:21

## 2021-03-05 RX ADMIN — SODIUM BICARBONATE 650 MG: 650 TABLET ORAL at 08:34

## 2021-03-05 RX ADMIN — HYDROMORPHONE HYDROCHLORIDE 0.2 MG: 1 INJECTION, SOLUTION INTRAMUSCULAR; INTRAVENOUS; SUBCUTANEOUS at 20:49

## 2021-03-05 RX ADMIN — INSULIN LISPRO 3 UNITS: 100 INJECTION, SOLUTION INTRAVENOUS; SUBCUTANEOUS at 02:28

## 2021-03-05 RX ADMIN — HYDROMORPHONE HYDROCHLORIDE 0.2 MG: 1 INJECTION, SOLUTION INTRAMUSCULAR; INTRAVENOUS; SUBCUTANEOUS at 07:15

## 2021-03-05 RX ADMIN — AMLODIPINE BESYLATE 10 MG: 10 TABLET ORAL at 08:34

## 2021-03-05 RX ADMIN — FAMOTIDINE 20 MG: 20 TABLET ORAL at 08:34

## 2021-03-05 RX ADMIN — ENOXAPARIN SODIUM 40 MG: 40 INJECTION SUBCUTANEOUS at 08:34

## 2021-03-05 RX ADMIN — OXYCODONE HYDROCHLORIDE 5 MG: 5 TABLET ORAL at 10:21

## 2021-03-05 RX ADMIN — AMOXICILLIN 1000 MG: 250 CAPSULE ORAL at 13:27

## 2021-03-05 NOTE — CASE MANAGEMENT
CM met with patient to introduce self, explain role, and follow up on DC planning  CM reviewed therapy's evaluation and care team recommendation for post acute rehabilitation services  Per chart review, neuropsych deemed patient to have capacity to make medical decisions  Patient reports he is interested in going to STR prior to returning home and defers the decisions to be made by his brother Ellie Cordon  CM called and spoke to Ellie Cordon to introduce self, explain role, and discuss same  Ellie Cordon reports he spoke to his brother at length and is pleased to hear he is open to STR, as he feels this will be the best plan for patient to get better and return home back at his baseline after completing rehab  CM discussed freedom of choice in facilities, and offered to email brother list of facilities to review  CM also discussed with COVID and limited bed availability, the option to also send a blanket referral to all facilities within 15 miles to obtain choices and then review and research those options with brother at that time  Brother would like CM to send blanket referral then follow up when facilities have responded  [facility] referrals sent per preference  CM will continue to follow

## 2021-03-05 NOTE — ASSESSMENT & PLAN NOTE
Lab Results   Component Value Date    HGBA1C 9 3 (H) 02/21/2021     · Patient did have severe hyperglycemia during this hospitalization with blood glucoses in the 500s  He required an insulin drip     · Continue Lantus and NovoLog with meals Carbohydrate restricted diet

## 2021-03-05 NOTE — PHYSICAL THERAPY NOTE
PHYSICAL THERAPY RE-EVALUATION NOTE    Patient Name: Tanvi BARRERA Date: 3/5/2021     AGE:   78 y o  Mrn:   [id number]  ADMIT DX:  Osteomyelitis ([facility] [address] ) [M86 9]  General weakness [R53 1]  Generalized weakness [R53 1]  Gangrene of toe of right foot ([facility] [address] ) [I96]    History reviewed  No pertinent past medical history  Length Of Stay: 14  PHYSICAL THERAPY RE-EVALUATION :   Patient's identity confirmed via 2 patient identifiers (full name and ) at start of session  Time in:   Time out: 0910  Total re-eval time: 16 min       21 0933   PT Last Visit   PT Visit Date 21   Note Type   Note type Re-Evaluation   Pain Assessment   Pain Assessment Tool 0-10   Pain Score 8   Pain Location/Orientation Orientation: Right;Location: Foot   Pain Onset/Description Onset: Gradual;Descriptor: Throbbing  (worse w/ gravity)   Effect of Pain on Daily Activities Limits standing tolerance   Patient's Stated Pain Goal No pain   Hospital Pain Intervention(s) Repositioned; Ambulation/increased activity; Emotional support  (Pre-medicated )   Restrictions/Precautions   Weight Bearing Precautions Per Order Yes   RLE Weight Bearing Per Order NWB  (Pt unable to maintain)   Braces or Orthoses Other (Comment)  (ACE wrap around R LE)   Other Precautions Cognitive; Bed Alarm;Multiple lines; Fall Risk;WBS   General   Additional Pertinent History Pt is POD1 s/p R TMA for pre-op dx of gangrene of toe of R foot  Pt is NWB RLE post-op  Pt is UNABLE to maintain this throughout the session despite constant education      Family/Caregiver Present No   Cognition   Overall Cognitive Status Impaired  (limited insight to deficits)   Attention Attends with cues to redirect   Orientation Level Oriented X4  (Pt identified by full name and )   Memory Decreased recall of precautions   Following Commands Follows one step commands with increased time or repetition Comments Pt requires maximum encouragement and education for participating in PT re-evaluation  Pt also w/ limited insight to deficits, states he's going to use the scooter to get around and bump down the steps  Strength RLE   R Hip Flexion 3+/5   R Knee Extension 3+/5   R Ankle Dorsiflexion   (TIGIST due to ace bandage wrapped around ankle )   Strength LLE   L Hip Flexion 3+/5   L Knee Extension 3+/5   L Ankle Dorsiflexion 3+/5   Light Touch   RLE Light Touch Comments TIGIST at foot due to ace bandage, but pt able to feet on low LE   LLE Light Touch Grossly intact   Bed Mobility   Supine to Sit 5  Supervision   Additional items Increased time required;LE management   Additional Comments Pt seated EOB w/ OT at end of re-eval   Transfers   Sit to Stand 2  Maximal assistance   Additional items Assist x 2; Increased time required;Verbal cues   Stand to Sit 2  Maximal assistance   Additional items Assist x 2; Increased time required   Stand pivot Unable to assess  (Pt unable to maintain appropriate WBS)   Additional Comments Education (including visual and verbal demonstration) for hand placement for STS transfers  Pt requires max A x 2 w/ bed level elevated to stand w/ therapist's foot underneath RLE, however pt still putting weight through RLE when standing  Pt c/o hx of weak LLE as to why he is unable to maintain NWB on RLE  Pt states he'll be fine "once he gets home"   Ambulation/Elevation   Gait pattern Not appropriate  (Pt unable to maintain appropriate WBS)   Balance   Static Sitting Good   Static Standing Zero  (Ax2 and still unable to maintain NWB)   Activity Tolerance   Activity Tolerance Other (Comment)  (pt unable to  maintain WBS)   Medical Staff Made Aware Care coordination w/ OT Gregg Fry, Spoke to Dr Patrick Swanson to FERNANDEZ Horvath   Assessment   Prognosis Fair   Problem List Decreased strength;Decreased endurance; Impaired balance;Decreased mobility; Impaired judgement;Decreased safety awareness; Impaired sensation;Obesity;Orthopedic restrictions;Pain   Assessment Anabell Quiroga is a 78 y o  Male seen for PT re-evaluation s/p OR for R TMA  Pt seen POD1  Orders for PT eval and treat received, w/ activity orders of NWB R LE and fall precautions  Upon re-evaluation, pt presents w/ the following deficits: weakness, decreased ROM, altered sensation, impaired balance, decreased endurance, pain limiting functional mobility and inability to maintain weight bearing precautions  Pt requires S for bed mobility, max A x 2 for STS transfers but places weight on RLE  Unable to further assess mobility due to pt's inability to maintain appropriate WBS  Pt's clinical presentation is unstable/unpredictable due to need for assist w/ all phases of mobility when usually mobilizing independently, pain impacting overall mobility status, need for input for task focus and mobility technique and recent drastic decline in mobility compared to baseline  Pt is at an increased risk of falls due to physical, safety awareness deficits  Given the above findings, discharge recommendation is for inpatient rehab  During this admission, pt would benefit from skilled acute inpatient PT in order to address the abovementioned deficits to maximize function and mobility before DC from acute care  Barriers to Discharge Inaccessible home environment;Decreased caregiver support; Other (Comment)  (stairs to main living area, inability to maintain WBS)   Goals   Patient Goals to go home and sort out his personal life   STG Expiration Date 03/15/21   Short Term Goal #1 Patient will:  Increase bilateral LE strength 1/2 grade to facilitate independent mobility, Perform all bed mobility tasks modified independent to decrease fall risk factors, pt will be able to perform lateral scooting and sit pivot transfers while maintaining WB precautions w/ min A x 1 to increased independence, perform STS transfers w/ A x 1 while maintaining WBS w/ AD for UE support to demonstrate compliance w/ WB restrictions and appropriateness to assess for further mobility  Propel WC for at least 50 ft and including turns w/ supervision as alternative for ambulation if pt unable to maintain WBS w/ standing  PT to assess for stand pivot transfers, gait, and stairs as appropriate and set goals accordingly  PT Treatment Day 1   Plan   Treatment/Interventions Functional transfer training;LE strengthening/ROM; Therapeutic exercise; Endurance training;Patient/family training;Equipment eval/education; Bed mobility  (PT to see for gait)   PT Frequency Other (Comment)  (3-5x/wk)   Recommendation   PT Discharge Recommendation Post-Acute Rehabilitation Services   Equipment Recommended   (TBD, potentially WC)   Additional Comments Pt may benefit from a psych/neuropsych consult to address insight    AM-PAC Basic Mobility Inpatient   Turning in Bed Without Bedrails 3   Lying on Back to Sitting on Edge of Flat Bed 3   Moving Bed to Chair 1   Standing Up From Chair 1   Walk in Room 1   Climb 3-5 Stairs 1   Basic Mobility Inpatient Raw Score 10   Turning Head Towards Sound 3   Follow Simple Instructions 3   Low Function Basic Mobility Raw Score 16   Low Function Basic Mobility Standardized Score 25 72   Barthel Index   Feeding 10   Bathing 0   Grooming Score 5   Dressing Score 5   Bladder Score 10   Bowels Score 10   Toilet Use Score 0   Transfers (Bed/Chair) Score 5   Mobility (Level Surface) Score 0   Stairs Score 0   Barthel Index Score 45           PHYSICAL THERAPY TREATMENT NOTE    Name: Kavita Dewey  : 1950  Time in: 910  Time out: 933  Total treat time: 23 min    S: Pt seated EOB w/ OT  Extensive time is spent reviewing NWB precautions, including explanation of why pt is unable to safely use knee scooter right now (unable to transfer while maintaining NWB precautions), and providing emotional support  Pt agreeable to participate in PT intervention      O: Pt is agreeable to participate in PT intervention  Extensive time is spent educating pt on NWB RLE precautions w/ reasoning as to why and understanding from a mobility perspective  Pt demonstrates poor insight to his deficits and is preoccupied w/ other things ("I have mail piling up at home, I have to go home")  Pt is agreeable to trial STS again  Pt is able to perform w/ VC for hand placement and max A x 2 w/ therapist's foot under RLE  Pt still unable to maintain NWB even w/ therapists foot underneath  Upon sitting EOB w/ PT holding RLE, pt is able to scoot towards HOB w/ BUEs and LLE with supervision (except for the fact therapist was holding RLE, therefore truly min A x 1)  Pt requires min A x 1 for sit to supine transfer  Pt supine in bed w/ bed alarm activated, call bell and room phone within reach w/ all needs met  A: Pt continues to demonstrate limited insight to his deficits, especially regarding his NWB RLE precautions  Pt requires constant reminders and education, and time is spent providing education regarding these precautions from a mobility perspective  Pt is agreeable to trial STS transfer again, however continues to require max A x 2 and still puts weight through RLE  Pt is able to scoot towards HOB w/ min A/S w/ cues to keep RLE up  Recommend continues practice w/ scooting and trialing sit-pivot transfers, as these may be more functional for pt at this time while maintaining NWB WBS  Also continue to trial STS transfers as appropriate while educating on WBS  P: Continue per evaluation above    Patient requires PT/OT co-treat due to signficant assistance with mobility, cognitive-behavioral impairments, and to challenge activity tolerance  Both PT and OT goals were addressed separately during this session       Skilled inpatient PT is recommended during this admission in order to progress patient toward goals so patient is able to maximize independence    Robi Bryson, PT, DPT

## 2021-03-05 NOTE — PLAN OF CARE
Problem: OCCUPATIONAL THERAPY ADULT  Goal: Performs self-care activities at highest level of function for planned discharge setting  See evaluation for individualized goals  Description: Treatment Interventions: ADL retraining, Functional transfer training, Endurance training, Cognitive reorientation, Patient/family training, Equipment evaluation/education, Compensatory technique education, Activityengagement, Energy conservation          See flowsheet documentation for full assessment, interventions and recommendations  Outcome: Progressing  Note: Limitation: Decreased ADL status, Decreased UE strength, Decreased Safe judgement during ADL, Decreased cognition, Decreased endurance, Decreased self-care trans, Decreased high-level ADLs  Prognosis: Good  Assessment: Pt agreeable to participate in skilled OT treatment session to address ADL retraining, functional transfer training, endurance training, patient/family training, equipment evaluation/education, compensatory technique education, activity engagement and activity tolerance  Pt supine in bed upon arrival  Pt requiring (S) and use of bed controls for supine > sit  Sitting EOB able to wash face and hair and comb hair with (S) and increased time  Min VC throughout for thoroughness  Pt declining trial of LB dressing at this time stating "I know I can't do that"  Pt requiring min A for UB dressing with locating sleeves of gown  Pt completing x3 trials of sit >< stand able to complete stand with max Ax2 with MAX VC for maintaining NWB  Pt unable to safely maintain NWB status at this time and returned to sitting  Pt reports that he would like a knee scooter for home, but educated that he is unable to stand up without max A x2 and unable to maintain NWB status, would be unsafe to trial at this time, pt verbalizing his understanding  Pt limited by cognition throughout session and limited by insight into deficits   Pt requiring max VC throughout for safety and problem solving  Would benefit from neuropsych consult to determine competency  Patient continues to be performing below their functional baseline with an increased risk for falls and injury and decreased occupational performance, and would benefit from continued skilled OT treatment to address deficits, The patient's raw score on the AM-PAC Daily Activity inpatient short form is 16, standardized score is 35 96, less than 39 4  Patients at this level are likely to benefit from DC to post-acute rehabilitation services  Continue to recommend PAR upon d/c     Recommendation: Other (comment)(neuropsych for competency)  OT Discharge Recommendation: [id number]

## 2021-03-05 NOTE — PLAN OF CARE
Problem: PHYSICAL THERAPY ADULT  Goal: Performs mobility at highest level of function for planned discharge setting  See evaluation for individualized goals  Description: Treatment/Interventions: Functional transfer training, LE strengthening/ROM, Therapeutic exercise, Endurance training, Cognitive reorientation, Equipment eval/education, Patient/family training, Spoke to case management, Spoke to nursing, Continued evaluation, Bed mobility  Equipment Recommended: (TBD)       See flowsheet documentation for full assessment, interventions and recommendations  Note: Prognosis: Fair  Problem List: Decreased strength, Decreased endurance, Impaired balance, Decreased mobility, Impaired judgement, Decreased safety awareness, Impaired sensation, Obesity, Orthopedic restrictions, Pain  Assessment: Porsper Villanueva is a 78 y o  Male seen for PT re-evaluation s/p OR for R TMA  Pt seen POD1  Orders for PT eval and treat received, w/ activity orders of NWB R LE and fall precautions  Upon re-evaluation, pt presents w/ the following deficits: weakness, decreased ROM, altered sensation, impaired balance, decreased endurance, pain limiting functional mobility and inability to maintain weight bearing precautions  Pt requires S for bed mobility, max A x 2 for STS transfers but places weight on RLE  Unable to further assess mobility due to pt's inability to maintain appropriate WBS  Pt's clinical presentation is unstable/unpredictable due to need for assist w/ all phases of mobility when usually mobilizing independently, pain impacting overall mobility status, need for input for task focus and mobility technique and recent drastic decline in mobility compared to baseline  Pt is at an increased risk of falls due to physical, safety awareness deficits  Given the above findings, discharge recommendation is for inpatient rehab   During this admission, pt would benefit from skilled acute inpatient PT in order to address the abovementioned deficits to maximize function and mobility before DC from acute care  Barriers to Discharge: Inaccessible home environment, Decreased caregiver support, Other (Comment)(stairs to main living area, inability to maintain WBS)  Barriers to Discharge Comments: Pt also reports that he would not have enough room to use a walker, knee scooter, or WC  PT Discharge Recommendation: Post-Acute Rehabilitation Services          See flowsheet documentation for full assessment

## 2021-03-05 NOTE — ASSESSMENT & PLAN NOTE
· Necrosis of the right 2nd digit (distal phalanx), proximal phalanx erythema toes, warmth, tender    Inflammatory signs present of the plantar surface as well  · X-ray of the right foot showed no evidence of osteomyelitis, 2nd PIP joint dislocation  · S/p I&D of the right foot with open 2nd ray amputation on 2/21/21 - s/p repeat washout with wound VAC insertion and partial closure on 2/26/21  · Podiatry discussed in detail long-term prognosis with patient and likely need for possible curative transmetatarsal amputation, however, patient refusing at this time, opting for further attempt at limb salvage  · Wound culture growing Enterococcus faecalis - antibiotic course transitioned to IV Ampicillin, to be completed tomorrow 03/06/2021  · PRN pain control and supportive care  · PT/OT as tolerated - nonweightbearing to affected extremity per podiatry  · 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  · Discharge planning

## 2021-03-05 NOTE — PROGRESS NOTES
Progress Note - Marshall Sanchez 1950, 78 y o  male MRN: [id number]    Unit/Bed#: S -01 Encounter: [id number]    Primary Care Provider: Ulises Webber   Date and time admitted to hospital: 2/19/2021  2:09 PM      * Osteomyelitis of toe with cellulitis  Assessment & Plan  · Necrosis of the right 2nd digit (distal phalanx), proximal phalanx erythema toes, warmth, tender  Inflammatory signs present of the plantar surface as well  · X-ray of the right foot showed no evidence of osteomyelitis, 2nd PIP joint dislocation  · S/p I&D of the right foot with open 2nd ray amputation on 2/21/21 - s/p repeat washout with wound VAC insertion and partial closure on 2/26/21  · Podiatry discussed in detail long-term prognosis with patient and likely need for possible curative transmetatarsal amputation, however, patient refusing at this time, opting for further attempt at limb salvage  · Wound culture growing Enterococcus faecalis - antibiotic course transitioned to IV Ampicillin, to be completed tomorrow 03/06/2021  · PRN pain control and supportive care  · PT/OT as tolerated - nonweightbearing to affected extremity per podiatry  · 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  · Discharge planning    Insulin-dependent diabetes mellitus  Assessment & Plan  Lab Results   Component Value Date    HGBA1C 9 3 (H) 02/21/2021     · Patient did have severe hyperglycemia during this hospitalization with blood glucoses in the 500s  He required an insulin drip     · Continue Lantus and NovoLog with meals Carbohydrate restricted diet    Peripheral arterial disease  Assessment & Plan  · S/p right SFA stenting on 2/23 - follow-up LEADs noted  · Continue Plavix/statin    Hyperlipidemia  Assessment & Plan  · Continue Lipitor    Essential hypertension  Assessment & Plan  · Continue Norvasc/Tenormin  · Holding ACEI in the setting of acute kidney injury    Acute kidney injury - Chronic kidney disease stage 3  Assessment & Plan  · Due to sepsis, ACEi, NSAIDs  · Received IVFs  · Baseline creatinine approximately 1 3-1 6 - at 1 29 today (resolved)  · Monitor renal function and urine output - limit/avoid nephrotoxins as possible - holding ACEI  · On sodium bicarbonate supplementation - acidosis resolved  · Nephrology signed off    Depression  Assessment & Plan  · Continue Celexa    Hypothyroidism  Assessment & Plan  · Continue Synthroid    Anemia of chronic disease  Assessment & Plan  · H/H stable    VTE Pharmacologic Prophylaxis:   Pharmacologic: Enoxaparin (Lovenox)  Mechanical VTE Prophylaxis in Place: Yes    Patient Centered Rounds: I have performed bedside rounds with nursing staff today  Discussions with Specialists or Other Care Team Provider:  PT and case management    Education and Discussions with Family / Patient:  Brother was updated over phone    Time Spent for Care: 30 minutes  More than 50% of total time spent on counseling and coordination of care as described above  Current Length of Stay: 14 day(s)    Current Patient Status: Inpatient   Certification Statement: The patient will continue to require additional inpatient hospital stay due to TMA    Discharge Plan:  Next 24 hours    Code Status: Level 1 - Full Code      Subjective:   Patient feels well  Offers no specific complaints  He reports some pain but controlled with narcotics  Wishes to go home tomorrow    Objective:     Vitals:   Temp (24hrs), Av 8 °F (36 6 °C), Min:97 3 °F (36 3 °C), Max:98 5 °F (36 9 °C)    Temp:  [97 3 °F (36 3 °C)-98 5 °F (36 9 °C)] 98 3 °F (36 8 °C)  HR:  [57-72] 66  Resp:  [13-20] 17  BP: (118-163)/(50-70) 127/63  SpO2:  [90 %-99 %] 90 %  Body mass index is 30 13 kg/m²  Input and Output Summary (last 24 hours):        Intake/Output Summary (Last 24 hours) at 3/5/2021 1516  Last data filed at 3/5/2021 1423  Gross per 24 hour   Intake 1200 ml   Output 950 ml   Net 250 ml       Physical Exam:     Physical Exam     Gen -Patient comfortable at rest  Pallor noted  Neck- Supple  No thyromegaly or lymphadenopathy  Lungs-Clear bilaterally without any wheeze or rales   Heart S1-S2, regular rate and rhythm, no murmurs  Abdomen-soft nontender, no organomegaly  Bowel sounds present  Extremities-no cyanosi,  clubbing or edema  Wound dressing intact  Skin- no rash  Neuro-nonfocal     Additional Data:     Labs:    Results from last 7 days   Lab Units 03/04/21  0628   WBC Thousand/uL 11 12*   HEMOGLOBIN g/dL 8 9*   HEMATOCRIT % 27 3*   PLATELETS Thousands/uL 502*   NEUTROS PCT % 67   LYMPHS PCT % 18   MONOS PCT % 11   EOS PCT % 2     Results from last 7 days   Lab Units 03/04/21  0628   SODIUM mmol/L 134*   POTASSIUM mmol/L 3 8   CHLORIDE mmol/L 99*   CO2 mmol/L 27   BUN mg/dL 9   CREATININE mg/dL 1 19   ANION GAP mmol/L 8   CALCIUM mg/dL 8 0*   GLUCOSE RANDOM mg/dL 280*         Results from last 7 days   Lab Units 03/05/21  0205 03/05/21  0004 03/04/21  2207 03/04/21  2050 03/04/21  1117 03/04/21  0718 03/03/21  2108 03/03/21  1621 03/03/21  1106 03/03/21  0812 03/02/21  2208 03/02/21  1628   POC GLUCOSE mg/dl 284* 309* 350* 355* 248* 291* 187* 165* 253* 302* 183* 197*                   * I Have Reviewed All Lab Data Listed Above  * Additional Pertinent Lab Tests Reviewed:  All Labs Within Last 24 Hours Reviewed    Imaging:    Imaging Reports Reviewed Today Include:   Imaging Personally Reviewed by Myself Includes:      Recent Cultures (last 7 days):           Last 24 Hours Medication List:   Current Facility-Administered Medications   Medication Dose Route Frequency Provider Last Rate    acetaminophen  975 mg Oral [facility] Sandor Martin DPM      amLODIPine  10 mg Oral Daily Sandor Martin DPM      amoxicillin  1,000 mg Oral [facility] Sandor Martin DPM      atenolol  25 mg Oral Daily Sandor Martin DPM      atorvastatin  40 mg Oral Daily With Marin Lanier DPM      calcium carbonate  500 mg Oral BID PRN Sandor Martin DPM      citalopram 20 mg Oral Daily Conrado Rsoe DPM      clopidogrel  75 mg Oral Daily Conrado Rose DPM      enoxaparin  40 mg Subcutaneous Daily Gladys Ashley      famotidine  20 mg Oral BID Conrado Rose DPM      HYDROmorphone  0 2 mg Intravenous Q3H PRN Elena Yang MD      [START ON 3/6/2021] insulin glargine  25 Units Subcutaneous QAM Elena Yang MD      insulin lispro  1-5 Units Subcutaneous 0200 He Wilde PA-C      insulin lispro  1-6 Units Subcutaneous HS He Wilde PA-C      insulin lispro  10 Units Subcutaneous TID With Meals Elena Yang MD      levothyroxine  175 mcg Oral Early Morning Conrado Rose DPM      oxyCODONE  2 5 mg Oral Q4H PRN Conrado Rose DPM      oxyCODONE  5 mg Oral Q4H PRN Conrado Rose DPM      polyethylene glycol  17 g Oral Daily PRN Conrado Rose DPM      sodium bicarbonate  650 mg Oral BID after meals Conrado Rose DPM      sodium chloride  1 spray Each Nare Q2H PRN Conrado Rose DPM      tiotropium  18 mcg Inhalation Daily Conrado Rose DPM      traZODone  50 mg Oral HS Conrado Rose DPM          Today, Patient Was Seen By: Phillip Peters MD    ** Please Note: Dictation voice to text software may have been used in the creation of this document   **

## 2021-03-05 NOTE — OCCUPATIONAL THERAPY NOTE
Occupational Therapy Progress Note     Patient Name: Natalio Johnson  [facility]'D Date: 3/5/2021  Problem List  Principal Problem:    Osteomyelitis of toe with cellulitis  Active Problems:    Insulin-dependent diabetes mellitus    Essential hypertension    Depression    Hyperlipidemia    Hypothyroidism    Anemia of chronic disease    Peripheral arterial disease    Hypoxemia    Acute kidney injury - Chronic kidney disease stage 3    Leukocytosis              03/05/21 0932   OT Last Visit   OT Visit Date 03/05/21   Restrictions/Precautions   Weight Bearing Precautions Per Order Yes   RLE Weight Bearing Per Order NWB  (Pt is noncomplaint)   Other Precautions Cognitive; Chair Alarm; Bed Alarm; Fall Risk;Pain;Multiple lines;WBS   Pain Assessment   Pain Assessment Tool 0-10   Pain Score 8   Pain Location/Orientation Orientation: Right;Location: Foot   ADL   Grooming Assistance 5  Supervision/Setup   Grooming Deficit Increased time to complete;Supervision/safety;Verbal cueing;Wash/dry hands; Wash/dry face;Brushing hair   Grooming Comments washing hair and face, brushing hair, max VC for thoroughness   UB Dressing Assistance 4  Minimal Assistance   UB Dressing Deficit Increased time to complete;Supervision/safety;Verbal cueing   UB Dressing Comments A with locating sleeves   LB Dressing Comments Pt declining stating "I know I can't do that" when asked to put on underwear or pants   Bed Mobility   Supine to Sit 5  Supervision   Additional items Increased time required;Verbal cues; Bedrails;HOB elevated   Sit to Supine 4  Minimal assistance   Additional items Assist x 1; Increased time required;Verbal cues;LE management   Transfers   Sit to Stand 2  Maximal assistance   Additional items Assist x 2; Increased time required;Verbal cues   Stand to Sit 2  Maximal assistance   Additional items Assist x 2; Increased time required;Verbal cues   Additional Comments with use of RW, pt unable to complete sit >< stand without max A x2 while maintaining NWB status  While in standing pt continues to put weight through RLE despite maximal education pt stating "I can't do that, I am not strong enough"   Functional Mobility   Additional Comments unable to complete without maintaining NWB status   Cognition   Overall Cognitive Status Impaired   Arousal/Participation Alert; Cooperative   Attention Attends with cues to redirect   Orientation Level Oriented X4   Memory Decreased recall of precautions   Following Commands Follows one step commands with increased time or repetition   Comments Max VC for encouragement and for edu on POC and NWB status   Activity Tolerance   Activity Tolerance Patient limited by fatigue   Medical Staff Made Aware PT Dunia, FERNANDEZ Pantoja, EL Deng   Assessment   Assessment Pt agreeable to participate in skilled OT treatment session to address ADL retraining, functional transfer training, endurance training, patient/family training, equipment evaluation/education, compensatory technique education, activity engagement and activity tolerance  Pt supine in bed upon arrival  Pt requiring (S) and use of bed controls for supine > sit  Sitting EOB able to wash face and hair and comb hair with (S) and increased time  Min VC throughout for thoroughness  Pt declining trial of LB dressing at this time stating "I know I can't do that"  Pt requiring min A for UB dressing with locating sleeves of gown  Pt completing x3 trials of sit >< stand able to complete stand with max Ax2 with MAX VC for maintaining NWB  Pt unable to safely maintain NWB status at this time and returned to sitting  Pt reports that he would like a knee scooter for home, but educated that he is unable to stand up without max A x2 and unable to maintain NWB status, would be unsafe to trial at this time, pt verbalizing his understanding  Pt limited by cognition throughout session and limited by insight into deficits  Pt requiring max VC throughout for safety and problem solving  Would benefit from neuropsych consult to determine competency  Patient continues to be performing below their functional baseline with an increased risk for falls and injury and decreased occupational performance, and would benefit from continued skilled OT treatment to address deficits, The patient's raw score on the AM-PAC Daily Activity inpatient short form is 16, standardized score is 35 96, less than 39 4  Patients at this level are likely to benefit from DC to post-acute rehabilitation services   Continue to recommend PAR upon d/c     Plan   Goal Expiration Date 03/15/21   OT Treatment Day 1   OT Frequency 3-5x/wk   Recommendation   Recommendation Other (comment)  (neuropsych for competency)   OT Discharge Recommendation Post-Acute Rehabilitation Services   Additional Comments  Pt educated extensively on safe d/c planning and requiring to maintain NWB, pt with decreased insight into deficits and decreased problem solving and unble to recognize safety concerns with return home   AM-PAC Daily Activity Inpatient   Lower Body Dressing 2   Bathing 2   Toileting 2   Upper Body Dressing 3   Grooming 3   Eating 4   Daily Activity Raw Score 16   Daily Activity Standardized Score (Calc for Raw Score >=11) 35 96   AM-[facility] Applied Cognition Inpatient   Following a Speech/Presentation 3   Understanding Ordinary Conversation 3   Taking Medications 2   Remembering Where Things Are Placed or Put Away 2   Remembering List of 4-5 Errands 2   Taking Care of Complicated Tasks 2   Applied Cognition Raw Score 14   Applied Cognition Standardized Score 32 02        At the end of the session, all needs met and pt supine in bed, bed alarm activated, LEs elevated , HOB elevated and call bell within reach    [facility], OTR/L

## 2021-03-05 NOTE — CONSULTS
Consultation - Neuropsychology/Psychology Department  Felicia Holloway 78 y o  male MRN: [id number]  Unit/Bed#: S -63 Encounter: [id number]        Reason for Consultation:  Felicia Holloway is a 78y o  year old male who was referred for a Neuropsychological Exam to assess cognitive functioning and comment on capacity to make informed medical decisions  History of Present Illness  Admitted with generalized weakness and right foot pain  Physician Requesting Consult: Caty Amezquita MD    PROBLEM LIST:  Patient Active Problem List   Diagnosis    Osteomyelitis of toe with cellulitis    Insulin-dependent diabetes mellitus    Essential hypertension    Depression    Hyperlipidemia    Hypothyroidism    Stage 3 chronic kidney disease    Ambulatory dysfunction    Hyponatremia    Anemia of chronic disease    Peripheral arterial disease    Dyspepsia    Hypoxemia    Acute kidney injury - Chronic kidney disease stage 3    Leukocytosis         Historical Information   History reviewed  No pertinent past medical history    Past Surgical History:   Procedure Laterality Date    INCISION AND DRAINAGE OF WOUND Right 2/21/2021    Procedure: INCISION AND DRAINAGE (I&D) EXTREMITY RIGHT FOOT AND OPEN SECOND RAY AMPUTATION;  Surgeon: Wendy Manjarrez DPM;  [facility];  Service: Podiatry    IR LOWER EXTREMITY ANGIOGRAM  2/23/2021    [place] AMPUTATION FOOT,TRANSMETATARSAL Right 3/4/2021    Procedure: AMPUTATION TRANSMETATARSAL (TMA) right foot;  Surgeon: Daron Muñiz DPM;  [facility];  Service: Podiatry    WOUND DEBRIDEMENT Right 2/26/2021    Procedure: DEBRIDEMENT WOUND ([address]) with partial closure;  Surgeon: Wendy Manjarrez DPM;  [facility];  Service: Podiatry     Social History   Social History     Substance and Sexual Activity   Alcohol Use None     Social History     Substance and Sexual Activity   Drug Use Not on file     Social History     Tobacco Use   Smoking Status Not on file Family History: History reviewed  No pertinent family history  Meds/Allergies   current meds:   Current Facility-Administered Medications   Medication Dose Route Frequency    acetaminophen (TYLENOL) tablet 975 mg  975 mg Oral Q8H [facility]    amLODIPine (NORVASC) tablet 10 mg  10 mg Oral Daily    amoxicillin (AMOXIL) capsule 1,000 mg  1,000 mg Oral Q8H [facility]    atenolol (TENORMIN) tablet 25 mg  25 mg Oral Daily    atorvastatin (LIPITOR) tablet 40 mg  40 mg Oral Daily With Dinner    calcium carbonate (TUMS) chewable tablet 500 mg  500 mg Oral BID PRN    citalopram (CeleXA) tablet 20 mg  20 mg Oral Daily    clopidogrel (PLAVIX) tablet 75 mg  75 mg Oral Daily    enoxaparin (LOVENOX) subcutaneous injection 40 mg  40 mg Subcutaneous Daily    famotidine (PEPCID) tablet 20 mg  20 mg Oral BID    HYDROmorphone (DILAUDID) injection 0 2 mg  0 2 mg Intravenous Q3H PRN    [START ON 3/6/2021] insulin glargine (LANTUS) subcutaneous injection 25 Units 0 25 mL  25 Units Subcutaneous QAM    insulin lispro (HumaLOG) 100 units/mL subcutaneous injection 1-5 Units  1-5 Units Subcutaneous 0200    insulin lispro (HumaLOG) 100 units/mL subcutaneous injection 1-6 Units  1-6 Units Subcutaneous HS    insulin lispro (HumaLOG) 100 units/mL subcutaneous injection 10 Units  10 Units Subcutaneous TID With Meals    levothyroxine tablet 175 mcg  175 mcg Oral Early Morning    oxyCODONE (ROXICODONE) IR tablet 2 5 mg  2 5 mg Oral Q4H PRN    oxyCODONE (ROXICODONE) IR tablet 5 mg  5 mg Oral Q4H PRN    polyethylene glycol (MIRALAX) packet 17 g  17 g Oral Daily PRN    sodium bicarbonate tablet 650 mg  650 mg Oral BID after meals    sodium chloride (OCEAN) 0 65 % nasal spray 1 spray  1 spray Each Nare Q2H PRN    tiotropium (SPIRIVA) capsule for inhaler 18 mcg  18 mcg Inhalation Daily    traZODone (DESYREL) tablet 50 mg  50 mg Oral HS       No Known Allergies      Family and Social Support:   CM Handoff Comments: 3/5: Osteomyelitis   TMA yesterday  PT/OT - needs STR  Continues to refuse  Neuropsych consult pending  Behavioral Observations: Alert, oriented x 3, cooperative with pleasant affect and admitted to depressed mood for last 15 days; denied anxiety; thoughts generally appeared logical and coherent with no overt evidence of psychotic process  Patient appeared to have understanding of medical history and reported he will require rehab services for his foot  States his brother is available to provide assistance at home and will want his brother to provide transport to rehab  Cognitive Examination    General Cognitive Functioning MMSE = Average 27/28; General Fund of Information = Average    Attention/Concentration Auditory Selective Attention = Average; ; Auditory Vigilance = Average; Information Processing Speed = Average    Frontal Systems/Executive Functioning Mental Flexibility/Cognitive Control = Average; Working Memory = Mildly Impaired Abstract Reasoning = Average; Generative Ability = Low Average, Commonsense Reasoning and Judgement = Average    Language Functioning Confrontation naming = Average, Phonemic Fluency = Low Average; Semantic Retrieval = Impaired; Comprehension of Complex Ideational Material = Average; Praxis = Within Normal Limits; Repetition = Within Normal Limits; Basic Reading = Within Normal Limits; Following Commands = Average    Memory Functioning Narrative Recall - Short Delay = Average; Long Delay Narrative Recall = Low Average;  Visual Recognition = Average    Visuo-Spatial Abilities Not Assessed    Functional Knowledge  Health & Safety Knowledge = Average;     Summary/Impression:  Results of Neuropsychological Exam revealed mild cognitive deficits in working memory and generative ability with all other tested areas of functioning within normal limits   On a measure assessing awareness of personal health status and ability to evaluate health problems, handle medical emergencies and take safety precautions, patient performed within normal limits  At this time, patient appears to have capacity to make informed medical decisions  Patient reported he has been prescribed anti-depressant medication for two years and is currently experiencing depressed mood  Depressive Disorder, Unspecified, R/O Mild Neurocognitive Disorder

## 2021-03-05 NOTE — PLAN OF CARE
Problem: Potential for Falls  Goal: Patient will remain free of falls  Description: INTERVENTIONS:  - Assess patient frequently for physical needs  -  Identify cognitive and physical deficits and behaviors that affect risk of falls    -  [place] fall precautions as indicated by assessment   - Educate patient/family on patient safety including physical limitations  - Instruct patient to call for assistance with activity based on assessment  - Modify environment to reduce risk of injury  - Consider OT/PT consult to assist with strengthening/mobility  Outcome: Progressing     Problem: PAIN - ADULT  Goal: Verbalizes/displays adequate comfort level or baseline comfort level  Description: Interventions:  - Encourage patient to monitor pain and request assistance  - Assess pain using appropriate pain scale  - Administer analgesics based on type and severity of pain and evaluate response  - Implement non-pharmacological measures as appropriate and evaluate response  - Consider cultural and social influences on pain and pain management  - Notify physician/advanced practitioner if interventions unsuccessful or patient reports new pain  Outcome: Progressing     Problem: INFECTION - ADULT  Goal: Absence or prevention of progression during hospitalization  Description: INTERVENTIONS:  - Assess and monitor for signs and symptoms of infection  - Monitor lab/diagnostic results  - Monitor all insertion sites, i e  indwelling lines, tubes, and drains  - Monitor endotracheal if appropriate and nasal secretions for changes in amount and color  - [place] appropriate cooling/warming therapies per order  - Administer medications as ordered  - Instruct and encourage patient and family to use good hand hygiene technique  - Identify and instruct in appropriate isolation precautions for identified infection/condition  Outcome: Progressing     Problem: SAFETY ADULT  Goal: Patient will remain free of falls  Description: INTERVENTIONS:  - Assess patient frequently for physical needs  -  Identify cognitive and physical deficits and behaviors that affect risk of falls  -  [place] fall precautions as indicated by assessment   - Educate patient/family on patient safety including physical limitations  - Instruct patient to call for assistance with activity based on assessment  - Modify environment to reduce risk of injury  - Consider OT/PT consult to assist with strengthening/mobility  Outcome: Progressing     Problem: DISCHARGE PLANNING  Goal: Discharge to home or other facility with appropriate resources  Description: INTERVENTIONS:  - Identify barriers to discharge w/patient and caregiver  - Arrange for needed discharge resources and transportation as appropriate  - Identify discharge learning needs (meds, wound care, etc )  - Arrange for interpretive services to assist at discharge as needed  - Refer to Case Management Department for coordinating discharge planning if the patient needs post-hospital services based on physician/advanced practitioner order or complex needs related to functional status, cognitive ability, or social support system  Outcome: Progressing     Problem: Knowledge Deficit  Goal: Patient/family/caregiver demonstrates understanding of disease process, treatment plan, medications, and discharge instructions  Description: Complete learning assessment and assess knowledge base    Interventions:  - Provide teaching at level of understanding  - Provide teaching via preferred learning methods  Outcome: Progressing     Problem: Prexisting or High Potential for Compromised Skin Integrity  Goal: Skin integrity is maintained or improved  Description: INTERVENTIONS:  - Identify patients at risk for skin breakdown  - Assess and monitor skin integrity  - Assess and monitor nutrition and hydration status  - Monitor labs   - Assess for incontinence   - Turn and reposition patient  - Assist with mobility/ambulation  - Relieve pressure over bony prominences  - Avoid friction and shearing  - Provide appropriate hygiene as needed including keeping skin clean and dry  - Evaluate need for skin moisturizer/barrier cream  - Collaborate with interdisciplinary team   - Patient/family teaching  - Consider wound care consult   Outcome: Progressing     Problem: Nutrition/Hydration-ADULT  Goal: Nutrient/Hydration intake appropriate for improving, restoring or maintaining nutritional needs  Description: Monitor and assess patient's nutrition/hydration status for malnutrition  Collaborate with interdisciplinary team and initiate plan and interventions as ordered  Monitor patient's weight and dietary intake as ordered or per policy  Utilize nutrition screening tool and intervene as necessary  Determine patient's food preferences and provide high-protein, high-caloric foods as appropriate       INTERVENTIONS:  - Monitor oral intake, urinary output, labs, and treatment plans  - Assess nutrition and hydration status and recommend course of action  - Evaluate amount of meals eaten  - Assist patient with eating if necessary   - Allow adequate time for meals  - Recommend/ encourage appropriate diets, oral nutritional supplements, and vitamin/mineral supplements  - Order, calculate, and assess calorie counts as needed  - Recommend, monitor, and adjust tube feedings and TPN/PPN based on assessed needs  - Assess need for intravenous fluids  - Provide specific nutrition/hydration education as appropriate  - Include patient/family/caregiver in decisions related to nutrition  Outcome: Progressing

## 2021-03-05 NOTE — PROGRESS NOTES
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 toe gangrene, cellulitis and clinical osteomyelitis   Patient reportedly had previous fracture at this site and was found on initial evaluation to have exposed bone with foul-smelling gangrenous right toe   Clinically consistent with osteomyelitis   Also noted to have tracking cellulitis   Patient underwent amputation on 2/21   Also found to have problem 2  Cultures isolated Enterococcus   Underwent revascularization   Status post washout and partial closure on 02/26 with presumed source control  TMA completed on 03/04 with residual hard bone and no concerning findings of infection on review of OP note  Continue amoxicillin 1 g q 8 hours through 3/6  Last doses of antibiotics ordered  Continue to trend fever curve/WBC while admitted  Ongoing follow-up by Podiatry  Additional supportive care as per primary  Patient otherwise cleared from ID standpoint for discharge     2  Right foot plantar abscess   Patient underwent incision and drainage with amputation on 02/21   Cultures ultimately with Enterococcus   Repeat washout on 02/26 with debridement without any further tracking sinus/purulence  TMA completed 3/4  Continue antibiotics as above  Continue to trend fever curve/WBC  Ongoing follow-up/local care as per Podiatry     3  Leukocytosis   Patient remains hemodynamically stable and blood cultures negative   Stable yesterday with unremarkable differential   Likely multifactorial given the above, complications with VAC and possibly drug related  Monitor CBC while admitted  Continue antibiotics as above     4  CKD 3  Creatinine appears to be stable  Amoxicillin dosing as above  Monitor chemistry while admitted  Further dose adjust antibiotics as needed     5   Diabetes mellitus, with neuropathy and obesity   Unfortunately this is likely risk factor for patient's progressive development of wound infection   And also impacts wound healing   BMI noted to be 30 which impacts antibiotic dosing  Higher dosing of amoxicillin as above  Glucose management as per primary  Recommend close follow-up as outpatient with PCP     6  PAD   Status post IR recannulization and stenting of right SFA    Unfortunately risk factor for poor wound healing   Continue local wound care and follow-up with vascular/podiatry       7  Transient hypoxia, with abnormal CXR   No further episodes of hypoxia noted   Appears stable on room air   Continue to monitor respiratory status while admitted      Above plan discussed in detail with the patient at bedside      ID consult service will sign off at this time  Please call if questions  Antibiotics:  Amoxicillin 5  Total antibiotic 15     Initial amputation   Debridement and partial closure     Subjective:  Patient has no fever, chills, sweats; no nausea, vomiting, diarrhea; no cough, shortness of breath  No new symptoms  He is having some intermittent pain at his surgical site  Otherwise tolerating antibiotic without issue  He is hopeful to go home soon  Objective:  Vitals:  Temp:  [97 3 °F (36 3 °C)-98 6 °F (37 °C)] 98 5 °F (36 9 °C)  HR:  [57-72] 72  Resp:  [13-20] 18  BP: (118-163)/(52-70) 136/64  SpO2:  [90 %-99 %] 92 %  Temp (24hrs), Av 8 °F (36 6 °C), Min:97 3 °F (36 3 °C), Max:98 6 °F (37 °C)  Current: Temperature: 98 5 °F (36 9 °C)    Physical Exam:   General Appearance:  Alert, interactive, nontoxic, no acute distress  Throat: Oropharynx moist without lesions  Poor dentition noted  Lungs:   Clear to auscultation bilaterally; no wheezes, rhonchi or rales; respirations unlabored  On room air   Heart:  RRR; no murmur, rub or gallop appreciated   Abdomen:   Soft, non-tender, non-distended, positive bowel sounds  Extremities: No clubbing, cyanosis or edema   Skin: No new rashes or lesions   No new draining wounds noted  Surgical site currently dressed at this time  There is no erythema spreading outside the bandage         Labs, Imaging, & Other studies:   All pertinent labs and imaging studies were personally reviewed  Results from last 7 days   Lab Units 03/04/21  0628 03/03/21  0539 03/02/21  0318   WBC Thousand/uL 11 12* 11 25* 10 98*   HEMOGLOBIN g/dL 8 9* 9 3* 10 1*   PLATELETS Thousands/uL 502* 466* 485*     Results from last 7 days   Lab Units 03/04/21  0628   POTASSIUM mmol/L 3 8   CHLORIDE mmol/L 99*   CO2 mmol/L 27   BUN mg/dL 9   CREATININE mg/dL 1 19   EGFR ml/min/1 73sq m 62   CALCIUM mg/dL 8 0*

## 2021-03-06 LAB
GLUCOSE SERPL-MCNC: 133 MG/DL (ref 65–140)
GLUCOSE SERPL-MCNC: 227 MG/DL (ref 65–140)
GLUCOSE SERPL-MCNC: 240 MG/DL (ref 65–140)
GLUCOSE SERPL-MCNC: 281 MG/DL (ref 65–140)
GLUCOSE SERPL-MCNC: 328 MG/DL (ref 65–140)
GLUCOSE SERPL-MCNC: 357 MG/DL (ref 65–140)

## 2021-03-06 PROCEDURE — 97530 THERAPEUTIC ACTIVITIES: CPT

## 2021-03-06 PROCEDURE — 99232 SBSQ HOSP IP/OBS MODERATE 35: CPT | Performed by: INTERNAL MEDICINE

## 2021-03-06 PROCEDURE — 82948 REAGENT STRIP/BLOOD GLUCOSE: CPT

## 2021-03-06 PROCEDURE — 99024 POSTOP FOLLOW-UP VISIT: CPT | Performed by: PODIATRIST

## 2021-03-06 PROCEDURE — 97110 THERAPEUTIC EXERCISES: CPT

## 2021-03-06 RX ORDER — INSULIN GLARGINE 100 [IU]/ML
30 INJECTION, SOLUTION SUBCUTANEOUS EVERY MORNING
Status: DISCONTINUED | OUTPATIENT
Start: 2021-03-07 | End: 2021-03-07 | Stop reason: HOSPADM

## 2021-03-06 RX ORDER — NICOTINE 21 MG/24HR
14 PATCH, TRANSDERMAL 24 HOURS TRANSDERMAL DAILY
Status: DISCONTINUED | OUTPATIENT
Start: 2021-03-06 | End: 2021-03-07 | Stop reason: HOSPADM

## 2021-03-06 RX ADMIN — TRAZODONE HYDROCHLORIDE 50 MG: 50 TABLET ORAL at 21:27

## 2021-03-06 RX ADMIN — ATENOLOL 25 MG: 25 TABLET ORAL at 09:02

## 2021-03-06 RX ADMIN — INSULIN GLARGINE 25 UNITS: 100 INJECTION, SOLUTION SUBCUTANEOUS at 09:06

## 2021-03-06 RX ADMIN — INSULIN LISPRO 2 UNITS: 100 INJECTION, SOLUTION INTRAVENOUS; SUBCUTANEOUS at 21:28

## 2021-03-06 RX ADMIN — AMOXICILLIN 1000 MG: 250 CAPSULE ORAL at 07:19

## 2021-03-06 RX ADMIN — HYDROMORPHONE HYDROCHLORIDE 0.2 MG: 1 INJECTION, SOLUTION INTRAMUSCULAR; INTRAVENOUS; SUBCUTANEOUS at 14:18

## 2021-03-06 RX ADMIN — ENOXAPARIN SODIUM 40 MG: 40 INJECTION SUBCUTANEOUS at 09:02

## 2021-03-06 RX ADMIN — CLOPIDOGREL BISULFATE 75 MG: 75 TABLET ORAL at 09:02

## 2021-03-06 RX ADMIN — OXYCODONE HYDROCHLORIDE 5 MG: 5 TABLET ORAL at 11:44

## 2021-03-06 RX ADMIN — FAMOTIDINE 20 MG: 20 TABLET ORAL at 17:26

## 2021-03-06 RX ADMIN — LEVOTHYROXINE SODIUM 175 MCG: 175 TABLET ORAL at 07:19

## 2021-03-06 RX ADMIN — GLYCERIN 1 DROP: .002; .002; .01 SOLUTION/ DROPS OPHTHALMIC at 21:29

## 2021-03-06 RX ADMIN — INSULIN LISPRO 12 UNITS: 100 INJECTION, SOLUTION INTRAVENOUS; SUBCUTANEOUS at 17:27

## 2021-03-06 RX ADMIN — GLYCERIN 1 DROP: .002; .002; .01 SOLUTION/ DROPS OPHTHALMIC at 13:30

## 2021-03-06 RX ADMIN — FAMOTIDINE 20 MG: 20 TABLET ORAL at 09:02

## 2021-03-06 RX ADMIN — ATORVASTATIN CALCIUM 40 MG: 40 TABLET, FILM COATED ORAL at 17:26

## 2021-03-06 RX ADMIN — INSULIN LISPRO 3 UNITS: 100 INJECTION, SOLUTION INTRAVENOUS; SUBCUTANEOUS at 02:39

## 2021-03-06 RX ADMIN — AMLODIPINE BESYLATE 10 MG: 10 TABLET ORAL at 09:02

## 2021-03-06 RX ADMIN — SODIUM BICARBONATE 650 MG: 650 TABLET ORAL at 09:02

## 2021-03-06 RX ADMIN — GLYCERIN 1 DROP: .002; .002; .01 SOLUTION/ DROPS OPHTHALMIC at 17:27

## 2021-03-06 RX ADMIN — OXYCODONE HYDROCHLORIDE 5 MG: 5 TABLET ORAL at 21:47

## 2021-03-06 RX ADMIN — ACETAMINOPHEN 975 MG: 325 TABLET, FILM COATED ORAL at 21:28

## 2021-03-06 RX ADMIN — OXYCODONE HYDROCHLORIDE 5 MG: 5 TABLET ORAL at 07:20

## 2021-03-06 RX ADMIN — SODIUM BICARBONATE 650 MG: 650 TABLET ORAL at 17:26

## 2021-03-06 RX ADMIN — CITALOPRAM HYDROBROMIDE 20 MG: 20 TABLET ORAL at 09:02

## 2021-03-06 RX ADMIN — TIOTROPIUM BROMIDE 18 MCG: 18 CAPSULE ORAL; RESPIRATORY (INHALATION) at 09:04

## 2021-03-06 RX ADMIN — GLYCERIN 1 DROP: .002; .002; .01 SOLUTION/ DROPS OPHTHALMIC at 18:30

## 2021-03-06 RX ADMIN — GLYCERIN 1 DROP: .002; .002; .01 SOLUTION/ DROPS OPHTHALMIC at 11:48

## 2021-03-06 RX ADMIN — OXYCODONE HYDROCHLORIDE 5 MG: 5 TABLET ORAL at 17:42

## 2021-03-06 RX ADMIN — AMOXICILLIN 1000 MG: 250 CAPSULE ORAL at 14:19

## 2021-03-06 RX ADMIN — AMOXICILLIN 1000 MG: 250 CAPSULE ORAL at 21:28

## 2021-03-06 NOTE — PLAN OF CARE
Problem: Potential for Falls  Goal: Patient will remain free of falls  Description: INTERVENTIONS:  - Assess patient frequently for physical needs  -  Identify cognitive and physical deficits and behaviors that affect risk of falls    -  [place] fall precautions as indicated by assessment   - Educate patient/family on patient safety including physical limitations  - Instruct patient to call for assistance with activity based on assessment  - Modify environment to reduce risk of injury  - Consider OT/PT consult to assist with strengthening/mobility  Outcome: Progressing     Problem: PAIN - ADULT  Goal: Verbalizes/displays adequate comfort level or baseline comfort level  Description: Interventions:  - Encourage patient to monitor pain and request assistance  - Assess pain using appropriate pain scale  - Administer analgesics based on type and severity of pain and evaluate response  - Implement non-pharmacological measures as appropriate and evaluate response  - Consider cultural and social influences on pain and pain management  - Notify physician/advanced practitioner if interventions unsuccessful or patient reports new pain  Outcome: Progressing     Problem: INFECTION - ADULT  Goal: Absence or prevention of progression during hospitalization  Description: INTERVENTIONS:  - Assess and monitor for signs and symptoms of infection  - Monitor lab/diagnostic results  - Monitor all insertion sites, i e  indwelling lines, tubes, and drains  - Monitor endotracheal if appropriate and nasal secretions for changes in amount and color  - [place] appropriate cooling/warming therapies per order  - Administer medications as ordered  - Instruct and encourage patient and family to use good hand hygiene technique  - Identify and instruct in appropriate isolation precautions for identified infection/condition  Outcome: Progressing     Problem: SAFETY ADULT  Goal: Patient will remain free of falls  Description: INTERVENTIONS:  - Assess patient frequently for physical needs  -  Identify cognitive and physical deficits and behaviors that affect risk of falls  -  [place] fall precautions as indicated by assessment   - Educate patient/family on patient safety including physical limitations  - Instruct patient to call for assistance with activity based on assessment  - Modify environment to reduce risk of injury  - Consider OT/PT consult to assist with strengthening/mobility  Outcome: Progressing     Problem: DISCHARGE PLANNING  Goal: Discharge to home or other facility with appropriate resources  Description: INTERVENTIONS:  - Identify barriers to discharge w/patient and caregiver  - Arrange for needed discharge resources and transportation as appropriate  - Identify discharge learning needs (meds, wound care, etc )  - Arrange for interpretive services to assist at discharge as needed  - Refer to Case Management Department for coordinating discharge planning if the patient needs post-hospital services based on physician/advanced practitioner order or complex needs related to functional status, cognitive ability, or social support system  Outcome: Progressing     Problem: Knowledge Deficit  Goal: Patient/family/caregiver demonstrates understanding of disease process, treatment plan, medications, and discharge instructions  Description: Complete learning assessment and assess knowledge base    Interventions:  - Provide teaching at level of understanding  - Provide teaching via preferred learning methods  Outcome: Progressing     Problem: Prexisting or High Potential for Compromised Skin Integrity  Goal: Skin integrity is maintained or improved  Description: INTERVENTIONS:  - Identify patients at risk for skin breakdown  - Assess and monitor skin integrity  - Assess and monitor nutrition and hydration status  - Monitor labs   - Assess for incontinence   - Turn and reposition patient  - Assist with mobility/ambulation  - Relieve pressure over bony prominences  - Avoid friction and shearing  - Provide appropriate hygiene as needed including keeping skin clean and dry  - Evaluate need for skin moisturizer/barrier cream  - Collaborate with interdisciplinary team   - Patient/family teaching  - Consider wound care consult   Outcome: Progressing     Problem: Nutrition/Hydration-ADULT  Goal: Nutrient/Hydration intake appropriate for improving, restoring or maintaining nutritional needs  Description: Monitor and assess patient's nutrition/hydration status for malnutrition  Collaborate with interdisciplinary team and initiate plan and interventions as ordered  Monitor patient's weight and dietary intake as ordered or per policy  Utilize nutrition screening tool and intervene as necessary  Determine patient's food preferences and provide high-protein, high-caloric foods as appropriate       INTERVENTIONS:  - Monitor oral intake, urinary output, labs, and treatment plans  - Assess nutrition and hydration status and recommend course of action  - Evaluate amount of meals eaten  - Assist patient with eating if necessary   - Allow adequate time for meals  - Recommend/ encourage appropriate diets, oral nutritional supplements, and vitamin/mineral supplements  - Order, calculate, and assess calorie counts as needed  - Recommend, monitor, and adjust tube feedings and TPN/PPN based on assessed needs  - Assess need for intravenous fluids  - Provide specific nutrition/hydration education as appropriate  - Include patient/family/caregiver in decisions related to nutrition  Outcome: Progressing

## 2021-03-06 NOTE — PROGRESS NOTES
Progress Note - Podiatry  Nathalie Chela 78 y o  male MRN: [id number]  Unit/Bed#: S -01 Encounter: [id number]    ASSESSMENT:  S/P TMA right foot  Type II diabetes with PAD    PLAN:  TMA site looks well and incision coapted  No signs of active infection  Continue NWB  Awaits placement  Subjective/Objective   Chief Complaint:   Chief Complaint   Patient presents with    Weakness - Generalized     PT comes to ED with c/o increasing weakness "for many weeks, today I just cant get around anymore" Per EMS "brother told us that his big toe on right foot is black" (PT is diabetic BS for EMS was 406)       Subjective: 78 y o  y/o male was seen and evaluated at bedside  Pain is better  It is about 5 out of 10  No new complaint  Review of Systems  Review of Systems   Constitutional: Negative for chills and fever  Respiratory: Negative for cough and shortness of breath  Cardiovascular: Negative for chest pain  Gastrointestinal: Negative for diarrhea, nausea and vomiting  Past Medical History  History reviewed  No pertinent past medical history  Past Surgical History  Past Surgical History:   Procedure Laterality Date    INCISION AND DRAINAGE OF WOUND Right 2/21/2021    Procedure: INCISION AND DRAINAGE (I&D) EXTREMITY RIGHT FOOT AND OPEN SECOND RAY AMPUTATION;  Surgeon: Colten Coughlin DPM;  [facility];  Service: Podiatry    IR LOWER EXTREMITY ANGIOGRAM  2/23/2021    CT AMPUTATION FOOT,TRANSMETATARSAL Right 3/4/2021    Procedure: AMPUTATION TRANSMETATARSAL (TMA) right foot;  Surgeon: Obed Purvis DPM;  [facility];  Service: Podiatry    WOUND DEBRIDEMENT Right 2/26/2021    Procedure: DEBRIDEMENT WOUND ([address]) with partial closure;  Surgeon: Colten Coughlin DPM;  [facility];  Service: Podiatry        Allergies:  Patient has no known allergies      Medications:  Current Facility-Administered Medications   Medication Dose Route Frequency Provider Last Rate Last Admin    acetaminophen (TYLENOL) tablet 975 mg  975 mg Oral [facility] [place], DPM   975 mg at 02/27/21 2316    amLODIPine (NORVASC) tablet 10 mg  10 mg Oral Daily Home Solares, DPM   10 mg at 03/06/21 0902    amoxicillin (AMOXIL) capsule 1,000 mg  1,000 mg Oral [facility], DPM   1,000 mg at 03/06/21 0719    atenolol (TENORMIN) tablet 25 mg  25 mg Oral Daily Home Solares, DPM   25 mg at 03/06/21 0902    atorvastatin (LIPITOR) tablet 40 mg  40 mg Oral Daily With Michelle Jean DPM   40 mg at 03/05/21 1648    calcium carbonate (TUMS) chewable tablet 500 mg  500 mg Oral BID PRN [place], DPM   500 mg at 02/25/21 3937    citalopram (CeleXA) tablet 20 mg  20 mg Oral Daily Home Solares, DPM   20 mg at 03/06/21 0902    clopidogrel (PLAVIX) tablet 75 mg  75 mg Oral Daily [facility] Sol[facility], DPM   75 mg at 03/06/21 0902    enoxaparin (LOVENOX) subcutaneous injection 40 mg  40 mg Subcutaneous Daily Honey Brookjay[place] Solares, DPM   40 mg at 03/06/21 0902    famotidine (PEPCID) tablet 20 mg  20 mg Oral BID Home Solares, DPM   20 mg at 03/06/21 0902    glycerin-hypromellose- (ARTIFICIAL TEARS) ophthalmic solution 1 drop  1 drop Both Eyes 6x Daily Elena Yang MD   1 drop at 03/06/21 1148    HYDROmorphone (DILAUDID) injection 0 2 mg  0 2 mg Intravenous Q3H PRN Elena Yang MD   0 2 mg at 03/05/21 2049    insulin glargine (LANTUS) subcutaneous injection 25 Units 0 25 mL  25 Units Subcutaneous QAM Elena Yang MD   25 Units at 03/06/21 0906    insulin lispro (HumaLOG) 100 units/mL subcutaneous injection 1-5 Units  1-5 Units Subcutaneous [address] Wilde PA-C   3 Units at 03/06/21 0239    insulin lispro (HumaLOG) 100 units/mL subcutaneous injection 1-6 Units  1-6 Units Subcutaneous HS He Wilde PA-C   2 Units at 03/05/21 2121    insulin lispro (HumaLOG) 100 units/mL subcutaneous injection 10 Units  10 Units Subcutaneous TID With Meals Elena MD Celia   10 Units at 03/06/21 1152    levothyroxine tablet 175 mcg  175 mcg Oral Early Morning Teretha Taylor, DPM   175 mcg at 03/06/21 0719    nicotine (NICODERM CQ) 14 mg/24hr TD 24 hr patch 14 mg  14 mg Transdermal Daily Elena Yang MD        oxyCODONE (ROXICODONE) IR tablet 2 5 mg  2 5 mg Oral Q4H PRN Teretha Taylor, DPM   2 5 mg at 02/26/21 2217    oxyCODONE (ROXICODONE) IR tablet 5 mg  5 mg Oral Q4H PRN Teretha Taylor, DPM   5 mg at 03/06/21 1144    polyethylene glycol (MIRALAX) packet 17 g  17 g Oral Daily PRN Teretha Taylor, DPM        sodium bicarbonate tablet 650 mg  650 mg Oral BID after meals Teretha Taylor, DPM   650 mg at 03/06/21 0902    sodium chloride (OCEAN) 0 65 % nasal spray 1 spray  1 spray Each Nare Q2H PRN Teretha Taylor, DPM        tiotropium (SPIRIVA) capsule for inhaler 18 mcg  18 mcg Inhalation Daily Teretha Taylor, DPM   18 mcg at 03/06/21 4903    traZODone (DESYREL) tablet 50 mg  50 mg Oral HS Teretha Taylor, DPM   50 mg at 03/05/21 2121       Social History:  Social History     Socioeconomic History    Marital status:       Spouse name: None    Number of children: None    Years of education: None    Highest education level: None   Occupational History    None   Social Needs    Financial resource strain: None    Food insecurity     Worry: None     Inability: None    Transportation needs     Medical: None     Non-medical: None   Tobacco Use    Smoking status: None   Substance and Sexual Activity    Alcohol use: None    Drug use: None    Sexual activity: None   Lifestyle    Physical activity     Days per week: None     Minutes per session: None    Stress: None   Relationships    Social connections     Talks on phone: None     Gets together: None     Attends [employer] service: None     Active member of club or organization: None     Attends meetings of clubs or organizations: None     Relationship status: None    Intimate partner violence     Fear of current or ex partner: None     Emotionally abused: None     Physically abused: None     Forced sexual activity: None   Other Topics Concern    None   Social History Narrative    None          Blood pressure 153/67, pulse 71, temperature 97 5 °F (36 4 °C), temperature source Oral, resp  rate 16, height 5' 10" (1 778 m), weight 95 3 kg (210 lb), SpO2 91 %  ,Body mass index is 30 13 kg/m²  Invasive Devices     Peripheral Intravenous Line            Peripheral IV 03/02/21 Right;Dorsal (posterior) Hand 4 days          Line            Arterial Sheath 6 Fr  Left Femoral 10 days          Drain            Negative Pressure Wound Therapy (V A C ) 12 days                Physical Exam:   General: Alert, cooperative and no distress  Lungs: Non labored breathing  Heart: RRR  Normal heart rate  Abdomen: Soft, non-tender  Neurologic:  No focal neurologic deficit  Sensation is intact to light touch  Extremity: No calf pain or edema  No cyanosis or clubbing  Skin: Incision is coapted  No active bleeding  No redness  No purulence  Vascular:  CRT less than 3 seconds  No calf pain      Lab and other studies: I have personally reviewed pertinent lab results  Imaging: I have personally reviewed pertinent films in PACS  EKG, Pathology, and Other Studies: I have personally reviewed pertinent reports

## 2021-03-06 NOTE — CASE MANAGEMENT
Per MD, patient is medically stable for d/c to rehab; aware that insurance auth is needed for same  Pending bed offers received from [facility], [id number] and [place] are still reviewing information  Call made to patient's brother, Lilian Morel ([phone]), to follow-up on decision re: STR  Lilian Morel reports that he did receive phone calls from facilities, but is awaiting the list to review options with patient  SNF listing, which includes quality measures, sent to him via email: [email or web address]  Lilian Morel states that he will be to the hospital this afternoon and will review options with his brother  Indicated in email the facilities which have already offered beds  Listing also delivered to patient's bedside; patient sleeping, so listing not discussed at this time  D/c plan continues to be transfer to [facility] once bed confirmed and insurance authorization received  Will continue to follow      SHRUTI Denise  3/6/2021  10:31 AM

## 2021-03-06 NOTE — CASE MANAGEMENT
Met with patient and brother, Franco Landaverde, at bedside to discuss preferred rehab locations  Per both, first choice is [facility] and second choice would be [facility]  Message sent to [facility] relaying same; awaiting response  Will need to confirm insurance auth prior to transfer - will continue to follow for same       SHRUTI Concepcion  3/6/2021  1:53 PM

## 2021-03-06 NOTE — PHYSICAL THERAPY NOTE
Physical Therapy Progress Note     21 1035   Pain Assessment   Pain Assessment Tool 0-10   Pain Score 7   Pain Location/Orientation Orientation: Right;Location: Foot   Restrictions/Precautions   Weight Bearing Precautions Per Order Yes   RLE Weight Bearing Per Order NWB  (Pt unable to maintain)   Braces or Orthoses Other (Comment)  (ACE wrap around R LE)   General   Response to Previous Treatment Patient with no complaints from previous session  Family/Caregiver Present Yes   Cognition   Overall Cognitive Status Impaired   Arousal/Participation Alert; Cooperative   Attention Attends with cues to redirect   Orientation Level Oriented X4   Memory Decreased recall of precautions   Following Commands Follows one step commands with increased time or repetition   Comments Pt was identified by name and , agreeable to treatment  Bed Mobility   Rolling R 5  Supervision   Additional items Increased time required   Supine to Sit 4  Minimal assistance   Additional items Assist x 1;HOB elevated; Increased time required;Verbal cues;LE management   Sit to Supine 4  Minimal assistance   Additional items Assist x 1; Increased time required;Verbal cues;LE management   Transfers   Sit to Stand 2  Maximal assistance   Additional items Assist x 2; Increased time required; Bedrails;Verbal cues   Stand to Sit 3  Moderate assistance   Additional items Assist x 2; Increased time required;Verbal cues; Bed elevated   Ambulation/Elevation   Gait pattern Not appropriate   Balance   Static Sitting Fair   Dynamic Sitting Fair   Static Standing Zero   Dynamic Standing Poor +   Endurance Deficit   Endurance Deficit Yes   Endurance Deficit Description fatigue, weakness   Activity Tolerance   Activity Tolerance Patient limited by fatigue;Patient limited by pain   Nurse Made Aware yes, ok to see   Exercises   Knee AROM Long Arc Quad Sitting;10 reps;AROM; Bilateral   Marching Sitting;20 reps;AROM; Bilateral   Assessment   Prognosis Fair Problem List Decreased strength;Decreased endurance; Impaired balance;Decreased mobility; Impaired judgement;Decreased safety awareness; Impaired sensation;Obesity;Orthopedic restrictions;Pain   Assessment Pt was found supine in bed to begin session with brother present  He was able to perform all bed mobility with A needed  Pt was able to tolerate static sitting at EOB for the first few minutes and once tired, he tended to leanto his R back down to the bed  Pt was very unmotivated in his exercise performance with continued education provided on the importance of exercise performance  He was able to perform a STS xfer with max A needing cuing on hand and foot placement  Pt was unable to maintain NWB in his RLE with therapists foot underneath  Pt stood for about 5 seconds until he needed to sit down and since he couldnt fully WB on his uninvolved LE  Pt was fatigued after minimal reps of TE and requested go go to bed  He had all needs met and call bell in reach  Pt would benefit from contineud PT in order to promote safe and functional mobility  Barriers to Discharge Inaccessible home environment;Decreased caregiver support; Other (Comment)  (flight of stairs to main living environment )   Goals   Patient Goals to go home   STG Expiration Date 03/15/21   Short Term Goal #1 Patient will: Increase bilateral LE strength 1/2 grade to facilitate independent mobility, Perform all bed mobility tasks modified independent to decrease fall risk factors, pt will be able to perform lateral scooting and sit pivot transfers while maintaining WB precautions w/ min A x 1 to increased independence, perform STS transfers w/ A x 1 while maintaining WBS w/ AD for UE support to demonstrate compliance w/ WB restrictions and appropriateness to assess for further mobility  Propel WC for at least 50 ft and including turns w/ supervision as alternative for ambulation if pt unable to maintain WBS w/ standing   PT to assess for stand pivot transfers, gait, and stairs as appropriate and set goals accordingly  PT Treatment Day 2   Plan   Treatment/Interventions Functional transfer training;LE strengthening/ROM; Therapeutic exercise; Endurance training;Patient/family training;Equipment eval/education; Bed mobility   PT Frequency Other (Comment)  (3-5x/wk)   Recommendation   PT Discharge Recommendation Post-Acute Rehabilitation Services   Equipment Recommended   (TBD)   AM-PAC Basic Mobility Inpatient   Turning in Bed Without Bedrails 3   Lying on Back to Sitting on Edge of Flat Bed 3   Moving Bed to Chair 1   Standing Up From Chair 1   Walk in Room 1   Climb 3-5 Stairs 1   Basic Mobility Inpatient Raw Score 10   Turning Head Towards Sound 3   Follow Simple Instructions 3   Low Function Basic Mobility Raw Score 16   Low Function Basic Mobility Standardized Score 25 72     Lilia Romero, PTA

## 2021-03-06 NOTE — PROGRESS NOTES
Progress Note - Cory Ortega 1950, 78 y o  male MRN: [id number]    Unit/Bed#: S -01 Encounter: [id number]    Primary Care Provider: Ulises Schmitz   Date and time admitted to hospital: 2/19/2021  2:09 PM      * Osteomyelitis of toe with cellulitis  Assessment & Plan  · Necrosis of the right 2nd digit (distal phalanx), proximal phalanx erythema toes, warmth, tender  Inflammatory signs present of the plantar surface as well  · X-ray of the right foot showed no evidence of osteomyelitis, 2nd PIP joint dislocation  · S/p I&D of the right foot with open 2nd ray amputation on 2/21/21 - s/p repeat washout with wound VAC insertion and partial closure on 2/26/21  · Podiatry discussed in detail long-term prognosis with patient and likely need for possible curative transmetatarsal amputation, however, patient refusing at this time, opting for further attempt at limb salvage  · Wound culture growing Enterococcus faecalis - antibiotic course transitioned to IV Ampicillin, to be completed tomorrow 03/06/2021  · PRN pain control and supportive care  · PT/OT as tolerated - nonweightbearing to affected extremity per podiatry  · 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  · Discharge planning    Insulin-dependent diabetes mellitus  Assessment & Plan  Lab Results   Component Value Date    HGBA1C 9 3 (H) 02/21/2021     · Patient did have severe hyperglycemia during this hospitalization with blood glucoses in the 500s  He required an insulin drip     · Continue Lantus and NovoLog with meals Carbohydrate restricted diet    Peripheral arterial disease  Assessment & Plan  · S/p right SFA stenting on 2/23 - follow-up LEADs noted  · Continue Plavix/statin    Hyperlipidemia  Assessment & Plan  · Continue Lipitor    Essential hypertension  Assessment & Plan  · Continue Norvasc/Tenormin  · Holding ACEI in the setting of acute kidney injury    Acute kidney injury - Chronic kidney disease stage 3  Assessment & Plan  · Due to sepsis, ACEi, NSAIDs  · Received IVFs  · Baseline creatinine approximately 1 3-1 6 - at 1 29 today (resolved)  · Monitor renal function and urine output - limit/avoid nephrotoxins as possible - holding ACEI  · On sodium bicarbonate supplementation - acidosis resolved  · Nephrology signed off    Depression  Assessment & Plan  · Continue Celexa    Hypothyroidism  Assessment & Plan  · Continue Synthroid    Anemia of chronic disease  Assessment & Plan  · H/H stable    VTE Pharmacologic Prophylaxis:   Pharmacologic: Enoxaparin (Lovenox)  Mechanical VTE Prophylaxis in Place: Yes    Patient Centered Rounds: I have performed bedside rounds with nursing staff today  Discussions with Specialists or Other Care Team Provider:     Education and Discussions with Family / Patient: patient    Time Spent for Care: 30 minutes  More than 50% of total time spent on counseling and coordination of care as described above  Current Length of Stay: 15 day(s)    Current Patient Status: Inpatient   Certification Statement: The patient will continue to require additional inpatient hospital stay due to placement pending    Discharge Plan: SNF placement    Code Status: Level 1 - Full Code      Subjective:     Patient upset about everything  Wants to get out of here  Wants to go to rehab  Objective:     Vitals:   Temp (24hrs), Av 9 °F (36 6 °C), Min:97 5 °F (36 4 °C), Max:98 3 °F (36 8 °C)    Temp:  [97 5 °F (36 4 °C)-98 3 °F (36 8 °C)] 97 5 °F (36 4 °C)  HR:  [66-72] 71  Resp:  [16-17] 16  BP: (127-153)/(63-67) 153/67  SpO2:  [90 %-93 %] 91 %  Body mass index is 30 13 kg/m²  Input and Output Summary (last 24 hours): Intake/Output Summary (Last 24 hours) at 3/6/2021 1342  Last data filed at 3/6/2021 1339  Gross per 24 hour   Intake 220 ml   Output 2825 ml   Net -2605 ml       Physical Exam:     Physical Exam    Gen -Patient comfortable   Neck- Supple   No thyromegaly or lymphadenopathy  Lungs-Clear bilaterally without any wheeze or rales   Heart S1-S2, regular rate and rhythm, no murmurs  Abdomen-soft nontender, no organomegaly  Bowel sounds present  Extremities-no cyanosi,  clubbing or edema  Skin- no rash  Neuro-nonfocal       Additional Data:     Labs:    Results from last 7 days   Lab Units 03/04/21  0628   WBC Thousand/uL 11 12*   HEMOGLOBIN g/dL 8 9*   HEMATOCRIT % 27 3*   PLATELETS Thousands/uL 502*   NEUTROS PCT % 67   LYMPHS PCT % 18   MONOS PCT % 11   EOS PCT % 2     Results from last 7 days   Lab Units 03/04/21  0628   SODIUM mmol/L 134*   POTASSIUM mmol/L 3 8   CHLORIDE mmol/L 99*   CO2 mmol/L 27   BUN mg/dL 9   CREATININE mg/dL 1 19   ANION GAP mmol/L 8   CALCIUM mg/dL 8 0*   GLUCOSE RANDOM mg/dL 280*         Results from last 7 days   Lab Units 03/06/21  1049 03/06/21  0827 03/06/21  0235 03/05/21  2116 03/05/21  1610 03/05/21  1058 03/05/21  0947 03/05/21  0626 03/05/21  0205 03/05/21  0004 03/04/21  2207 03/04/21  2050   POC GLUCOSE mg/dl 328* 357* 281* 214* 194* 393* 349* 240* 284* 309* 350* 355*                   * I Have Reviewed All Lab Data Listed Above  * Additional Pertinent Lab Tests Reviewed:  All Labs Within Last 24 Hours Reviewed    Imaging:    Imaging Reports Reviewed Today Include:   Imaging Personally Reviewed by Myself Includes:      Recent Cultures (last 7 days):           Last 24 Hours Medication List:   Current Facility-Administered Medications   Medication Dose Route Frequency Provider Last Rate    acetaminophen  975 mg Oral [facility] Kelleen Crimes, DPM      amLODIPine  10 mg Oral Daily Kelleen Crimes, DPM      amoxicillin  1,000 mg Oral [facility] Kelleen Crimes, DPM      atenolol  25 mg Oral Daily Kelleen Crimes, DPM      atorvastatin  40 mg Oral Daily With Marta Lab, DPM      calcium carbonate  500 mg Oral BID PRN Kelleen Crimes, DPM      citalopram  20 mg Oral Daily Kelleen Crimes, DPM      clopidogrel  75 mg Oral Daily Kelleen Crimes, DPM      enoxaparin  40 mg Subcutaneous Daily Davide Hinds DPM      famotidine  20 mg Oral BID Davide Hinds DPM      glycerin-hypromellose-  1 drop Both Eyes 6x Daily Elena Yang MD      HYDROmorphone  0 2 mg Intravenous Q3H PRN Elena Yang MD      insulin glargine  25 Units Subcutaneous QAM Elena Yang MD      insulin lispro  1-5 Units Subcutaneous [address] Wilde PA-C      insulin lispro  1-6 Units Subcutaneous HS He Wilde PA-C      insulin lispro  10 Units Subcutaneous TID With Meals Elena Yang MD      levothyroxine  175 mcg Oral Early Morning Davide Hinds DPM      nicotine  14 mg Transdermal Daily Elena Yang MD      oxyCODONE  2 5 mg Oral Q4H PRN Davide Hinds DPM      oxyCODONE  5 mg Oral Q4H PRN Davide Hinds DPM      polyethylene glycol  17 g Oral Daily PRN Davide Hinds DPM      sodium bicarbonate  650 mg Oral BID after meals Davide Hinds DPM      sodium chloride  1 spray Each Nare Q2H PRN Davide Hinds DPM      tiotropium  18 mcg Inhalation Daily Davide Hinds DPM      traZODone  50 mg Oral HS Davide Hinds DPM          Today, Patient Was Seen By: Darlyn Kinney MD    ** Please Note: Dictation voice to text software may have been used in the creation of this document   **

## 2021-03-06 NOTE — CASE MANAGEMENT
Response received via [facility] from [facility] confirming they will be starting insurance auth for rehab placement  Patient will need COVID test prior to transfer to rehab - Quyen prefers test be done on day of discharge  No d/c anticipated for today as auth still pending; may not get approval until Monday, 3/8 - patient and brother aware of same  Will continue to follow to assist with rehab transfer       SHRUTI Kirk  3/6/2021  3:36 PM

## 2021-03-07 VITALS
WEIGHT: 210 LBS | DIASTOLIC BLOOD PRESSURE: 56 MMHG | SYSTOLIC BLOOD PRESSURE: 150 MMHG | HEART RATE: 66 BPM | TEMPERATURE: 98.5 F | OXYGEN SATURATION: 92 % | RESPIRATION RATE: 17 BRPM | BODY MASS INDEX: 30.06 KG/M2 | HEIGHT: 70 IN

## 2021-03-07 PROBLEM — N17.9 ACUTE ON CHRONIC KIDNEY FAILURE (HCC): Status: RESOLVED | Noted: 2021-02-27 | Resolved: 2021-03-07

## 2021-03-07 PROBLEM — D72.829 LEUKOCYTOSIS: Status: RESOLVED | Noted: 2021-02-27 | Resolved: 2021-03-07

## 2021-03-07 PROBLEM — N18.9 ACUTE ON CHRONIC KIDNEY FAILURE (HCC): Status: RESOLVED | Noted: 2021-02-27 | Resolved: 2021-03-07

## 2021-03-07 PROBLEM — R09.02 HYPOXEMIA: Status: RESOLVED | Noted: 2021-02-22 | Resolved: 2021-03-07

## 2021-03-07 LAB
GLUCOSE SERPL-MCNC: 289 MG/DL (ref 65–140)
GLUCOSE SERPL-MCNC: 308 MG/DL (ref 65–140)
GLUCOSE SERPL-MCNC: 319 MG/DL (ref 65–140)

## 2021-03-07 PROCEDURE — 99239 HOSP IP/OBS DSCHRG MGMT >30: CPT | Performed by: INTERNAL MEDICINE

## 2021-03-07 PROCEDURE — 82948 REAGENT STRIP/BLOOD GLUCOSE: CPT

## 2021-03-07 RX ORDER — FAMOTIDINE 20 MG/1
20 TABLET, FILM COATED ORAL 2 TIMES DAILY
Qty: 30 TABLET | Refills: 0
Start: 2021-03-07

## 2021-03-07 RX ORDER — ECHINACEA PURPUREA EXTRACT 125 MG
1 TABLET ORAL AS NEEDED
Refills: 0
Start: 2021-03-07 | End: 2021-03-17

## 2021-03-07 RX ORDER — INSULIN GLARGINE 100 [IU]/ML
35 INJECTION, SOLUTION SUBCUTANEOUS EVERY MORNING
Refills: 0
Start: 2021-03-08

## 2021-03-07 RX ORDER — POLYETHYLENE GLYCOL 3350 17 G/17G
17 POWDER, FOR SOLUTION ORAL DAILY PRN
Refills: 0
Start: 2021-03-07

## 2021-03-07 RX ORDER — OXYCODONE HYDROCHLORIDE 5 MG/1
5 TABLET ORAL EVERY 4 HOURS PRN
Qty: 30 TABLET | Refills: 0 | Status: SHIPPED | OUTPATIENT
Start: 2021-03-07 | End: 2021-03-17

## 2021-03-07 RX ORDER — CLOPIDOGREL BISULFATE 75 MG/1
75 TABLET ORAL DAILY
Qty: 30 TABLET | Refills: 0
Start: 2021-03-08

## 2021-03-07 RX ADMIN — CLOPIDOGREL BISULFATE 75 MG: 75 TABLET ORAL at 09:12

## 2021-03-07 RX ADMIN — GLYCERIN 1 DROP: .002; .002; .01 SOLUTION/ DROPS OPHTHALMIC at 12:25

## 2021-03-07 RX ADMIN — OXYCODONE HYDROCHLORIDE 5 MG: 5 TABLET ORAL at 06:45

## 2021-03-07 RX ADMIN — LEVOTHYROXINE SODIUM 175 MCG: 175 TABLET ORAL at 06:26

## 2021-03-07 RX ADMIN — INSULIN LISPRO 12 UNITS: 100 INJECTION, SOLUTION INTRAVENOUS; SUBCUTANEOUS at 09:14

## 2021-03-07 RX ADMIN — CITALOPRAM HYDROBROMIDE 20 MG: 20 TABLET ORAL at 09:12

## 2021-03-07 RX ADMIN — INSULIN GLARGINE 30 UNITS: 100 INJECTION, SOLUTION SUBCUTANEOUS at 09:13

## 2021-03-07 RX ADMIN — ATENOLOL 25 MG: 25 TABLET ORAL at 09:12

## 2021-03-07 RX ADMIN — GLYCERIN 1 DROP: .002; .002; .01 SOLUTION/ DROPS OPHTHALMIC at 07:30

## 2021-03-07 RX ADMIN — OXYCODONE HYDROCHLORIDE 5 MG: 5 TABLET ORAL at 02:45

## 2021-03-07 RX ADMIN — AMLODIPINE BESYLATE 10 MG: 10 TABLET ORAL at 09:12

## 2021-03-07 RX ADMIN — GLYCERIN 1 DROP: .002; .002; .01 SOLUTION/ DROPS OPHTHALMIC at 10:15

## 2021-03-07 RX ADMIN — INSULIN LISPRO 12 UNITS: 100 INJECTION, SOLUTION INTRAVENOUS; SUBCUTANEOUS at 12:24

## 2021-03-07 RX ADMIN — FAMOTIDINE 20 MG: 20 TABLET ORAL at 09:12

## 2021-03-07 RX ADMIN — ENOXAPARIN SODIUM 40 MG: 40 INJECTION SUBCUTANEOUS at 09:13

## 2021-03-07 RX ADMIN — INSULIN LISPRO 3 UNITS: 100 INJECTION, SOLUTION INTRAVENOUS; SUBCUTANEOUS at 02:46

## 2021-03-07 RX ADMIN — SODIUM BICARBONATE 650 MG: 650 TABLET ORAL at 09:12

## 2021-03-07 RX ADMIN — TIOTROPIUM BROMIDE 18 MCG: 18 CAPSULE ORAL; RESPIRATORY (INHALATION) at 09:17

## 2021-03-07 RX ADMIN — OXYCODONE HYDROCHLORIDE 5 MG: 5 TABLET ORAL at 12:28

## 2021-03-07 NOTE — ASSESSMENT & PLAN NOTE
Lab Results   Component Value Date    HGBA1C 9 3 (H) 02/21/2021     · Patient did have severe hyperglycemia during this hospitalization with blood glucoses in the 500s  He required an insulin drip  · Blood sugars remains elevated  Adjusted insulin regimen

## 2021-03-07 NOTE — DISCHARGE SUMMARY
Discharge- Cloteal Situ 1950, 78 y o  male MRN: [id number]    Unit/Bed#: S -01 Encounter: [id number]    Primary Care Provider: Ulises Ambrose   Date and time admitted to hospital: 2/19/2021  2:09 PM      * Osteomyelitis of toe with cellulitis  Assessment & Plan  · Necrosis of the right 2nd digit (distal phalanx), proximal phalanx erythema toes, warmth, tender  Inflammatory signs present of the plantar surface as well  · X-ray of the right foot showed no evidence of osteomyelitis, 2nd PIP joint dislocation  · S/p I&D of the right foot with open 2nd ray amputation on 2/21/21 - s/p repeat washout with wound VAC insertion and partial closure on 2/26/21  · Podiatry discussed in detail long-term prognosis with patient and likely need for possible curative transmetatarsal amputation, however, patient refusing at this time, opting for further attempt at limb salvage  · Wound culture growing Enterococcus faecalis - antibiotic course transitioned to IV Ampicillin, to be completed 03/06/2021  · PRN pain control and supportive care  · PT/OT as tolerated - nonweightbearing to affected extremity per podiatry  · 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  · Seen by Podiatry yesterday, wound is healing well  Cleared for discharge  Insulin-dependent diabetes mellitus  Assessment & Plan  Lab Results   Component Value Date    HGBA1C 9 3 (H) 02/21/2021     · Patient did have severe hyperglycemia during this hospitalization with blood glucoses in the 500s  He required an insulin drip  · Blood sugars remains elevated  Adjusted insulin regimen      Peripheral arterial disease  Assessment & Plan  · S/p right SFA stenting on 2/23 - follow-up LEADs noted  · Continue Plavix/statin    Hyperlipidemia  Assessment & Plan  · Continue Lipitor    Essential hypertension  Assessment & Plan  · Continue Norvasc/Tenormin  · Holding ACEI in the setting of acute kidney injury    Hypoxemia-resolved as of 3/7/2021  Assessment & Plan  · Intermittent episodes of hypoxia - currently on 2 L via nasal cannula at rest  · CXR with questionable infiltrates although in the absence of upper respiratory symptoms  · Infectious Disease following - on IV Ampicillin already for primary issue  · Encourage incentive spirometry    Acute kidney injury - Chronic kidney disease stage 3-resolved as of 3/7/2021  Assessment & Plan  · Due to sepsis, ACEi, NSAIDs  · Received IVFs  · Baseline creatinine approximately 1 3-1 6 - at 1 29 today (resolved)  · Monitor renal function and urine output - limit/avoid nephrotoxins as possible - holding ACEI  · Nephrology signed off    Depression  Assessment & Plan  · Continue Celexa    Hypothyroidism  Assessment & Plan  · Continue Synthroid    Anemia of chronic disease  Assessment & Plan  · H/H stable    Leukocytosis-resolved as of 3/7/2021  Assessment & Plan  · Due to osteomyelitis  · Monitor WBC count, improving    Discharging Physician / Practitioner: Maria Victoria Ignacio MD  PCP: Subha Ortega  Admission Date:   Admission Orders (From admission, onward)     Ordered        02/19/21 1736  Inpatient Admission  Once                   Discharge Date: 03/07/21    Resolved Problems  Date Reviewed: 3/7/2021          Resolved    Hypoxemia 3/7/2021     Resolved by  Maria Victoria Ignacio MD    Acute kidney injury - Chronic kidney disease stage 3 3/7/2021     Resolved by  Maria Victoria Ignacio MD    Leukocytosis 3/7/2021     Resolved by  Maria Victoria Ignacio MD          Consultations During Hospital Stay:  · Vascular surgery  · Podiatry  · Infectious disease  · Nephrology  · Neuropsychiatry    Procedures Performed:   · Transmetatarsal amputation right-sided  · Chest x-ray no active disease  · Arterial Dopplers lower extremities diffuse arterial disease noted  · IR angiogram heavily calcified 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 treated with angioplasty and drug-eluting stents x3    Significant Findings / Test Results:   · As above    Incidental Findings:   · As above    Test Results Pending at Discharge (will require follow up): · None     Outpatient Tests Requested:  · None    Complications:  None    Reason for Admission:  Gangrene  Hospital Course:     Spenser Rodriguez is a 78 y o  male patient with past medical significant for diabetes mellitus, peripheral arterial disease, hypertension, peripheral neuropathy and chronic kidney disease who originally presented to the hospital on 2/19/2021 due to right foot pain  Patient was having trouble walking around, he noted to have blackish discoloration of right 2nd toe  He decided to come to the emergency department for evaluation  He was subsequently admitted given gangrene offered 2nd toe  He was seen by Podiatry vascular surgery and Nephrology  He successfully underwent IR angiogram with stent placement  Patient's circulation improved  Patient underwent right 2nd toe ray amputation and incision and drainage  However due to poor healing patient noncompliance with weight-bearing  Subsequently transmetatarsal amputation of right foot was performed  Patient was seen by Physical therapy and occupation therapy and recommended short-term rehabilitation  Patient had acute kidney injury during the hospitalization and was managed by Nephrology  His creatinine returned to baseline prior to discharge  He was also seen by neuropsychiatry, deemed to have capacity to make healthcare decisions  Please see above list of diagnoses and related plan for additional information  Condition at Discharge: good     Discharge Day Visit / Exam:     Subjective:  Patient feels okay    Anxious to go to rehab  Vitals: Blood Pressure: 150/56 (03/07/21 0825)  Pulse: 66 (03/07/21 0825)  Temperature: 98 5 °F (36 9 °C) (03/07/21 0825)  Temp Source: Oral (03/07/21 0825)  Respirations: 17 (03/07/21 0300)  Height: 5' 10" (177 8 cm) (02/19/21 1619)  Weight - Scale: 95 3 kg (210 lb) (02/19/21 1619)  SpO2: 92 % (03/07/21 0825)  Exam:   Physical Exam     Gen -Patient comfortable   Neck- Supple  No thyromegaly or lymphadenopathy  Lungs-Clear bilaterally without any wheeze or rales   Heart S1-S2, regular rate and rhythm, no murmurs  Abdomen-soft nontender, no organomegaly  Bowel sounds present  Extremities-no cyanosi,  clubbing or edema  Right foot  dressing intact  Skin- no rash  Neuro-nonfocal       Discussion with Family:  Updated brother over phone    Discharge instructions/Information to patient and family:   See after visit summary for information provided to patient and family  Provisions for Follow-Up Care:  See after visit summary for information related to follow-up care and any pertinent home health orders  Disposition: For Discharges to [facility]:   · [address] - Not Applicable to this Patient    Planned Readmission: no     Discharge Statement:  I spent 35 minutes discharging the patient  This time was spent on the day of discharge  I had direct contact with the patient on the day of discharge  Greater than 50% of the total time was spent examining patient, answering all patient questions, arranging and discussing plan of care with patient as well as directly providing post-discharge instructions  Additional time then spent on discharge activities  Discharge Medications:  See after visit summary for reconciled discharge medications provided to patient and family        ** Please Note: This note has been constructed using a voice recognition system **

## 2021-03-07 NOTE — ASSESSMENT & PLAN NOTE
· Due to sepsis, ACEi, NSAIDs  · Received IVFs  · Baseline creatinine approximately 1 3-1 6 - at 1 29 today (resolved)  · Monitor renal function and urine output - limit/avoid nephrotoxins as possible - holding ACEI  · Nephrology signed off

## 2021-03-07 NOTE — CASE MANAGEMENT
Pt is stable for DC today  [facility] has obtained [address] and this is pt/family's first choice  Transportation is needed as pt is a max assist to transfer and will require BLS  Referral has been placed to [facility] requesting a 1400  time  [facility] is only transport co in network with insurance  Cm will continue to follow

## 2021-03-07 NOTE — ASSESSMENT & PLAN NOTE
· Necrosis of the right 2nd digit (distal phalanx), proximal phalanx erythema toes, warmth, tender  Inflammatory signs present of the plantar surface as well  · X-ray of the right foot showed no evidence of osteomyelitis, 2nd PIP joint dislocation  · S/p I&D of the right foot with open 2nd ray amputation on 2/21/21 - s/p repeat washout with wound VAC insertion and partial closure on 2/26/21  · Podiatry discussed in detail long-term prognosis with patient and likely need for possible curative transmetatarsal amputation, however, patient refusing at this time, opting for further attempt at limb salvage  · Wound culture growing Enterococcus faecalis - antibiotic course transitioned to IV Ampicillin, to be completed 03/06/2021  · PRN pain control and supportive care  · PT/OT as tolerated - nonweightbearing to affected extremity per podiatry  · 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  · Seen by Podiatry yesterday, wound is healing well  Cleared for discharge

## 2021-03-07 NOTE — PLAN OF CARE
Problem: Potential for Falls  Goal: Patient will remain free of falls  Description: INTERVENTIONS:  - Assess patient frequently for physical needs  -  Identify cognitive and physical deficits and behaviors that affect risk of falls    -  [place] fall precautions as indicated by assessment   - Educate patient/family on patient safety including physical limitations  - Instruct patient to call for assistance with activity based on assessment  - Modify environment to reduce risk of injury  - Consider OT/PT consult to assist with strengthening/mobility  Outcome: Progressing     Problem: PAIN - ADULT  Goal: Verbalizes/displays adequate comfort level or baseline comfort level  Description: Interventions:  - Encourage patient to monitor pain and request assistance  - Assess pain using appropriate pain scale  - Administer analgesics based on type and severity of pain and evaluate response  - Implement non-pharmacological measures as appropriate and evaluate response  - Consider cultural and social influences on pain and pain management  - Notify physician/advanced practitioner if interventions unsuccessful or patient reports new pain  Outcome: Progressing     Problem: INFECTION - ADULT  Goal: Absence or prevention of progression during hospitalization  Description: INTERVENTIONS:  - Assess and monitor for signs and symptoms of infection  - Monitor lab/diagnostic results  - Monitor all insertion sites, i e  indwelling lines, tubes, and drains  - Monitor endotracheal if appropriate and nasal secretions for changes in amount and color  - [place] appropriate cooling/warming therapies per order  - Administer medications as ordered  - Instruct and encourage patient and family to use good hand hygiene technique  - Identify and instruct in appropriate isolation precautions for identified infection/condition  Outcome: Progressing     Problem: SAFETY ADULT  Goal: Patient will remain free of falls  Description: INTERVENTIONS:  - Assess patient frequently for physical needs  -  Identify cognitive and physical deficits and behaviors that affect risk of falls    -  [place] fall precautions as indicated by assessment   - Educate patient/family on patient safety including physical limitations  - Instruct patient to call for assistance with activity based on assessment  - Modify environment to reduce risk of injury  - Consider OT/PT consult to assist with strengthening/mobility  Outcome: Progressing

## 2021-03-07 NOTE — CASE MANAGEMENT
MADDIE was able to arrange transport for 1330 for pt to go to [facility]  AND [facility] for rehab  Cm notified nursing Coty Jarquin), Willie Bray ([facility]), facility and pt's friend, Cassidy Harvey, of DC arrangements  IMM reviewed with friend   friend agree with discharge determination

## 2021-03-08 ENCOUNTER — TELEPHONE (OUTPATIENT)
Dept: OTHER | Facility: OTHER | Age: 71
End: 2021-03-08

## 2021-03-08 NOTE — TELEPHONE ENCOUNTER
[address] [facility] [address]/Kady-[facility]/PT-Edson Dempsey/ 7 1950/aKdy is nurse requesting a call back regarding clarification with patient's order of  wound bag, thank you

## 2021-03-09 NOTE — UTILIZATION REVIEW
Notification of Discharge  This is a Notification of Discharge from our facility [address]  Please be advised that this patient has been discharge from our facility  Below you will find the admission and discharge date and time including the patients disposition  PRESENTATION DATE: 2/19/2021  2:09 PM  OBS ADMISSION DATE:   IP ADMISSION DATE: 2/19/21 1736   DISCHARGE DATE: 3/7/2021  1:40 PM  DISPOSITION: Non [facility] Non [facility] SN[facility]/SNU   Admission Orders listed below:  Admission Orders (From admission, onward)     Ordered        02/19/21 1736  Inpatient Admission  Once                   Please contact the UR Department if additional information is required to close this patient's authorization/case  [address] [facility] Utilization Review Department  Main: [phone] x carefully listen to the prompts  All voicemails are confidential   [email or web address]  Send all requests for admission clinical reviews, approved or denied determinations and any other requests to dedicated fax number below belonging to the campus where the patient is receiving treatment   List of dedicated fax numbers:  [address] DENIALS (Administrative/Medical Necessity) [phone]   [address] [place] [address] (Maternity/NICU/Pediatrics) [phone]   Jesse Owens [phone]   Frankie Torres [phone]   [place] [phone]   Jeovanny Night [place] 15[address] [facility] [phone]   [facility]  [phone]   [address] [facility] [address] [facility]  24[address] [facility] [address] [facility] [phone]

## 2021-03-09 NOTE — TELEPHONE ENCOUNTER
Spoke with Rl Vora at [facility] [phone]  She states she spoke to someone earlier and had her questions answered

## 2021-03-15 LAB
EXT GLUCOSE BLD: 166
EXTERNAL ANION GAP: 8
EXTERNAL BUN: 21
EXTERNAL CALCIUM: 8.7
EXTERNAL CHLORIDE: 104
EXTERNAL CO2: 24
EXTERNAL CREATININE: 1.49
EXTERNAL EGFR: 48
EXTERNAL POTASSIUM: 4.5
EXTERNAL SODIUM: 136
HCT VFR BLD AUTO: 28.1 % (ref 36.5–49.3)
HGB BLD-MCNC: 9.2 G/DL (ref 12–17)
PLATELET # BLD AUTO: 298 THOUSANDS/UL (ref 149–390)
WBC # BLD AUTO: 7.8 THOUSAND/UL

## 2021-03-16 LAB — EXT SARS-COV-2: NOT DETECTED

## 2021-03-17 ENCOUNTER — TELEMEDICINE (OUTPATIENT)
Dept: NEPHROLOGY | Facility: CLINIC | Age: 71
End: 2021-03-17
Payer: COMMERCIAL

## 2021-03-17 ENCOUNTER — DOCUMENTATION (OUTPATIENT)
Dept: NEPHROLOGY | Facility: CLINIC | Age: 71
End: 2021-03-17

## 2021-03-17 VITALS
BODY MASS INDEX: 29.04 KG/M2 | DIASTOLIC BLOOD PRESSURE: 65 MMHG | WEIGHT: 202.4 LBS | RESPIRATION RATE: 20 BRPM | SYSTOLIC BLOOD PRESSURE: 157 MMHG | HEART RATE: 68 BPM | TEMPERATURE: 97.5 F

## 2021-03-17 DIAGNOSIS — N18.30 BENIGN HYPERTENSION WITH CKD (CHRONIC KIDNEY DISEASE) STAGE III (HCC): ICD-10-CM

## 2021-03-17 DIAGNOSIS — D64.9 ANEMIA, UNSPECIFIED TYPE: ICD-10-CM

## 2021-03-17 DIAGNOSIS — N18.31 STAGE 3A CHRONIC KIDNEY DISEASE (HCC): Primary | ICD-10-CM

## 2021-03-17 DIAGNOSIS — I12.9 BENIGN HYPERTENSION WITH CKD (CHRONIC KIDNEY DISEASE) STAGE III (HCC): ICD-10-CM

## 2021-03-17 PROCEDURE — 99214 OFFICE O/P EST MOD 30 MIN: CPT | Performed by: PHYSICIAN ASSISTANT

## 2021-03-17 RX ORDER — DIPHENOXYLATE HYDROCHLORIDE AND ATROPINE SULFATE 2.5; .025 MG/1; MG/1
1 TABLET ORAL DAILY
COMMUNITY

## 2021-03-17 NOTE — PATIENT INSTRUCTIONS
Acute Kidney Injury- Resolved on discharge to below baseline  Currently at baseline on outpatient blood work  Chronic Kidney Disease stage III- Baseline creatinine 1 3-1 6 from November 2020  Suspected etiology is due to diabetic nephropathy and long term smoking with nephrosclerosis  Creatinine from 3/15/21 is 1 5 with stable/normal electrolytes  Hypertension- Antihypertensive regimen includes atenolol 25mg daily, amlodipine 10mg daily, and lisinopril 20mg daily  Avoid salt in your diet  Stay active  Avoid NSAIDs  Continue to monitor for now  Blood pressures range from 130s-150s  Anemia- check iron studies prior to next visit  HGB 9 2 from 3/15/21  Follow up in 4 months  Please call the office with any questions or concerns

## 2021-03-17 NOTE — PROGRESS NOTES
Virtual Regular Visit      Assessment/Plan:    Problem List Items Addressed This Visit        Cardiovascular and Mediastinum    Benign hypertension with CKD (chronic kidney disease) stage III       Genitourinary    Stage 3 chronic kidney disease - Primary    Relevant Orders    Basic metabolic panel    Phosphorus    Magnesium    Protein / creatinine ratio, urine       Other    Anemia of chronic disease    Relevant Orders    CBC    Ferritin    Iron Saturation %           Reason for visit is hospital follow up  With concern for COVID exposure, the patient did not want to come in for actual appointment  Chief Complaint   Patient presents with    Virtual Regular Visit     hospital followup/FAUSTO        Encounter provider [place], [place]    Provider located at [address] [place] [id number]      Recent Visits  No visits were found meeting these conditions  Showing recent visits within past 7 days and meeting all other requirements     Today's Visits  Date Type Provider Dept   03/17/21 Telemedicine [address]   Showing today's visits and meeting all other requirements     Future Appointments  No visits were found meeting these conditions  Showing future appointments within next 150 days and meeting all other requirements        The patient was identified by name and date of birth  Gege Bush was informed that this is a telemedicine visit and that the visit is being conducted through telephone  My office door was closed  No one else was in the room  He acknowledged consent and understanding of privacy and security of the video platform  The patient has agreed to participate and understands they can discontinue the visit at any time    It was my intent to perform this visit via video technology but the patient was not able to do a video connection so the visit was completed via audio telephone only   Patient is aware this is a billable service  Subjective  Marshal Aragon is a 78 y o  male who was hospitalized at [address] [place] [address] from 2/19 to 3/7 for right foot pain and was diagnosed with gangrene of the 2nd toe  He underwent IR angiogram with stent placement and circulation improved  He then underwent a 2nd toe ray amputation + I&D but due to poor healing, a transmetatarsal amputation of the right foot was performed  During his hospitalization, he developed acute kidney injury which returned to baseline on discharge  He is currently at the nursing facility for rehab but is hoping to be discharged within the week  He just has been allowed to put partial pressure on his foot which he is happy about to start walking a little again  He denies SOB, N/V/D  He denies LE edema  He denies difficulty urinating  I reviewed the patients medication list as well as the most recent blood work with the patient  Objective  Acute Kidney Injury- Resolved on discharge to below baseline  Currently at baseline on outpatient blood work  Chronic Kidney Disease stage III- Baseline creatinine 1 3-1 6 from November 2020  Suspected etiology is due to diabetic nephropathy and long term smoking with nephrosclerosis  Creatinine from 3/15/21 is 1 5 with stable/normal electrolytes  Hypertension- Antihypertensive regimen includes atenolol 25mg daily, amlodipine 10mg daily, and lisinopril 20mg daily  Avoid salt in your diet  Stay active  Avoid NSAIDs  Continue to monitor for now  Blood pressures range from 130s-150s  Anemia- check iron studies prior to next visit  HGB 9 2 from 3/15/21  Follow up in 4 months  Please call the office with any questions or concerns  HPI     History reviewed  No pertinent past medical history      Past Surgical History:   Procedure Laterality Date    INCISION AND DRAINAGE OF WOUND Right 2/21/2021    Procedure: INCISION AND DRAINAGE (I&D) EXTREMITY RIGHT FOOT AND OPEN SECOND RAY AMPUTATION;  Surgeon: Sheron Pate DPM;  [facility];  Service: Podiatry    IR LOWER EXTREMITY ANGIOGRAM  2/23/2021    MI AMPUTATION FOOT,TRANSMETATARSAL Right 3/4/2021    Procedure: AMPUTATION TRANSMETATARSAL (TMA) right foot;  Surgeon: Alondra Jessica DPM;  [facility];  Service: [address] Right 2/26/2021    Procedure: DEBRIDEMENT WOUND ([address]) with partial closure;  Surgeon: Sheron Pate DPM;  [facility];  Service: Podiatry       Current Outpatient Medications   Medication Sig Dispense Refill    amLODIPine (NORVASC) 10 mg tablet Take 10 mg by mouth daily      atenolol (TENORMIN) 25 mg tablet Take 25 mg by mouth daily      atorvastatin (LIPITOR) 40 mg tablet Take 40 mg by mouth daily      citalopram (CeleXA) 20 mg tablet Take 20 mg by mouth daily      clopidogrel (PLAVIX) 75 mg tablet Take 1 tablet (75 mg total) by mouth daily 30 tablet 0    famotidine (PEPCID) 20 mg tablet Take 1 tablet (20 mg total) by mouth 2 (two) times a day 30 tablet 0    glycerin-hypromellose- (ARTIFICIAL TEARS) 0 2-0 2-1 % SOLN Administer 1 drop to both eyes 3 (three) times a day 30 mL 0    hydrOXYzine HCL (ATARAX) 50 mg tablet Take 50 mg by mouth daily      insulin glargine (LANTUS) 100 units/mL subcutaneous injection Inject 35 Units under the skin every morning  0    insulin lispro (HumaLOG) 100 units/mL injection Inject 15 Units under the skin 3 (three) times a day with meals  0    levothyroxine 175 mcg tablet Take 175 mcg by mouth daily      lisinopril (ZESTRIL) 20 mg tablet Take 20 mg by mouth daily      multivitamin (THERAGRAN) TABS Take 1 tablet by mouth daily      oxyCODONE (ROXICODONE) 5 mg immediate release tablet Take 1 tablet (5 mg total) by mouth every 4 (four) hours as needed for severe pain for up to 10 daysMax Daily Amount: 30 mg 30 tablet 0    polyethylene glycol (MIRALAX) 17 g packet Take 17 g by mouth daily as needed (consti) 0    pregabalin (LYRICA) 50 mg capsule Take 50 mg by mouth daily      tiotropium (SPIRIVA) 18 mcg inhalation capsule Place 18 mcg into inhaler and inhale daily      traZODone (DESYREL) 50 mg tablet Take 50 mg by mouth daily at bedtime       No current facility-administered medications for this visit  No Known Allergies    Review of Systems   Constitutional: Negative for appetite change  HENT: Negative for hearing loss  Respiratory: Negative for shortness of breath  Cardiovascular: Negative for leg swelling  Gastrointestinal: Negative for diarrhea, nausea and vomiting  Genitourinary: Negative for difficulty urinating and hematuria  Musculoskeletal: Positive for gait problem  Skin: Negative for rash  Neurological: Negative for dizziness and light-headedness  Psychiatric/Behavioral: Negative for agitation and confusion  Video Exam    Vitals:    03/17/21 1535   BP: 157/65   Pulse: 68   Resp: 20   Temp: 97 5 °F (36 4 °C)   Weight: 91 8 kg (202 lb 6 4 oz)       Physical Exam  Constitutional:       General: He is not in acute distress  Pulmonary:      Effort: Pulmonary effort is normal  No respiratory distress  Neurological:      Mental Status: He is alert and oriented to person, place, and time  Mental status is at baseline  Psychiatric:         Mood and Affect: Mood normal          Thought Content: Thought content normal         I spent 10 minutes directly with the patient during this visit and 10 minutes of chart prep  VIRTUAL VISIT DISCLAIMER    Haja Lora acknowledges that he has consented to an online visit or consultation  He understands that the online visit is based solely on information provided by him, and that, in the absence of a face-to-face physical evaluation by the physician, the diagnosis he receives is both limited and provisional in terms of accuracy and completeness  This is not intended to replace a full medical face-to-face evaluation by the physician  Emilie Valdez understands and accepts these terms

## 2021-03-23 ENCOUNTER — OFFICE VISIT (OUTPATIENT)
Dept: VASCULAR SURGERY | Facility: CLINIC | Age: 71
End: 2021-03-23
Payer: COMMERCIAL

## 2021-03-23 VITALS
WEIGHT: 205 LBS | HEART RATE: 53 BPM | RESPIRATION RATE: 17 BRPM | HEIGHT: 70 IN | DIASTOLIC BLOOD PRESSURE: 80 MMHG | TEMPERATURE: 98.6 F | BODY MASS INDEX: 29.35 KG/M2 | SYSTOLIC BLOOD PRESSURE: 118 MMHG

## 2021-03-23 DIAGNOSIS — L97.909 ATHEROSCLEROSIS OF ARTERY OF EXTREMITY WITH ULCERATION (HCC): Primary | ICD-10-CM

## 2021-03-23 DIAGNOSIS — I70.299 ATHEROSCLEROSIS OF ARTERY OF EXTREMITY WITH ULCERATION (HCC): Primary | ICD-10-CM

## 2021-03-23 PROCEDURE — 99213 OFFICE O/P EST LOW 20 MIN: CPT | Performed by: SURGERY

## 2021-03-23 NOTE — LETTER
March 23, 2021     [address] [address]    Patient: Heidi Laughlin   YOB: 1950   Date of Visit: 3/23/2021       Dear Dr Yazmin Loaiza: Thank you for referring Heidi Laughlin to me for evaluation  Below are my notes for this consultation  If you have questions, please do not hesitate to call me  I look forward to following your patient along with you           Sincerely,        Joceline Medel MD        CC: No Recipients

## 2021-03-23 NOTE — PROGRESS NOTES
Assessment/Plan:     Doing well after recannulization right SFA balloon angioplasty and multiple stents has greatly improved perfusion of his right foot  Transmetatarsal amputation is healing nicely and remainder of sutures and staples will be removed by Podiatry  Plan:  Follow-up lower extremity arterial study in 3 months       Diagnoses and all orders for this visit:    Atherosclerosis of artery of extremity with ulceration (HCC)  -     VAS lower limb arterial duplex, complete bilateral; Future        Subjective:      Patient ID: Kavita Dewey is a 78 y o  male  New patient presents in office s/p A-gram with IR on 2/23 with recanalization of Right SFA with stenting  Patient has a right foot wound  Patient denies any fevers or chills  Patient incision are clean and dry  Patient does have staples in  Patient is currently taking Atorvastatin and Plavix  HPI    The following portions of the patient's history were reviewed and updated as appropriate: allergies, current medications, past family history, past medical history, past social history, past surgical history and problem list     Review of Systems   Constitutional: Negative  HENT: Negative  Eyes: Positive for discharge  Double Vision   Respiratory: Positive for shortness of breath  Cardiovascular: Negative  Gastrointestinal: Negative  Endocrine: Negative  Genitourinary: Negative  Musculoskeletal: Positive for neck pain  Skin: Positive for color change and wound  Allergic/Immunologic: Negative  Neurological: Positive for weakness and light-headedness  Hematological: Negative  Psychiatric/Behavioral: The patient is nervous/anxious  Objective: There were no vitals taken for this visit           Physical Exam   Right foot wound transmetatarsal amputation appearing to heal   Doppler sounds dorsalis pedis foot is warm no evidence of cellulitis or drainage    I have reviewed and made appropriate changes to the review of systems input by the medical assistant  Vitals:    03/23/21 1342   BP: 118/80   BP Location: Left arm   Patient Position: Sitting   Cuff Size: Adult   Pulse: (!) 53   Resp: 17   Temp: 98 6 °F (37 °C)   TempSrc: Tympanic   Weight: 93 kg (205 lb)   Height: 5' 10" (1 778 m)       Patient Active Problem List   Diagnosis    Osteomyelitis of toe with cellulitis    Insulin-dependent diabetes mellitus    Benign hypertension with CKD (chronic kidney disease) stage III    Depression    Hyperlipidemia    Hypothyroidism    Stage 3 chronic kidney disease    Ambulatory dysfunction    Hyponatremia    Anemia of chronic disease    Peripheral arterial disease    Dyspepsia    Atherosclerosis of artery of extremity with ulceration (HCC)       Past Surgical History:   Procedure Laterality Date    INCISION AND DRAINAGE OF WOUND Right 2/21/2021    Procedure: INCISION AND DRAINAGE (I&D) EXTREMITY RIGHT FOOT AND OPEN SECOND RAY AMPUTATION;  Surgeon: Pj Kerr DPM;  [facility];  Service: Podiatry    IR LOWER EXTREMITY ANGIOGRAM  2/23/2021    [place] AMPUTATION FOOT,TRANSMETATARSAL Right 3/4/2021    Procedure: AMPUTATION TRANSMETATARSAL (TMA) right foot;  Surgeon: Irma Linares DPM;  [facility];  Service: Podiatry    WOUND DEBRIDEMENT Right 2/26/2021    Procedure: DEBRIDEMENT WOUND ([address]) with partial closure;  Surgeon: Pj Kerr DPM;  [facility];  Service: Podiatry       History reviewed  No pertinent family history  Social History     Socioeconomic History    Marital status:       Spouse name: Not on file    Number of children: Not on file    Years of education: Not on file    Highest education level: Not on file   Occupational History    Not on file   Social Needs    Financial resource strain: Not on file    Food insecurity     Worry: Not on file     Inability: Not on file    Transportation needs     Medical: Not on file     Non-medical: Not on file Tobacco Use    Smoking status: Former Smoker    Smokeless tobacco: Never Used   Substance and Sexual Activity    Alcohol use: Not on file    Drug use: Not on file    Sexual activity: Not on file   Lifestyle    Physical activity     Days per week: Not on file     Minutes per session: Not on file    Stress: Not on file   Relationships    Social connections     Talks on phone: Not on file     Gets together: Not on file     Attends Mosque service: Not on file     Active member of club or organization: Not on file     Attends meetings of clubs or organizations: Not on file     Relationship status: Not on file    Intimate partner violence     Fear of current or ex partner: Not on file     Emotionally abused: Not on file     Physically abused: Not on file     Forced sexual activity: Not on file   Other Topics Concern    Not on file   Social History Narrative    Not on file       No Known Allergies      Current Outpatient Medications:     amLODIPine (NORVASC) 10 mg tablet, Take 10 mg by mouth daily, Disp: , Rfl:     atenolol (TENORMIN) 25 mg tablet, Take 25 mg by mouth daily, Disp: , Rfl:     atorvastatin (LIPITOR) 40 mg tablet, Take 40 mg by mouth daily, Disp: , Rfl:     citalopram (CeleXA) 20 mg tablet, Take 20 mg by mouth daily, Disp: , Rfl:     clopidogrel (PLAVIX) 75 mg tablet, Take 1 tablet (75 mg total) by mouth daily, Disp: 30 tablet, Rfl: 0    famotidine (PEPCID) 20 mg tablet, Take 1 tablet (20 mg total) by mouth 2 (two) times a day, Disp: 30 tablet, Rfl: 0    glycerin-hypromellose- (ARTIFICIAL TEARS) 0 2-0 2-1 % SOLN, Administer 1 drop to both eyes 3 (three) times a day, Disp: 30 mL, Rfl: 0    hydrOXYzine HCL (ATARAX) 50 mg tablet, Take 50 mg by mouth daily, Disp: , Rfl:     insulin glargine (LANTUS) 100 units/mL subcutaneous injection, Inject 35 Units under the skin every morning, Disp: , Rfl: 0    insulin lispro (HumaLOG) 100 units/mL injection, Inject 15 Units under the skin 3 (three) times a day with meals, Disp: , Rfl: 0    levothyroxine 175 mcg tablet, Take 175 mcg by mouth daily, Disp: , Rfl:     lisinopril (ZESTRIL) 20 mg tablet, Take 20 mg by mouth daily, Disp: , Rfl:     multivitamin (THERAGRAN) TABS, Take 1 tablet by mouth daily, Disp: , Rfl:     polyethylene glycol (MIRALAX) 17 g packet, Take 17 g by mouth daily as needed (consti), Disp:  , Rfl: 0    pregabalin (LYRICA) 50 mg capsule, Take 50 mg by mouth daily, Disp: , Rfl:     tiotropium (SPIRIVA) 18 mcg inhalation capsule, Place 18 mcg into inhaler and inhale daily, Disp: , Rfl:     traZODone (DESYREL) 50 mg tablet, Take 50 mg by mouth daily at bedtime, Disp: , Rfl:

## 2021-03-23 NOTE — PATIENT INSTRUCTIONS
Doing well after recannulization right SFA balloon angioplasty and multiple stents has greatly improved perfusion of his right foot  Transmetatarsal amputation is healing nicely and remainder of sutures and staples will be removed by Podiatry        Plan:  Follow-up lower extremity arterial study in 3 months

## 2021-03-27 ENCOUNTER — APPOINTMENT (EMERGENCY)
Dept: RADIOLOGY | Facility: HOSPITAL | Age: 71
DRG: 683 | End: 2021-03-27
Payer: COMMERCIAL

## 2021-03-27 ENCOUNTER — APPOINTMENT (EMERGENCY)
Dept: CT IMAGING | Facility: HOSPITAL | Age: 71
DRG: 683 | End: 2021-03-27
Payer: COMMERCIAL

## 2021-03-27 ENCOUNTER — HOSPITAL ENCOUNTER (INPATIENT)
Facility: HOSPITAL | Age: 71
LOS: 1 days | Discharge: LEFT BEFORE DISCHARGE | DRG: 683 | End: 2021-03-28
Attending: EMERGENCY MEDICINE | Admitting: INTERNAL MEDICINE
Payer: COMMERCIAL

## 2021-03-27 DIAGNOSIS — R41.0 DISORIENTATION: ICD-10-CM

## 2021-03-27 DIAGNOSIS — I73.9 PERIPHERAL ARTERIAL DISEASE (HCC): Chronic | ICD-10-CM

## 2021-03-27 DIAGNOSIS — N17.9 AKI (ACUTE KIDNEY INJURY) (HCC): ICD-10-CM

## 2021-03-27 DIAGNOSIS — I49.3 MULTIFOCAL PVCS: Primary | ICD-10-CM

## 2021-03-27 DIAGNOSIS — Z89.9 STATUS POST AMPUTATION: ICD-10-CM

## 2021-03-27 DIAGNOSIS — R55 SYNCOPE: ICD-10-CM

## 2021-03-27 PROBLEM — E16.2 HYPOGLYCEMIA: Status: ACTIVE | Noted: 2021-03-27

## 2021-03-27 LAB
ALBUMIN SERPL BCP-MCNC: 2.6 G/DL (ref 3.5–5)
ALP SERPL-CCNC: 157 U/L (ref 46–116)
ALT SERPL W P-5'-P-CCNC: 18 U/L (ref 12–78)
AMPHETAMINES SERPL QL SCN: NEGATIVE
ANION GAP SERPL CALCULATED.3IONS-SCNC: 9 MMOL/L (ref 4–13)
APAP SERPL-MCNC: <2 UG/ML (ref 10–20)
APTT PPP: 29 SECONDS (ref 23–37)
AST SERPL W P-5'-P-CCNC: 13 U/L (ref 5–45)
BARBITURATES UR QL: NEGATIVE
BASOPHILS # BLD AUTO: 0.08 THOUSANDS/ΜL (ref 0–0.1)
BASOPHILS NFR BLD AUTO: 1 % (ref 0–1)
BENZODIAZ UR QL: NEGATIVE
BETA-HYDROXYBUTYRATE: 0.1 MMOL/L
BILIRUB SERPL-MCNC: 0.17 MG/DL (ref 0.2–1)
BILIRUB UR QL STRIP: NEGATIVE
BUN SERPL-MCNC: 22 MG/DL (ref 5–25)
CALCIUM ALBUM COR SERPL-MCNC: 9.3 MG/DL (ref 8.3–10.1)
CALCIUM SERPL-MCNC: 8.2 MG/DL (ref 8.3–10.1)
CHLORIDE SERPL-SCNC: 107 MMOL/L (ref 100–108)
CLARITY UR: CLEAR
CO2 SERPL-SCNC: 21 MMOL/L (ref 21–32)
COCAINE UR QL: NEGATIVE
COLOR UR: YELLOW
CREAT SERPL-MCNC: 1.78 MG/DL (ref 0.6–1.3)
EOSINOPHIL # BLD AUTO: 0.64 THOUSAND/ΜL (ref 0–0.61)
EOSINOPHIL NFR BLD AUTO: 7 % (ref 0–6)
ERYTHROCYTE [DISTWIDTH] IN BLOOD BY AUTOMATED COUNT: 14.5 % (ref 11.6–15.1)
ETHANOL SERPL-MCNC: <3 MG/DL (ref 0–3)
GFR SERPL CREATININE-BSD FRML MDRD: 38 ML/MIN/1.73SQ M
GLUCOSE SERPL-MCNC: 111 MG/DL (ref 65–140)
GLUCOSE SERPL-MCNC: 173 MG/DL (ref 65–140)
GLUCOSE SERPL-MCNC: 63 MG/DL (ref 65–140)
GLUCOSE UR STRIP-MCNC: NEGATIVE MG/DL
HCT VFR BLD AUTO: 28 % (ref 36.5–49.3)
HGB BLD-MCNC: 8.7 G/DL (ref 12–17)
HGB UR QL STRIP.AUTO: NEGATIVE
IMM GRANULOCYTES # BLD AUTO: 0.02 THOUSAND/UL (ref 0–0.2)
IMM GRANULOCYTES NFR BLD AUTO: 0 % (ref 0–2)
INR PPP: 1.07 (ref 0.84–1.19)
KETONES UR STRIP-MCNC: NEGATIVE MG/DL
LACTATE SERPL-SCNC: 1.6 MMOL/L (ref 0.5–2)
LEUKOCYTE ESTERASE UR QL STRIP: NEGATIVE
LYMPHOCYTES # BLD AUTO: 2.54 THOUSANDS/ΜL (ref 0.6–4.47)
LYMPHOCYTES NFR BLD AUTO: 29 % (ref 14–44)
MAGNESIUM SERPL-MCNC: 1.8 MG/DL (ref 1.6–2.6)
MCH RBC QN AUTO: 28.9 PG (ref 26.8–34.3)
MCHC RBC AUTO-ENTMCNC: 31.1 G/DL (ref 31.4–37.4)
MCV RBC AUTO: 93 FL (ref 82–98)
METHADONE UR QL: NEGATIVE
MONOCYTES # BLD AUTO: 0.9 THOUSAND/ΜL (ref 0.17–1.22)
MONOCYTES NFR BLD AUTO: 10 % (ref 4–12)
NEUTROPHILS # BLD AUTO: 4.55 THOUSANDS/ΜL (ref 1.85–7.62)
NEUTS SEG NFR BLD AUTO: 53 % (ref 43–75)
NITRITE UR QL STRIP: NEGATIVE
NRBC BLD AUTO-RTO: 0 /100 WBCS
NT-PROBNP SERPL-MCNC: 1058 PG/ML
OPIATES UR QL SCN: NEGATIVE
OXYCODONE+OXYMORPHONE UR QL SCN: NEGATIVE
PCP UR QL: NEGATIVE
PH UR STRIP.AUTO: 5.5 [PH]
PLATELET # BLD AUTO: 303 THOUSANDS/UL (ref 149–390)
PMV BLD AUTO: 10.3 FL (ref 8.9–12.7)
POTASSIUM SERPL-SCNC: 5.1 MMOL/L (ref 3.5–5.3)
PROT SERPL-MCNC: 6.4 G/DL (ref 6.4–8.2)
PROT UR STRIP-MCNC: NEGATIVE MG/DL
PROTHROMBIN TIME: 14 SECONDS (ref 11.6–14.5)
RBC # BLD AUTO: 3.01 MILLION/UL (ref 3.88–5.62)
SALICYLATES SERPL-MCNC: 4.1 MG/DL (ref 3–20)
SODIUM SERPL-SCNC: 137 MMOL/L (ref 136–145)
SP GR UR STRIP.AUTO: 1.02 (ref 1–1.03)
THC UR QL: NEGATIVE
TROPONIN I SERPL-MCNC: <0.02 NG/ML
TSH SERPL DL<=0.05 MIU/L-ACNC: 0.2 UIU/ML (ref 0.36–3.74)
UROBILINOGEN UR QL STRIP.AUTO: 0.2 E.U./DL
WBC # BLD AUTO: 8.73 THOUSAND/UL (ref 4.31–10.16)

## 2021-03-27 PROCEDURE — 84443 ASSAY THYROID STIM HORMONE: CPT | Performed by: PHYSICIAN ASSISTANT

## 2021-03-27 PROCEDURE — 84439 ASSAY OF FREE THYROXINE: CPT | Performed by: INTERNAL MEDICINE

## 2021-03-27 PROCEDURE — 82010 KETONE BODYS QUAN: CPT | Performed by: PHYSICIAN ASSISTANT

## 2021-03-27 PROCEDURE — 70450 CT HEAD/BRAIN W/O DYE: CPT

## 2021-03-27 PROCEDURE — 99285 EMERGENCY DEPT VISIT HI MDM: CPT | Performed by: PHYSICIAN ASSISTANT

## 2021-03-27 PROCEDURE — 80143 DRUG ASSAY ACETAMINOPHEN: CPT | Performed by: EMERGENCY MEDICINE

## 2021-03-27 PROCEDURE — 84484 ASSAY OF TROPONIN QUANT: CPT | Performed by: INTERNAL MEDICINE

## 2021-03-27 PROCEDURE — 83605 ASSAY OF LACTIC ACID: CPT | Performed by: PHYSICIAN ASSISTANT

## 2021-03-27 PROCEDURE — 80307 DRUG TEST PRSMV CHEM ANLYZR: CPT | Performed by: EMERGENCY MEDICINE

## 2021-03-27 PROCEDURE — 84484 ASSAY OF TROPONIN QUANT: CPT | Performed by: PHYSICIAN ASSISTANT

## 2021-03-27 PROCEDURE — 71045 X-RAY EXAM CHEST 1 VIEW: CPT

## 2021-03-27 PROCEDURE — 85730 THROMBOPLASTIN TIME PARTIAL: CPT | Performed by: PHYSICIAN ASSISTANT

## 2021-03-27 PROCEDURE — 84145 PROCALCITONIN (PCT): CPT | Performed by: PHYSICIAN ASSISTANT

## 2021-03-27 PROCEDURE — 36415 COLL VENOUS BLD VENIPUNCTURE: CPT | Performed by: PHYSICIAN ASSISTANT

## 2021-03-27 PROCEDURE — 82077 ASSAY SPEC XCP UR&BREATH IA: CPT | Performed by: EMERGENCY MEDICINE

## 2021-03-27 PROCEDURE — G1004 CDSM NDSC: HCPCS

## 2021-03-27 PROCEDURE — 96367 TX/PROPH/DG ADDL SEQ IV INF: CPT

## 2021-03-27 PROCEDURE — 83735 ASSAY OF MAGNESIUM: CPT | Performed by: PHYSICIAN ASSISTANT

## 2021-03-27 PROCEDURE — 85610 PROTHROMBIN TIME: CPT | Performed by: PHYSICIAN ASSISTANT

## 2021-03-27 PROCEDURE — 99285 EMERGENCY DEPT VISIT HI MDM: CPT

## 2021-03-27 PROCEDURE — 82948 REAGENT STRIP/BLOOD GLUCOSE: CPT

## 2021-03-27 PROCEDURE — 80053 COMPREHEN METABOLIC PANEL: CPT | Performed by: PHYSICIAN ASSISTANT

## 2021-03-27 PROCEDURE — 81003 URINALYSIS AUTO W/O SCOPE: CPT | Performed by: PHYSICIAN ASSISTANT

## 2021-03-27 PROCEDURE — 85025 COMPLETE CBC W/AUTO DIFF WBC: CPT | Performed by: PHYSICIAN ASSISTANT

## 2021-03-27 PROCEDURE — 87040 BLOOD CULTURE FOR BACTERIA: CPT | Performed by: PHYSICIAN ASSISTANT

## 2021-03-27 PROCEDURE — 99223 1ST HOSP IP/OBS HIGH 75: CPT | Performed by: INTERNAL MEDICINE

## 2021-03-27 PROCEDURE — 80179 DRUG ASSAY SALICYLATE: CPT | Performed by: EMERGENCY MEDICINE

## 2021-03-27 PROCEDURE — 93005 ELECTROCARDIOGRAM TRACING: CPT

## 2021-03-27 PROCEDURE — 96365 THER/PROPH/DIAG IV INF INIT: CPT

## 2021-03-27 PROCEDURE — 83880 ASSAY OF NATRIURETIC PEPTIDE: CPT | Performed by: PHYSICIAN ASSISTANT

## 2021-03-27 RX ORDER — ATENOLOL 25 MG/1
25 TABLET ORAL DAILY
Status: DISCONTINUED | OUTPATIENT
Start: 2021-03-27 | End: 2021-03-28 | Stop reason: HOSPADM

## 2021-03-27 RX ORDER — AMLODIPINE BESYLATE 10 MG/1
10 TABLET ORAL DAILY
Status: DISCONTINUED | OUTPATIENT
Start: 2021-03-28 | End: 2021-03-28 | Stop reason: HOSPADM

## 2021-03-27 RX ORDER — LISINOPRIL 10 MG/1
20 TABLET ORAL DAILY
Status: DISCONTINUED | OUTPATIENT
Start: 2021-03-28 | End: 2021-03-27

## 2021-03-27 RX ORDER — INSULIN GLARGINE 100 [IU]/ML
30 INJECTION, SOLUTION SUBCUTANEOUS
Status: DISCONTINUED | OUTPATIENT
Start: 2021-03-28 | End: 2021-03-27

## 2021-03-27 RX ORDER — CLOPIDOGREL BISULFATE 75 MG/1
75 TABLET ORAL DAILY
Status: DISCONTINUED | OUTPATIENT
Start: 2021-03-27 | End: 2021-03-28 | Stop reason: HOSPADM

## 2021-03-27 RX ORDER — SIMETHICONE 80 MG
80 TABLET,CHEWABLE ORAL 4 TIMES DAILY PRN
Status: DISCONTINUED | OUTPATIENT
Start: 2021-03-27 | End: 2021-03-28 | Stop reason: HOSPADM

## 2021-03-27 RX ORDER — SODIUM CHLORIDE, SODIUM GLUCONATE, SODIUM ACETATE, POTASSIUM CHLORIDE, MAGNESIUM CHLORIDE, SODIUM PHOSPHATE, DIBASIC, AND POTASSIUM PHOSPHATE .53; .5; .37; .037; .03; .012; .00082 G/100ML; G/100ML; G/100ML; G/100ML; G/100ML; G/100ML; G/100ML
75 INJECTION, SOLUTION INTRAVENOUS CONTINUOUS
Status: DISPENSED | OUTPATIENT
Start: 2021-03-27 | End: 2021-03-28

## 2021-03-27 RX ORDER — HEPARIN SODIUM 5000 [USP'U]/ML
5000 INJECTION, SOLUTION INTRAVENOUS; SUBCUTANEOUS EVERY 8 HOURS SCHEDULED
Status: DISCONTINUED | OUTPATIENT
Start: 2021-03-27 | End: 2021-03-28 | Stop reason: HOSPADM

## 2021-03-27 RX ORDER — CITALOPRAM 20 MG/1
20 TABLET ORAL DAILY
Status: DISCONTINUED | OUTPATIENT
Start: 2021-03-27 | End: 2021-03-28 | Stop reason: HOSPADM

## 2021-03-27 RX ORDER — TRAZODONE HYDROCHLORIDE 50 MG/1
50 TABLET ORAL
Status: DISCONTINUED | OUTPATIENT
Start: 2021-03-27 | End: 2021-03-28 | Stop reason: HOSPADM

## 2021-03-27 RX ORDER — LEVOTHYROXINE SODIUM 175 UG/1
175 TABLET ORAL
Status: DISCONTINUED | OUTPATIENT
Start: 2021-03-27 | End: 2021-03-28 | Stop reason: HOSPADM

## 2021-03-27 RX ORDER — PREGABALIN 50 MG/1
50 CAPSULE ORAL DAILY
Status: DISCONTINUED | OUTPATIENT
Start: 2021-03-27 | End: 2021-03-28 | Stop reason: HOSPADM

## 2021-03-27 RX ORDER — ATORVASTATIN CALCIUM 40 MG/1
40 TABLET, FILM COATED ORAL DAILY
Status: DISCONTINUED | OUTPATIENT
Start: 2021-03-27 | End: 2021-03-28 | Stop reason: HOSPADM

## 2021-03-27 RX ADMIN — SODIUM CHLORIDE, SODIUM LACTATE, POTASSIUM CHLORIDE, AND CALCIUM CHLORIDE 1000 ML: .6; .31; .03; .02 INJECTION, SOLUTION INTRAVENOUS at 14:37

## 2021-03-27 RX ADMIN — CEFEPIME HYDROCHLORIDE 2000 MG: 2 INJECTION, POWDER, FOR SOLUTION INTRAVENOUS at 14:36

## 2021-03-27 RX ADMIN — TIOTROPIUM BROMIDE 18 MCG: 18 CAPSULE ORAL; RESPIRATORY (INHALATION) at 19:04

## 2021-03-27 RX ADMIN — PREGABALIN 50 MG: 50 CAPSULE ORAL at 18:29

## 2021-03-27 RX ADMIN — TRAZODONE HYDROCHLORIDE 50 MG: 50 TABLET ORAL at 22:16

## 2021-03-27 RX ADMIN — HEPARIN SODIUM 5000 UNITS: 5000 INJECTION INTRAVENOUS; SUBCUTANEOUS at 22:16

## 2021-03-27 RX ADMIN — ATENOLOL 25 MG: 25 TABLET ORAL at 18:29

## 2021-03-27 RX ADMIN — CLOPIDOGREL BISULFATE 75 MG: 75 TABLET ORAL at 18:29

## 2021-03-27 RX ADMIN — CITALOPRAM HYDROBROMIDE 20 MG: 20 TABLET ORAL at 19:03

## 2021-03-27 RX ADMIN — LEVOTHYROXINE SODIUM 175 MCG: 175 TABLET ORAL at 19:03

## 2021-03-27 RX ADMIN — SODIUM CHLORIDE, SODIUM GLUCONATE, SODIUM ACETATE, POTASSIUM CHLORIDE, MAGNESIUM CHLORIDE, SODIUM PHOSPHATE, DIBASIC, AND POTASSIUM PHOSPHATE 75 ML/HR: .53; .5; .37; .037; .03; .012; .00082 INJECTION, SOLUTION INTRAVENOUS at 19:05

## 2021-03-27 RX ADMIN — INSULIN LISPRO 1 UNITS: 100 INJECTION, SOLUTION INTRAVENOUS; SUBCUTANEOUS at 22:00

## 2021-03-27 RX ADMIN — VANCOMYCIN HYDROCHLORIDE 1500 MG: 1 INJECTION, POWDER, LYOPHILIZED, FOR SOLUTION INTRAVENOUS at 15:03

## 2021-03-27 RX ADMIN — ATORVASTATIN CALCIUM 40 MG: 40 TABLET, FILM COATED ORAL at 18:29

## 2021-03-27 RX ADMIN — GLYCERIN 1 DROP: .002; .002; .01 SOLUTION/ DROPS OPHTHALMIC at 22:16

## 2021-03-27 NOTE — ED PROVIDER NOTES
History  Chief Complaint   Patient presents with    Altered Mental Status     Pt presents to the ED from a bank where he appeared sluggish  Pt found to have runs of vtach per EMS  This is a 71-year-old male with past medical history significant for insulin-dependent diabetes mellitus, CAD, hypothyroidism, and hypertension presenting to the emergency department today via EMS for altered mental status  Supposedly the patient was at a bank and was found slumped over a chair with incomprehensible speech and altered mental status  The patient also supposedly had a syncopal episode but this occurred while the patient was sitting and he did not have a fall or head strike  On the way to the emergency department, patient's blood pressure was found to be in the 90 systolic, blood sugars were found to be in within normal limits, and patient was having frequent PVCs on telemetry and rhythm strips  No other history was able to be elicited from the patient as he was altered at the time of arrival   The patient did have a transmetatarsal amputation on March 4, 2021 and an incision and drainage per Podiatry in February of 2021  The patient denies any pain  The patient denies other complaints at this time  History provided by:  Patient  History limited by:  Mental status change   used: No    Altered Mental Status  Presenting symptoms: behavior changes, confusion, disorientation and partial responsiveness    Presenting symptoms: no combativeness and no unresponsiveness    Severity:  Moderate  Most recent episode:   Today  Episode history:  Continuous  Timing:  Constant  Progression:  Unchanged  Chronicity:  New  Context: not alcohol use, not dementia, not drug use, not head injury, taking medications as prescribed and not recent illness    Associated symptoms: no abdominal pain, no agitation, no eye deviation, no fever, no headaches, no light-headedness, no nausea, no palpitations, no seizures, no slurred speech, no suicidal behavior, no vomiting and no weakness      Prior to Admission Medications   Prescriptions Last Dose Informant Patient Reported? Taking?    amLODIPine (NORVASC) 10 mg tablet 3/26/2021 at Unknown time Outside Facility ([address] [place] [address]) Yes Yes   Sig: Take 10 mg by mouth daily   atenolol (TENORMIN) 25 mg tablet 3/26/2021 at Unknown time Outside Facility ([address] [place] [address]) Yes Yes   Sig: Take 25 mg by mouth daily   atorvastatin (LIPITOR) 40 mg tablet 3/26/2021 at Unknown time Outside Facility ([address] [place] [address]) Yes Yes   Sig: Take 40 mg by mouth daily   citalopram (CeleXA) 20 mg tablet 3/26/2021 at Unknown time Outside Facility ([address] [place] [address]) Yes Yes   Sig: Take 20 mg by mouth daily   clopidogrel (PLAVIX) 75 mg tablet 3/26/2021 at Unknown time Outside Facility (Specify) No Yes   Sig: Take 1 tablet (75 mg total) by mouth daily   famotidine (PEPCID) 20 mg tablet 3/26/2021 at Unknown time Outside Facility (Specify) No Yes   Sig: Take 1 tablet (20 mg total) by mouth 2 (two) times a day   glycerin-hypromellose- (ARTIFICIAL TEARS) 0 2-0 2-1 % SOLN 3/26/2021 at Unknown time Outside Facility (Specify) No Yes   Sig: Administer 1 drop to both eyes 3 (three) times a day   hydrOXYzine HCL (ATARAX) 50 mg tablet 3/26/2021 at Unknown time Outside Facility ([address] [place] [address]) Yes Yes   Sig: Take 50 mg by mouth daily   insulin glargine (LANTUS) 100 units/mL subcutaneous injection 3/26/2021 at Unknown time Outside Facility (Specify) No Yes   Sig: Inject 35 Units under the skin every morning   insulin lispro (HumaLOG) 100 units/mL injection 3/26/2021 at Unknown time Outside Facility (Specify) No Yes   Sig: Inject 15 Units under the skin 3 (three) times a day with meals   levothyroxine 175 mcg tablet 3/26/2021 at Unknown time Outside Facility (Specify) Yes Yes   Sig: Take 175 mcg by mouth daily   lisinopril (ZESTRIL) 20 mg tablet 3/26/2021 at Unknown time Outside Facility (Specify) Yes Yes   Sig: Take 20 mg by mouth daily   multivitamin [facility]) TABS 3/26/2021 at Unknown time Outside Facility ([address] [place] [address]) Yes Yes   Sig: Take 1 tablet by mouth daily   polyethylene glycol (MIRALAX) 17 g packet 3/26/2021 at Unknown time Outside Facility (Specify) No Yes   Sig: Take 17 g by mouth daily as needed (consti)   pregabalin (LYRICA) 50 mg capsule 3/26/2021 at Unknown time Outside Facility (Specify) Yes Yes   Sig: Take 50 mg by mouth daily   tiotropium (SPIRIVA) 18 mcg inhalation capsule 3/26/2021 at Unknown time Outside Facility ([address] [place] [address]) Yes Yes   Sig: Place 18 mcg into inhaler and inhale daily   traZODone (DESYREL) 50 mg tablet 3/26/2021 at Unknown time Outside Facility ([address] [place] [address]) Yes Yes   Sig: Take 50 mg by mouth daily at bedtime      Facility-Administered Medications: None     No past medical history on file  Past Surgical History:   Procedure Laterality Date    INCISION AND DRAINAGE OF WOUND Right 2/21/2021    Procedure: INCISION AND DRAINAGE (I&D) EXTREMITY RIGHT FOOT AND OPEN SECOND RAY AMPUTATION;  Surgeon: Mj Rome DPM;  [facility];  Service: Podiatry    IR LOWER EXTREMITY ANGIOGRAM  2/23/2021    ID AMPUTATION FOOT,TRANSMETATARSAL Right 3/4/2021    Procedure: AMPUTATION TRANSMETATARSAL (TMA) right foot;  Surgeon: Gustavo Gibson DPM;  [facility];  Service: Podiatry    WOUND DEBRIDEMENT Right 2/26/2021    Procedure: DEBRIDEMENT WOUND ([address]) with partial closure;  Surgeon: Mj Rome DPM;  [facility];  Service: Podiatry     No family history on file  I have reviewed and agree with the history as documented  E-Cigarette/Vaping     E-Cigarette/Vaping Substances     Social History     Tobacco Use    Smoking status: Former Smoker    Smokeless tobacco: Never Used   Substance Use Topics    Alcohol use: Not on file    Drug use: Not on file     Review of Systems   Constitutional: Positive for activity change  Negative for diaphoresis, fatigue and fever  Eyes: Negative for visual disturbance  Respiratory: Negative for chest tightness, shortness of breath and wheezing  Cardiovascular: Negative for chest pain and palpitations  Gastrointestinal: Negative for abdominal pain, constipation, diarrhea, nausea and vomiting  Genitourinary: Negative for dysuria and hematuria  Musculoskeletal: Negative for arthralgias  Skin: Negative for color change and wound  Neurological: Negative for dizziness, seizures, syncope, weakness, light-headedness, numbness and headaches  Psychiatric/Behavioral: Positive for confusion  Negative for agitation  All other systems reviewed and are negative  Physical Exam  Physical Exam  Vitals signs and nursing note reviewed  Constitutional:       General: He is not in acute distress  Appearance: Normal appearance  He is normal weight  He is not ill-appearing, toxic-appearing or diaphoretic  HENT:      Head: Normocephalic and atraumatic  Nose: Nose normal       Mouth/Throat:      Mouth: Mucous membranes are moist    Eyes:      General: No scleral icterus  Right eye: No discharge  Left eye: No discharge  Extraocular Movements: Extraocular movements intact  Conjunctiva/sclera: Conjunctivae normal    Cardiovascular:      Rate and Rhythm: Normal rate and regular rhythm  Pulses: Normal pulses  Heart sounds: Normal heart sounds  No murmur  No friction rub  No gallop  Comments: 2+ radial and DP pulses bilaterally  Pulmonary:      Effort: Pulmonary effort is normal  No respiratory distress  Breath sounds: Normal breath sounds  No stridor  No wheezing, rhonchi or rales  Chest:      Chest wall: No tenderness  Abdominal:      Comments: Protuberant abdomen; soft, nontender, nondistended, without organomegaly   Musculoskeletal: Normal range of motion  Right lower leg: No edema  Left lower leg: No edema        Comments: Patient did not follow exam on initial presentation but did have normal gross movement of bilateral upper and lower extremities  The patient has a right-sided transmetatarsal amputation; there was blood soaked gauze surrounding this  When removed, the patient had no signs of infection but did have oozing and a small amount of blood at the site of the midfoot   Skin:     General: Skin is warm and dry  Capillary Refill: Capillary refill takes less than 2 seconds  Coloration: Skin is not jaundiced or pale  Neurological:      Mental Status: He is alert  Mental status is at baseline  He is disoriented        Comments: Patient presented initially with altered mental status  He was alert to person but not place or time  GCS 12 on arrival  The patient did not participate with the neurologic examination; however, he did have good gross strength of bilateral upper and lower extremities  Normal sensation of bilateral upper and lower extremities  When patient did tolerate a neuro exam, patient was able to smile, frown, and stick out tongue without difficulty  Patient was able to raise both extremities upper and lower bilaterally; no focal neurologic deficits or weaknesses to suggest stroke   Psychiatric:         Mood and Affect: Mood normal          Behavior: Behavior normal        Vital Signs  ED Triage Vitals   Temperature Pulse Respirations Blood Pressure SpO2   03/27/21 1356 03/27/21 1356 03/27/21 1356 03/27/21 1404 03/27/21 1356   98 2 °F (36 8 °C) 90 18 129/57 99 %      Temp Source Heart Rate Source Patient Position - Orthostatic VS BP Location FiO2 (%)   03/27/21 1356 03/27/21 1356 -- 03/27/21 1356 --   Oral Monitor  Left arm       Pain Score       --                Vitals:    03/27/21 1630 03/27/21 1715 03/27/21 1829 03/27/21 1830   BP: 141/65 149/94 168/80 168/60   Pulse: 86 84 78 92     Visual Acuity  Visual Acuity      Most Recent Value   L Pupil Size (mm)  2   R Pupil Size (mm)  2        ED Medications  Medications   atenolol (TENORMIN) tablet 25 mg (25 mg Oral Given 3/27/21 1829) atorvastatin (LIPITOR) tablet 40 mg (40 mg Oral Given 3/27/21 1829)   citalopram (CeleXA) tablet 20 mg (has no administration in time range)   clopidogrel (PLAVIX) tablet 75 mg (75 mg Oral Given 3/27/21 1829)   glycerin-hypromellose- (ARTIFICIAL TEARS) ophthalmic solution 1 drop (has no administration in time range)   levothyroxine tablet 175 mcg (has no administration in time range)   pregabalin (LYRICA) capsule 50 mg (50 mg Oral Given 3/27/21 1829)   tiotropium (SPIRIVA) capsule for inhaler 18 mcg (has no administration in time range)   traZODone (DESYREL) tablet 50 mg (has no administration in time range)   heparin (porcine) subcutaneous injection 5,000 Units (has no administration in time range)   multi-electrolyte (PLASMALYTE-A/ISOLYTE-S PH 7 4) IV solution (has no administration in time range)   insulin lispro (HumaLOG) 100 units/mL subcutaneous injection 1-6 Units (1 Units Subcutaneous Not Given 3/27/21 1828)   insulin lispro (HumaLOG) 100 units/mL subcutaneous injection 1-6 Units (has no administration in time range)   simethicone (MYLICON) chewable tablet 80 mg (has no administration in time range)   amLODIPine (NORVASC) tablet 10 mg (has no administration in time range)   vancomycin (VANCOCIN) 1,500 mg in sodium chloride 0 9 % 250 mL IVPB (0 mg/kg × 93 kg Intravenous Stopped 3/27/21 1637)   cefepime (MAXIPIME) 2 g/50 mL dextrose IVPB (0 mg Intravenous Stopped 3/27/21 1459)   lactated ringers bolus 1,000 mL (0 mL Intravenous Stopped 3/27/21 1555)     Diagnostic Studies  Results Reviewed     Procedure Component Value Units Date/Time    Fingerstick Glucose (POCT) [[id number]]  (Abnormal) Collected: 03/27/21 1827    Lab Status: Final result Updated: 03/27/21 1832     POC Glucose 63 mg/dl     T4, free [[id number]]     Lab Status: No result Specimen: Blood     Ethanol [[id number]]  (Normal) Collected: 03/27/21 1528    Lab Status: Final result Specimen: Blood from Arm, Right Updated: 03/27/21 1626 Ethanol Lvl <3 mg/dL     Salicylate level [[id number]]  (Normal) Collected: 03/27/21 1555    Lab Status: Final result Specimen: Blood from Arm, Right Updated: 80/21/67 4838     Salicylate Lvl 4 1 mg/dL     Acetaminophen level-If concentration is detectable, please discuss with medical  on call  [[id number]]  (Abnormal) Collected: 03/27/21 1555    Lab Status: Final result Specimen: Blood from Arm, Right Updated: 03/27/21 1621     Acetaminophen Level <2 ug/mL     Rapid drug screen, urine [[id number]]  (Normal) Collected: 03/27/21 1528    Lab Status: Final result Specimen: Urine, Other Updated: 03/27/21 1616     Amph/Meth UR Negative     Barbiturate Ur Negative     Benzodiazepine Urine Negative     Cocaine Urine Negative     Methadone Urine Negative     Opiate Urine Negative     PCP Ur Negative     THC Urine Negative     Oxycodone Urine Negative    Narrative:      FOR MEDICAL PURPOSES ONLY  IF CONFIRMATION NEEDED PLEASE CONTACT THE LAB WITHIN 5 DAYS      Drug Screen Cutoff Levels:  AMPHETAMINE/METHAMPHETAMINES  1000 ng/mL  BARBITURATES     200 ng/mL  BENZODIAZEPINES     200 ng/mL  COCAINE      300 ng/mL  METHADONE      300 ng/mL  OPIATES      300 ng/mL  PHENCYCLIDINE     25 ng/mL  THC       50 ng/mL  OXYCODONE      100 ng/mL    Beta Hydroxybutyrate [[id number]]  (Normal) Collected: 03/27/21 1555    Lab Status: Final result Specimen: Blood from Arm, Right Updated: 03/27/21 1604     BETA-HYDROXYBUTYRATE 0 1 mmol/L     UA w Reflex to Microscopic w Reflex to Culture [[id number]] Collected: 03/27/21 1435    Lab Status: Final result Specimen: Urine, Straight Cath Updated: 03/27/21 1454     Color, UA Yellow     Clarity, UA Clear     Specific Gravity, UA 1 025     pH, UA 5 5     Leukocytes, UA Negative     Nitrite, UA Negative     Protein, UA Negative mg/dl      Glucose, UA Negative mg/dl      Ketones, UA Negative mg/dl      Urobilinogen, UA 0 2 E U /dl      Bilirubin, UA Negative     Blood, UA Negative    NT-BNP PRO [[id number]]  (Abnormal) Collected: 03/27/21 1400    Lab Status: Final result Specimen: Blood from Arm, Right Updated: 03/27/21 1434     NT-proBNP 1,058 pg/mL     Magnesium [[id number]]  (Normal) Collected: 03/27/21 1400    Lab Status: Final result Specimen: Blood from Arm, Right Updated: 03/27/21 1434     Magnesium 1 8 mg/dL     TSH [[id number]]  (Abnormal) Collected: 03/27/21 1400    Lab Status: Final result Specimen: Blood from Arm, Right Updated: 03/27/21 1434     TSH 3RD GENERATON 0 203 uIU/mL     Narrative:      Patients undergoing fluorescein dye angiography may retain small amounts of fluorescein in the body for 48-72 hours post procedure  Samples containing fluorescein can produce falsely depressed TSH values  If the patient had this procedure,a specimen should be resubmitted post fluorescein clearance  Troponin I [[id number]]  (Normal) Collected: 03/27/21 1400    Lab Status: Final result Specimen: Blood from Arm, Right Updated: 03/27/21 1427     Troponin I <0 02 ng/mL     Lactic acid [[id number]]  (Normal) Collected: 03/27/21 1400    Lab Status: Final result Specimen: Blood from Arm, Right Updated: 03/27/21 1426     LACTIC ACID 1 6 mmol/L     Narrative:      Result may be elevated if tourniquet was used during collection      Comprehensive metabolic panel [[id number]]  (Abnormal) Collected: 03/27/21 1400    Lab Status: Final result Specimen: Blood from Arm, Right Updated: 03/27/21 1424     Sodium 137 mmol/L      Potassium 5 1 mmol/L      Chloride 107 mmol/L      CO2 21 mmol/L      ANION GAP 9 mmol/L      BUN 22 mg/dL      Creatinine 1 78 mg/dL      Glucose 111 mg/dL      Calcium 8 2 mg/dL      Corrected Calcium 9 3 mg/dL      AST 13 U/L      ALT 18 U/L      Alkaline Phosphatase 157 U/L      Total Protein 6 4 g/dL      Albumin 2 6 g/dL      Total Bilirubin 0 17 mg/dL      eGFR 38 ml/min/1 73sq m     Narrative:      [place] guidelines for Chronic Kidney Disease (CKD):     Stage 1 with normal or high GFR (GFR > 90 mL/min/1 73 square meters)    Stage 2 Mild CKD (GFR = 60-89 mL/min/1 73 square meters)    Stage 3A Moderate CKD (GFR = 45-59 mL/min/1 73 square meters)    Stage 3B Moderate CKD (GFR = 30-44 mL/min/1 73 square meters)    Stage 4 Severe CKD (GFR = 15-29 mL/min/1 73 square meters)    Stage 5 End Stage CKD (GFR <15 mL/min/1 73 square meters)  Note: GFR calculation is accurate only with a steady state creatinine    Protime-INR [[id number]]  (Normal) Collected: 03/27/21 1400    Lab Status: Final result Specimen: Blood from Arm, Right Updated: 03/27/21 1420     Protime 14 0 seconds      INR 1 07    APTT [[id number]]  (Normal) Collected: 03/27/21 1400    Lab Status: Final result Specimen: Blood from Arm, Right Updated: 03/27/21 1420     PTT 29 seconds     Blood culture #1 [[id number]] Collected: 03/27/21 1409    Lab Status: In process Specimen: Blood from Hand, Right Updated: 03/27/21 1412    CBC and differential [[id number]]  (Abnormal) Collected: 03/27/21 1400    Lab Status: Final result Specimen: Blood from Arm, Right Updated: 03/27/21 1409     WBC 8 73 Thousand/uL      RBC 3 01 Million/uL      Hemoglobin 8 7 g/dL      Hematocrit 28 0 %      MCV 93 fL      MCH 28 9 pg      MCHC 31 1 g/dL      RDW 14 5 %      MPV 10 3 fL      Platelets 238 Thousands/uL      nRBC 0 /100 WBCs      Neutrophils Relative 53 %      Immat GRANS % 0 %      Lymphocytes Relative 29 %      Monocytes Relative 10 %      Eosinophils Relative 7 %      Basophils Relative 1 %      Neutrophils Absolute 4 55 Thousands/µL      Immature Grans Absolute 0 02 Thousand/uL      Lymphocytes Absolute 2 54 Thousands/µL      Monocytes Absolute 0 90 Thousand/µL      Eosinophils Absolute 0 64 Thousand/µL      Basophils Absolute 0 08 Thousands/µL     Procalcitonin with AM Reflex [[id number]] Collected: 03/27/21 1400    Lab Status:  In process Specimen: Blood from Arm, Right Updated: 03/27/21 1405    Blood culture #2 [[id number]] Collected: 03/27/21 1400    Lab Status: In process Specimen: Blood from Arm, Right Updated: 03/27/21 1405             CT head without contrast   Final Result by Checo Webster MD (03/27 1443)      No acute intracranial abnormality  Workstation performed: [id number]         XR chest portable    (Results Pending)          Procedures  ECG 12 Lead Documentation Only    Date/Time: 3/27/2021 6:53 PM  Performed by: Nancy Garcia PA-C  Authorized by: Nancy Garcia PA-C     Indications / Diagnosis:  Altered Mental Status  ECG reviewed by me, the ED Provider: yes    Patient location:  ED  Previous ECG:     Previous ECG:  Compared to current    Comparison ECG info:  2/19/2021    Similarity:  Changes noted    Comparison to cardiac monitor: Yes    Interpretation:     Interpretation: abnormal    Rate:     ECG rate:  93    ECG rate assessment: normal    Rhythm:     Rhythm: sinus rhythm    Ectopy:     Ectopy: PVCs      PVCs:  Frequent and multifocal  QRS:     QRS axis:  Normal  Conduction:     Conduction: normal    ST segments:     ST segments:  Normal  T waves:     T waves: non-specific    Comments:      Patient is exhibiting PVCs, sometimes in couplets especially on telemetry  No acute ST segment elevations or other signs of ischemia  Large T-waves        ED Course  ED Course as of Mar 27 1901   Sat Mar 27, 2021   1408 Called to bedside with Dr Rony Plata for AMS  Patient has inappropriate speech  Patient does have abnormalities with smile but able to stick tongue out normally and move all four extremities equally  Continue monitoring neuro exams  [facility] ordered  On reexamination of patient the patient has brother were both sitting comfortably in the room  The patient was much more alert at that time  He was GCS 15 and alert oriented x3  I did a full neuro exam on him at that point which revealed no neurologic deficits  The patient had no focal neurologic deficits or weaknesses to suggest stroke  The patient also had normal range of motion and strength in bilateral upper and lower extremities  The patient was oriented x4  The patient exhibited no cerebellar signs at that time  I had a discussion with the patient that we are recommending inpatient admission given his PVCs  The patient is brother verify their understanding and agreed to the plan at that time  MDM  Number of Diagnoses or Management Options  FAUSTO (acute kidney injury) [facility]): new and requires workup  Disorientation: new and requires workup  Multifocal PVCs: new and requires workup  Diagnosis management comments: This is 79-year-old male with past medical history of poorly controlled type 2 diabetes, hypertension, hyperlipidemia, and recent transmetatarsal amputation presenting to the emergency department by EMS for altered mental status  The patient was found at his bank slumped over a chair with a questionable syncopal episode  No falls  On arrival, the patient was extremely altered in terms of his mental status  No acute neuro deficits  Lab workup significant for creatinine 1 78, TSH 0 203, BNP 1058, hemoglobin 8 7  No acute intracranial abnormality on CT of the head  Here in the here in the emergency department, the patient had fluids and also cefepime and vancomycin for infection prophylaxis  One with EMS, the patient had frequent PVCs and couplets  He continue this on telemetry here in the emergency department  When I went to re-evaluate the patient, the patient was significantly less altered and was able to follow commands  I got a good neurologic examination at this time which showed no neurologic deficits or cerebellar symptoms  Given the patient's EKG and telemetry readings with the PVCs, decision was made to have the patient admitted to the hospital for further workup  The patient was accepted to the service of Dr Tyler Perez for inpatient status    The patient is stable for admission at this time   All questions answered to the patient and his brother's satisfaction  Amount and/or Complexity of Data Reviewed  Clinical lab tests: ordered and reviewed  Tests in the radiology section of CPT®: ordered and reviewed  Obtain history from someone other than the patient: yes  Discuss the patient with other providers: yes  Independent visualization of images, tracings, or specimens: yes      Disposition  Final diagnoses:   Multifocal PVCs   Disorientation   FAUSTO (acute kidney injury) ([facility] [address] )     Time reflects when diagnosis was documented in both MDM as applicable and the Disposition within this note     Time User Action Codes Description Comment    3/27/2021  4:21 PM Murh[place]to [place] [I49 3] Multifocal PVCs     3/27/2021  4:21 PM [place]joshuaan [place] [R41 0] Disorientation     3/27/2021  4:21 PM Gurjit Giles [N17 9] FAUSTO (acute kidney injury) [facility])       ED Disposition     ED Disposition Condition Date/Time Comment    Admit Stable Sat Mar 27, 2021  4:22 PM Case was discussed with Dr Trev Botello and the patient's admission status was agreed to be Admission Status: inpatient status to the service of Dr Trev Botello   Follow-up Information    None         Patient's Medications   Discharge Prescriptions    No medications on file     No discharge procedures on file      PDMP Review     None        ED Provider  Electronically Signed by     Silvino Christopher PA-C  03/27/21 0191

## 2021-03-27 NOTE — ASSESSMENT & PLAN NOTE
· Home medication amlodipine 10 mg daily, atenolol 25 mg, lisinopril 20 mg daily  · Hold lisinopril because of ongoing FAUSTO  · Continue atenolol 25 mg because of PVCs, continue amlodipine  · Monitor blood pressure    Avoid hypotension

## 2021-03-27 NOTE — ASSESSMENT & PLAN NOTE
Lab Results   Component Value Date    HGBA1C 9 3 (H) 02/21/2021       Recent Labs     03/27/21  1827   POCGLU 63*       Blood Sugar Average: Last 72 hrs:  (P) 63     · Insulin with sliding scale  · Accu-Chek  · Hypoglycemia protocol  · Diabetic diet  · Home regiment:  Lantus 30 units a m , 60 units bedtime and 30 units Humalog with supper

## 2021-03-27 NOTE — ASSESSMENT & PLAN NOTE
Lab Results   Component Value Date    EGFR 38 03/27/2021    EGFR 48 03/15/2021    EGFR 62 03/04/2021    CREATININE 1 78 (H) 03/27/2021    CREATININE 1 49 03/15/2021    CREATININE 1 19 03/04/2021     · Baseline creatine 1 1- 1 4   · Unclear etiology, most likely prerenal   Patient looks dry  · Hold lisinopril  · Plasmalyte with 75 cc an hour for 10 hours  · Monitor BMP a m    · Avoid hypotension/nephrotoxin  · I/O's

## 2021-03-27 NOTE — ASSESSMENT & PLAN NOTE
· Glucose present on admission 111 mg, last checked 63 mg  · Patient is on insulin Lantus 30 units a m , 60 units bedtime and 30 units Humalog with supper  · Patient had elevated blood sugars during the last admission for which he needed insulin drip  · Will hold home insulin     · Sliding scale only with Accu-Chek  · Hypoglycemia protocol  · If blood sugar normalized tomorrow, consider Lantus

## 2021-03-27 NOTE — ASSESSMENT & PLAN NOTE
· Status post right SFA stents placement  · Patient followed with vascular surgery on 03/23   · Continue Plavix and atorvastatin

## 2021-03-27 NOTE — ASSESSMENT & PLAN NOTE
· Patient had transmetatarsal amputation last month  Wound culture showed Enterococcus faecalis for which patient took ampicillin until 3/9  · He followed with vascular surgery and per their notes the wound was clean and dry on 03/23  · Patient has erythema along the incision, and oozing blood  · Emergency department patient received vancomycin and cefepime for likely infection  · Will hold antibiotics for now    · Monitor vital signs  · Podiatry consult  · Wound care

## 2021-03-27 NOTE — H&P
[facility]  [facility] 1950[facility] 78 [facility]  [facility] MRN: [id number]  Unit/Bed#: ED 17 Encounter: [id number]  Primary Care Provider: Ulises Melendez   Date and time admitted to hospital: 3/27/2021  1:49 PM    * Syncope  Assessment & Plan  · Patient had likely syncopal episode today while in the bank  Mentioned that wanted to sit up, felt weak and that he was going to pass out  Per patient while in ambulance he was able to hear people talking but he was confused and could not talk  Denied tongue bite, urine/fecal incontinence, motor/sensory deficits  Denied chest pain, nausea, vomiting, diaphoresis  · Per ED notes, EMS reported systolic blood pressure in the 90s, normal blood sugar  · Patient mentioned that sometimes he feels lightheaded and has tremors because of some of his medication (unclear which 1 likely pain medicine)  He said that he did not to take any of his morning medication today because he knew he will go to the bank and wanted to make sure he is not lightheaded  · EKG and telemetry showed nonsustained V-tach with frequent PVCs  Troponin was normal, NT pro BNP: 1058, electrolytes within normal limits, rapid drug screen within normal limits, UA normal  · Differentials include:  Cardiac arrhythmia versus orthostatic hypotension versus vasovagal versus medication versus hypoglycemia    Unlikely TIA, patient had no neurologic deficits    Plan:  · Serial troponin  · Monitor on telemetry  · Echocardiography  · Monitor electrolytes and replenish, avoid hypoglycemia  · Orthostatic vital signs  · Fall precaution  · Avoid hypotension  · Hold Atarax    Acute-on-chronic kidney injury [facility])  Assessment & Plan  Lab Results   Component Value Date    EGFR 38 03/27/2021    EGFR 48 03/15/2021    EGFR 62 03/04/2021    CREATININE 1 78 (H) 03/27/2021    CREATININE 1 49 03/15/2021    CREATININE 1 19 03/04/2021     · Baseline creatine 1 1- 1 4   · Unclear etiology, most likely prerenal   Patient looks dry  · Hold lisinopril  · Plasmalyte with 75 cc an hour for 10 hours  · Monitor BMP a m  · Avoid hypotension/nephrotoxin  · I/O's      Insulin-dependent diabetes mellitus  Assessment & Plan  Lab Results   Component Value Date    HGBA1C 9 3 (H) 02/21/2021       Recent Labs     03/27/21  1827   POCGLU 63*       Blood Sugar Average: Last 72 hrs:  (P) 63     · Insulin with sliding scale  · Accu-Chek  · Hypoglycemia protocol  · Diabetic diet  · Home regiment:  Lantus 30 units a m , 60 units bedtime and 30 units Humalog with supper    Status post amputation  Assessment & Plan  · Patient had transmetatarsal amputation last month  Wound culture showed Enterococcus faecalis for which patient took ampicillin until 3/9  · He followed with vascular surgery and per their notes the wound was clean and dry on 03/23  · Patient has erythema along the incision, and oozing blood  · Emergency department patient received vancomycin and cefepime for likely infection  · Will hold antibiotics for now  · Monitor vital signs  · Podiatry consult  · Wound care    Hypoglycemia  Assessment & Plan  · Glucose present on admission 111 mg, last checked 63 mg  · Patient is on insulin Lantus 30 units a m , 60 units bedtime and 30 units Humalog with supper  · Patient had elevated blood sugars during the last admission for which he needed insulin drip  · Will hold home insulin     · Sliding scale only with Accu-Chek  · Hypoglycemia protocol  · If blood sugar normalized tomorrow, consider Lantus    Peripheral arterial disease  Assessment & Plan  · Status post right SFA stents placement  · Patient followed with vascular surgery on 03/23   · Continue Plavix and atorvastatin    Anemia of chronic disease  Assessment & Plan  · Hemoglobin 8 7 present on admission, baseline 8 9- 11 5  · Monitor    Hypothyroidism  Assessment & Plan  · On levothyroxine 175 mcg daily  · TSH 0 28  · Likely patient will need dose adjustment    Depression  Assessment & Plan  · Continue citalopram and trazodone  · Will hold Atarax (patient reported tremors and lightheaded with some of his medication)    Hypertension  Assessment & Plan  · Home medication amlodipine 10 mg daily, atenolol 25 mg, lisinopril 20 mg daily  · Hold lisinopril because of ongoing FAUSTO  · Continue atenolol 25 mg because of PVCs, continue amlodipine  · Monitor blood pressure  Avoid hypotension    VTE Prophylaxis: Heparin  / sequential compression device   Code Status:  Level 1  POLST: POLST form is not discussed and not completed at this time  Anticipated Length of Stay:  Patient will be admitted on an Inpatient basis with an anticipated length of stay of  greater than 2 midnights  Justification for Hospital Stay:  Syncope, acute kidney injury    Chief Complaint:   Passed out, could not talk, confusion    History of Present Illness:    Rosanna Laird is a 78 y o  male with past medical history of hypertension, hyperlipidemia, type 2 diabetes on insulin, chronic kidney disease, hypothyroidism, peripheral artery disease, status post right transmetatarsal amputation, depression,  brought by EMS from a bank after patient had a syncopal event  Per patient he wanted to stand but felt weak with tremors as he was going to pass out  He mentioned that in the ambulance he was able to hear people talking but was confused and could not talk  Patient denied chest pain, shortness of breath, nausea, vomiting, diaphoresis  Denied tongue bite, fecal/urinary incontinence  Mentioned that in purpose he did not not take his medication this morning, because some of his medication makes him lightheaded  Per ED notes, EMS noticed systolic blood pressure in the 90s, normal blood sugar  Patient was recently discharge from the hospital on 03/06 after he was admitted for about 6 weeks for a right toe osteomyelitis and amputation    He was discharged to post acute rehab and about 3 days ago he was discharged home  Patient was found to have nonsustained V-tach per EMS as well upon presentation in the hospital   First troponin within normal limits, anti pro BMP increased to 1058  As well patient was found to have acute kidney injury  Initially his blood sugar was 111, decreased after few hours to 63 likely secondary to lack of meals  Patient was started on vancomycin and ceftriaxone, because of concern for right surgery wound infection  Patient was admitted  for further management    Review of Systems:    Review of Systems   Constitutional: Negative for chills, diaphoresis, fatigue and fever  Eyes: Negative for visual disturbance  Respiratory: Negative for cough, shortness of breath and wheezing  Cardiovascular: Negative for chest pain and palpitations  Gastrointestinal: Negative for abdominal distention, abdominal pain, constipation, diarrhea, nausea and vomiting  Genitourinary: Negative for dysuria and hematuria  Musculoskeletal: Positive for arthralgias (Left hip, chronic)  Neurological: Negative for dizziness, tremors, seizures, syncope, speech difficulty, light-headedness, numbness and headaches  All other systems reviewed and are negative  Past Medical and Surgical History:     No past medical history on file      Past Surgical History:   Procedure Laterality Date    INCISION AND DRAINAGE OF WOUND Right 2/21/2021    Procedure: INCISION AND DRAINAGE (I&D) EXTREMITY RIGHT FOOT AND OPEN SECOND RAY AMPUTATION;  Surgeon: Josiah Kayser, DPM;  [facility];  Service: Podiatry    IR LOWER EXTREMITY ANGIOGRAM  2/23/2021    TN AMPUTATION FOOT,TRANSMETATARSAL Right 3/4/2021    Procedure: AMPUTATION TRANSMETATARSAL (TMA) right foot;  Surgeon: Isabel Fontenot DPM;  [facility];  Service: Podiatry    WOUND DEBRIDEMENT Right 2/26/2021    Procedure: DEBRIDEMENT WOUND ([address]) with partial closure;  Surgeon: Josiah Kayser, DPM;  [facility];  Service: Podiatry Meds/Allergies:    Prior to Admission medications    Medication Sig Start Date End Date Taking?  Authorizing Provider   amLODIPine (NORVASC) 10 mg tablet Take 10 mg by mouth daily   Yes Historical Provider, MD   atenolol (TENORMIN) 25 mg tablet Take 25 mg by mouth daily   Yes Historical Provider, MD   atorvastatin (LIPITOR) 40 mg tablet Take 40 mg by mouth daily   Yes Historical Provider, MD   citalopram (CeleXA) 20 mg tablet Take 20 mg by mouth daily   Yes Historical Provider, MD   clopidogrel (PLAVIX) 75 mg tablet Take 1 tablet (75 mg total) by mouth daily 3/8/21  Yes Elena Yang MD   famotidine (PEPCID) 20 mg tablet Take 1 tablet (20 mg total) by mouth 2 (two) times a day 3/7/21  Yes Elena Yang MD   glycerin-hypromellose- (ARTIFICIAL TEARS) 0 2-0 2-1 % SOLN Administer 1 drop to both eyes 3 (three) times a day 3/7/21  Yes Elena Yang MD   hydrOXYzine HCL (ATARAX) 50 mg tablet Take 50 mg by mouth daily   Yes Historical Provider, MD   insulin glargine (LANTUS) 100 units/mL subcutaneous injection Inject 35 Units under the skin every morning 3/8/21  Yes Elena Yang MD   insulin lispro (HumaLOG) 100 units/mL injection Inject 15 Units under the skin 3 (three) times a day with meals 3/7/21  Yes Elena Yang MD   levothyroxine 175 mcg tablet Take 175 mcg by mouth daily   Yes Historical Provider, MD   lisinopril (ZESTRIL) 20 mg tablet Take 20 mg by mouth daily   Yes Historical Provider, MD   multivitamin (THERAGRAN) TABS Take 1 tablet by mouth daily   Yes Historical Provider, MD   polyethylene glycol (MIRALAX) 17 g packet Take 17 g by mouth daily as needed (consti) 3/7/21  Yes Elena Yang MD   pregabalin (LYRICA) 50 mg capsule Take 50 mg by mouth daily   Yes Historical Provider, MD   tiotropium (SPIRIVA) 18 mcg inhalation capsule Place 18 mcg into inhaler and inhale daily   Yes Historical Provider, MD   traZODone (DESYREL) 50 mg tablet Take 50 mg by mouth daily at bedtime   Yes Historical Provider, MD     I have reviewed home medications with patient personally  Allergies: No Known Allergies    Social History:     Marital Status:    Occupation:   Patient Pre-hospital Living Situation:  Lives with brother  Patient Pre-hospital Level of Mobility:  Independent  Patient Pre-hospital Diet Restrictions:  No restrictions  Substance Use History:   Social History     Substance and Sexual Activity   Alcohol Use None     Social History     Tobacco Use   Smoking Status Former Smoker   Smokeless Tobacco Never Used     Social History     Substance and Sexual Activity   Drug Use Not on file       Family History:    No family history on file  Physical Exam:     Vitals:   Blood Pressure: 140/62 (03/27/21 1920)  Pulse: 84 (03/27/21 1920)  Temperature: (!) 97 4 °F (36 3 °C) (03/27/21 1920)  Temp Source: Axillary (03/27/21 1920)  Respirations: 18 (03/27/21 1920)  SpO2: 94 % (03/27/21 1920)    Physical Exam  Vitals signs reviewed  Constitutional:       General: He is not in acute distress  Appearance: He is obese  He is not diaphoretic  HENT:      Head: Normocephalic  Eyes:      General: No visual field deficit or scleral icterus  Right eye: No discharge  Left eye: No discharge  Extraocular Movements: Extraocular movements intact  Conjunctiva/sclera: Conjunctivae normal       Pupils: Pupils are equal, round, and reactive to light  Neck:      Musculoskeletal: Normal range of motion  Cardiovascular:      Rate and Rhythm: Normal rate and regular rhythm  Heart sounds: Murmur (Systolic) present  Pulmonary:      Effort: No respiratory distress  Breath sounds: Normal breath sounds  No wheezing, rhonchi or rales  Abdominal:      General: There is no distension  Palpations: Abdomen is soft  Tenderness: There is no abdominal tenderness  There is no guarding or rebound  Musculoskeletal:      Right lower leg: No edema  Left lower leg: No edema  Comments: Erythema over the right surgical wound, with oozing blood   No pus   Skin:     General: Skin is warm  Coloration: Skin is pale  Neurological:      Mental Status: He is alert  GCS: GCS eye subscore is 4  GCS verbal subscore is 5  GCS motor subscore is 6  Cranial Nerves: Cranial nerves are intact  No dysarthria or facial asymmetry  Sensory: No sensory deficit  Motor: No weakness  Comments: Oriented to self, place, year and month   Psychiatric:         Mood and Affect: Mood normal          Behavior: Behavior normal          Additional Data:     Lab Results: I have personally reviewed pertinent reports  Results from last 7 days   Lab Units 03/27/21  1400   WBC Thousand/uL 8 73   HEMOGLOBIN g/dL 8 7*   HEMATOCRIT % 28 0*   PLATELETS Thousands/uL 303   NEUTROS PCT % 53   LYMPHS PCT % 29   MONOS PCT % 10   EOS PCT % 7*     Results from last 7 days   Lab Units 03/27/21  1400   POTASSIUM mmol/L 5 1   CHLORIDE mmol/L 107   CO2 mmol/L 21   BUN mg/dL 22   CREATININE mg/dL 1 78*   CALCIUM mg/dL 8 2*   ALK PHOS U/L 157*   ALT U/L 18   AST U/L 13     Results from last 7 days   Lab Units 03/27/21  1400   INR  1 07       Imaging: I have personally reviewed pertinent reports  Xr Foot Right 3+ Views    Result Date: 3/6/2021  Narrative: RIGHT FOOT INDICATION:   post op TMA  COMPARISON:  Multiple priors most recently 2/26/2021 VIEWS:  XR FOOT 3+ VW RIGHT Images: 3 FINDINGS: Patient is status post amputation of the forefoot at the level of the mid metatarsal shafts  Resection margins are sharp  No acute osseous abnormality identified  Mild midfoot arthrosis  No cortical erosion or bony destruction  Expected postsurgical changes of the soft tissues with associated skin staples  Atherosclerotic calcifications       Impression: Postoperative baseline examination status post transmetatarsal amputation Workstation performed: [id number]     Xr Foot Right 3+ Views    Result Date: 2/27/2021  Narrative: RIGHT FOOT INDICATION:   removal of additional bone  COMPARISON:  Right foot radiographs 2/21/2021 VIEWS:  XR FOOT 3+ VW RIGHT FINDINGS: Interval extension of second metatarsal amputation to the level of the distal metatarsal  No acute fracture or dislocation  Mild osteoarthritis first MTP joint  Tiny plantar calcaneal and small retrocalcaneal spurs  No lytic or blastic osseous lesion  Post amputation soft tissue defect distal to the second metatarsal with wound VAC in place  Diffuse vascular calcification  Impression: Interval extension of second metatarsal amputation to the level of the distal metatarsal  No radiographic evidence of osteomyelitis  Workstation performed: [id number]     Ct Head Without Contrast    Result Date: 3/27/2021  Narrative: CT BRAIN - WITHOUT CONTRAST INDICATION:   Altered mental status AMS  COMPARISON:  None  TECHNIQUE:  CT examination of the brain was performed  In addition to axial images, sagittal and coronal 2D reformatted images were created and submitted for interpretation  Radiation dose length product (DLP) for this visit:  937 mGy-cm   This examination, like all CT scans performed in the [facility], was performed utilizing techniques to minimize radiation dose exposure, including the use of iterative reconstruction and automated exposure control  IMAGE QUALITY:  Diagnostic  FINDINGS: PARENCHYMA: Decreased attenuation is noted in periventricular and subcortical white matter demonstrating an appearance that is statistically most likely to represent mild microangiopathic change  No CT signs of acute infarction  No intracranial mass, mass effect or midline shift  No acute parenchymal hemorrhage  VENTRICLES AND EXTRA-AXIAL SPACES:  Normal for the patient's age  VISUALIZED ORBITS AND PARANASAL SINUSES:  No acute abnormality involving the orbits  Mild scattered sinus mucosal thickening is noted    No fluid levels are seen  CALVARIUM AND EXTRACRANIAL SOFT TISSUES:  Normal      Impression: No acute intracranial abnormality  Workstation performed: [id number]       EKG, Pathology, and Other Studies Reviewed on Admission:   · EKG:  Sinus rhythm, nonsustained VT, , no acute ST/T changes    Epic / Care Everywhere Records Reviewed: Yes     ** Please Note: This note has been constructed using a voice recognition system   **

## 2021-03-27 NOTE — ASSESSMENT & PLAN NOTE
· Patient had likely syncopal episode today while in the bank  Mentioned that wanted to sit up, felt weak and that he was going to pass out  Per patient while in ambulance he was able to hear people talking but he was confused and could not talk  Denied tongue bite, urine/fecal incontinence, motor/sensory deficits  Denied chest pain, nausea, vomiting, diaphoresis  · Per ED notes, EMS reported systolic blood pressure in the 90s, normal blood sugar  · Patient mentioned that sometimes he feels lightheaded and has tremors because of some of his medication (unclear which 1 likely pain medicine)  He said that he did not to take any of his morning medication today because he knew he will go to the bank and wanted to make sure he is not lightheaded  · EKG and telemetry showed nonsustained V-tach with frequent PVCs  Troponin was normal, NT pro BNP: 1058, electrolytes within normal limits, rapid drug screen within normal limits, UA normal  · Differentials include:  Cardiac arrhythmia versus orthostatic hypotension versus vasovagal versus medication versus hypoglycemia    Unlikely TIA, patient had no neurologic deficits    Plan:  · Serial troponin  · Monitor on telemetry  · Echocardiography  · Monitor electrolytes and replenish, avoid hypoglycemia  · Orthostatic vital signs  · Fall precaution  · Avoid hypotension  · Hold Atarax

## 2021-03-27 NOTE — ASSESSMENT & PLAN NOTE
· Continue citalopram and trazodone  · Will hold Atarax (patient reported tremors and lightheaded with some of his medication)

## 2021-03-27 NOTE — QUICK NOTE
[facility] Attending Physician Attestation Note - Admission    I have seen and examined Spenser Rodriguez personally and have reviewed the medical record independently  I have reviewed the case with the resident physician including all assessments and the plan of care for each  I agree with the resident physician and offer the following addendum to the below statements by the resident physician:     Date Evaluated: March 27th 2021  Time Evaluated: 7 pm      Subjective / HPI:   This is a 74-year-old male with type 2 diabetes polypharmacy who presents with a syncopal episode today  He is hypoglycemic currently and also has an Matt I  Will check orthostatics in stop his long-acting insulin with sliding scale only  Patient is sitting comfortably at bedside eating a salad and has no symptoms currently he does have a tremor which she states is on and off regardless of his acute illness  Chest x-ray does not show any volume overload or any findings consistent with pneumonia  CT scan was done in the ED which is unremarkable will get an echo as we do not have 1 on file for this patient  Will also monitor on telemetry and trend troponins      Exam:   Abdomen soft nondistended  Extremities no edema  Right lower extremity - amputation site has stitches and there was some oozing but no signs of overt infection  Neurologically intact  Mild tremor at rest    Assessment and Plan:  · Syncope  · Orthostatics  · CT scan a unremarkable  · Echo pending  · Admitted to telemetry  · Trend troponins  · IV fluid  · For Matt I will hold nephrotoxins and also decrease insulin  · For hypoglycemia will start only sliding scale and hold all long-acting    Education and Discussions with Family / Patient:  Discussed with patient resident updated family    Time Spent for Care: 45 minutes  More than 50% of total time spent on counseling and coordination of care as described above      Current Patient Status: Inpatient Anticipated Length of Stay:  Patient will be admitted on an Inpatient basis with an anticipated length of stay of  More than 2 midnights  Justification for Hospital Stay: FAUSTO with syncope    Epic / Care Everywhere Records Reviewed Independently: Yes     For detailed history, assessment, and plan of care, please review the statements below by Dr Jamie Zamora MD

## 2021-03-28 ENCOUNTER — APPOINTMENT (INPATIENT)
Dept: NON INVASIVE DIAGNOSTICS | Facility: HOSPITAL | Age: 71
DRG: 683 | End: 2021-03-28
Payer: COMMERCIAL

## 2021-03-28 VITALS
TEMPERATURE: 98.4 F | SYSTOLIC BLOOD PRESSURE: 124 MMHG | OXYGEN SATURATION: 96 % | HEART RATE: 83 BPM | RESPIRATION RATE: 18 BRPM | DIASTOLIC BLOOD PRESSURE: 58 MMHG

## 2021-03-28 LAB
ALBUMIN SERPL BCP-MCNC: 2.5 G/DL (ref 3.5–5)
ALP SERPL-CCNC: 154 U/L (ref 46–116)
ALT SERPL W P-5'-P-CCNC: 18 U/L (ref 12–78)
ANION GAP SERPL CALCULATED.3IONS-SCNC: 9 MMOL/L (ref 4–13)
AST SERPL W P-5'-P-CCNC: 12 U/L (ref 5–45)
ATRIAL RATE: 75 BPM
BASOPHILS # BLD AUTO: 0.09 THOUSANDS/ΜL (ref 0–0.1)
BASOPHILS NFR BLD AUTO: 1 % (ref 0–1)
BILIRUB SERPL-MCNC: 0.22 MG/DL (ref 0.2–1)
BUN SERPL-MCNC: 18 MG/DL (ref 5–25)
CALCIUM ALBUM COR SERPL-MCNC: 9.7 MG/DL (ref 8.3–10.1)
CALCIUM SERPL-MCNC: 8.5 MG/DL (ref 8.3–10.1)
CHLORIDE SERPL-SCNC: 109 MMOL/L (ref 100–108)
CO2 SERPL-SCNC: 22 MMOL/L (ref 21–32)
CREAT SERPL-MCNC: 1.46 MG/DL (ref 0.6–1.3)
EOSINOPHIL # BLD AUTO: 0.65 THOUSAND/ΜL (ref 0–0.61)
EOSINOPHIL NFR BLD AUTO: 7 % (ref 0–6)
ERYTHROCYTE [DISTWIDTH] IN BLOOD BY AUTOMATED COUNT: 14.5 % (ref 11.6–15.1)
GFR SERPL CREATININE-BSD FRML MDRD: 49 ML/MIN/1.73SQ M
GLUCOSE SERPL-MCNC: 142 MG/DL (ref 65–140)
GLUCOSE SERPL-MCNC: 148 MG/DL (ref 65–140)
GLUCOSE SERPL-MCNC: 229 MG/DL (ref 65–140)
GLUCOSE SERPL-MCNC: 236 MG/DL (ref 65–140)
GLUCOSE SERPL-MCNC: 94 MG/DL (ref 65–140)
HCT VFR BLD AUTO: 27 % (ref 36.5–49.3)
HGB BLD-MCNC: 8.6 G/DL (ref 12–17)
IMM GRANULOCYTES # BLD AUTO: 0.04 THOUSAND/UL (ref 0–0.2)
IMM GRANULOCYTES NFR BLD AUTO: 0 % (ref 0–2)
LYMPHOCYTES # BLD AUTO: 2.51 THOUSANDS/ΜL (ref 0.6–4.47)
LYMPHOCYTES NFR BLD AUTO: 27 % (ref 14–44)
MAGNESIUM SERPL-MCNC: 1.9 MG/DL (ref 1.6–2.6)
MCH RBC QN AUTO: 29.4 PG (ref 26.8–34.3)
MCHC RBC AUTO-ENTMCNC: 31.9 G/DL (ref 31.4–37.4)
MCV RBC AUTO: 92 FL (ref 82–98)
MONOCYTES # BLD AUTO: 0.84 THOUSAND/ΜL (ref 0.17–1.22)
MONOCYTES NFR BLD AUTO: 9 % (ref 4–12)
NEUTROPHILS # BLD AUTO: 5.23 THOUSANDS/ΜL (ref 1.85–7.62)
NEUTS SEG NFR BLD AUTO: 56 % (ref 43–75)
NRBC BLD AUTO-RTO: 0 /100 WBCS
PLATELET # BLD AUTO: 305 THOUSANDS/UL (ref 149–390)
PMV BLD AUTO: 10.2 FL (ref 8.9–12.7)
POTASSIUM SERPL-SCNC: 4.9 MMOL/L (ref 3.5–5.3)
PROCALCITONIN SERPL-MCNC: <0.05 NG/ML
PROCALCITONIN SERPL-MCNC: <0.05 NG/ML
PROT SERPL-MCNC: 6.3 G/DL (ref 6.4–8.2)
QRS AXIS: 18 DEGREES
QRSD INTERVAL: 86 MS
QT INTERVAL: 388 MS
QTC INTERVAL: 482 MS
RBC # BLD AUTO: 2.93 MILLION/UL (ref 3.88–5.62)
SODIUM SERPL-SCNC: 140 MMOL/L (ref 136–145)
T WAVE AXIS: 66 DEGREES
T4 FREE SERPL-MCNC: 1.18 NG/DL (ref 0.76–1.46)
TROPONIN I SERPL-MCNC: <0.02 NG/ML
VENTRICULAR RATE: 93 BPM
WBC # BLD AUTO: 9.36 THOUSAND/UL (ref 4.31–10.16)

## 2021-03-28 PROCEDURE — 82948 REAGENT STRIP/BLOOD GLUCOSE: CPT

## 2021-03-28 PROCEDURE — 84145 PROCALCITONIN (PCT): CPT | Performed by: PHYSICIAN ASSISTANT

## 2021-03-28 PROCEDURE — 84484 ASSAY OF TROPONIN QUANT: CPT | Performed by: INTERNAL MEDICINE

## 2021-03-28 PROCEDURE — 85025 COMPLETE CBC W/AUTO DIFF WBC: CPT | Performed by: INTERNAL MEDICINE

## 2021-03-28 PROCEDURE — 83735 ASSAY OF MAGNESIUM: CPT | Performed by: INTERNAL MEDICINE

## 2021-03-28 PROCEDURE — 93306 TTE W/DOPPLER COMPLETE: CPT

## 2021-03-28 PROCEDURE — 99239 HOSP IP/OBS DSCHRG MGMT >30: CPT | Performed by: INTERNAL MEDICINE

## 2021-03-28 PROCEDURE — 93306 TTE W/DOPPLER COMPLETE: CPT | Performed by: INTERNAL MEDICINE

## 2021-03-28 PROCEDURE — 80053 COMPREHEN METABOLIC PANEL: CPT | Performed by: INTERNAL MEDICINE

## 2021-03-28 PROCEDURE — 93010 ELECTROCARDIOGRAM REPORT: CPT | Performed by: INTERNAL MEDICINE

## 2021-03-28 RX ORDER — MAGNESIUM SULFATE 1 G/100ML
1 INJECTION INTRAVENOUS ONCE
Status: DISCONTINUED | OUTPATIENT
Start: 2021-03-28 | End: 2021-03-28 | Stop reason: HOSPADM

## 2021-03-28 RX ORDER — ACETAMINOPHEN 325 MG/1
650 TABLET ORAL EVERY 8 HOURS PRN
Status: DISCONTINUED | OUTPATIENT
Start: 2021-03-28 | End: 2021-03-28 | Stop reason: HOSPADM

## 2021-03-28 RX ADMIN — INSULIN LISPRO 2 UNITS: 100 INJECTION, SOLUTION INTRAVENOUS; SUBCUTANEOUS at 11:34

## 2021-03-28 RX ADMIN — HEPARIN SODIUM 5000 UNITS: 5000 INJECTION INTRAVENOUS; SUBCUTANEOUS at 05:38

## 2021-03-28 RX ADMIN — TIOTROPIUM BROMIDE 18 MCG: 18 CAPSULE ORAL; RESPIRATORY (INHALATION) at 08:32

## 2021-03-28 RX ADMIN — ACETAMINOPHEN 650 MG: 325 TABLET, FILM COATED ORAL at 01:52

## 2021-03-28 RX ADMIN — ACETAMINOPHEN 650 MG: 325 TABLET, FILM COATED ORAL at 15:45

## 2021-03-28 RX ADMIN — ATORVASTATIN CALCIUM 40 MG: 40 TABLET, FILM COATED ORAL at 08:31

## 2021-03-28 RX ADMIN — CLOPIDOGREL BISULFATE 75 MG: 75 TABLET ORAL at 08:31

## 2021-03-28 RX ADMIN — LEVOTHYROXINE SODIUM 175 MCG: 175 TABLET ORAL at 05:38

## 2021-03-28 RX ADMIN — ATENOLOL 25 MG: 25 TABLET ORAL at 08:31

## 2021-03-28 RX ADMIN — AMLODIPINE BESYLATE 10 MG: 10 TABLET ORAL at 08:31

## 2021-03-28 RX ADMIN — CITALOPRAM HYDROBROMIDE 20 MG: 20 TABLET ORAL at 08:31

## 2021-03-28 RX ADMIN — PREGABALIN 50 MG: 50 CAPSULE ORAL at 08:31

## 2021-03-28 NOTE — PLAN OF CARE
Problem: Potential for Falls  Goal: Patient will remain free of falls  Description: INTERVENTIONS:  - Assess patient frequently for physical needs  -  Identify cognitive and physical deficits and behaviors that affect risk of falls  -  [place] fall precautions as indicated by assessment   - Educate patient/family on patient safety including physical limitations  - Instruct patient to call for assistance with activity based on assessment  - Modify environment to reduce risk of injury  - Consider OT/PT consult to assist with strengthening/mobility  Outcome: Completed     Problem: Prexisting or High Potential for Compromised Skin Integrity  Goal: Skin integrity is maintained or improved  Description: INTERVENTIONS:  - Identify patients at risk for skin breakdown  - Assess and monitor skin integrity  - Assess and monitor nutrition and hydration status  - Monitor labs   - Assess for incontinence   - Turn and reposition patient  - Assist with mobility/ambulation  - Relieve pressure over bony prominences  - Avoid friction and shearing  - Provide appropriate hygiene as needed including keeping skin clean and dry  - Evaluate need for skin moisturizer/barrier cream  - Collaborate with interdisciplinary team   - Patient/family teaching  - Consider wound care consult   Outcome: Completed     Problem: Nutrition/Hydration-ADULT  Goal: Nutrient/Hydration intake appropriate for improving, restoring or maintaining nutritional needs  Description: Monitor and assess patient's nutrition/hydration status for malnutrition  Collaborate with interdisciplinary team and initiate plan and interventions as ordered  Monitor patient's weight and dietary intake as ordered or per policy  Utilize nutrition screening tool and intervene as necessary  Determine patient's food preferences and provide high-protein, high-caloric foods as appropriate       INTERVENTIONS:  - Monitor oral intake, urinary output, labs, and treatment plans  - Assess nutrition and hydration status and recommend course of action  - Evaluate amount of meals eaten  - Assist patient with eating if necessary   - Allow adequate time for meals  - Recommend/ encourage appropriate diets, oral nutritional supplements, and vitamin/mineral supplements  - Order, calculate, and assess calorie counts as needed  - Recommend, monitor, and adjust tube feedings and TPN/PPN based on assessed needs  - Assess need for intravenous fluids  - Provide specific nutrition/hydration education as appropriate  - Include patient/family/caregiver in decisions related to nutrition  Outcome: Completed     Problem: PAIN - ADULT  Goal: Verbalizes/displays adequate comfort level or baseline comfort level  Description: Interventions:  - Encourage patient to monitor pain and request assistance  - Assess pain using appropriate pain scale  - Administer analgesics based on type and severity of pain and evaluate response  - Implement non-pharmacological measures as appropriate and evaluate response  - Consider cultural and social influences on pain and pain management  - Notify physician/advanced practitioner if interventions unsuccessful or patient reports new pain  Outcome: Completed     Problem: INFECTION - ADULT  Goal: Absence or prevention of progression during hospitalization  Description: INTERVENTIONS:  - Assess and monitor for signs and symptoms of infection  - Monitor lab/diagnostic results  - Monitor all insertion sites, i e  indwelling lines, tubes, and drains  - Monitor endotracheal if appropriate and nasal secretions for changes in amount and color  - [place] appropriate cooling/warming therapies per order  - Administer medications as ordered  - Instruct and encourage patient and family to use good hand hygiene technique  - Identify and instruct in appropriate isolation precautions for identified infection/condition  Outcome: Completed  Goal: Absence of fever/infection during neutropenic period  Description: INTERVENTIONS:  - Monitor WBC    Outcome: Completed     Problem: CARDIOVASCULAR - ADULT  Goal: Maintains optimal cardiac output and hemodynamic stability  Description: INTERVENTIONS:  - Monitor I/O, vital signs and rhythm  - Monitor for S/S and trends of decreased cardiac output  - Administer and titrate ordered vasoactive medications to optimize hemodynamic stability  - Assess quality of pulses, skin color and temperature  - Assess for signs of decreased coronary artery perfusion  - Instruct patient to report change in severity of symptoms  Outcome: Completed  Goal: Absence of cardiac dysrhythmias or at baseline rhythm  Description: INTERVENTIONS:  - Continuous cardiac monitoring, vital signs, obtain 12 lead EKG if ordered  - Administer antiarrhythmic and heart rate control medications as ordered  - Monitor electrolytes and administer replacement therapy as ordered  Outcome: Completed     Problem: GENITOURINARY - ADULT  Goal: Maintains or returns to baseline urinary function  Description: INTERVENTIONS:  - Assess urinary function  - Encourage oral fluids to ensure adequate hydration if ordered  - Administer IV fluids as ordered to ensure adequate hydration  - Administer ordered medications as needed  - Offer frequent toileting  - Follow urinary retention protocol if ordered  Outcome: Completed  Goal: Absence of urinary retention  Description: INTERVENTIONS:  - Assess patients ability to void and empty bladder  - Monitor I/O  - Bladder scan as needed  - Discuss with physician/AP medications to alleviate retention as needed  - Discuss catheterization for long term situations as appropriate  Outcome: Completed     Problem: METABOLIC, FLUID AND ELECTROLYTES - ADULT  Goal: Electrolytes maintained within normal limits  Description: INTERVENTIONS:  - Monitor labs and assess patient for signs and symptoms of electrolyte imbalances  - Administer electrolyte replacement as ordered  - Monitor response to electrolyte replacements, including repeat lab results as appropriate  - Instruct patient on fluid and nutrition as appropriate  Outcome: Completed  Goal: Fluid balance maintained  Description: INTERVENTIONS:  - Monitor labs   - Monitor I/O and WT  - Instruct patient on fluid and nutrition as appropriate  - Assess for signs & symptoms of volume excess or deficit  Outcome: Completed  Goal: Glucose maintained within target range  Description: INTERVENTIONS:  - Monitor Blood Glucose as ordered  - Assess for signs and symptoms of hyperglycemia and hypoglycemia  - Administer ordered medications to maintain glucose within target range  - Assess nutritional intake and initiate nutrition service referral as needed  Outcome: Completed     Problem: HEMATOLOGIC - ADULT  Goal: Maintains hematologic stability  Description: INTERVENTIONS  - Assess for signs and symptoms of bleeding or hemorrhage  - Monitor labs  - Administer supportive blood products/factors as ordered and appropriate  Outcome: Completed

## 2021-03-28 NOTE — PLAN OF CARE
Problem: Potential for Falls  Goal: Patient will remain free of falls  Description: INTERVENTIONS:  - Assess patient frequently for physical needs  -  Identify cognitive and physical deficits and behaviors that affect risk of falls  -  [place] fall precautions as indicated by assessment   - Educate patient/family on patient safety including physical limitations  - Instruct patient to call for assistance with activity based on assessment  - Modify environment to reduce risk of injury  - Consider OT/PT consult to assist with strengthening/mobility  Outcome: Progressing     Problem: Prexisting or High Potential for Compromised Skin Integrity  Goal: Skin integrity is maintained or improved  Description: INTERVENTIONS:  - Identify patients at risk for skin breakdown  - Assess and monitor skin integrity  - Assess and monitor nutrition and hydration status  - Monitor labs   - Assess for incontinence   - Turn and reposition patient  - Assist with mobility/ambulation  - Relieve pressure over bony prominences  - Avoid friction and shearing  - Provide appropriate hygiene as needed including keeping skin clean and dry  - Evaluate need for skin moisturizer/barrier cream  - Collaborate with interdisciplinary team   - Patient/family teaching  - Consider wound care consult   Outcome: Progressing     Problem: Nutrition/Hydration-ADULT  Goal: Nutrient/Hydration intake appropriate for improving, restoring or maintaining nutritional needs  Description: Monitor and assess patient's nutrition/hydration status for malnutrition  Collaborate with interdisciplinary team and initiate plan and interventions as ordered  Monitor patient's weight and dietary intake as ordered or per policy  Utilize nutrition screening tool and intervene as necessary  Determine patient's food preferences and provide high-protein, high-caloric foods as appropriate       INTERVENTIONS:  - Monitor oral intake, urinary output, labs, and treatment plans  - Assess nutrition and hydration status and recommend course of action  - Evaluate amount of meals eaten  - Assist patient with eating if necessary   - Allow adequate time for meals  - Recommend/ encourage appropriate diets, oral nutritional supplements, and vitamin/mineral supplements  - Order, calculate, and assess calorie counts as needed  - Recommend, monitor, and adjust tube feedings and TPN/PPN based on assessed needs  - Assess need for intravenous fluids  - Provide specific nutrition/hydration education as appropriate  - Include patient/family/caregiver in decisions related to nutrition  Outcome: Progressing     Problem: PAIN - ADULT  Goal: Verbalizes/displays adequate comfort level or baseline comfort level  Description: Interventions:  - Encourage patient to monitor pain and request assistance  - Assess pain using appropriate pain scale  - Administer analgesics based on type and severity of pain and evaluate response  - Implement non-pharmacological measures as appropriate and evaluate response  - Consider cultural and social influences on pain and pain management  - Notify physician/advanced practitioner if interventions unsuccessful or patient reports new pain  Outcome: Progressing     Problem: INFECTION - ADULT  Goal: Absence or prevention of progression during hospitalization  Description: INTERVENTIONS:  - Assess and monitor for signs and symptoms of infection  - Monitor lab/diagnostic results  - Monitor all insertion sites, i e  indwelling lines, tubes, and drains  - Monitor endotracheal if appropriate and nasal secretions for changes in amount and color  - [place] appropriate cooling/warming therapies per order  - Administer medications as ordered  - Instruct and encourage patient and family to use good hand hygiene technique  - Identify and instruct in appropriate isolation precautions for identified infection/condition  Outcome: Progressing  Goal: Absence of fever/infection during neutropenic period  Description: INTERVENTIONS:  - Monitor WBC    Outcome: Progressing     Problem: CARDIOVASCULAR - ADULT  Goal: Maintains optimal cardiac output and hemodynamic stability  Description: INTERVENTIONS:  - Monitor I/O, vital signs and rhythm  - Monitor for S/S and trends of decreased cardiac output  - Administer and titrate ordered vasoactive medications to optimize hemodynamic stability  - Assess quality of pulses, skin color and temperature  - Assess for signs of decreased coronary artery perfusion  - Instruct patient to report change in severity of symptoms  Outcome: Progressing  Goal: Absence of cardiac dysrhythmias or at baseline rhythm  Description: INTERVENTIONS:  - Continuous cardiac monitoring, vital signs, obtain 12 lead EKG if ordered  - Administer antiarrhythmic and heart rate control medications as ordered  - Monitor electrolytes and administer replacement therapy as ordered  Outcome: Progressing     Problem: GENITOURINARY - ADULT  Goal: Maintains or returns to baseline urinary function  Description: INTERVENTIONS:  - Assess urinary function  - Encourage oral fluids to ensure adequate hydration if ordered  - Administer IV fluids as ordered to ensure adequate hydration  - Administer ordered medications as needed  - Offer frequent toileting  - Follow urinary retention protocol if ordered  Outcome: Progressing  Goal: Absence of urinary retention  Description: INTERVENTIONS:  - Assess patients ability to void and empty bladder  - Monitor I/O  - Bladder scan as needed  - Discuss with physician/AP medications to alleviate retention as needed  - Discuss catheterization for long term situations as appropriate  Outcome: Progressing     Problem: METABOLIC, FLUID AND ELECTROLYTES - ADULT  Goal: Electrolytes maintained within normal limits  Description: INTERVENTIONS:  - Monitor labs and assess patient for signs and symptoms of electrolyte imbalances  - Administer electrolyte replacement as ordered  - Monitor response to electrolyte replacements, including repeat lab results as appropriate  - Instruct patient on fluid and nutrition as appropriate  Outcome: Progressing  Goal: Fluid balance maintained  Description: INTERVENTIONS:  - Monitor labs   - Monitor I/O and WT  - Instruct patient on fluid and nutrition as appropriate  - Assess for signs & symptoms of volume excess or deficit  Outcome: Progressing  Goal: Glucose maintained within target range  Description: INTERVENTIONS:  - Monitor Blood Glucose as ordered  - Assess for signs and symptoms of hyperglycemia and hypoglycemia  - Administer ordered medications to maintain glucose within target range  - Assess nutritional intake and initiate nutrition service referral as needed  Outcome: Progressing     Problem: HEMATOLOGIC - ADULT  Goal: Maintains hematologic stability  Description: INTERVENTIONS  - Assess for signs and symptoms of bleeding or hemorrhage  - Monitor labs  - Administer supportive blood products/factors as ordered and appropriate  Outcome: Progressing

## 2021-03-28 NOTE — ASSESSMENT & PLAN NOTE
Lab Results   Component Value Date    EGFR 49 03/28/2021    EGFR 38 03/27/2021    EGFR 48 03/15/2021    CREATININE 1 46 (H) 03/28/2021    CREATININE 1 78 (H) 03/27/2021    CREATININE 1 49 03/15/2021     · Baseline creatine 1 1- 1 4   · Unclear etiology, most likely prerenal   Patient looks dry  · Hold lisinopril  · Plasmalyte with 75 cc an hour for 10 hours  · Monitor BMP a m    · Avoid hypotension/nephrotoxin  · I/O's

## 2021-03-28 NOTE — PROGRESS NOTES
Attempted to administer IV magnesium  Pt refused, stating that he no longer wants to take any medications  He will only allow the echocardiogram to be done  I explained the importance of taking medications and the purpose of the magnesium, but pt still continued to refuse  Notified [facility] resident  Will continue to monitor

## 2021-03-28 NOTE — UTILIZATION REVIEW
Initial Clinical Review    Admission: Date/Time/Statement:   Admission Orders (From admission, onward)     Ordered        03/27/21 1623  Inpatient Admission  Once                   Orders Placed This Encounter   Procedures    Inpatient Admission     Standing Status:   Standing     Number of Occurrences:   1     Order Specific Question:   Level of Care     Answer:   Med Surg [16]     Order Specific Question:   Estimated length of stay     Answer:   Not Applicable     ED Arrival Information     Expected Arrival 70 [address] Trixie Mcgraw of Arrival Escorted By Service Admission Type    - 3/27/2021 13:49 Emergent Ambulance [facility] Emergency    Arrival Complaint    -        Chief Complaint   Patient presents with    Altered Mental Status     Pt presents to the ED from a bank where he appeared sluggish  Pt found to have runs of vtach per EMS  Assessment/Plan: 79 yo male PMHx insulin-dependent diabetes mellitus, CAD, hypothyroidism, hypertension, polypharmacy, recent transmetatarsal amputation 3/4/2021 due to Osteomyelitis to ED by EMS admitted Inpatient due to Syncope, FAUSTO, insulin dep diabetes, S/P amputation, Hypoglycemia, HTN  Patient arrives after a Syncopal event at a bank, no head strike-he slumped over a chair  Mentioned in ambulance he was able to hear but was confused & could not speak  Mentions he did not take am meds due to they make him lightheaded  In EMS: systolic blood pressure in the 90s, in ED:  EKG & telemetry : nonsustained V-tach with frequent PVCs  Received dual IV antibx for oozing blood at amputation site (Wound culture showed Enterococcus faecalis for which patient took ampicillin until 3/9)  PLAN: serial trops, telemetry, obtain echo, Orthostatic vitals, fall precaution  Hold Atarax/Lisinopril  Continue gentle IV Plasmalyte for 10 HRS  Repeat BMP  Avoid hypotension  Hold further antibx, Podiatry consult, wound care   Hold home insulin, monitor sugars- consider lantus in am  Cont Plavix, atorvastatin  Monitor BPs   3/28/2021 Provider  Syncope while in bank prior to admission  While sitting, Patient felt weak & knew he was going to pass out  Denies tongue bite, incontinence; motor /sensory deficits  Monitor on telemetry with 2 RUNS of unsustained VTACH at 8 am & 11 am since admit  Echo pending; replenish electrolytes & monitor   Obtain orthostatic vitals  Hold Atarax  FAUSTO: baseline 1 1-1 4- hold ACEi, gentle IV hydration for 10 hrs, monitor BMP latha m     ED Triage Vitals   Temperature Pulse Respirations Blood Pressure SpO2   03/27/21 1356 03/27/21 1356 03/27/21 1356 03/27/21 1404 03/27/21 1356   98 2 °F (36 8 °C) 90 18 129/57 99 %      Temp Source Heart Rate Source Patient Position - Orthostatic VS BP Location FiO2 (%)   03/27/21 1356 03/27/21 1356 03/27/21 1920 03/27/21 1356 --   Oral Monitor Lying Left arm       Pain Score       03/27/21 1943       No Pain          Wt Readings from Last 1 Encounters:   03/23/21 93 kg (205 lb)     Additional Vital Signs:   Date/Time  Temp  Pulse  Resp  BP  MAP (mmHg)  SpO2  O2 Device  Patient Position - Orthostatic VS   03/28/21 0724  --  80  18  131/54  72  98 %  None (Room air)  Standing - Orthostatic VS    Patient Position - Orthostatic VS: Pt could not stand for 3 minutes at 03/28/21 0724   03/28/21 0723  --  76  18  137/58  84  98 %  None (Room air)  Sitting - Orthostatic VS   03/28/21 0700  98 °F (36 7 °C)  72  18  142/60  86  98 %  None (Room air)  Lying - Orthostatic VS   03/27/21 2200  98 4 °F (36 9 °C)  76  18  129/57  --  98 %  None (Room air)  Lying   03/27/21 1946  97 9 °F (36 6 °C)  75  18  148/63  91  98 %  None (Room air)  Lying   03/27/21 1920  97 4 °F (36 3 °C)Abnormal   84  18  140/62  89  94 %  None (Room air)  Lying   03/27/21 1830  --  92  16  168/60  87  97 %  None (Room air)  --   03/27/21 1829  --  78  --  168/80  --  --  --  --   03/27/21 1715  --  84  18  149/94  113  100 %  None (Room air)  --   03/27/21 1630  --  86  18 141/65  94  95 %  None (Room air)  --   03/27/21 1600  --  84  18  147/104Abnormal   120  98 %  None (Room air)  --   03/27/21 1515  --  70  18  151/63  91  98 %  None (Room air)  --   03/27/21 1500  --  52Abnormal   18  132/64  92  97 %  None (Room air)  --   03/27/21 1445  --  70  18  140/67  96  98 %  None (Room air)  --   03/27/21 1404  --  --  --  129/57  --  --  --  --   03/27/21 1356  98 2 °F (36 8 °C)  90  18  --  --  99 %           Pertinent Labs/Diagnostic Test Results:       Results from last 7 days   Lab Units 03/28/21  0454 03/27/21  1400   WBC Thousand/uL 9 36 8 73   HEMOGLOBIN g/dL 8 6* 8 7*   HEMATOCRIT % 27 0* 28 0*   PLATELETS Thousands/uL 305 303   NEUTROS ABS Thousands/µL 5 23 4 55         Results from last 7 days   Lab Units 03/28/21  0454 03/27/21  1400   SODIUM mmol/L 140 137   POTASSIUM mmol/L 4 9 5 1   CHLORIDE mmol/L 109* 107   CO2 mmol/L 22 21   ANION GAP mmol/L 9 9   BUN mg/dL 18 22   CREATININE mg/dL 1 46* 1 78*   EGFR ml/min/1 73sq m 49 38   CALCIUM mg/dL 8 5 8 2*   MAGNESIUM mg/dL 1 9 1 8     Results from last 7 days   Lab Units 03/28/21  0454 03/27/21  1400   AST U/L 12 13   ALT U/L 18 18   ALK PHOS U/L 154* 157*   TOTAL PROTEIN g/dL 6 3* 6 4   ALBUMIN g/dL 2 5* 2 6*   TOTAL BILIRUBIN mg/dL 0 22 0 17*     Results from last 7 days   Lab Units 03/28/21  1543 03/28/21  1039 03/28/21  0721 03/28/21  0034 03/27/21  2213 03/27/21  1827   POC GLUCOSE mg/dl 148* 229* 94 236* 173* 63*     Results from last 7 days   Lab Units 03/28/21  0454 03/27/21  1400   GLUCOSE RANDOM mg/dL 142* 111             BETA-HYDROXYBUTYRATE   Date Value Ref Range Status   03/27/2021 0 1 <0 6 mmol/L Final                      Results from last 7 days   Lab Units 03/28/21  0230 03/27/21  2308 03/27/21 2021 03/27/21  1400   TROPONIN I ng/mL <0 02 <0 02 <0 02 <0 02         Results from last 7 days   Lab Units 03/27/21  1400   PROTIME seconds 14 0   INR  1 07   PTT seconds 29     Results from last 7 days   Lab Units 03/27/21  1400   TSH 3RD GENERATON uIU/mL 0 203*     Results from last 7 days   Lab Units 03/28/21  0454 03/27/21  1400   PROCALCITONIN ng/ml <0 05 <0 05     Results from last 7 days   Lab Units 03/27/21  1400   LACTIC ACID mmol/L 1 6             Results from last 7 days   Lab Units 03/27/21  1400   NT-PRO BNP pg/mL 1,058*                         Results from last 7 days   Lab Units 03/27/21  1435   CLARITY UA  Clear   COLOR UA  Yellow   SPEC GRAV UA  1 025   PH UA  5 5   GLUCOSE UA mg/dl Negative   KETONES UA mg/dl Negative   BLOOD UA  Negative   PROTEIN UA mg/dl Negative   NITRITE UA  Negative   BILIRUBIN UA  Negative   UROBILINOGEN UA E U /dl 0 2   LEUKOCYTES UA  Negative             Results from last 7 days   Lab Units 03/27/21  1528   AMPH/METH  Negative   BARBITURATE UR  Negative   BENZODIAZEPINE UR  Negative   COCAINE UR  Negative   METHADONE URINE  Negative   OPIATE UR  Negative   PCP UR  Negative   THC UR  Negative     Results from last 7 days   Lab Units 03/27/21  1555 03/27/21  1528   ETHANOL LVL mg/dL  --  <3   ACETAMINOPHEN LVL ug/mL <2*  --    SALICYLATE LVL mg/dL 4 1  --                  Results from last 7 days   Lab Units 03/27/21  1409 03/27/21  1400   BLOOD CULTURE  Received in Microbiology Lab  Culture in Progress  Received in Microbiology Lab  Culture in Progress  CT head without contrast   Final Result by , MD (03/27 1443)      No acute intracranial abnormality  XR chest portable   Final Result by Calixto Ya MD (03/28 1653)      Improvement in bibasilar airspace opacities  No new focal consolidations       3/27/2021 ekg     Normal sinus rhythm with runs of probable NSVT  Prolonged QT   Abnormal ECG  3/27/2021 ECHO ordered & pending  ED Treatment:   Medication Administration from 03/27/2021 1349 to 03/27/2021 1942       Date/Time Order Dose Route Action     03/27/2021 1503 vancomycin (VANCOCIN) 1,500 mg in sodium chloride 0 9 % 250 mL IVPB 1,500 mg Intravenous New Bag     03/27/2021 1436 cefepime (MAXIPIME) 2 g/50 mL dextrose IVPB 2,000 mg Intravenous New Bag     03/27/2021 1437 lactated ringers bolus 1,000 mL 1,000 mL Intravenous New Bag     03/27/2021 1829 atenolol (TENORMIN) tablet 25 mg 25 mg Oral Given     03/27/2021 1829 atorvastatin (LIPITOR) tablet 40 mg 40 mg Oral Given     03/27/2021 1903 citalopram (CeleXA) tablet 20 mg 20 mg Oral Given     03/27/2021 1829 clopidogrel (PLAVIX) tablet 75 mg 75 mg Oral Given     03/27/2021 1903 levothyroxine tablet 175 mcg 175 mcg Oral Given     03/27/2021 1829 pregabalin (LYRICA) capsule 50 mg 50 mg Oral Given     03/27/2021 1904 tiotropium (SPIRIVA) capsule for inhaler 18 mcg 18 mcg Inhalation Given     03/27/2021 1905 multi-electrolyte (PLASMALYTE-A/ISOLYTE-S PH 7 4) IV solution 75 mL/hr Intravenous New Bag        No past medical history on file    Present on Admission:   Depression   Hypothyroidism   Peripheral arterial disease   Anemia of chronic disease      Admitting Diagnosis: Multifocal PVCs [I49 3]  Disorientation [R41 0]  Status post amputation [Z89 9]  Peripheral arterial disease (HCC) [I73 9]  FAUSTO (acute kidney injury) ([facility] [address] ) [N17 9]  AMS (altered mental status) [R41 82]  Age/Sex: 78 y o  male  Admission Orders:  Neuro checks q4 hr x24 hr  Telemetry  Echo  Orthostatic BPs  Up w assist  scd  Scheduled Medications:  amLODIPine, 10 mg, Oral, Daily  atenolol, 25 mg, Oral, Daily  atorvastatin, 40 mg, Oral, Daily  citalopram, 20 mg, Oral, Daily  clopidogrel, 75 mg, Oral, Daily  glycerin-hypromellose-, 1 drop, Both Eyes, TID  heparin (porcine), 5,000 Units, Subcutaneous, Q8H CECILIO  insulin lispro, 1-6 Units, Subcutaneous, TID AC  insulin lispro, 1-6 Units, Subcutaneous, HS  levothyroxine, 175 mcg, Oral, Early Morning  pregabalin, 50 mg, Oral, Daily  tiotropium, 18 mcg, Inhalation, Daily  traZODone, 50 mg, Oral, HS  Continuous IV Infusions:   IV multi-electrolyte (PLASMALYTE-A/ISOLYTE-S PH 7 4) IV solution 3/27 1905-3/28 DC 0504  PRN Meds:  acetaminophen, 650 mg, Oral, Q8H PRN  simethicone, 80 mg, Oral, 4x Daily PRN    IP CONSULT TO PODIATRY  IP CONSULT TO CARDIOLOGY    Network Utilization Review Department  ATTENTION: Please call with any questions or concerns to [phone] and carefully listen to the prompts so that you are directed to the right person  All voicemails are confidential   Afshin Ramsay all requests for admission clinical reviews, approved or denied determinations and any other requests to dedicated fax number below belonging to the campus where the patient is receiving treatment   List of dedicated fax numbers for the Facilities:  [address] DENIALS (Administrative/Medical Necessity) [phone]   [address] (Maternity/NICU/Pediatrics) [phone]   [address] [place] [address] (Radha Gelbarb) [address] [place] [address] [address] [place] [address] [phone]

## 2021-03-28 NOTE — ASSESSMENT & PLAN NOTE
· Patient had likely syncopal episode today while in the bank  Mentioned that wanted to sit up, felt weak and that he was going to pass out  Per patient while in ambulance he was able to hear people talking but he was confused and could not talk  Denied tongue bite, urine/fecal incontinence, motor/sensory deficits  Denied chest pain, nausea, vomiting, diaphoresis  · Per ED notes, EMS reported systolic blood pressure in the 90s, normal blood sugar  · Patient mentioned that sometimes he feels lightheaded and has tremors because of some of his medication (unclear which 1 likely pain medicine)  He said that he did not to take any of his morning medication today because he knew he will go to the bank and wanted to make sure he is not lightheaded  · EKG and telemetry showed nonsustained V-tach with frequent PVCs  Troponin was normal, NT pro BNP: 1058, electrolytes within normal limits, rapid drug screen within normal limits, UA normal  · Differentials include:  Cardiac arrhythmia versus orthostatic hypotension versus vasovagal versus medication versus hypoglycemia    Unlikely TIA, patient had no neurologic deficits    Plan:  · Serial troponin x3  · Monitor on telemetry - 2 runs of unsustained Vtach (3 beats) at 8 am and 11 am   · Echocardiography pending  · Monitor electrolytes and replenish, avoid hypoglycemia  · Orthostatic vital signs  · Fall precaution  · Avoid hypotension  · Hold Atarax

## 2021-03-28 NOTE — ASSESSMENT & PLAN NOTE
Lab Results   Component Value Date    HGBA1C 9 3 (H) 02/21/2021       Recent Labs     03/27/21  2213 03/28/21  0034 03/28/21  0721 03/28/21  1039   POCGLU 173* 236* 94 229*       Blood Sugar Average: Last 72 hrs:  (P) 159     · Insulin with sliding scale  · Accu-Chek  · Hypoglycemia protocol  · Diabetic diet  · Home regiment:  Lantus 30 units a m , 60 units bedtime and 30 units Humalog with supper

## 2021-03-28 NOTE — ASSESSMENT & PLAN NOTE
[facility]   Intensive Care Unit Progress Note    Name: Urban (Male KAROLINE- Jim Rios  MRN#[id number]  Parents: Izabela and Oneil Rios   YOB: 2017 2:59 PM  Date of Admission: 2019  ____    History of Present Illness   , 30w3d, appropriate for gestational age,twin B, male infant born by urgent  section for PROM,   vaginal bleeding and decels ( twin A).     The infant was admitted to the NICU for further evaluation, monitoring and management of prematurity, RDS and possible sepsis.    Patient Active Problem List   Diagnosis     Prematurity, 30w3d GA     Feeding problem in infant     Need for observation and evaluation of  for sepsis     Apnea of prematurity     Respiratory failure of      VLBW baby (very low birth-weight baby) at 1250g     Hyperbilirubinemia requiring phototherapy. 12/3-;  -.       Interval History   No acute concerns overnight. Afeb, VSS, Tolerating feeds.  RA overnight, but this am was having periodic breathing with desats, so placed back on LFNC.      Assessment & Plan   Overall Status:    21 day old , VLBW, male infant, now at 33w3d PMA.   RDS resolved. Tolerating full gavage feeds.     This patient, whose weight is < 5000 grams, is no longer critically ill.   He still requires gavage feeds and CR monitoring, due to prematurity.    Changes in plan on 2019 :  - increase feeds for weight gain.  - see below for details of overall ongoing plan by system, PE, and daily communications.  ------    FEN:    Vitals:    19 1600 19 1600 19 1600   Weight: 1.48 kg (3 lb 4.2 oz) 1.53 kg (3 lb 6 oz) 1.55 kg (3 lb 6.7 oz)   Weight change: 0.02 kg (0.7 oz)    Malnutrition.  Growth curve reviewed and acceptable liner growth.  No osteopenia of prematurity.    Appropriate I/O, ~ at fluid goal with adequate UO and stool.     Continue:  - TF goal 160 ml/kg/day  - gavage  · Continue citalopram and trazodone  · Will hold Atarax (patient reported tremors and lightheaded with some of his medication) feedings of MBM/DBM 24kcal + LP. 100% gavage.  - Vit D supplementation.   - monitoring fluid status, feeding tolerance & readiness scores, along with overall growth.   - plan to initiate IDF schedule when feeding readiness scores appropriate (1-2 for >50%)     Lab Results   Component Value Date    CLIFFORD 341 2019       Respiratory: Currently on 1/2 lpm LFNC at 21-24% FiO2, due to periodic breathing.   Failure initially requiring CPAP. CXR consistent with retained pulmonary fluid. Briefly on LFNC -.  - Continue routine CR monitoring.     Apnea of Prematurity:  No AB spells.  Freq SR desats.    - Continue caffeine until ~33-34 weeks PMA.    Cardiovascular:  Stable - good perfusion and BP.   No murmur present.  - obtain CCHD screen.   - Continue routine CR monitoring.     ID: No current signs of systemic infection.   Initial sepsis eval NTD, received empiric antibiotic therapy for ~48 hr.    Hematology:  Risk for anemia of prematurity/phlebotomy.  Ferritin 63.  - continue darbepoetin and iron supplementation.   - monitor serial Hgb/Ferritin levels - next at 30do with blood draw for repeat NMS     No results for input(s): HGB in the last 168 hours.    CNS:  Exam wnl for 30 weeks. No IVH - normal initial HUS.  - Obtain final screening head ultrasound at ~35-36 wks PMA (eval for PVL).  - Monitor clinical exam and weekly OFC measurements.      Sedation/ Pain Control:  Sweet ease for painful procedures    ROP:  At risk due to prematurity (<31 weeks BGA).    - Schedule ROP exam with Peds Ophthalmology per protocol ().    Thermoregulation:   - Monitor temperature and provide thermal support as indicated.    HCM: [place] - first with borderline aa profile.   - Send final repeat NMS at 30 days old.  - Obtain hearing/CCHD/carseat screens PTD.  - Input from OT.  - Continue standard NICU cares and family education plan.    Immunizations   - Give Hep B immunization at 21-30 days  old (BW <2000 gm) or PTD, whichever comes first.    There is no immunization history on file for this patient.     Medications   Current Facility-Administered Medications   Medication     Breast Milk label for barcode scanning 1 Bottle     caffeine citrate (CAFCIT) solution 14 mg     cholecalciferol (D-VI-SOL, Vitamin D3) 10 MCG/ML (400 units/ml) liquid 300 Units     cyclopentolate-phenylephrine (CYCLOMYDRYL) 0.2-1 % ophthalmic solution 1 drop     darbepoetin kenton (ARANESP) injection 13 mcg     ferrous sulfate (LENNY-IN-SOL) oral drops 4.5 mg     glycerin (PEDI-LAX) Suppository 0.125 suppository     sucrose (SWEET-EASE) solution 0.2-2 mL     tetracaine (PONTOCAINE) 0.5 % ophthalmic solution 1 drop     Physical Exam - Attending Physician   GENERAL: NAD, male infant. Overall appearance c/w CGA.   RESPIRATORY: Chest CTA with equal breath sounds, no retractions.   CV: RRR, no murmur, strong/sym pulses in UE/LE, good perfusion.   ABDOMEN: soft, +BS, no HSM.   CNS: Tone appropriate for GA. AFOF. MAEE.   Rest of exam unchanged.      Communications   Parents:  Updated after rounds by MYA.    Extended Emergency Contact Information  Primary Emergency Contact: Oneil Rios  Address: [address] [place] [id number] [facility]  Home Phone: [phone]  Mobile Phone: [phone]  Relation: Father  Secondary Emergency Contact: BLUEKRISHIZABELA IZA  Address: [address] [place] [id number] [facility]  Home Phone: [phone]  Mobile Phone: [phone]  Relation: Mother    PCPs:   Infant PCP: Physician No Ref-Primary  Maternal OB PCP:   Information for the patient's mother:  Izabela Rios [[id number]]   Izabela Castro  Delivering Provider:   Juanita Shaw  All updated via Epic on 12/19/19.     Health Care Team:  Patient discussed with the care team.    A/P, imaging studies, laboratory data, medications and family situation reviewed.    Letha Miramontes MD

## 2021-03-28 NOTE — DISCHARGE INSTR - AVS FIRST PAGE
Dear Kavita Dewey,     It was our pleasure to care for you here at [facility]  It is our hope that we were always able to exceed the expected standards for your care during your stay  You were hospitalized due to loss of consciousness  You were cared for on the 3rd floor by Alan Cedillo DO under the service of Elizabeth Riggs MD with the [facility] Hospitalist Group who covers for your primary care physician (PCP), Rosea Shone, while you were hospitalized  If you have any questions or concerns related to this hospitalization, you may contact us at [phone]  For follow up as well as any medication refills, we recommend that you follow up with your primary care physician  A registered nurse will reach out to you by phone within a few days after your discharge to answer any additional questions that you may have after going home  However, at this time we provide for you here, the most important instructions / recommendations at discharge:     · Notable Medication Adjustments -   · None  · Testing Required after Discharge -   ·   · Important follow up information -   · Please follow up with your primary care physician within 1 week  · Please contact your podiatrist to establish Wound Care and home PT/OT  · A referral to Cardiology has been made for you  They will call to arrange an appointment  Their contact information has been provided if you wish to reach out to them  · Other Instructions -   · We have informed you that at this time you are leaving against medical advice and that there is risk of serious illness, injury and/or death  Against medical advice paperwork was signed     · Please review this entire after visit summary as additional general instructions including medication list, appointments, activity, diet, any pertinent wound care, and other additional recommendations from your care team that may be provided for you       Sincerely,     Brunilda Layne, DO

## 2021-03-28 NOTE — PROGRESS NOTES
[facility]  Progress Note - Rosanna Laird 1950, 78 y o  male MRN: [id number]  Unit/Bed#: S -01 Encounter: [id number]  Primary Care Provider: Ulises Corey   Date and time admitted to hospital: 3/27/2021  1:49 PM    * Syncope  Assessment & Plan  · Patient had likely syncopal episode [place] the [place]  Mentioned that wanted to sit up, felt weak and that he was going to pass out  Per patient while in ambulance he was able to hear people talking but he was confused and could not talk  Denied tongue bite, urine/fecal incontinence, motor/sensory deficits  Denied chest pain, nausea, vomiting, diaphoresis  · Per ED notes, EMS reported systolic blood pressure in the 90s, normal blood sugar  · Patient mentioned that sometimes he feels lightheaded and has tremors because of some of his medication (unclear which 1 likely pain medicine)  He said that he did not to take any of his morning medication today because he knew he will go to the bank and wanted to make sure he is not lightheaded  · EKG and telemetry showed nonsustained V-tach with frequent PVCs  Troponin was normal, NT pro BNP: 1058, electrolytes within normal limits, rapid drug screen within normal limits, UA normal  · Differentials include:  Cardiac arrhythmia versus orthostatic hypotension versus vasovagal versus medication versus hypoglycemia    Unlikely TIA, patient had no neurologic deficits    Plan:  · Serial troponin x3  · Monitor on telemetry - 2 runs of unsustained Vtach (3 beats) at 8 am and 11 am   · Echocardiography pending  · Monitor electrolytes and replenish, avoid hypoglycemia  · Orthostatic vital signs  · Fall precaution  · Avoid hypotension  · Hold Atarax    Hypoglycemia  Assessment & Plan  · Glucose present on admission 111 mg, last checked 63 mg  · Patient is on insulin Lantus 30 units a m , 60 units bedtime and 30 units Humalog with supper  · Patient had elevated blood sugars during the last admission for which he needed insulin drip  · Will hold home insulin  · Sliding scale only with Accu-Chek  · Hypoglycemia protocol  · If blood sugar normalized tomorrow, consider Lantus    Status post amputation  Assessment & Plan  · Patient had transmetatarsal amputation last month  Wound culture showed Enterococcus faecalis for which patient took ampicillin until 3/9  · He followed with vascular surgery and per their notes the wound was clean and dry on 03/23  · Patient has erythema along the incision, and oozing blood  · Emergency department patient received vancomycin and cefepime for likely infection  · Will hold antibiotics for now  · Monitor vital signs  · Podiatry consult  · Wound care    Acute-on-chronic kidney injury [facility])  Assessment & Plan  Lab Results   Component Value Date    EGFR 49 03/28/2021    EGFR 38 03/27/2021    EGFR 48 03/15/2021    CREATININE 1 46 (H) 03/28/2021    CREATININE 1 78 (H) 03/27/2021    CREATININE 1 49 03/15/2021     · Baseline creatine 1 1- 1 4   · Unclear etiology, most likely prerenal   Patient looks dry  · Hold lisinopril  · Plasmalyte with 75 cc an hour for 10 hours  · Monitor BMP a m    · Avoid hypotension/nephrotoxin  · I/O's      Peripheral arterial disease  Assessment & Plan  · Status post right SFA stents placement  · Patient followed with vascular surgery on 03/23   · Continue Plavix and atorvastatin    Anemia of chronic disease  Assessment & Plan  · Hemoglobin 8 7 present on admission, baseline 8 9- 11 5  · Monitor    Hypothyroidism  Assessment & Plan  · On levothyroxine 175 mcg daily  · TSH 0 28  · Likely patient will need dose adjustment    Depression  Assessment & Plan  · Continue citalopram and trazodone  · Will hold Atarax (patient reported tremors and lightheaded with some of his medication)    Hypertension  Assessment & Plan  · Home medication amlodipine 10 mg daily, atenolol 25 mg, lisinopril 20 mg daily  · Hold lisinopril because of ongoing FAUSTO  · Continue atenolol 25 mg because of PVCs, continue amlodipine  · Monitor blood pressure  Avoid hypotension    Insulin-dependent diabetes mellitus  Assessment & Plan  Lab Results   Component Value Date    HGBA1C 9 3 (H) 2021       Recent Labs     21  2213 21  0034 21  0721 21  1039   POCGLU 173* 236* 94 229*       Blood Sugar Average: Last 72 hrs:  (P) 159     · Insulin with sliding scale  · Accu-Chek  · Hypoglycemia protocol  · Diabetic diet  · Home regiment:  Lantus 30 units a m , 60 units bedtime and 30 units Humalog with supper      VTE Pharmacologic Prophylaxis:   Pharmacologic: Heparin  Mechanical VTE Prophylaxis in Place: Yes    Discussions with Specialists or Other Care Team Provider: Podiatry     Education and Discussions with Family / Patient: Patient    Current Length of Stay: 1 day(s)    Current Patient Status: Inpatient     Discharge Plan / Estimated Discharge Date: 24- 48 hours    Code Status: Level 1 - Full Code      Subjective:   Patient seen and examined at bedside he denies any subjective complaints  Objective:     Vitals:   Temp (24hrs), Av 9 °F (36 6 °C), Min:97 4 °F (36 3 °C), Max:98 4 °F (36 9 °C)    Temp:  [97 4 °F (36 3 °C)-98 4 °F (36 9 °C)] 98 °F (36 7 °C)  HR:  [52-92] 80  Resp:  [16-18] 18  BP: (129-168)/() 131/54  SpO2:  [94 %-100 %] 98 %  There is no height or weight on file to calculate BMI  Input and Output Summary (last 24 hours): Intake/Output Summary (Last 24 hours) at 3/28/2021 1400  Last data filed at 3/28/2021 1300  Gross per 24 hour   Intake 3260 ml   Output 2450 ml   Net 810 ml       Physical Exam:     Physical Exam  Constitutional:       Appearance: Normal appearance  Cardiovascular:      Rate and Rhythm: Normal rate and regular rhythm  Pulmonary:      Effort: Pulmonary effort is normal       Breath sounds: Normal breath sounds  Abdominal:      Palpations: Abdomen is soft  Tenderness:  There is no abdominal tenderness  Musculoskeletal:      Right lower leg: No edema  Left lower leg: No edema  Comments: Right foot toe amputations, appears clean no drainage   Skin:     General: Skin is warm and dry  Neurological:      General: No focal deficit present  Mental Status: He is alert and oriented to person, place, and time  Psychiatric:         Mood and Affect: Mood normal          Behavior: Behavior normal          Additional Data:     Labs:    Results from last 7 days   Lab Units 03/28/21  0454   WBC Thousand/uL 9 36   HEMOGLOBIN g/dL 8 6*   HEMATOCRIT % 27 0*   PLATELETS Thousands/uL 305   NEUTROS PCT % 56   LYMPHS PCT % 27   MONOS PCT % 9   EOS PCT % 7*     Results from last 7 days   Lab Units 03/28/21  0454   POTASSIUM mmol/L 4 9   CHLORIDE mmol/L 109*   CO2 mmol/L 22   BUN mg/dL 18   CREATININE mg/dL 1 46*   CALCIUM mg/dL 8 5   ALK PHOS U/L 154*   ALT U/L 18   AST U/L 12     Results from last 7 days   Lab Units 03/27/21  1400   INR  1 07       * I Have Reviewed All Lab Data Listed Above  * Additional Pertinent Lab Tests Reviewed: All Labs Within Last 24 Hours Reviewed    Imaging:    Imaging Reports Reviewed Today Include:  None  Imaging Personally Reviewed by Myself Includes:  None    Recent Cultures (last 7 days):     Results from last 7 days   Lab Units 03/27/21  1409 03/27/21  1400   BLOOD CULTURE  Received in Microbiology Lab  Culture in Progress  Received in Microbiology Lab  Culture in Progress         Last 24 Hours Medication List:   Current Facility-Administered Medications   Medication Dose Route Frequency Provider Last Rate    acetaminophen  650 mg Oral Q8H PRN Marry Valderrama PA-C      amLODIPine  10 mg Oral Daily Rita Avalos MD      atenolol  25 mg Oral Daily Rita Avalos MD      atorvastatin  40 mg Oral Daily Rita Avalos MD      citalopram  20 mg Oral Daily Rita Avalos MD      clopidogrel  75 mg Oral Daily MD Nancy Borjas glycerin-hypromellose-  1 drop Both Eyes TID Contreras Saenz MD      heparin (porcine)  5,000 Units Subcutaneous [facility] Contreras Saenz MD      insulin lispro  1-6 Units Subcutaneous TID [facility] Ellie Torrez MD      insulin lispro  1-6 Units Subcutaneous HS Ellie Torrez MD      levothyroxine  175 mcg Oral Early Morning Contreras Saenz MD      magnesium sulfate  1 g Intravenous Once Louie Walsh DO      pregabalin  50 mg Oral Daily Contreras Saenz MD      simethicone  80 mg Oral 4x Daily PRN Ellie Torrez MD      tiotropium  18 mcg Inhalation Daily Contreras Saenz MD      traZODone  50 mg Oral HS Contreras Saenz MD          Today, Patient Was Seen By: Louie Walsh DO    ** Please Note: This note has been constructed using a voice recognition system   **

## 2021-03-29 NOTE — UTILIZATION REVIEW
Notification of Inpatient Admission/Inpatient Authorization Request   This is a Notification of Inpatient Admission for [facility]  Be advised that this patient was admitted to our facility under Inpatient Status  Contact Sanjuanita Marks at [phone] for additional admission information  [facility] DEPT  DEDICATED -[phone]  Patient Name:   Marshal Aragon   YOB: 1950       [address]   [address]:   Antonella Stauffer  Tax ID: [id number]  NPI: [id number] Attending Provider/NPI:  Address:  Phone: Tyshawnnely Sidhu, Mark Aguirre [[id number]]  Same as the facility  [phone]   Place of Service Code: 24 Place of Service Name:  [address] [place] [address]   Start Date: 3/27/21 1623     Discharge Date & Time: 3/28/2021  4:58 PM    Type of Admission: Inpatient Status Discharge Disposition   (if discharged): Left against medical advice or discontinued care   Patient Diagnoses: Multifocal PVCs [I49 3]  Disorientation [R41 0]  Status post amputation [Z89 9]  Peripheral arterial disease ([facility] [address] ) [I73 9]  FAUSTO (acute kidney injury) ([facility] [address] ) [N17 9]  AMS (altered mental status) [R41 82]     Orders: Admission Orders (From admission, onward)     Ordered        03/27/21 1623  Inpatient Admission  Once                    Assigned Utilization Review Contact: Sanjuanita Marks  Utilization   Network Utilization Review Department  Phone: [phone]; Fax [phone]  Email: [email or web address]   ATTENTION PAYERS: Please call the assigned Utilization  directly with any questions or concerns ALL voicemails in the department are confidential  Send all requests for admission clinical reviews, approved or denied determinations and any other requests to dedicated fax number belonging to the campus where the patient is receiving treatment

## 2021-03-31 NOTE — ASSESSMENT & PLAN NOTE
· Patient had transmetatarsal amputation last month  Wound culture showed Enterococcus faecalis for which patient took ampicillin until 3/9  · He followed with vascular surgery and per their notes the wound was clean and dry on 03/23  · Patient has erythema along the incision, and oozing blood  · Emergency department patient received vancomycin and cefepime for likely infection  · Will hold antibiotics for now    · Monitor vital signs  · Podiatry consult  · Wound care    Patient left AMA prior to Podiatry consult  Advised him to follow up with his podiatrist this week and to discuss with their office setting up home wound care and PT/OT

## 2021-03-31 NOTE — ASSESSMENT & PLAN NOTE
· Patient had likely syncopal episode [place] the [place]  Mentioned that wanted to sit up, felt weak and that he was going to pass out  Per patient while in ambulance he was able to hear people talking but he was confused and could not talk  Denied tongue bite, urine/fecal incontinence, motor/sensory deficits  Denied chest pain, nausea, vomiting, diaphoresis  · Per ED notes, EMS reported systolic blood pressure in the 90s, normal blood sugar  · Patient mentioned that sometimes he feels lightheaded and has tremors because of some of his medication (unclear which 1 likely pain medicine)  He said that he did not to take any of his morning medication today because he knew he will go to the bank and wanted to make sure he is not lightheaded  · EKG and telemetry showed nonsustained V-tach with frequent PVCs  Troponin was normal, NT pro BNP: 1058, electrolytes within normal limits, rapid drug screen within normal limits, UA normal  · Differentials include:  Cardiac arrhythmia versus orthostatic hypotension versus vasovagal versus medication versus hypoglycemia    Unlikely TIA, patient had no neurologic deficits    Plan:  · Serial troponin x3  · Monitor on telemetry - 2 runs of unsustained Vtach (3 beats) at 8 am and 11 am   · Echocardiography pending  · Monitor electrolytes and replenish, avoid hypoglycemia  · Orthostatic vital signs  · Fall precaution  · Avoid hypotension  · Hold Atarax    Patient left AMA  Referral to Cardiology provided  Echo results pending will follow

## 2021-03-31 NOTE — DISCHARGE SUMMARY
[facility]  1950, 78 y o  male MRN: [id number]  Unit/Bed#: S -01 Encounter: [id number]  Primary Care Provider: Ulises Kelley   Date and time admitted to hospital: 3/27/2021  1:49 PM    * Syncope  Assessment & Plan  · Patient had likely syncopal episode today while in the bank  Mentioned that wanted to sit up, felt weak and that he was going to pass out  Per patient while in ambulance he was able to hear people talking but he was confused and could not talk  Denied tongue bite, urine/fecal incontinence, motor/sensory deficits  Denied chest pain, nausea, vomiting, diaphoresis  · Per ED notes, EMS reported systolic blood pressure in the 90s, normal blood sugar  · Patient mentioned that sometimes he feels lightheaded and has tremors because of some of his medication (unclear which 1 likely pain medicine)  He said that he did not to take any of his morning medication today because he knew he will go to the bank and wanted to make sure he is not lightheaded  · EKG and telemetry showed nonsustained V-tach with frequent PVCs  Troponin was normal, NT pro BNP: 1058, electrolytes within normal limits, rapid drug screen within normal limits, UA normal  · Differentials include:  Cardiac arrhythmia versus orthostatic hypotension versus vasovagal versus medication versus hypoglycemia    Unlikely TIA, patient had no neurologic deficits    Plan:  · Serial troponin x3  · Monitor on telemetry - 2 runs of unsustained Vtach (3 beats) at 8 am and 11 am   · Echocardiography pending  · Monitor electrolytes and replenish, avoid hypoglycemia  · Orthostatic vital signs  · Fall precaution  · Avoid hypotension  · Hold Atarax    Patient left AMA  Referral to Cardiology provided  Echo results pending will follow    Hypoglycemia  Assessment & Plan  · Glucose present on admission 111 mg, last checked 63 mg  · Patient is on insulin Lantus 30 units a m , 60 units bedtime and 30 units Humalog with supper  · Patient had elevated blood sugars during the last admission for which he needed insulin drip  · Will hold home insulin  · Sliding scale only with Accu-Chek  · Hypoglycemia protocol  · If blood sugar normalized tomorrow, consider Lantus    Patient left AMA  No episodes of hypoglycemia on day of discharge    Status post amputation  Assessment & Plan  · Patient had transmetatarsal amputation last month  Wound culture showed Enterococcus faecalis for which patient took ampicillin until 3/9  · He followed with vascular surgery and per their notes the wound was clean and dry on 03/23  · Patient has erythema along the incision, and oozing blood  · Emergency department patient received vancomycin and cefepime for likely infection  · Will hold antibiotics for now    · Monitor vital signs  · Podiatry consult  · Wound care    Patient left AMA prior to Podiatry consult  Advised him to follow up with his podiatrist this week and to discuss with their office setting up home wound care and PT/OT    Acute-on-chronic kidney injury [facility])  Assessment & Plan  Lab Results   Component Value Date    EGFR 49 03/28/2021    EGFR 38 03/27/2021    EGFR 48 03/15/2021    CREATININE 1 46 (H) 03/28/2021    CREATININE 1 78 (H) 03/27/2021    CREATININE 1 49 03/15/2021     · Baseline creatine 1 1- 1 4   · Unclear etiology, most likely prerenal   Patient looks dry  · Hold lisinopril  · Plasmalyte with 75 cc an hour for 10 hours  · Improving    Patient left AMA  Follow up with PCP in 1 week      Peripheral arterial disease  Assessment & Plan  · Status post right SFA stents placement  · Patient followed with vascular surgery on 03/23   · Continue Plavix and atorvastatin    Anemia of chronic disease  Assessment & Plan  · Hemoglobin 8 7 present on admission, baseline 8 9- 11 5  · Monitor    Hypothyroidism  Assessment & Plan  · On levothyroxine 175 mcg daily  · TSH 0 28  · Likely patient will need dose adjustment  · Follow up with PCP    Depression  Assessment & Plan  · Continue citalopram and trazodone      Hypertension  Assessment & Plan  · Home medication amlodipine 10 mg daily, atenolol 25 mg, lisinopril 20 mg daily  · Hold lisinopril because of ongoing FAUSTO  · Continue atenolol 25 mg because of PVCs, continue amlodipine  · Monitor blood pressure  Avoid hypotension    Insulin-dependent diabetes mellitus  Assessment & Plan  Lab Results   Component Value Date    HGBA1C 9 3 (H) 02/21/2021       Recent Labs     03/28/21  0721 03/28/21  1039 03/28/21  1543   POCGLU 94 229* 148*       Blood Sugar Average: Last 72 hrs:  (P) 176 75     · Insulin with sliding scale  · Accu-Chek  · Hypoglycemia protocol  · Diabetic diet  · Home regiment:  Lantus 30 units a m , 60 units bedtime and 30 units Humalog with supper    Continue home regimen    Discharging Resident Physician: Princella Meigs, DO  Attending: No att  providers found  PCP: Ulises Staley  Admission Date: 3/27/2021  Discharge Date: 03/28/21    Disposition:     Home    Reason for Admission: Syncope    Consultations During Hospital Stay:  · Podiatry  · Cardiology    Procedures Performed:     · None    Significant Findings / Test Results:     · Non-sustained V tach  · FAUSTO  · CT: No acute intracranial abnormality  · Chest XR: improvement in bibasilar airspace opacities  No new focal consolidations    Incidental Findings:   · None     Test Results Pending at Discharge (will require follow up): · Echo     Outpatient Tests Requested:  · None    Complications:  [facility] Course:     Emilie Valdez is a 78 y o  male patient who originally presented to the hospital on 3/27/2021 due to [place]opal episode [place] the bank  EMS reported initial SBP in the 90s with a normal blood glucose  He reportedly had nonsustained V-tach upon presentation  Pro BNP 1058  Troponin was normal  He was also noted to have an FAUSTO  CT head and CXR were unremarkable    In the ED, he was given 1 dose of vancomycin and ceftriaxone but antibiotics were discontinued due to low suspicion for infection at the surgical site on his foot  Orthostatic vital signs negative  He was admitted for telemetry monitoring and troponin trending  Echocardiogram ordered  Podiatry consulted to evaluate wound  Discussed with patient the next day that given the fact echocardiogram was being performed later in the day and that he had 2 episodes of non-sustained VT (3-4 beats) during the day and that Podiatry was to see him, he may stay overnight  Patient adamant that he wishes to leave the hospital and does not want to stay the night given that he was recently in the hospital for several weeks  Explained plan that we would like Cardiology to see him given telemetry findings and Podiatry  Patient expressed difficulty with arranging wound care and PT/OT at home and we offered to help arrange these tomorrow when Case Management staffing was normal  Patient understands plan and concern that he is having heart arrthymias that if become sustained could be potentially life threatening and that repeated episode of syncope could occur if he leaves  He understands and is adamant he wishes to leave  He agrees to follow up with his PCP this week  Referral to Cardiology provided  Patient did end up getting echocardiogram before leaving and we will follow results  Condition at Discharge: stable     Discharge Day Visit / Exam:     * Please refer to separate progress note for these details *    Discussion with Family: Patient's brother    Discharge instructions/Information to patient and family:   See after visit summary for information provided to patient and family  Provisions for Follow-Up Care:  See after visit summary for information related to follow-up care and any pertinent home health orders        Planned Readmission: None     Discharge Medications:  See after visit summary for reconciled discharge medications provided to patient and family        ** Please Note: This note has been constructed using a voice recognition system **

## 2021-03-31 NOTE — ASSESSMENT & PLAN NOTE
Lab Results   Component Value Date    HGBA1C 9 3 (H) 02/21/2021       Recent Labs     03/28/21  0721 03/28/21  1039 03/28/21  1543   POCGLU 94 229* 148*       Blood Sugar Average: Last 72 hrs:  (P) 176 75     · Insulin with sliding scale  · Accu-Chek  · Hypoglycemia protocol  · Diabetic diet  · Home regiment:  Lantus 30 units a m , 60 units bedtime and 30 units Humalog with supper    Continue home regimen

## 2021-03-31 NOTE — ASSESSMENT & PLAN NOTE
· Glucose present on admission 111 mg, last checked 63 mg  · Patient is on insulin Lantus 30 units a m , 60 units bedtime and 30 units Humalog with supper  · Patient had elevated blood sugars during the last admission for which he needed insulin drip  · Will hold home insulin     · Sliding scale only with Accu-Chek  · Hypoglycemia protocol  · If blood sugar normalized tomorrow, consider Lantus    Patient left AMA  No episodes of hypoglycemia on day of discharge

## 2021-03-31 NOTE — ASSESSMENT & PLAN NOTE
Lab Results   Component Value Date    EGFR 49 03/28/2021    EGFR 38 03/27/2021    EGFR 48 03/15/2021    CREATININE 1 46 (H) 03/28/2021    CREATININE 1 78 (H) 03/27/2021    CREATININE 1 49 03/15/2021     · Baseline creatine 1 1- 1 4   · Unclear etiology, most likely prerenal   Patient looks dry  · Hold lisinopril  · Plasmalyte with 75 cc an hour for 10 hours  · Improving    Patient left AMA  Follow up with PCP in 1 week

## 2021-03-31 NOTE — ASSESSMENT & PLAN NOTE
· On levothyroxine 175 mcg daily  · TSH 0 28  · Likely patient will need dose adjustment  · Follow up with PCP

## 2021-04-02 LAB
BACTERIA BLD CULT: NORMAL
BACTERIA BLD CULT: NORMAL

## 2021-04-07 ENCOUNTER — OFFICE VISIT (OUTPATIENT)
Dept: PODIATRY | Facility: CLINIC | Age: 71
End: 2021-04-07

## 2021-04-07 VITALS
HEIGHT: 70 IN | BODY MASS INDEX: 29.41 KG/M2 | SYSTOLIC BLOOD PRESSURE: 124 MMHG | DIASTOLIC BLOOD PRESSURE: 62 MMHG | HEART RATE: 73 BPM

## 2021-04-07 DIAGNOSIS — L97.909 ATHEROSCLEROSIS OF ARTERY OF EXTREMITY WITH ULCERATION (HCC): ICD-10-CM

## 2021-04-07 DIAGNOSIS — I70.299 ATHEROSCLEROSIS OF ARTERY OF EXTREMITY WITH ULCERATION (HCC): ICD-10-CM

## 2021-04-07 DIAGNOSIS — Z89.9 STATUS POST AMPUTATION: ICD-10-CM

## 2021-04-07 DIAGNOSIS — E11.42 DIABETIC POLYNEUROPATHY ASSOCIATED WITH TYPE 2 DIABETES MELLITUS (HCC): Primary | ICD-10-CM

## 2021-04-07 PROCEDURE — 99024 POSTOP FOLLOW-UP VISIT: CPT | Performed by: PODIATRIST

## 2021-04-07 RX ORDER — GABAPENTIN 300 MG/1
CAPSULE ORAL
COMMUNITY
Start: 2021-04-06

## 2021-04-07 RX ORDER — TRAMADOL HYDROCHLORIDE 50 MG/1
TABLET ORAL
COMMUNITY
Start: 2021-04-06

## 2021-05-05 ENCOUNTER — OFFICE VISIT (OUTPATIENT)
Dept: PODIATRY | Facility: CLINIC | Age: 71
End: 2021-05-05

## 2021-05-05 VITALS
HEART RATE: 78 BPM | HEIGHT: 70 IN | BODY MASS INDEX: 29.84 KG/M2 | WEIGHT: 208.4 LBS | DIASTOLIC BLOOD PRESSURE: 80 MMHG | SYSTOLIC BLOOD PRESSURE: 130 MMHG

## 2021-05-05 DIAGNOSIS — Z89.9 STATUS POST AMPUTATION: ICD-10-CM

## 2021-05-05 DIAGNOSIS — L97.909 ATHEROSCLEROSIS OF ARTERY OF EXTREMITY WITH ULCERATION (HCC): ICD-10-CM

## 2021-05-05 DIAGNOSIS — I70.299 ATHEROSCLEROSIS OF ARTERY OF EXTREMITY WITH ULCERATION (HCC): ICD-10-CM

## 2021-05-05 DIAGNOSIS — E11.42 DIABETIC POLYNEUROPATHY ASSOCIATED WITH TYPE 2 DIABETES MELLITUS (HCC): Primary | ICD-10-CM

## 2021-05-05 PROCEDURE — 99024 POSTOP FOLLOW-UP VISIT: CPT | Performed by: PODIATRIST

## 2021-05-05 RX ORDER — SYRINGE AND NEEDLE,INSULIN,1ML 31 GX5/16"
SYRINGE, EMPTY DISPOSABLE MISCELLANEOUS
COMMUNITY
Start: 2021-04-21

## 2021-05-05 NOTE — PROGRESS NOTES
Assessment/Plan:      Diagnoses and all orders for this visit:    Diabetic polyneuropathy associated with type 2 diabetes mellitus ([facility] [address] )    Atherosclerosis of artery of extremity with ulceration ([facility] [address] )    Status post amputation    Other orders  -     Droplet Insulin Syringe 31G X 5/16" 1 ML MISC      Patient has healed well  He needs to make appt for shoe inserts  WB surgical shoe until he gets the DM shoes and inserts  Protect scab from trauma with betadyne and bandaid  Subjective:     Patient ID: Gerda Mcelroy is a 78 y o  male  Patient had right TMA 3/4/21  HE states he has a small scab but no drainage  Review of Systems      Objective:     Physical Exam    Right foot TMA is healed  Small intact eschar medial and alteral amputation but no drainage or open wound  CRT brisk  NO pain   Gait is slow but normal

## 2021-06-11 ENCOUNTER — TELEPHONE (OUTPATIENT)
Dept: NEPHROLOGY | Facility: CLINIC | Age: 71
End: 2021-06-11

## 2021-06-11 NOTE — TELEPHONE ENCOUNTER
I called and left message for patient to call back and schedule July follow up appt w/ Dr Gagandeep Wilkins

## 2021-06-16 ENCOUNTER — OFFICE VISIT (OUTPATIENT)
Dept: PODIATRY | Facility: CLINIC | Age: 71
End: 2021-06-16
Payer: COMMERCIAL

## 2021-06-16 VITALS
HEIGHT: 70 IN | DIASTOLIC BLOOD PRESSURE: 52 MMHG | BODY MASS INDEX: 30.21 KG/M2 | WEIGHT: 211 LBS | SYSTOLIC BLOOD PRESSURE: 157 MMHG

## 2021-06-16 DIAGNOSIS — B35.1 TINEA UNGUIUM: ICD-10-CM

## 2021-06-16 DIAGNOSIS — Z89.9 STATUS POST AMPUTATION: ICD-10-CM

## 2021-06-16 DIAGNOSIS — Z79.4 TYPE 2 DIABETES MELLITUS WITH DIABETIC PERIPHERAL ANGIOPATHY WITHOUT GANGRENE, WITH LONG-TERM CURRENT USE OF INSULIN (HCC): Primary | ICD-10-CM

## 2021-06-16 DIAGNOSIS — E11.51 TYPE 2 DIABETES MELLITUS WITH DIABETIC PERIPHERAL ANGIOPATHY WITHOUT GANGRENE, WITH LONG-TERM CURRENT USE OF INSULIN (HCC): Primary | ICD-10-CM

## 2021-06-16 PROCEDURE — 99213 OFFICE O/P EST LOW 20 MIN: CPT | Performed by: PODIATRIST

## 2021-06-16 NOTE — PROGRESS NOTES
Assessment/Plan:         Diagnoses and all orders for this visit:    Type 2 diabetes mellitus with diabetic peripheral angiopathy without gangrene, with long-term current use of insulin (HCC)    Status post amputation    Tinea unguium      -Educated on DM risk to lower extremities, proper shoe gear, and daily monitoring of feet    -Educated on A1C and the risks of poorly controlled Diabetes   -Discussed weight loss and suitable exercise regiment    -due to right foot transmetatarsal amputation he is considered high risk  He will be seen for at-risk foot care going forward  His transmetatarsal amputation is stable and his inserts fitting well  Subjective:      Patient ID: Clarisse Oppenheim is a 78 y o  male  Patient had right TMA 3/4/21  HE is healed  He is here to begin at risk foot care  He reports he is walking ok with the amputation  He got an insert for the shoe  HE has no concerns with his left foot  The following portions of the patient's history were reviewed and updated as appropriate:   He  has a past medical history of Diabetes mellitus ([address] ), Hyperlipidemia, and Hypertension  He   Patient Active Problem List    Diagnosis Date Noted    Syncope 03/27/2021    Hypoglycemia 03/27/2021    Status post amputation 03/27/2021    Atherosclerosis of artery of extremity with ulceration ([facility] ) 03/23/2021    Acute-on-chronic kidney injury ([address] ) 02/27/2021    Peripheral arterial disease 02/21/2021    Dyspepsia 02/21/2021    Hyponatremia 02/20/2021    Anemia of chronic disease 02/20/2021    Osteomyelitis of toe with cellulitis 02/19/2021    Insulin-dependent diabetes mellitus 02/19/2021    Hypertension 02/19/2021    Depression 02/19/2021    Hyperlipidemia 02/19/2021    Hypothyroidism 02/19/2021    Stage 3 chronic kidney disease ([address] ) 02/19/2021     He  has a past surgical history that includes Incision and drainage of wound (Right, 2/21/2021); IR lower extremity angiogram (2/23/2021);  Wound debridement (Right, 2/26/2021); and pr amputation foot,transmetatarsal (Right, 3/4/2021)  His family history is not on file  He  reports that he has quit smoking  He has never used smokeless tobacco  No history on file for alcohol use and drug use  Current Outpatient Medications   Medication Sig Dispense Refill    amLODIPine (NORVASC) 10 mg tablet Take 10 mg by mouth daily      atenolol (TENORMIN) 25 mg tablet Take 25 mg by mouth daily      atorvastatin (LIPITOR) 40 mg tablet Take 40 mg by mouth daily      citalopram (CeleXA) 20 mg tablet Take 20 mg by mouth daily      clopidogrel (PLAVIX) 75 mg tablet Take 1 tablet (75 mg total) by mouth daily 30 tablet 0    Droplet Insulin Syringe 31G X 5/16" 1 ML MISC       famotidine (PEPCID) 20 mg tablet Take 1 tablet (20 mg total) by mouth 2 (two) times a day 30 tablet 0    gabapentin (NEURONTIN) 300 mg capsule       glycerin-hypromellose- (ARTIFICIAL TEARS) 0 2-0 2-1 % SOLN Administer 1 drop to both eyes 3 (three) times a day 30 mL 0    hydrOXYzine HCL (ATARAX) 50 mg tablet Take 50 mg by mouth daily      insulin glargine (LANTUS) 100 units/mL subcutaneous injection Inject 35 Units under the skin every morning  0    insulin lispro (HumaLOG) 100 units/mL injection Inject 15 Units under the skin 3 (three) times a day with meals  0    levothyroxine 175 mcg tablet Take 175 mcg by mouth daily      lisinopril (ZESTRIL) 20 mg tablet Take 20 mg by mouth daily      multivitamin (THERAGRAN) TABS Take 1 tablet by mouth daily      polyethylene glycol (MIRALAX) 17 g packet Take 17 g by mouth daily as needed (consti)  0    tiotropium (SPIRIVA) 18 mcg inhalation capsule Place 18 mcg into inhaler and inhale daily      traMADol (ULTRAM) 50 mg tablet       traZODone (DESYREL) 50 mg tablet Take 50 mg by mouth daily at bedtime      pregabalin (LYRICA) 50 mg capsule Take 50 mg by mouth daily       No current facility-administered medications for this visit  Current Outpatient Medications on File Prior to Visit   Medication Sig    amLODIPine (NORVASC) 10 mg tablet Take 10 mg by mouth daily    atenolol (TENORMIN) 25 mg tablet Take 25 mg by mouth daily    atorvastatin (LIPITOR) 40 mg tablet Take 40 mg by mouth daily    citalopram (CeleXA) 20 mg tablet Take 20 mg by mouth daily    clopidogrel (PLAVIX) 75 mg tablet Take 1 tablet (75 mg total) by mouth daily    Droplet Insulin Syringe 31G X 5/16" 1 ML MISC     famotidine (PEPCID) 20 mg tablet Take 1 tablet (20 mg total) by mouth 2 (two) times a day    gabapentin (NEURONTIN) 300 mg capsule     glycerin-hypromellose- (ARTIFICIAL TEARS) 0 2-0 2-1 % SOLN Administer 1 drop to both eyes 3 (three) times a day    hydrOXYzine HCL (ATARAX) 50 mg tablet Take 50 mg by mouth daily    insulin glargine (LANTUS) 100 units/mL subcutaneous injection Inject 35 Units under the skin every morning    insulin lispro (HumaLOG) 100 units/mL injection Inject 15 Units under the skin 3 (three) times a day with meals    levothyroxine 175 mcg tablet Take 175 mcg by mouth daily    lisinopril (ZESTRIL) 20 mg tablet Take 20 mg by mouth daily    multivitamin (THERAGRAN) TABS Take 1 tablet by mouth daily    polyethylene glycol (MIRALAX) 17 g packet Take 17 g by mouth daily as needed (consti)    tiotropium (SPIRIVA) 18 mcg inhalation capsule Place 18 mcg into inhaler and inhale daily    traMADol (ULTRAM) 50 mg tablet     traZODone (DESYREL) 50 mg tablet Take 50 mg by mouth daily at bedtime    pregabalin (LYRICA) 50 mg capsule Take 50 mg by mouth daily     No current facility-administered medications on file prior to visit  He has No Known Allergies       Review of Systems   Constitutional: Negative  Gastrointestinal: Negative for diarrhea, nausea and vomiting  Musculoskeletal: Positive for joint swelling  Negative for arthralgias and gait problem  Skin: Negative for color change and wound     Neurological: Positive for numbness  Negative for weakness  Objective:      /52   Ht 5' 10" (1 778 m) Comment: verbal  Wt 95 7 kg (211 lb)   BMI 30 28 kg/m²          Physical Exam  Vitals reviewed  Constitutional:       Appearance: He is obese  He is not ill-appearing or diaphoretic  Cardiovascular:      Rate and Rhythm: Normal rate  Pulses: no weak pulses          Dorsalis pedis pulses are 2+ on the right side and 2+ on the left side  Posterior tibial pulses are 2+ on the right side and 2+ on the left side  Pulmonary:      Effort: Pulmonary effort is normal  No respiratory distress  Breath sounds: No wheezing  Musculoskeletal:      Right foot: Deformity present  Left foot: Deformity present  Right Lower Extremity: (TMA)  Feet:      Right foot: amputated     Protective Sensation: 4 sites tested  2 sites sensed  Skin integrity: Dry skin present  No ulcer, skin breakdown, erythema or callus  Left foot:      Protective Sensation: 10 sites tested  2 sites sensed  Skin integrity: Dry skin present  No ulcer, skin breakdown, erythema or callus  Toenail Condition: Left toenails are abnormally thick  Fungal disease present  Skin:     Capillary Refill: Capillary refill takes less than 2 seconds  Neurological:      Mental Status: He is oriented to person, place, and time  Sensory: Sensory deficit present  Motor: No weakness  Gait: Gait normal    Psychiatric:         Mood and Affect: Mood normal          Diabetic Foot Exam    Patient's shoes and socks removed  Right Foot/Ankle   Right Foot Inspection  Skin Exam: skin intact and dry skin no callus, no erythema, no maceration, no abnormal color, no ulcer and no callus                        Amputation: amputation right foot (Comments: TMA, stable)  Toe Exam: right toe deformityno swelling, no tenderness and erythema  Sensory   Vibration: absent  Proprioception: diminished   Monofilament testing: diminished  Vascular  Capillary refills: < 3 seconds  The right DP pulse is 2+  The right PT pulse is 2+  Left Foot/Ankle  Left Foot Inspection  Skin Exam: skin intact and dry skinno erythema, no maceration, normal color, no ulcer and no callus                         Toe Exam: no swelling, no tenderness, no erythema and no left toe deformity                   Sensory   Vibration: absent  Proprioception: diminished  Monofilament: diminished  Vascular  Capillary refills: < 3 seconds  The left DP pulse is 2+  The left PT pulse is 2+  Assign Risk Category:  Deformity present; Loss of protective sensation;  No weak pulses       Risk: 2

## 2021-06-16 NOTE — PATIENT INSTRUCTIONS
Foot Care for People with Diabetes   WHAT YOU NEED TO KNOW:   · Foot care helps protect your feet and prevent foot ulcers or sores  Long-term high blood sugar levels can damage the blood vessels and nerves in your legs and feet  This damage makes it hard to feel pressure, pain, temperature, and touch  You may not be able to feel a cut or sore, or shoes that are too tight  Foot care is needed to prevent serious problems, such as an infection or amputation  · Diabetes may cause your toes to become crooked or curved under  These changes may affect the way you walk and can lead to increased pressure on your foot  The pressure can decrease blood flow to your feet  Lack of blood flow increases your risk for a foot ulcer  Do not ignore small problems, such as dry skin or small wounds  These can become life-threatening over time without proper care  DISCHARGE INSTRUCTIONS:   Call your care team provider if:   · Your feet become numb, weak, or hard to move  · You have pus draining from a sore on your foot  · You have a wound on your foot that gets bigger, deeper, or does not heal      · You see blisters, cuts, scratches, calluses, or sores on your foot  · You have a fever, and your feet become red, warm, and swollen  · Your toenails become thick, curled, or yellow  · You find it hard to check your feet because your vision is poor  · You have questions or concerns about your condition or care  Foot care:   · Check your feet each day  Look at your whole foot, including the bottom, and between and under your toes  Check for wounds, corns, and calluses  Use a mirror to see the bottom of your feet  The skin on your feet may be shiny, tight, or darker than normal  Your feet may also be cold and pale  Feel your feet by running your hands along the tops, bottoms, sides, and between your toes  Redness, swelling, and warmth are signs of blood flow problems that can lead to a foot ulcer   Do not try to remove corns or calluses yourself  · Wash your feet each day with soap and warm water  Do not use hot water, because this can injure your foot  Dry your feet gently with a towel after you wash them  Dry between and under your toes  · Apply lotion or a moisturizer on your dry feet  Ask your care team provider what lotions are best to use  Do not put lotion or moisturizer between your toes  Moisture between your toes could lead to skin breakdown  · Cut your toenails correctly  File or cut your toenails straight across  Use a soft brush to clean around your toenails  If your toenails are very thick, you may need to have a care team provider or specialist cut them  · Protect your feet  Do not walk barefoot or wear your shoes without socks  Check your shoes for rocks or other objects that can hurt your feet  Wear cotton socks to help keep your feet dry  Wear socks without toe seams, or wear them with the seams inside out  Change your socks each day  Do not wear socks that are dirty or damp  · Wear shoes that fit well  Wear shoes that do not rub against any area of your feet  Your shoes should be ½ to ¾ inch (1 to 2 centimeters) longer than your feet  Your shoes should also have extra space around the widest part of your feet  Walking or athletic shoes with laces or straps that adjust are best  Ask your care team provider for help to choose shoes that fit you best  Ask him or her if you need to wear an insert, orthotic, or bandage on your feet  · Do not smoke  Smoking can damage your blood vessels and put you at increased risk for foot ulcers  Ask your care team provider for information if you currently smoke and need help to quit  E-cigarettes or smokeless tobacco still contain nicotine  Talk to your care team provider before you use these products  Follow up with your diabetes care team provider or foot specialist as directed:   You will need to have your feet checked at least once a letty [place] may need a foot exam more often if you have nerve damage, foot deformities, or ulcers  Write down your questions so you remember to ask them during your visits  © Copyright [address] Information is for End User's use only and may not be sold, redistributed or otherwise used for commercial purposes  All illustrations and images included in CareNotes® are the copyrighted property of A D A M [employer]  or [facility] Leandro Osorio   The above information is an  only  It is not intended as medical advice for individual conditions or treatments  Talk to your doctor, nurse or pharmacist before following any medical regimen to see if it is safe and effective for you

## 2021-07-13 ENCOUNTER — HOSPITAL ENCOUNTER (OUTPATIENT)
Dept: NON INVASIVE DIAGNOSTICS | Facility: CLINIC | Age: 71
Discharge: HOME | End: 2021-07-13
Payer: COMMERCIAL

## 2021-07-13 DIAGNOSIS — L97.909 ATHEROSCLEROSIS OF ARTERY OF EXTREMITY WITH ULCERATION (HCC): ICD-10-CM

## 2021-07-13 DIAGNOSIS — I70.299 ATHEROSCLEROSIS OF ARTERY OF EXTREMITY WITH ULCERATION (HCC): ICD-10-CM

## 2021-07-13 PROCEDURE — 93923 UPR/LXTR ART STDY 3+ LVLS: CPT

## 2021-07-13 PROCEDURE — 93925 LOWER EXTREMITY STUDY: CPT

## 2021-07-15 PROCEDURE — 93922 UPR/L XTREMITY ART 2 LEVELS: CPT | Performed by: SURGERY

## 2021-07-15 PROCEDURE — 93925 LOWER EXTREMITY STUDY: CPT | Performed by: SURGERY

## 2021-09-02 ENCOUNTER — TELEPHONE (OUTPATIENT)
Dept: PODIATRY | Facility: CLINIC | Age: 71
End: 2021-09-02

## 2021-10-11 ENCOUNTER — OFFICE VISIT (OUTPATIENT)
Dept: PODIATRY | Facility: CLINIC | Age: 71
End: 2021-10-11
Payer: COMMERCIAL

## 2021-10-11 VITALS
DIASTOLIC BLOOD PRESSURE: 54 MMHG | BODY MASS INDEX: 32.78 KG/M2 | HEART RATE: 81 BPM | WEIGHT: 229 LBS | HEIGHT: 70 IN | SYSTOLIC BLOOD PRESSURE: 152 MMHG

## 2021-10-11 DIAGNOSIS — B35.1 TINEA UNGUIUM: ICD-10-CM

## 2021-10-11 DIAGNOSIS — Z79.4 TYPE 2 DIABETES MELLITUS WITH DIABETIC PERIPHERAL ANGIOPATHY WITHOUT GANGRENE, WITH LONG-TERM CURRENT USE OF INSULIN (HCC): Primary | ICD-10-CM

## 2021-10-11 DIAGNOSIS — Z89.9 STATUS POST AMPUTATION: ICD-10-CM

## 2021-10-11 DIAGNOSIS — E11.51 TYPE 2 DIABETES MELLITUS WITH DIABETIC PERIPHERAL ANGIOPATHY WITHOUT GANGRENE, WITH LONG-TERM CURRENT USE OF INSULIN (HCC): Primary | ICD-10-CM

## 2021-10-11 PROCEDURE — 11720 DEBRIDE NAIL 1-5: CPT | Performed by: PODIATRIST

## 2021-10-11 RX ORDER — IPRATROPIUM/ALBUTEROL SULFATE 20-100 MCG
MIST INHALER (GRAM) INHALATION
COMMUNITY
Start: 2021-08-11

## 2021-10-11 RX ORDER — HUMAN INSULIN 100 [IU]/ML
INJECTION, SOLUTION SUBCUTANEOUS
COMMUNITY
Start: 2021-08-17

## 2021-12-04 NOTE — PROGRESS NOTES
Assessment/Plan:      Diagnoses and all orders for this visit:    Diabetic polyneuropathy associated with type 2 diabetes mellitus ([facility] )  -     Diabetic Shoe Inserts  -     Diabetic Shoe    Atherosclerosis of artery of extremity with ulceration ([facility] )  Comments:  s/p angiogram, TMA healed  NO acute arteial concerns  Orders:  -     Diabetic Shoe Inserts  -     Diabetic Shoe    Status post amputation  -     Diabetic Shoe Inserts  -     Diabetic Shoe    Other orders  -     traMADol (ULTRAM) 50 mg tablet  -     gabapentin (NEURONTIN) 300 mg capsule        Patient's transmetatarsal amputation is healed  There is still some small scabs an eschar over the incision but the should be left intact until they fall off on their own  He may clean the foot daily and wear clean stocking  Prescription given for inserts for shoes  Check in 1 month to   Began at-risk foot care  Subjective:     Patient ID: Braulio Holman is a 78 y o  male  Patient had right transmetatarsal amputation March 4, 2021  He had been in acute rehab  The sutures were removed  He states he has no drainage from the amputation is able to walk  He has no pain in the foot  Review of Systems      Objective:     Physical Exam       right foot transmetatarsal amputation overall is generally healed well  There is a small eschar to the central aspect of the incision but no sign of wound  No drainage on the sock  No pain on palpation  Capillary refills brisk  Patient has adequate ankle range of motion  No

## 2022-07-26 NOTE — TELEPHONE ENCOUNTER
Nguyen faxed the orders for me to Mary Romero @ [phone] per Adiel's request
Order was faxed
Please see above message
Sorry I meant to send this to clerical and accidentally hit clinical
Alert-The patient is alert, awake and responds to voice. The patient is oriented to time, place, and person. The triage nurse is able to obtain subjective information.

## 2024-12-05 NOTE — PLAN OF CARE
Problem: PHYSICAL THERAPY ADULT  Goal: Performs mobility at highest level of function for planned discharge setting  See evaluation for individualized goals  Description: Treatment/Interventions: Functional transfer training, LE strengthening/ROM, Therapeutic exercise, Endurance training, Cognitive reorientation, Equipment eval/education, Patient/family training, Spoke to case management, Spoke to nursing, Continued evaluation, Bed mobility  Equipment Recommended: (TBD)       See flowsheet documentation for full assessment, interventions and recommendations  Outcome: Progressing  Note: Prognosis: Fair  Problem List: Decreased strength, Decreased endurance, Impaired balance, Decreased mobility, Impaired judgement, Decreased safety awareness, Impaired sensation, Obesity, Orthopedic restrictions, Pain  Assessment: Pt was found supine in bed to begin session with brother present  He was able to perform all bed mobility with A needed  Pt was able to tolerate static sitting at EOB for the first few minutes and once tired, he tended to leanto his R back down to the bed  Pt was very unmotivated in his exercise performance with continued education provided on the importance of exercise performance  He was able to perform a STS xfer with max A needing cuing on hand and foot placement  Pt was unable to maintain NWB in his RLE with therapists foot underneath  Pt stood for about 5 seconds until he needed to sit down and since he couldnt fully WB on his uninvolved LE  Pt was fatigued after minimal reps of TE and requested go go to bed  He had all needs met and call bell in reach  Pt would benefit from contineud PT in order to promote safe and functional mobility     Barriers to Discharge: Inaccessible home environment, Decreased caregiver support, Other (Comment)(flight of stairs to main living environment )  Barriers to Discharge Comments: Pt also reports that he would not have enough room to use a walker, knee scooter, or WC  PT Discharge Recommendation: Post-Acute Rehabilitation Services          See flowsheet documentation for full assessment  [FreeTextEntry1] : 81 YO M seen 11/19/2019  with history of chronic mild LUTS for BPH and hypogonadism, For BPH takes Finasteride regularly.  PSA 1.5 (on finasteride ) from 11/19/2019, stable. Labs from 5/2019- H/H 13.5/39.9. Testosterone 401., Labs from 11/19/19: H/H 13.7/41.2. Testosterone 422., PSA 1.5.  Patient saw Myles on 12/1/2020 in my absence.  Patient seen  6/9/2021 to revisit these issues and to discuss stopping the testosterone, which he has been on for 20 years.  Patient is considering this option because of the change the significant change in cost of the medication for him with his new insurance.  He tells me that he is urinating well on the finasteride with no real urinary issues.  Urinalysis today is negative.  Residual bladder volume is 0.  Patient reports gradual loss of muscle mass and shrinking of testicles over the last 20 years of the being on testosterone replacement therapy. Patient had recent blood work June 1, 2021 which showed total testosterone of 446, PSA 1.2, H&H 13/38. PAtient STOPPED TRT at tat visit. PSA 0.34 from 11.5.2021, TT 76 from 7/9/2021.  Patient seen  11/10/2021 to reassess his response to the withdrawal of the TRT. He told me that he is feeling lass energetic off the medication. He remains on the finasteride and his urination remains good. He requested a refill of this medication.  UA NEGATIVE IPSS 11 KENNY 1 ANNA 5 We discussed the patient's symptoms off the testosterone placement therapy and the potential to resume this medication. At the present time he is looking for better insurance coverage since on his present plan is out-of-pocket expenses proximate $2000 a month and is onerous for him. We discussed the alternative to the testosterone gel he has been using for 20 years. This would be testosterone cypionate injections. I offered to order the medication administer at the first 1-2 times and teach him self injection likely at 1 cc every 2 weeks. He will consider this option as he looks more insurance alternatives. We also review the total testosterone value today to compare to that of 4 months ago. We will plan to meet follow-up in 6 months and address the testosterone issue at any time in between at his discretion. TT 60.4 PSA  0.34 (on finasteride, = 0.68).  Patient seen 5/12/2022 to repeat blood work and reassess his symptoms off TRT. He remains off TRT for 1 year and has noted a decrease in energy and libido, but is comfortable at the present level. e also describes his urination as stable and denies any gross hematuria or dysuria. Nocturia x 1 - 2. He is enjoying his 9 month old grandson. UA trace RBC lysed IPSS 9 KENNY 1 ANNA 5 PVR 14 cc.  78-year-old man with chronic low testosterone level who has now been off TRT for 1 year after having been on it for over 20 years.  He has had some decrease in libido and energy level but otherwise feels approximately the same.  Urination is stable today.  As for the microscopic hematuria identified today this is asymptomatic and I sent urine for microscopic examination culture and cytology to confirm the dipstick findings we will proceed as indicated for any abnormal findings otherwise check it on his next visit in 6 months.  This patient remains on finasteride 5 mg and we will continue on this as it is.  He has enough medication to last until his next visit or will call if he runs out before then.   UA negative C+S  negative cytology negative T 64.4 PSA 0.15 (5/12/22) (Corrected for Finasteride to 0.3)  Patient seen 11/10/2022 for follow up. Voiding symptoms are stable while on the finasteride. He denies dysuria and gross hematuria. He feels his age off the TRT but is content at this level of activity.  UA traced protein IPSS 12 KENNY 1 ANNA 5 His voiding symptoms remain stable on the finasteride 5 mg, and will continue this course, renewed today. T total was drawn today, and we will call him with the results. He will follow up in May 2023, and will have a T and PSAT level drawn prior to this visit.  TT 58 (11/10/22), 52 (5/1/23) PSA 0.10 (5/10/22), 0.10 (5/1/2023, both on finasteride. Medications remain stable and no known medication allergies.  The patient denies fevers, chills, nausea and or vomiting and no unexplained weight loss.  All pertinent parts of the patient PFSH (past medical, family and social histories), laboratory, radiological studies and physician notes were reviewed prior to starting the face to face portion of the visit. Questionnaire results were discussed with patient.  Patient seen 5/9/2023 to review recent lab work, for PABLO and reassess symptoms. He has been off TRT for almost 2 years. He continues on finasteride for his stable voiding symptoms. He reports nocturia x2. He denies gross hematuria and dysuria. UA trace RBC, protein +1 IPSS 14 KENNY 1 ANNA 4 PVR 2 cc He is doing well urologically and will continue on the finasteride for his stable voiding symptoms. Regarding the prior TRT replacement, he agrees that he will stay off it. A DEXA bone scan was ordered to evaluate any residual effects from the TRT. As for the microhematuria, a urine C+S and cytology were sent and we will call him with the results. He will follow up in 6 months and have a PSA and testosterone drawn prior to that visit. PSA 0.1 (= 0.2 due to finasteride effect) C+S, cytology negative. DEXA bone scan 6/2/2023 showed osteopenia.  Patient seen 11/8/2023 to reassess. He has had stable urination with a good stream during the day, slower at night, no dysuria, no gross hematuria, nocturia x 2-3 which he states is tolerable. He has also had a lump which he found on self exam behind his right nipple 2 weeks ago, This is under evaluation and he is going for imaging under the care of his new PCP Vern Mclaughlin. UA small bilirubin, ketone, protein. IPSS 12 AAM 5 KENNY 17 We reviewed the patient's stable lower urinary tract symptoms on finasteride only. We discussed the use of tamsulosin in an attempt to minimize his nocturia, however patient declined since he finds this symptom tolerable and would rather not take another medication on a regular basis. Blood was sent today for PSA and total testosterone and I will review these results with the patient by phone. If they remain stable then follow-up in 6 months at which time we will perform his yearly PABLO. We will also repeat the testosterone prior to his next visit and this order was written. He declined renewal of his finasteride at this time since it was just renewed by his PCP. He will contact me if he needs an renewal moving forward. He will continue on this medicine as ordered. As for the newly identified right breast mass, I encouraged him to continue with his plans for imaging and follow-up with his PCP. After the patient left, I also emailed him a copy of his DEXA scan from earlier in the year which identified osteopenia. I recommend that he share this with his new PCP also.  TT 54.1 PSA 0.1 Both stable, results to patient by phone, FU as planned 6 months.   Patient seen 5/9/2024 to reassess. PSA from 4/29/2024 stable at 0.07. He describes his LUTS as stable and remains off any TRT. As for his right breast mass, he had a negative Mammogram 11/21023 by history. He tells me that now he has masses in both breasts and had a repeat mammography 5/8/2024 with the diagnosis of gynecomastia. Results are pending. He is also seeing a specialist this week for further evaluation and treatment. UA negative IPSS 7 ANNA 5 KENNY 20 PVR 34 ml. We discussed today the patient's stable lower urinary tract symptoms and benign evaluation today. I recommended follow-up in 6 months. We will order PSA test to be drawn shortly before that visit. As for his presumptive diagnosis of gynecomastia, I encouraged him to continue his evaluation. We discussed the need to also check his hormonal axis with FSH LH prolactin estradiol and testosterone levels. These orders were placed and if his physician does not order these with her evaluation later in the week, that he will come to my office to have them done. We will follow-up in 6 months.   Patient seen TODAY 12/5/2024 to reassess.  PSA from 11/27/2024 0.12 (=0.24 on finasteride) stable. He told me that he had surgery for his gynecomastia about 1 month ago. His urination varies, but he feels as though he empties well with a reasonable stream. He has nocturia x 3 usually. He asks if he should consider restarting the testosterone, which he has been off for about 2 years now. UA negative IPSS 15 ANNA 4 KENNY 18 PVR 12ml

## 2025-01-16 NOTE — ASSESSMENT & PLAN NOTE
Report given to Savanah Escamilla RN.   · Intermittent episodes of hypoxia - currently on 2 L via nasal cannula at rest  · CXR with questionable infiltrates although in the absence of upper respiratory symptoms  · Infectious Disease following - on IV Ampicillin already for primary issue  · Encourage incentive spirometry

## (undated) DEVICE — SPONGE LAP 18 X 18 IN STRL RFD

## (undated) DEVICE — INTENDED FOR TISSUE SEPARATION, AND OTHER PROCEDURES THAT REQUIRE A SHARP SURGICAL BLADE TO PUNCTURE OR CUT.: Brand: BARD-PARKER ® CARBON RIB-BACK BLADES

## (undated) DEVICE — CULTURE TUBE ANAEROBIC

## (undated) DEVICE — PAD CAST 3 IN COTTON NON STRL

## (undated) DEVICE — TUBING SUCTION 5MM X 12 FT

## (undated) DEVICE — ACE WRAP 4 IN STERILE

## (undated) DEVICE — IODOFORM PACKING STRIP: Brand: CURITY

## (undated) DEVICE — CURITY NON-ADHERENT STRIPS: Brand: CURITY

## (undated) DEVICE — ACE WRAP 4 IN UNSTERILE

## (undated) DEVICE — SPONGE PVP SCRUB WING STERILE

## (undated) DEVICE — CURITY STRETCH BANDAGE: Brand: CURITY

## (undated) DEVICE — PAD GROUNDING ADULT

## (undated) DEVICE — THE SIMPULSE SOLO SYSTEM WITH ULTREX RETRACTABLE SPLASH SHIELD TIP: Brand: SIMPULSE SOLO

## (undated) DEVICE — KERLIX BANDAGE ROLL: Brand: KERLIX

## (undated) DEVICE — DRESSING XEROFORM 5 X 9

## (undated) DEVICE — STOCKINETTE REGULAR

## (undated) DEVICE — PREP PAD BNS: Brand: CONVERTORS

## (undated) DEVICE — OCCLUSIVE GAUZE STRIP,3% BISMUTH TRIBROMOPHENATE IN PETROLATUM BLEND: Brand: XEROFORM

## (undated) DEVICE — GLOVE SRG BIOGEL 7.5

## (undated) DEVICE — LIGHT HANDLE COVER SLEEVE DISP BLUE STELLAR

## (undated) DEVICE — GLOVE SRG BIOGEL 7

## (undated) DEVICE — GLOVE INDICATOR PI UNDERGLOVE SZ 7.5 BLUE

## (undated) DEVICE — SYRINGE 10ML LL

## (undated) DEVICE — NEEDLE 25G X 1 1/2

## (undated) DEVICE — 3M™ V.A.C.® GRANUFOAM™ DRESSING KIT, M8275052, MEDIUM: Brand: 3M™ V.A.C.® GRANUFOAM™

## (undated) DEVICE — BETHLEHEM UNIVERSAL  MIONR EXT: Brand: CARDINAL HEALTH

## (undated) DEVICE — MEDI-VAC YANK SUCT HNDL W/TPRD BULBOUS TIP: Brand: CARDINAL HEALTH

## (undated) DEVICE — 10FR FRAZIER SUCTION HANDLE: Brand: CARDINAL HEALTH

## (undated) DEVICE — VIAL DECANTER

## (undated) DEVICE — PADDING CAST 4 IN  COTTON STRL

## (undated) DEVICE — SPONGE STICK WITH PVP-I: Brand: KENDALL

## (undated) DEVICE — BLADE SAGITTAL 25.6 X 9.5MM

## (undated) DEVICE — ABDOMINAL PAD: Brand: DERMACEA

## (undated) DEVICE — CULTURE TUBE AEROBIC

## (undated) DEVICE — GAUZE SPONGES,16 PLY: Brand: CURITY